# Patient Record
Sex: MALE | Race: WHITE | NOT HISPANIC OR LATINO
[De-identification: names, ages, dates, MRNs, and addresses within clinical notes are randomized per-mention and may not be internally consistent; named-entity substitution may affect disease eponyms.]

---

## 2017-01-30 ENCOUNTER — APPOINTMENT (OUTPATIENT)
Dept: PULMONOLOGY | Facility: CLINIC | Age: 72
End: 2017-01-30

## 2018-06-16 ENCOUNTER — INPATIENT (INPATIENT)
Facility: HOSPITAL | Age: 73
LOS: 2 days | Discharge: HOME | DRG: 314 | End: 2018-06-19
Attending: STUDENT IN AN ORGANIZED HEALTH CARE EDUCATION/TRAINING PROGRAM | Admitting: STUDENT IN AN ORGANIZED HEALTH CARE EDUCATION/TRAINING PROGRAM
Payer: COMMERCIAL

## 2018-06-16 VITALS
SYSTOLIC BLOOD PRESSURE: 105 MMHG | HEART RATE: 124 BPM | OXYGEN SATURATION: 86 % | RESPIRATION RATE: 34 BRPM | DIASTOLIC BLOOD PRESSURE: 59 MMHG

## 2018-06-16 DIAGNOSIS — R09.02 HYPOXEMIA: ICD-10-CM

## 2018-06-16 DIAGNOSIS — I10 ESSENTIAL (PRIMARY) HYPERTENSION: ICD-10-CM

## 2018-06-16 DIAGNOSIS — Z90.89 ACQUIRED ABSENCE OF OTHER ORGANS: Chronic | ICD-10-CM

## 2018-06-16 DIAGNOSIS — Z72.0 TOBACCO USE: ICD-10-CM

## 2018-06-16 DIAGNOSIS — R57.9 SHOCK, UNSPECIFIED: ICD-10-CM

## 2018-06-16 DIAGNOSIS — E78.5 HYPERLIPIDEMIA, UNSPECIFIED: ICD-10-CM

## 2018-06-16 DIAGNOSIS — N39.0 URINARY TRACT INFECTION, SITE NOT SPECIFIED: ICD-10-CM

## 2018-06-16 DIAGNOSIS — N17.9 ACUTE KIDNEY FAILURE, UNSPECIFIED: ICD-10-CM

## 2018-06-16 DIAGNOSIS — I95.9 HYPOTENSION, UNSPECIFIED: ICD-10-CM

## 2018-06-16 DIAGNOSIS — Z87.442 PERSONAL HISTORY OF URINARY CALCULI: Chronic | ICD-10-CM

## 2018-06-16 DIAGNOSIS — I48.91 UNSPECIFIED ATRIAL FIBRILLATION: ICD-10-CM

## 2018-06-16 DIAGNOSIS — I31.3 PERICARDIAL EFFUSION (NONINFLAMMATORY): ICD-10-CM

## 2018-06-16 LAB
ALBUMIN SERPL ELPH-MCNC: 3.1 G/DL — LOW (ref 3.3–5)
ALBUMIN SERPL ELPH-MCNC: 3.4 G/DL — SIGNIFICANT CHANGE UP (ref 3.3–5)
ALP SERPL-CCNC: 95 U/L — SIGNIFICANT CHANGE UP (ref 40–120)
ALP SERPL-CCNC: 95 U/L — SIGNIFICANT CHANGE UP (ref 40–120)
ALT FLD-CCNC: 10 U/L — SIGNIFICANT CHANGE UP (ref 10–45)
ALT FLD-CCNC: 12 U/L — SIGNIFICANT CHANGE UP (ref 10–45)
ANION GAP SERPL CALC-SCNC: 18 MMOL/L — HIGH (ref 5–17)
ANION GAP SERPL CALC-SCNC: 21 MMOL/L — HIGH (ref 5–17)
APPEARANCE UR: ABNORMAL
APTT BLD: 25.6 SEC — LOW (ref 27.5–37.4)
APTT BLD: 27.8 SEC — SIGNIFICANT CHANGE UP (ref 27.5–37.4)
AST SERPL-CCNC: 11 U/L — SIGNIFICANT CHANGE UP (ref 10–40)
AST SERPL-CCNC: 12 U/L — SIGNIFICANT CHANGE UP (ref 10–40)
BACTERIA # UR AUTO: ABNORMAL /HPF
BASE EXCESS BLDV CALC-SCNC: -0.1 MMOL/L — SIGNIFICANT CHANGE UP (ref -2–2)
BASOPHILS # BLD AUTO: 0 K/UL — SIGNIFICANT CHANGE UP (ref 0–0.2)
BASOPHILS # BLD AUTO: 0 K/UL — SIGNIFICANT CHANGE UP (ref 0–0.2)
BASOPHILS NFR BLD AUTO: 0 % — SIGNIFICANT CHANGE UP (ref 0–2)
BASOPHILS NFR BLD AUTO: 0.1 % — SIGNIFICANT CHANGE UP (ref 0–2)
BILIRUB SERPL-MCNC: 0.5 MG/DL — SIGNIFICANT CHANGE UP (ref 0.2–1.2)
BILIRUB SERPL-MCNC: 0.7 MG/DL — SIGNIFICANT CHANGE UP (ref 0.2–1.2)
BILIRUB UR-MCNC: NEGATIVE — SIGNIFICANT CHANGE UP
BLD GP AB SCN SERPL QL: NEGATIVE — SIGNIFICANT CHANGE UP
BUN SERPL-MCNC: 77 MG/DL — HIGH (ref 7–23)
BUN SERPL-MCNC: 86 MG/DL — HIGH (ref 7–23)
CA-I SERPL-SCNC: 1.18 MMOL/L — SIGNIFICANT CHANGE UP (ref 1.12–1.3)
CALCIUM SERPL-MCNC: 8.7 MG/DL — SIGNIFICANT CHANGE UP (ref 8.4–10.5)
CALCIUM SERPL-MCNC: 9.4 MG/DL — SIGNIFICANT CHANGE UP (ref 8.4–10.5)
CHLORIDE BLDV-SCNC: 96 MMOL/L — SIGNIFICANT CHANGE UP (ref 96–108)
CHLORIDE SERPL-SCNC: 91 MMOL/L — LOW (ref 96–108)
CHLORIDE SERPL-SCNC: 96 MMOL/L — SIGNIFICANT CHANGE UP (ref 96–108)
CHLORIDE UR-SCNC: <35 MMOL/L — SIGNIFICANT CHANGE UP
CK MB CFR SERPL CALC: 2.8 NG/ML — SIGNIFICANT CHANGE UP (ref 0–6.7)
CK SERPL-CCNC: 55 U/L — SIGNIFICANT CHANGE UP (ref 30–200)
CO2 BLDV-SCNC: 26 MMOL/L — SIGNIFICANT CHANGE UP (ref 22–30)
CO2 SERPL-SCNC: 19 MMOL/L — LOW (ref 22–31)
CO2 SERPL-SCNC: 21 MMOL/L — LOW (ref 22–31)
COLOR SPEC: YELLOW — SIGNIFICANT CHANGE UP
COMMENT - URINE: SIGNIFICANT CHANGE UP
COMMENT - URINE: SIGNIFICANT CHANGE UP
CREAT ?TM UR-MCNC: 102 MG/DL — SIGNIFICANT CHANGE UP
CREAT SERPL-MCNC: 2.76 MG/DL — HIGH (ref 0.5–1.3)
CREAT SERPL-MCNC: 3.42 MG/DL — HIGH (ref 0.5–1.3)
DIFF PNL FLD: ABNORMAL
EOSINOPHIL # BLD AUTO: 0.1 K/UL — SIGNIFICANT CHANGE UP (ref 0–0.5)
EOSINOPHIL # BLD AUTO: 0.2 K/UL — SIGNIFICANT CHANGE UP (ref 0–0.5)
EOSINOPHIL NFR BLD AUTO: 0.7 % — SIGNIFICANT CHANGE UP (ref 0–6)
EOSINOPHIL NFR BLD AUTO: 0.7 % — SIGNIFICANT CHANGE UP (ref 0–6)
EPI CELLS # UR: SIGNIFICANT CHANGE UP /HPF
GAS PNL BLDV: 133 MMOL/L — LOW (ref 136–145)
GAS PNL BLDV: SIGNIFICANT CHANGE UP
GAS PNL BLDV: SIGNIFICANT CHANGE UP
GLUCOSE BLDV-MCNC: 217 MG/DL — HIGH (ref 70–99)
GLUCOSE SERPL-MCNC: 216 MG/DL — HIGH (ref 70–99)
GLUCOSE SERPL-MCNC: 362 MG/DL — HIGH (ref 70–99)
GLUCOSE UR QL: NEGATIVE — SIGNIFICANT CHANGE UP
HCO3 BLDV-SCNC: 25 MMOL/L — SIGNIFICANT CHANGE UP (ref 21–29)
HCT VFR BLD CALC: 35.6 % — LOW (ref 39–50)
HCT VFR BLD CALC: 39.1 % — SIGNIFICANT CHANGE UP (ref 39–50)
HCT VFR BLDA CALC: 36 % — LOW (ref 39–50)
HGB BLD CALC-MCNC: 11.8 G/DL — LOW (ref 13–17)
HGB BLD-MCNC: 12.3 G/DL — LOW (ref 13–17)
HGB BLD-MCNC: 13.4 G/DL — SIGNIFICANT CHANGE UP (ref 13–17)
HYALINE CASTS # UR AUTO: ABNORMAL
INR BLD: 1.28 RATIO — HIGH (ref 0.88–1.16)
INR BLD: 1.3 RATIO — HIGH (ref 0.88–1.16)
KETONES UR-MCNC: ABNORMAL
LACTATE BLDV-MCNC: 2.2 MMOL/L — HIGH (ref 0.7–2)
LACTATE SERPL-SCNC: 1.3 MMOL/L — SIGNIFICANT CHANGE UP (ref 0.7–2)
LEUKOCYTE ESTERASE UR-ACNC: ABNORMAL
LYMPHOCYTES # BLD AUTO: 0.6 K/UL — LOW (ref 1–3.3)
LYMPHOCYTES # BLD AUTO: 1.4 K/UL — SIGNIFICANT CHANGE UP (ref 1–3.3)
LYMPHOCYTES # BLD AUTO: 3.3 % — LOW (ref 13–44)
LYMPHOCYTES # BLD AUTO: 6.6 % — LOW (ref 13–44)
MAGNESIUM SERPL-MCNC: 2.3 MG/DL — SIGNIFICANT CHANGE UP (ref 1.6–2.6)
MAGNESIUM SERPL-MCNC: 2.4 MG/DL — SIGNIFICANT CHANGE UP (ref 1.6–2.6)
MCHC RBC-ENTMCNC: 30.7 PG — SIGNIFICANT CHANGE UP (ref 27–34)
MCHC RBC-ENTMCNC: 31.2 PG — SIGNIFICANT CHANGE UP (ref 27–34)
MCHC RBC-ENTMCNC: 34.3 GM/DL — SIGNIFICANT CHANGE UP (ref 32–36)
MCHC RBC-ENTMCNC: 34.6 GM/DL — SIGNIFICANT CHANGE UP (ref 32–36)
MCV RBC AUTO: 89.6 FL — SIGNIFICANT CHANGE UP (ref 80–100)
MCV RBC AUTO: 90.1 FL — SIGNIFICANT CHANGE UP (ref 80–100)
MONOCYTES # BLD AUTO: 0.5 K/UL — SIGNIFICANT CHANGE UP (ref 0–0.9)
MONOCYTES # BLD AUTO: 2.5 K/UL — HIGH (ref 0–0.9)
MONOCYTES NFR BLD AUTO: 12.1 % — SIGNIFICANT CHANGE UP (ref 2–14)
MONOCYTES NFR BLD AUTO: 2.4 % — SIGNIFICANT CHANGE UP (ref 2–14)
NEUTROPHILS # BLD AUTO: 16.7 K/UL — HIGH (ref 1.8–7.4)
NEUTROPHILS # BLD AUTO: 17.3 K/UL — HIGH (ref 1.8–7.4)
NEUTROPHILS NFR BLD AUTO: 80.5 % — HIGH (ref 43–77)
NEUTROPHILS NFR BLD AUTO: 93.5 % — HIGH (ref 43–77)
NITRITE UR-MCNC: NEGATIVE — SIGNIFICANT CHANGE UP
NT-PROBNP SERPL-SCNC: 1818 PG/ML — HIGH (ref 0–300)
OTHER CELLS CSF MANUAL: 7 ML/DL — LOW (ref 18–22)
PCO2 BLDV: 42 MMHG — SIGNIFICANT CHANGE UP (ref 35–50)
PH BLDV: 7.38 — SIGNIFICANT CHANGE UP (ref 7.35–7.45)
PH UR: 5.5 — SIGNIFICANT CHANGE UP (ref 5–8)
PHOSPHATE SERPL-MCNC: 3.5 MG/DL — SIGNIFICANT CHANGE UP (ref 2.5–4.5)
PHOSPHATE SERPL-MCNC: 4.4 MG/DL — SIGNIFICANT CHANGE UP (ref 2.5–4.5)
PLAT MORPH BLD: NORMAL — SIGNIFICANT CHANGE UP
PLATELET # BLD AUTO: 382 K/UL — SIGNIFICANT CHANGE UP (ref 150–400)
PLATELET # BLD AUTO: 397 K/UL — SIGNIFICANT CHANGE UP (ref 150–400)
PO2 BLDV: 27 MMHG — SIGNIFICANT CHANGE UP (ref 25–45)
POTASSIUM BLDV-SCNC: 4.2 MMOL/L — SIGNIFICANT CHANGE UP (ref 3.5–5.3)
POTASSIUM SERPL-MCNC: 4.5 MMOL/L — SIGNIFICANT CHANGE UP (ref 3.5–5.3)
POTASSIUM SERPL-MCNC: 4.9 MMOL/L — SIGNIFICANT CHANGE UP (ref 3.5–5.3)
POTASSIUM SERPL-SCNC: 4.5 MMOL/L — SIGNIFICANT CHANGE UP (ref 3.5–5.3)
POTASSIUM SERPL-SCNC: 4.9 MMOL/L — SIGNIFICANT CHANGE UP (ref 3.5–5.3)
POTASSIUM UR-SCNC: 36 MMOL/L — SIGNIFICANT CHANGE UP
PROCALCITONIN SERPL-MCNC: 0.32 NG/ML — HIGH (ref 0.02–0.1)
PROT SERPL-MCNC: 7.3 G/DL — SIGNIFICANT CHANGE UP (ref 6–8.3)
PROT SERPL-MCNC: 7.6 G/DL — SIGNIFICANT CHANGE UP (ref 6–8.3)
PROT UR-MCNC: 30 MG/DL
PROTHROM AB SERPL-ACNC: 14 SEC — HIGH (ref 9.8–12.7)
PROTHROM AB SERPL-ACNC: 14.3 SEC — HIGH (ref 9.8–12.7)
RAPID RVP RESULT: SIGNIFICANT CHANGE UP
RBC # BLD: 3.95 M/UL — LOW (ref 4.2–5.8)
RBC # BLD: 4.36 M/UL — SIGNIFICANT CHANGE UP (ref 4.2–5.8)
RBC # FLD: 12.7 % — SIGNIFICANT CHANGE UP (ref 10.3–14.5)
RBC # FLD: 12.8 % — SIGNIFICANT CHANGE UP (ref 10.3–14.5)
RBC BLD AUTO: SIGNIFICANT CHANGE UP
RBC CASTS # UR COMP ASSIST: ABNORMAL /HPF (ref 0–2)
RH IG SCN BLD-IMP: POSITIVE — SIGNIFICANT CHANGE UP
SAO2 % BLDV: 46 % — LOW (ref 67–88)
SODIUM SERPL-SCNC: 133 MMOL/L — LOW (ref 135–145)
SODIUM SERPL-SCNC: 133 MMOL/L — LOW (ref 135–145)
SODIUM UR-SCNC: 32 MMOL/L — SIGNIFICANT CHANGE UP
SP GR SPEC: 1.01 — SIGNIFICANT CHANGE UP (ref 1.01–1.02)
TROPONIN T, HIGH SENSITIVITY RESULT: 30 NG/L — SIGNIFICANT CHANGE UP (ref 0–51)
TROPONIN T, HIGH SENSITIVITY RESULT: 40 NG/L — SIGNIFICANT CHANGE UP (ref 0–51)
TROPONIN T, HIGH SENSITIVITY RESULT: 49 NG/L — SIGNIFICANT CHANGE UP (ref 0–51)
TSH SERPL-MCNC: 0.01 UIU/ML — LOW (ref 0.27–4.2)
UROBILINOGEN FLD QL: 4 MG/DL
WBC # BLD: 18.5 K/UL — HIGH (ref 3.8–10.5)
WBC # BLD: 20.8 K/UL — HIGH (ref 3.8–10.5)
WBC # FLD AUTO: 18.5 K/UL — HIGH (ref 3.8–10.5)
WBC # FLD AUTO: 20.8 K/UL — HIGH (ref 3.8–10.5)
WBC UR QL: SIGNIFICANT CHANGE UP /HPF (ref 0–5)

## 2018-06-16 PROCEDURE — 93308 TTE F-UP OR LMTD: CPT | Mod: 26,GC

## 2018-06-16 PROCEDURE — 99223 1ST HOSP IP/OBS HIGH 75: CPT | Mod: GC

## 2018-06-16 PROCEDURE — 93010 ELECTROCARDIOGRAM REPORT: CPT

## 2018-06-16 PROCEDURE — 71250 CT THORAX DX C-: CPT | Mod: 26

## 2018-06-16 PROCEDURE — 93308 TTE F-UP OR LMTD: CPT | Mod: 26

## 2018-06-16 PROCEDURE — 99291 CRITICAL CARE FIRST HOUR: CPT

## 2018-06-16 PROCEDURE — 71045 X-RAY EXAM CHEST 1 VIEW: CPT | Mod: 26

## 2018-06-16 PROCEDURE — 93306 TTE W/DOPPLER COMPLETE: CPT | Mod: 26

## 2018-06-16 RX ORDER — LANOLIN ALCOHOL/MO/W.PET/CERES
5 CREAM (GRAM) TOPICAL AT BEDTIME
Qty: 0 | Refills: 0 | Status: DISCONTINUED | OUTPATIENT
Start: 2018-06-16 | End: 2018-06-19

## 2018-06-16 RX ORDER — GLUCAGON INJECTION, SOLUTION 0.5 MG/.1ML
1 INJECTION, SOLUTION SUBCUTANEOUS ONCE
Qty: 0 | Refills: 0 | Status: DISCONTINUED | OUTPATIENT
Start: 2018-06-16 | End: 2018-06-19

## 2018-06-16 RX ORDER — AZITHROMYCIN 500 MG/1
500 TABLET, FILM COATED ORAL ONCE
Qty: 0 | Refills: 0 | Status: COMPLETED | OUTPATIENT
Start: 2018-06-16 | End: 2018-06-16

## 2018-06-16 RX ORDER — IPRATROPIUM/ALBUTEROL SULFATE 18-103MCG
3 AEROSOL WITH ADAPTER (GRAM) INHALATION ONCE
Qty: 0 | Refills: 0 | Status: COMPLETED | OUTPATIENT
Start: 2018-06-16 | End: 2018-06-16

## 2018-06-16 RX ORDER — DEXTROSE 50 % IN WATER 50 %
25 SYRINGE (ML) INTRAVENOUS ONCE
Qty: 0 | Refills: 0 | Status: DISCONTINUED | OUTPATIENT
Start: 2018-06-16 | End: 2018-06-19

## 2018-06-16 RX ORDER — INSULIN LISPRO 100/ML
VIAL (ML) SUBCUTANEOUS AT BEDTIME
Qty: 0 | Refills: 0 | Status: DISCONTINUED | OUTPATIENT
Start: 2018-06-16 | End: 2018-06-19

## 2018-06-16 RX ORDER — AZTREONAM 2 G
2000 VIAL (EA) INJECTION EVERY 6 HOURS
Qty: 0 | Refills: 0 | Status: DISCONTINUED | OUTPATIENT
Start: 2018-06-16 | End: 2018-06-16

## 2018-06-16 RX ORDER — SODIUM CHLORIDE 9 MG/ML
500 INJECTION INTRAMUSCULAR; INTRAVENOUS; SUBCUTANEOUS ONCE
Qty: 0 | Refills: 0 | Status: COMPLETED | OUTPATIENT
Start: 2018-06-16 | End: 2018-06-16

## 2018-06-16 RX ORDER — IPRATROPIUM/ALBUTEROL SULFATE 18-103MCG
3 AEROSOL WITH ADAPTER (GRAM) INHALATION EVERY 6 HOURS
Qty: 0 | Refills: 0 | Status: DISCONTINUED | OUTPATIENT
Start: 2018-06-16 | End: 2018-06-19

## 2018-06-16 RX ORDER — DEXTROSE 50 % IN WATER 50 %
12.5 SYRINGE (ML) INTRAVENOUS ONCE
Qty: 0 | Refills: 0 | Status: DISCONTINUED | OUTPATIENT
Start: 2018-06-16 | End: 2018-06-19

## 2018-06-16 RX ORDER — NOREPINEPHRINE BITARTRATE/D5W 8 MG/250ML
0.05 PLASTIC BAG, INJECTION (ML) INTRAVENOUS
Qty: 8 | Refills: 0 | Status: DISCONTINUED | OUTPATIENT
Start: 2018-06-16 | End: 2018-06-17

## 2018-06-16 RX ORDER — AZTREONAM 2 G
500 VIAL (EA) INJECTION EVERY 8 HOURS
Qty: 0 | Refills: 0 | Status: DISCONTINUED | OUTPATIENT
Start: 2018-06-16 | End: 2018-06-17

## 2018-06-16 RX ORDER — CEFTRIAXONE 500 MG/1
1 INJECTION, POWDER, FOR SOLUTION INTRAMUSCULAR; INTRAVENOUS ONCE
Qty: 0 | Refills: 0 | Status: DISCONTINUED | OUTPATIENT
Start: 2018-06-16 | End: 2018-06-16

## 2018-06-16 RX ORDER — SODIUM CHLORIDE 9 MG/ML
2000 INJECTION INTRAMUSCULAR; INTRAVENOUS; SUBCUTANEOUS ONCE
Qty: 0 | Refills: 0 | Status: COMPLETED | OUTPATIENT
Start: 2018-06-16 | End: 2018-06-16

## 2018-06-16 RX ORDER — SODIUM CHLORIDE 9 MG/ML
1000 INJECTION INTRAMUSCULAR; INTRAVENOUS; SUBCUTANEOUS ONCE
Qty: 0 | Refills: 0 | Status: COMPLETED | OUTPATIENT
Start: 2018-06-16 | End: 2018-06-16

## 2018-06-16 RX ORDER — INSULIN LISPRO 100/ML
VIAL (ML) SUBCUTANEOUS
Qty: 0 | Refills: 0 | Status: DISCONTINUED | OUTPATIENT
Start: 2018-06-16 | End: 2018-06-19

## 2018-06-16 RX ORDER — DEXTROSE 50 % IN WATER 50 %
15 SYRINGE (ML) INTRAVENOUS ONCE
Qty: 0 | Refills: 0 | Status: DISCONTINUED | OUTPATIENT
Start: 2018-06-16 | End: 2018-06-19

## 2018-06-16 RX ORDER — AZTREONAM 2 G
2000 VIAL (EA) INJECTION ONCE
Qty: 0 | Refills: 0 | Status: COMPLETED | OUTPATIENT
Start: 2018-06-16 | End: 2018-06-16

## 2018-06-16 RX ORDER — SODIUM CHLORIDE 9 MG/ML
1000 INJECTION, SOLUTION INTRAVENOUS
Qty: 0 | Refills: 0 | Status: DISCONTINUED | OUTPATIENT
Start: 2018-06-16 | End: 2018-06-19

## 2018-06-16 RX ORDER — NICOTINE POLACRILEX 2 MG
1 GUM BUCCAL DAILY
Qty: 0 | Refills: 0 | Status: DISCONTINUED | OUTPATIENT
Start: 2018-06-16 | End: 2018-06-17

## 2018-06-16 RX ADMIN — Medication 7.5 MICROGRAM(S)/KG/MIN: at 13:31

## 2018-06-16 RX ADMIN — Medication 5 MILLIGRAM(S): at 22:46

## 2018-06-16 RX ADMIN — Medication 100 MILLIGRAM(S): at 14:39

## 2018-06-16 RX ADMIN — Medication 125 MILLIGRAM(S): at 11:45

## 2018-06-16 RX ADMIN — AZITHROMYCIN 250 MILLIGRAM(S): 500 TABLET, FILM COATED ORAL at 12:39

## 2018-06-16 RX ADMIN — Medication 1 PATCH: at 16:54

## 2018-06-16 RX ADMIN — Medication 3 MILLILITER(S): at 11:45

## 2018-06-16 RX ADMIN — Medication 3 MILLILITER(S): at 11:55

## 2018-06-16 RX ADMIN — Medication 7.5 MICROGRAM(S)/KG/MIN: at 20:30

## 2018-06-16 RX ADMIN — SODIUM CHLORIDE 1000 MILLILITER(S): 9 INJECTION INTRAMUSCULAR; INTRAVENOUS; SUBCUTANEOUS at 15:45

## 2018-06-16 RX ADMIN — SODIUM CHLORIDE 2000 MILLILITER(S): 9 INJECTION INTRAMUSCULAR; INTRAVENOUS; SUBCUTANEOUS at 11:50

## 2018-06-16 RX ADMIN — SODIUM CHLORIDE 1000 MILLILITER(S): 9 INJECTION INTRAMUSCULAR; INTRAVENOUS; SUBCUTANEOUS at 13:12

## 2018-06-16 NOTE — H&P ADULT - NEGATIVE GENERAL GENITOURINARY SYMPTOMS
no flank pain R/no urine discoloration/no nocturia/no incontinence/no urinary hesitancy/no hematuria/no flank pain L/no dysuria

## 2018-06-16 NOTE — ED PROVIDER NOTE - PROGRESS NOTE DETAILS
Patient with B-lines on right now, IVC still collapsible, will limit fluids due to concern for overload and start peripheral pressors. Patient seen by MICU, on echo now showing early signs of tamponade, stat cards consult called. Teena REYNA: Patient signed out by Dr. Gonzalez pending cardiology recommendations. Patient accepted to CCU for early tamponade. Patient's BP is stable.

## 2018-06-16 NOTE — H&P ADULT - NEGATIVE ENMT SYMPTOMS
no nasal discharge/no throat pain/no nose bleeds/no dysphagia/no tinnitus/no vertigo/no sinus symptoms/no nasal congestion/no hearing difficulty

## 2018-06-16 NOTE — H&P ADULT - PROBLEM SELECTOR PLAN 6
pt satting 86% on RA on admission w/ audible wheezing - ? undiagnosed COPD given significant smoking history, now satting well on 2L, in no distress, speaking in full sentences, no infiltrate on CT chest, does not appear acutely exacerbated @ this time, will order nichelle PRN  Wells criteria 1.5, low suspicion PE despite recent long car ride given improved sat and HR.  does not appear volume overloaded on exam pt satting 86% on RA on admission w/ audible wheezing - ? undiagnosed COPD given significant smoking history, now satting well on 2L, in no distress, speaking in full sentences, no infiltrate on CT chest, does not appear acutely exacerbated @ this time, will order nichelle PRN  Wells criteria 1.5, low suspicion PE despite recent long car ride given improved sat and HR.  Pt w/ BALTAZAR worsening x 3 mos w/ multiple possible causes including undiagnosed COPD, noted moderate AS on TTE and ? pt going into AF w/ out symptoms. Does not appear volume overloaded on exam

## 2018-06-16 NOTE — ED PROVIDER NOTE - MEDICAL DECISION MAKING DETAILS
72 year old male, past medical history HTN, hyperlipidemia, DM, presents to the ED for shortness of breath. Patient with increasing weakness, fatigue, decreased PO intake. Patient A-fib RVR at PMD's office. On exam hypotensive, tachycardic in 110s but well appearing, mentating well, tachypnea with diffuse rhonchi, few wheezes. POCUS shows pleural effusion without signs of tamponade, collapsible IVC. Could be in hypovolemic shock given decreased PO intake vs septic shock given shortness of breath with pulmonary source. Also with difficulty urinating could have UTI. The shock like state likely placed patient in renal failure given decreased urine output. Will start empiric abx, fluids, pressors if needed. Hold further diltiazem see if fluids helps with the a-fib RVR. Don't suspect calcium channel blocker overdose given not bradycardic. Also, given shortness of breath and smoking history concern for COPD exacerbation given smoking history. Check labwork, ekg, cxr, ua, culture, and reeval.

## 2018-06-16 NOTE — H&P ADULT - ASSESSMENT
71 yo M HTN, HLD, DM2 (does not know a1c, does not check glucose), nephrolithiasis, pulm nodules p/w hypoxia on RA, new A fib RVR ([id number]), pericardial effusion w/ possible  hypotension s/p 3.5 L NS on levophed gtt, PARRISH, UTI

## 2018-06-16 NOTE — CONSULT NOTE ADULT - ASSESSMENT
72M pmh of HTN, hyperlipidemia, DM, COPD, 1 ppd current everyday smoker, presented to the ER, sent from urgent care, where he was found to be in rapid afib with RVR and short of breath.  Work up revealed a large pericardial effusion on echo and mild pericardial effusion without tamponade physiology on CT scan.  Interventional cardiology and IR were consulted regarding urgent drainage and both communicated that the location would not be amenable to drainage.   CTS was consulted regarding surgical intervention for therapeutic/diagnostic pericardiocentesis.   As patient is hemodynamically stable, with /70 on levophed that can be titrated to off, and chest CT without evidence of tamponade physiology, patient to be monitored in CCU and we will continue to follow along with you.      [id number]  CTS 72M pmh of HTN, hyperlipidemia, DM, COPD, 1 ppd current everyday smoker, presented to the ER, sent from urgent care, where he was found to be in rapid afib with RVR and short of breath.  Work up revealed a large pericardial effusion on echo and mild pericardial effusion without tamponade physiology on CT scan.  Interventional cardiology and IR were consulted regarding urgent drainage and both communicated that the location would not be amenable to drainage.   CTS was consulted regarding surgical intervention for therapeutic/diagnostic pericardiocentesis.   As patient is hemodynamically stable, with /70 on levophed that can be titrated to off, and chest CT without evidence of tamponade physiology, urgent surgical intervention is not indicated at this time.  Reconsult as needed.    [id number]  CTS

## 2018-06-16 NOTE — CHART NOTE - NSCHARTNOTEFT_GEN_A_CORE
====================  CCU MIDNIGHT ROUNDS  ====================    MILAGROS VIERA  [id number]  Patient is a 72y old  Male who presents with a chief complaint of AF RVR, hypotension (16 Jun 2018 22:13)      ====================  SUMMARY:  ====================    HPI:  71 yo M HTN, HLD, DM2 (does not know a1c, does not check glucose), nephrolithiasis, pulm nodules p/w 3-4 days of decreased appetite, dec UOP and malaise/fatigue. Pt presented to urgent care this AM and was sent to the ED for hypotension and tachycardia (reported AF RVR), s/p 10 mg IVP cardizem per EMS per ED note. Pt does not know what medications he takes or if any have been changed recently. He denies prior history of A fib. He last saw his PCP ~1 mo ago.   In the ED, pt was wheezing & satting 86% on RA w/ improved O2 sat s/p 3 duonebs and 4L O2 via NC. Pt given 125 mg IV solu medrol. Pt tachycardic 100s-120s w/ SBP as low as 70s systolic. Pt given 3.5L NS and started on levophed for hypotension. Give azithromax for COPD exacerbation and azactam for PNA. UA +. Pt noted to have PARRISH (denies history of CKD). CT chest w/ mild pericardial effusion mild increased ground glass opacity in the RUL suggestive of asymmetric pulmonary edema and POCUS w/ pericardial effusion. Formal TTE showing moderate AS, large (>2 cm) pericardial effusion around LV that appears smaller around the RV (<1cm), no respiratory variation across the MV, envagination of the RA, no RV diastolic collapse is seen, IVC is poorly seen. Pt converted to sinus rhythm and is without complaints @ this time, stating in the ED he ate and urinated for the first time in 4 days.  Pt denies previous cardiac history. Denies previous arrhtyhmia or heart failure. His exercise tolerance has decreased over the past ~3 mos, he now gets dyspneic after walking ~ .5 mile. No CP/palpitations/SOB. Denies orthopnea, PND, LE edema, weight gain or loss. No history of PE/DVT or malignancy. No nausea/vomiting/diarrhea. No fevers/chills. Has chronic smoker's cough occasionally, no sputum production.  No sick contacts. No known tick bites. He has driven ~14 hours recently and was recently in the [facility] and [place] (16 Jun 2018 22:13)      ====================  NEW EVENTS:  ====================        ====================  VITALS (Last 12 hrs):  ====================    T(C): 36.4 (06-16-18 @ 23:00), Max: 37.1 (06-16-18 @ 16:57)  T(F): 97.6 (06-16-18 @ 23:00), Max: 98.8 (06-16-18 @ 16:57)  HR: 102 (06-16-18 @ 23:00) (87 - 124)  BP: 125/68 (06-16-18 @ 23:00) (53/56 - 133/70)  BP(mean): 85 (06-16-18 @ 23:00) (81 - 92)  ABP: --  ABP(mean): --  RR: 22 (06-16-18 @ 23:00) (18 - 34)  SpO2: 93% (06-16-18 @ 23:00) (86% - 100%)  Wt(kg): --  CVP(mm Hg): --  CVP(cm H2O): --  CO: --  CI: --  PA: --  PA(mean): --  PCWP: --  SVR: --  PVR: --    I&O's Summary    16 Jun 2018 07:01  -  16 Jun 2018 23:24  --------------------------------------------------------  IN: 330 mL / OUT: 400 mL / NET: -70 mL            ====================  NEW LABS:  ====================                          12.3   18.5  )-----------( 397      ( 16 Jun 2018 21:17 )             35.6     06-16    133<L>  |  96  |  77<H>  ----------------------------<  362<H>  4.9   |  19<L>  |  2.76<H>    Ca    8.7      16 Jun 2018 21:17  Phos  3.5     06-16  Mg     2.3     06-16    TPro  7.3  /  Alb  3.1<L>  /  TBili  0.5  /  DBili  x   /  AST  11  /  ALT  10  /  AlkPhos  95  06-16    PT/INR - ( 16 Jun 2018 21:17 )   PT: 14.3 sec;   INR: 1.30 ratio         PTT - ( 16 Jun 2018 21:17 )  PTT:25.6 sec  Creatine Kinase, Serum: 55 U/L (06-16-18 @ 21:17)    CKMB Units: 2.8 ng/mL (06-16 @ 21:17)          ====================  PLAN:  ====================  71 yo M HTN, HLD, DM2 (does not know a1c, does not check glucose), nephrolithiasis, pulm nodules p/w hypoxia on RA, new A fib RVR ([id number]), pericardial effusion w/ possible  hypotension s/p 3.5 L NS on levophed gtt, PARRISH, UTI          Problem/Plan - 1:  ·  Problem: Hypotension.  Plan: likely multifactorial s/t hypovolemia s/t poor PO intake and decreased UOP now improved s/p volume resuscitation, ? sepsis component w/ +UA, ? r/t pericardial effusion, ? r/t arrhythmia (currently in sinus)  pressor requirements decreasing, wean levophed as tolerated, appears dry to euvolemic on exam, consider additional fluid given poor PO intake PTA, pericardial effusion and preload dependence and possible sepsis s/t UTI.      Problem/Plan - 2:  ·  Problem: Pericardial effusion.  Plan: unclear etiology, no recent illnesses, no signs/symptoms of pericarditis, w/ hx of pulm nodules (stable per CT chest read), CT chest w/ mult anterior mediastinal lymph nodes and paratracheal lymphadenopathy, thyroid glad extending into upper mediastinum   hemodynamically stable @ this time w/ decreasing pressor requirements, appears dry to euvolemic on exam, consider additional fluids to try to wean pressors, CT surgery consulted if need for emergent drain arises   would need cytology if pericardiocentesis performed.      Problem/Plan - 3:  ·  Problem: Atrial fibrillation.  Plan: now in sinus, no known history of AF, no evidence for ischemia, TSH low, check thyroid panel. Treat UTI. Monitor pericardial effusion.  [id number] - hold AC @ this time as pt w/ pericardial effusion and currently in sinus.      Problem/Plan - 4:  ·  Problem: PARRISH (acute kidney injury).  Plan: pt w/ PARRISH, likely pre-renal in nature given poor PO intake and poor UOP, Cr and UOP improved w/ NS in ED   c/w azactam for + UA, f/u urine culture, f/u urine lytes and renal sono  strict I&Os, daily weights, monitor Cr and lytes.      Problem/Plan - 5:  ·  Problem: UTI (urinary tract infection).  Plan: c/w azactam, f/u urine culture.      Problem/Plan - 6:  Problem: Hypoxia. Plan: pt satting 86% on RA on admission w/ audible wheezing - ? undiagnosed COPD given significant smoking history, now satting well on 2L, in no distress, speaking in full sentences, no infiltrate on CT chest, does not appear acutely exacerbated @ this time, will order duonebs PRN  Wells criteria 1.5, low suspicion PE despite recent long car ride given improved sat and HR.  Pt w/ BALTAZAR worsening x 3 mos w/ multiple possible causes including undiagnosed COPD, noted moderate AS on TTE and ? pt going into AF w/ out symptoms. Does not appear volume overloaded on exam.     Problem/Plan - 7:  ·  Problem: Hypertension.  Plan: currently hypotensive, wean pressors as tolerated  call pharmacy in AM for med list as pt does not know home meds.      Problem/Plan - 8:  ·  Problem: Hyperlipidemia.  Plan: clarify and resume home statin.      Problem/Plan - 9:  ·  Problem: Tobacco user.  Plan: counseled on tobacco cessation, nicotine patch offered and accepted     GoC discussion had w/ patient, patient re-affirmed code status DNR/DNI      Joon Mason MD PGY2   [facility] Pager #: [phone]   Pager #: [phone]

## 2018-06-16 NOTE — H&P ADULT - NEGATIVE GENERAL SYMPTOMS
no weight loss/no polyphagia/no chills/no polydipsia/no sweating/no polyuria/no fever/no weight gain

## 2018-06-16 NOTE — PATIENT PROFILE ADULT. - VISION (WITH CORRECTIVE LENSES IF THE PATIENT USUALLY WEARS THEM):
Partially impaired: cannot see medication labels or newsprint, but can see obstacles in path, and the surrounding layout; can count fingers at arm's length/Distance

## 2018-06-16 NOTE — ED PROCEDURE NOTE - PROCEDURE ADDITIONAL DETAILS
Large pericardial effusion without evidence of tamponade  Right sided B lines.  possible pleural effusion on right

## 2018-06-16 NOTE — ED ADULT NURSE REASSESSMENT NOTE - NS ED NURSE REASSESS COMMENT FT1
Levophed drip started at 0.05mcg/kg/min. BP of 91/57 noted, pt communicating well in full sentences. Pt has no complaints at this time, pt remains on 4L NC with SpO2 of 98%. MD at bedside
MD Granados aware of BP of 128/66, told to titrate down on Levophed drip to keep MAP of 65.
Pharmacy called about Nicotine patch.
Pharmacy called again for medication
Pharmacy called for medication
Pt brought to CT with two RNs present and ED Tech.
Pt tried to go to the bathroom in urinal to obtain urine sample but was unable to go, safety maintained.
Pt's BP remains low at 71/51, Levophed increased to 0.3mcg/kg/min. Third IV line started, NS Bolus running. Pt continues to communicate well in full sentences, states he feels tired. Pt is alert and talking with wife at bedside. SpO2 of 95% on 5L NC noted, safety maintained.
Pt's BP went back down to 80's/60's, Levophed increased to 0.5mcg/kg/min
pt remains a&oX4 resting comfortably in high fowlers position, in no apparent distress. Pt remains on 4L NC. No work of breathing noted. Wife at bedside. Levophed drip going.

## 2018-06-16 NOTE — H&P ADULT - PROBLEM SELECTOR PLAN 1
likely multifactorial s/t hypovolemia s/t poor PO intake and decreased UOP now improved s/p volume resuscitation, ? sepsis component w/ +UA, ? r/t pericardial effusion, ? r/t arrhythmia (currently in sinus)  pressor requirements decreasing, wean levophed as tolerated, appears dry to euvolemic on exam, consider additional fluid given poor PO intake PTA, pericardial effusion and preload dependence and possible sepsis s/t UTI

## 2018-06-16 NOTE — CHART NOTE - NSCHARTNOTEFT_GEN_A_CORE
:  Donaldo Chou  INDICATION:  hypotension  PROCEDURE:  [ x] LIMITED ECHO  [ ] LIMITED CHEST  [ ] LIMITED RETROPERITONEAL  [ ] LIMITED ABDOMINAL  [ ] LIMITED DVT  [ ] NEEDLE GUIDANCE VASCULAR  [ ] NEEDLE GUIDANCE THORACENTESIS  [ ] NEEDLE GUIDANCE PARACENTESIS  [ ] NEEDLE GUIDANCE PERICARDIOCENTESIS  [ ] OTHER    FINDINGS:  Large pericardial effusion with RV collapse in diastole  partial RA collapse in systole. Large IVC    INTERPRETATION:.  Early Tamponade.

## 2018-06-16 NOTE — ED PROVIDER NOTE - OBJECTIVE STATEMENT
72 year old male, past medical history HTN, hyperlipidemia, DM, presents to the ED for shortness of breath. Patient reports that since Wednesday he has been feeling weak, fatigued, with decreased appetite. Today patient went to primary medical doctor Dr. Campbell, and was noted to be short of breath and in new A-fib RVR. Patient reports yesterday his Diltiazem was increased to 180mg. He received 10mg Cardizem in the office. Today patient reports increasing shortness of breath. No wheezing. No chest pain, abdominal pain, nausea, vomiting, diaphoresis. Does reports sitting in car for 6 hours 2-3 days ago.     primary medical doctor: Dr. Campbell

## 2018-06-16 NOTE — H&P ADULT - PROBLEM SELECTOR PLAN 4
pt w/ PARRISH, likely pre-renal in nature given poor PO intake and poor UOP, Cr and UOP improved w/ NS in ED   c/w azactam for + UA, f/u urine culture, f/u urine lytes and renal sono  strict I&Os, daily weights, monitor Cr and lytes

## 2018-06-16 NOTE — CONSULT NOTE ADULT - SUBJECTIVE AND OBJECTIVE BOX
CHIEF COMPLAINT:    HPI:  72M PMHx HTN, HLD, DM2 presented to ED from     PAST MEDICAL & SURGICAL HISTORY:  Pulmonary nodules  COPD (chronic obstructive pulmonary disease)  Diabetes mellitus, type 2  Kidney stones  Hypertension  History of tonsillectomy  History of kidney stones      FAMILY HISTORY:  Family history of early CAD (Father): father  MI age 83      SOCIAL HISTORY:  Smoking: [ ] Never Smoked [ ] Former Smoker (__ packs x ___ years) [ ] Current Smoker  (__ packs x ___ years)  Substance Use: [ ] Never Used [ ] Used ____  EtOH Use:  Marital Status: [ ] Single [ ]  [ ]  [ ]   Sexual History:   Occupation:  Recent Travel:  Country of Birth:  Advance Directives:    Allergies    penicillins (Unknown)    Intolerances        HOME MEDICATIONS:    REVIEW OF SYSTEMS:  Constitutional: [x ] negative [ ] fevers [ ] chills [ ] weight loss [ ] weight gain  HEENT: [ ] negative [ ] dry eyes [ ] eye irritation [ ] postnasal drip [ ] nasal congestion  CV: [x ] negative  [ ] chest pain [ ] orthopnea [ ] palpitations [ ] murmur  Resp: [ ] negative [ x] cough [ ] shortness of breath [ ] dyspnea [ ] wheezing [ ] sputum [ ] hemoptysis  GI: [ x] negative [ ] nausea [ ] vomiting [ ] diarrhea [ ] constipation [ ] abd pain [ ] dysphagia   : [ x] negative [ ] dysuria [ ] nocturia [ ] hematuria [ ] increased urinary frequency  Musculoskeletal: [x ] negative [ ] back pain [ ] myalgias [ ] arthralgias [ ] fracture  Skin: [x ] negative [ ] rash [ ] itch  Neurological: [x ] negative [ ] headache [ ] dizziness [ ] syncope [ ] weakness [ ] numbness  Psychiatric: [x ] negative [ ] anxiety [ ] depression  Endocrine: [x ] negative [ ] diabetes [ ] thyroid problem  Hematologic/Lymphatic: [x ] negative [ ] anemia [ ] bleeding problem  Allergic/Immunologic: [ ] negative [ ] itchy eyes [ ] nasal discharge [ ] hives [ ] angioedema  [ ] All other systems negative  [ ] Unable to assess ROS because ________    OBJECTIVE:  ICU Vital Signs Last 24 Hrs  T(C): 36.6 (16 Jun 2018 12:19), Max: 36.6 (16 Jun 2018 12:19)  T(F): 97.8 (16 Jun 2018 12:19), Max: 97.8 (16 Jun 2018 12:19)  HR: 106 (16 Jun 2018 14:34) (87 - 124)  BP: 128/66 (16 Jun 2018 14:34) (53/56 - 128/66)  BP(mean): --  ABP: --  ABP(mean): --  RR: 18 (16 Jun 2018 14:34) (18 - 34)  SpO2: 95% (16 Jun 2018 14:34) (86% - 100%)        CAPILLARY BLOOD GLUCOSE      POCT Blood Glucose.: 218 mg/dL (16 Jun 2018 11:40)      PHYSICAL EXAM:  General: nad, a+o x3  HEENT:   Lymph Nodes:  Neck: supple  Respiratory: CTAB no w/r/r; cough, no resp distress  Cardiovascular: tachycardic, no m/g/r  Abdomen: obese, soft, NT  Extremities: scant pit edema BL  Skin: no ulcers, rashes  Neurological:  Psychiatry: nl affect    LINES:     HOSPITAL MEDICATIONS:  MEDICATIONS  (STANDING):  nicotine -  14 mG/24Hr(s) Patch 1 patch Transdermal daily  norepinephrine Infusion 0.05 MICROgram(s)/kG/Min (7.5 mL/Hr) IV Continuous <Continuous>  sodium chloride 0.9% Bolus 500 milliLiter(s) IV Bolus once    MEDICATIONS  (PRN):      LABS:                        13.4   20.8  )-----------( 382      ( 16 Jun 2018 11:54 )             39.1     Hgb Trend: 13.4<--  06-16    133<L>  |  91<L>  |  86<H>  ----------------------------<  216<H>  4.5   |  21<L>  |  3.42<H>    Ca    9.4      16 Jun 2018 11:54  Phos  4.4     06-16  Mg     2.4     06-16    TPro  7.6  /  Alb  3.4  /  TBili  0.7  /  DBili  x   /  AST  12  /  ALT  12  /  AlkPhos  95  06-16    Creatinine Trend: 3.42<--  PT/INR - ( 16 Jun 2018 11:54 )   PT: 14.0 sec;   INR: 1.28 ratio         PTT - ( 16 Jun 2018 11:54 )  PTT:27.8 sec      Venous Blood Gas:  06-16 @ 11:54  7.38/42/27/25/46  VBG Lactate: 2.2      MICROBIOLOGY:     RADIOLOGY:  [ ] Reviewed and interpreted by me    EKG: CHIEF COMPLAINT:    HPI:  72 year old male, past medical history HTN, hyperlipidemia, DM, presents to the ED for shortness of breath. Patient reports that since Wednesday he has been feeling weak, fatigued, with decreased appetite. Today patient went to primary medical doctor Dr. Campbell, and was noted to be short of breath and in new A-fib RVR. Patient reports yesterday his Diltiazem was increased to 180mg. He received 10mg Cardizem in the office. Today patient reports increasing shortness of breath. No wheezing. No chest pain, abdominal pain, nausea, vomiting, diaphoresis. Does reports sitting in car for 6 hours 2-3 days ago.    In ED, T: 97.8, HR: 110, BP: 92/60, RR: 23 SpO2: 96%  Labs: WBC elevated to 20.8, PARRISH to 3.42 (last 1.06 in 2016), BNP: 1818  POCUS showing pericardial effusion with early tamponade     PAST MEDICAL & SURGICAL HISTORY:  Pulmonary nodules  COPD (chronic obstructive pulmonary disease)  Diabetes mellitus, type 2  Kidney stones  Hypertension  History of tonsillectomy  History of kidney stones      FAMILY HISTORY:  Family history of early CAD (Father): father  MI age 83      SOCIAL HISTORY:  Smoking: [ ] Never Smoked [ ] Former Smoker (__ packs x ___ years) [ ] Current Smoker  (__ packs x ___ years)  Substance Use: [ ] Never Used [ ] Used ____  EtOH Use:  Marital Status: [ ] Single [ ]  [ ]  [ ]   Sexual History:   Occupation:  Recent Travel:  Country of Birth:  Advance Directives:    Allergies    penicillins (Unknown)    Intolerances        HOME MEDICATIONS:    REVIEW OF SYSTEMS:  Constitutional: [x ] negative [ ] fevers [ ] chills [ ] weight loss [ ] weight gain  HEENT: [ ] negative [ ] dry eyes [ ] eye irritation [ ] postnasal drip [ ] nasal congestion  CV: [x ] negative  [ ] chest pain [ ] orthopnea [ ] palpitations [ ] murmur  Resp: [ ] negative [ x] cough [ ] shortness of breath [ ] dyspnea [ ] wheezing [ ] sputum [ ] hemoptysis  GI: [ x] negative [ ] nausea [ ] vomiting [ ] diarrhea [ ] constipation [ ] abd pain [ ] dysphagia   : [ x] negative [ ] dysuria [ ] nocturia [ ] hematuria [ ] increased urinary frequency  Musculoskeletal: [x ] negative [ ] back pain [ ] myalgias [ ] arthralgias [ ] fracture  Skin: [x ] negative [ ] rash [ ] itch  Neurological: [x ] negative [ ] headache [ ] dizziness [ ] syncope [ ] weakness [ ] numbness  Psychiatric: [x ] negative [ ] anxiety [ ] depression  Endocrine: [x ] negative [ ] diabetes [ ] thyroid problem  Hematologic/Lymphatic: [x ] negative [ ] anemia [ ] bleeding problem  Allergic/Immunologic: [ ] negative [ ] itchy eyes [ ] nasal discharge [ ] hives [ ] angioedema  [ ] All other systems negative  [ ] Unable to assess ROS because ________    OBJECTIVE:  ICU Vital Signs Last 24 Hrs  T(C): 36.6 (16 Jun 2018 12:19), Max: 36.6 (16 Jun 2018 12:19)  T(F): 97.8 (16 Jun 2018 12:19), Max: 97.8 (16 Jun 2018 12:19)  HR: 106 (16 Jun 2018 14:34) (87 - 124)  BP: 128/66 (16 Jun 2018 14:34) (53/56 - 128/66)  BP(mean): --  ABP: --  ABP(mean): --  RR: 18 (16 Jun 2018 14:34) (18 - 34)  SpO2: 95% (16 Jun 2018 14:34) (86% - 100%)        CAPILLARY BLOOD GLUCOSE      POCT Blood Glucose.: 218 mg/dL (16 Jun 2018 11:40)      PHYSICAL EXAM:  General: nad, a+o x3  HEENT:   Lymph Nodes:  Neck: supple  Respiratory: CTAB no w/r/r; cough, no resp distress  Cardiovascular: tachycardic, no m/g/r  Abdomen: obese, soft, NT  Extremities: scant pit edema BL  Skin: no ulcers, rashes  Neurological:  Psychiatry: nl affect    LINES:     HOSPITAL MEDICATIONS:  MEDICATIONS  (STANDING):  nicotine -  14 mG/24Hr(s) Patch 1 patch Transdermal daily  norepinephrine Infusion 0.05 MICROgram(s)/kG/Min (7.5 mL/Hr) IV Continuous <Continuous>  sodium chloride 0.9% Bolus 500 milliLiter(s) IV Bolus once    MEDICATIONS  (PRN):      LABS:                        13.4   20.8  )-----------( 382      ( 16 Jun 2018 11:54 )             39.1     Hgb Trend: 13.4<--  06-16    133<L>  |  91<L>  |  86<H>  ----------------------------<  216<H>  4.5   |  21<L>  |  3.42<H>    Ca    9.4      16 Jun 2018 11:54  Phos  4.4     06-16  Mg     2.4     06-16    TPro  7.6  /  Alb  3.4  /  TBili  0.7  /  DBili  x   /  AST  12  /  ALT  12  /  AlkPhos  95  06-16    Creatinine Trend: 3.42<--  PT/INR - ( 16 Jun 2018 11:54 )   PT: 14.0 sec;   INR: 1.28 ratio         PTT - ( 16 Jun 2018 11:54 )  PTT:27.8 sec      Venous Blood Gas:  06-16 @ 11:54  7.38/42/27/25/46  VBG Lactate: 2.2      MICROBIOLOGY:     RADIOLOGY:  [ ] Reviewed and interpreted by me    EKG: CHIEF COMPLAINT:    HPI:  72M PMHx HTN, HLD, DM2 presenting from his doctor's office for new AF with RVR. He was at usual state of health but began feeling weak, fatigued with decreased po appetite as well as low urine output. Otherwise has not noted fever, chills, SOB, abd pain, diarrhea, dysuria. He has chronic cough, has 55 pack year smoking history, but has not changed from baseline; not productive. He had recent travel to [place] and [place] but no sick contacts. Of note his diltiazem was increased from 120 to 180 yesterday. He saw his primary, Dr. Campbell who noted pt to be short of breath and new afib with RVR. He currently denies CP, SOB, palpitations.  In ED, T: 97.8, HR: 110, BP: 92/60, RR: 23 SpO2: 96%  Labs: WBC elevated to 20.8, PARRISH to 3.42 (last 1.06 in 2016), BNP: 1818  POCUS showing large pericardial effusion with signs concerning for tamponade    PAST MEDICAL & SURGICAL HISTORY:  Pulmonary nodules  COPD (chronic obstructive pulmonary disease)  Diabetes mellitus, type 2  Kidney stones  Hypertension  History of tonsillectomy  History of kidney stones      FAMILY HISTORY:  Family history of early CAD (Father): father  MI age 83      SOCIAL HISTORY:  Smoking: [ ] Never Smoked [ ] Former Smoker (__ packs x ___ years) [ ] Current Smoker  (__ packs x ___ years)  Substance Use: [ ] Never Used [ ] Used ____  EtOH Use:  Marital Status: [ ] Single [ ]  [ ]  [ ]   Sexual History:   Occupation:  Recent Travel:  Country of Birth:  Advance Directives:    Allergies    penicillins (Unknown)    Intolerances        HOME MEDICATIONS:    REVIEW OF SYSTEMS:  Constitutional: [x ] negative [ ] fevers [ ] chills [ ] weight loss [ ] weight gain  HEENT: [ ] negative [ ] dry eyes [ ] eye irritation [ ] postnasal drip [ ] nasal congestion  CV: [x ] negative  [ ] chest pain [ ] orthopnea [ ] palpitations [ ] murmur  Resp: [ ] negative [ x] cough [ ] shortness of breath [ ] dyspnea [ ] wheezing [ ] sputum [ ] hemoptysis  GI: [ x] negative [ ] nausea [ ] vomiting [ ] diarrhea [ ] constipation [ ] abd pain [ ] dysphagia   : [ x] negative [ ] dysuria [ ] nocturia [ ] hematuria [ ] increased urinary frequency  Musculoskeletal: [x ] negative [ ] back pain [ ] myalgias [ ] arthralgias [ ] fracture  Skin: [x ] negative [ ] rash [ ] itch  Neurological: [x ] negative [ ] headache [ ] dizziness [ ] syncope [ ] weakness [ ] numbness  Psychiatric: [x ] negative [ ] anxiety [ ] depression  Endocrine: [x ] negative [ ] diabetes [ ] thyroid problem  Hematologic/Lymphatic: [x ] negative [ ] anemia [ ] bleeding problem  Allergic/Immunologic: [ ] negative [ ] itchy eyes [ ] nasal discharge [ ] hives [ ] angioedema  [ ] All other systems negative  [ ] Unable to assess ROS because ________    OBJECTIVE:  ICU Vital Signs Last 24 Hrs  T(C): 36.6 (16 Jun 2018 12:19), Max: 36.6 (16 Jun 2018 12:19)  T(F): 97.8 (16 Jun 2018 12:19), Max: 97.8 (16 Jun 2018 12:19)  HR: 106 (16 Jun 2018 14:34) (87 - 124)  BP: 128/66 (16 Jun 2018 14:34) (53/56 - 128/66)  BP(mean): --  ABP: --  ABP(mean): --  RR: 18 (16 Jun 2018 14:34) (18 - 34)  SpO2: 95% (16 Jun 2018 14:34) (86% - 100%)        CAPILLARY BLOOD GLUCOSE      POCT Blood Glucose.: 218 mg/dL (16 Jun 2018 11:40)      PHYSICAL EXAM:  General: nad, a+o x3  HEENT:   Lymph Nodes:  Neck: supple  Respiratory: CTAB no w/r/r; cough, no resp distress  Cardiovascular: tachycardic, no m/g/r  Abdomen: obese, soft, NT  Extremities: scant pit edema BL  Skin: no ulcers, rashes  Neurological:  Psychiatry: nl affect    LINES:     HOSPITAL MEDICATIONS:  MEDICATIONS  (STANDING):  nicotine -  14 mG/24Hr(s) Patch 1 patch Transdermal daily  norepinephrine Infusion 0.05 MICROgram(s)/kG/Min (7.5 mL/Hr) IV Continuous <Continuous>  sodium chloride 0.9% Bolus 500 milliLiter(s) IV Bolus once    MEDICATIONS  (PRN):      LABS:                        13.4   20.8  )-----------( 382      ( 16 Jun 2018 11:54 )             39.1     Hgb Trend: 13.4<--  06-16    133<L>  |  91<L>  |  86<H>  ----------------------------<  216<H>  4.5   |  21<L>  |  3.42<H>    Ca    9.4      16 Jun 2018 11:54  Phos  4.4     06-16  Mg     2.4     06-16    TPro  7.6  /  Alb  3.4  /  TBili  0.7  /  DBili  x   /  AST  12  /  ALT  12  /  AlkPhos  95  06-16    Creatinine Trend: 3.42<--  PT/INR - ( 16 Jun 2018 11:54 )   PT: 14.0 sec;   INR: 1.28 ratio         PTT - ( 16 Jun 2018 11:54 )  PTT:27.8 sec      Venous Blood Gas:  06-16 @ 11:54  7.38/42/27/25/46  VBG Lactate: 2.2      MICROBIOLOGY:     RADIOLOGY:  [ ] Reviewed and interpreted by me    EKG: CHIEF COMPLAINT:    HPI:  72M PMHx HTN, HLD, DM2 presenting from his doctor's office for new AF with RVR. He was at usual state of health but began feeling weak, fatigued with decreased po appetite as well as low urine output. Otherwise has not noted fever, chills, SOB, abd pain, diarrhea, dysuria. He has chronic cough, has 55 pack year smoking history, but has not changed from baseline; not productive. He had recent travel to [place] and [place] but no sick contacts. Of note his diltiazem was increased from 120 to 180 yesterday. He saw his primary, Dr. Campbell who noted pt to be short of breath and new afib with RVR. He currently denies CP, SOB, palpitations. Pt confirms he is DNR/ DNI.   In ED, T: 97.8, HR: 110, BP: 92/60, RR: 23 SpO2: 96%  Labs: WBC elevated to 20.8, PARRISH to 3.42 (last 1.06 in 2016), BNP: 1818  POCUS showing large pericardial effusion with signs concerning for tamponade    PAST MEDICAL & SURGICAL HISTORY:  Pulmonary nodules  COPD (chronic obstructive pulmonary disease)  Diabetes mellitus, type 2  Kidney stones  Hypertension  History of tonsillectomy  History of kidney stones      FAMILY HISTORY:  Family history of early CAD (Father): father  MI age 83      SOCIAL HISTORY:  Smoking: [ ] Never Smoked [ ] Former Smoker (__ packs x ___ years) [ ] Current Smoker  (__ packs x ___ years)  Substance Use: [ ] Never Used [ ] Used ____  EtOH Use:  Marital Status: [ ] Single [ ]  [ ]  [ ]   Sexual History:   Occupation:  Recent Travel:  Country of Birth:  Advance Directives:    Allergies    penicillins (Unknown)    Intolerances        HOME MEDICATIONS:    REVIEW OF SYSTEMS:  Constitutional: [x ] negative [ ] fevers [ ] chills [ ] weight loss [ ] weight gain  HEENT: [ ] negative [ ] dry eyes [ ] eye irritation [ ] postnasal drip [ ] nasal congestion  CV: [x ] negative  [ ] chest pain [ ] orthopnea [ ] palpitations [ ] murmur  Resp: [ ] negative [ x] cough [ ] shortness of breath [ ] dyspnea [ ] wheezing [ ] sputum [ ] hemoptysis  GI: [ x] negative [ ] nausea [ ] vomiting [ ] diarrhea [ ] constipation [ ] abd pain [ ] dysphagia   : [ x] negative [ ] dysuria [ ] nocturia [ ] hematuria [ ] increased urinary frequency  Musculoskeletal: [x ] negative [ ] back pain [ ] myalgias [ ] arthralgias [ ] fracture  Skin: [x ] negative [ ] rash [ ] itch  Neurological: [x ] negative [ ] headache [ ] dizziness [ ] syncope [ ] weakness [ ] numbness  Psychiatric: [x ] negative [ ] anxiety [ ] depression  Endocrine: [x ] negative [ ] diabetes [ ] thyroid problem  Hematologic/Lymphatic: [x ] negative [ ] anemia [ ] bleeding problem  Allergic/Immunologic: [ ] negative [ ] itchy eyes [ ] nasal discharge [ ] hives [ ] angioedema  [ ] All other systems negative  [ ] Unable to assess ROS because ________    OBJECTIVE:  ICU Vital Signs Last 24 Hrs  T(C): 36.6 (16 Jun 2018 12:19), Max: 36.6 (16 Jun 2018 12:19)  T(F): 97.8 (16 Jun 2018 12:19), Max: 97.8 (16 Jun 2018 12:19)  HR: 106 (16 Jun 2018 14:34) (87 - 124)  BP: 128/66 (16 Jun 2018 14:34) (53/56 - 128/66)  BP(mean): --  ABP: --  ABP(mean): --  RR: 18 (16 Jun 2018 14:34) (18 - 34)  SpO2: 95% (16 Jun 2018 14:34) (86% - 100%)        CAPILLARY BLOOD GLUCOSE      POCT Blood Glucose.: 218 mg/dL (16 Jun 2018 11:40)      PHYSICAL EXAM:  General: nad, a+o x3  HEENT:   Lymph Nodes:  Neck: supple  Respiratory: CTAB no w/r/r; cough, no resp distress  Cardiovascular: tachycardic, no m/g/r  Abdomen: obese, soft, NT  Extremities: scant pit edema BL  Skin: no ulcers, rashes  Neurological:  Psychiatry: nl affect    LINES:     HOSPITAL MEDICATIONS:  MEDICATIONS  (STANDING):  nicotine -  14 mG/24Hr(s) Patch 1 patch Transdermal daily  norepinephrine Infusion 0.05 MICROgram(s)/kG/Min (7.5 mL/Hr) IV Continuous <Continuous>  sodium chloride 0.9% Bolus 500 milliLiter(s) IV Bolus once    MEDICATIONS  (PRN):      LABS:                        13.4   20.8  )-----------( 382      ( 16 Jun 2018 11:54 )             39.1     Hgb Trend: 13.4<--  06-16    133<L>  |  91<L>  |  86<H>  ----------------------------<  216<H>  4.5   |  21<L>  |  3.42<H>    Ca    9.4      16 Jun 2018 11:54  Phos  4.4     06-16  Mg     2.4     06-16    TPro  7.6  /  Alb  3.4  /  TBili  0.7  /  DBili  x   /  AST  12  /  ALT  12  /  AlkPhos  95  06-16    Creatinine Trend: 3.42<--  PT/INR - ( 16 Jun 2018 11:54 )   PT: 14.0 sec;   INR: 1.28 ratio         PTT - ( 16 Jun 2018 11:54 )  PTT:27.8 sec      Venous Blood Gas:  06-16 @ 11:54  7.38/42/27/25/46  VBG Lactate: 2.2      MICROBIOLOGY:     RADIOLOGY:  [ ] Reviewed and interpreted by me    EKG:

## 2018-06-16 NOTE — H&P ADULT - FAMILY HISTORY
Father  Still living? Unknown  Family history of MI (myocardial infarction), Age at diagnosis: 81-90

## 2018-06-16 NOTE — H&P ADULT - NEGATIVE NEUROLOGICAL SYMPTOMS
no paresthesias/no difficulty walking/no confusion/no transient paralysis/no weakness/no generalized seizures/no vertigo/no syncope/no focal seizures/no loss of consciousness/no headache

## 2018-06-16 NOTE — CONSULT NOTE ADULT - ASSESSMENT
72M PMHx HTN, HLD, DM2 here with shock state in setting of large pericardial effusion with ultrasound findings concerning for cardiac tamponade; pending formal TTE eval. AF with RVR now sinus.    Plan  continue giving aggressive fluids  levophed to MAP 65  cardiology to do formal TTE to further eval tamponade  check TSH  will need ICU level of care, either CCU or MICU pending TTE results

## 2018-06-16 NOTE — ED PROVIDER NOTE - PMH
COPD (chronic obstructive pulmonary disease)    Diabetes mellitus, type 2    Hypertension    Kidney stones    Pulmonary nodules

## 2018-06-16 NOTE — ED PROVIDER NOTE - ATTENDING CONTRIBUTION TO CARE
72M hx HTN, hyperlipidemia, DM, COPD presents from urgent care for increase fatigue, decreased appetite and urinary output x 4d now progressing to dyspnea.  Pt found to be tachycardiac to 120s at urgent care with afib/aflutter.  Given 10mg cardizem by EMS.  Pt has been able to urinate but believes this has been decreased.  Chronic smoker's cough, no known sputum production.  Denies Chest pain, back pain, LE pain/swelling, fever/chills.   Tachycardic with initial hypotension 100s/60s, mild tachypnea  (Attending - Carlos) AAOx3, NCAT, MMM, Trachea midline, No JVD, PERRL, b/l rhonchi worse in bases, tachy, Normal perfusion 2+ radial/DP b/l, soft, NTND, No CVAT b/l, No LE edema, No deformity of extremities, Appropriate, Cooperative, No rashes, CN grossly intact, Normal coordination, No focal motor or sensory deficits. 72M hx HTN, hyperlipidemia, DM, COPD presents from urgent care for increase fatigue, decreased appetite and urinary output x 4d now progressing to dyspnea.  Pt found to be tachycardiac to 120s at urgent care with afib/aflutter.  Given 10mg cardizem by EMS.  Pt has been able to urinate but believes this has been decreased.  Chronic smoker's cough, no known sputum production.  Denies Chest pain, back pain, LE pain/swelling, fever/chills.   Tachycardic with initial hypotension 100s/60s, mild tachypnea  (Attending - Carlos) AAOx3, NCAT, MMM, Trachea midline, No JVD, PERRL, b/l rhonchi worse in bases, tachy, Normal perfusion 2+ radial/DP b/l, soft, NTND, No CVAT b/l, No LE edema, No deformity of extremities, Appropriate, Cooperative, No rashes, CN grossly intact, Normal coordination, No focal motor deficits    EKG with afib/aflutter unclear if new, however, pt one home cardizem, asa 81mg.  POCUS lungs reveals scattered B-likes of R upper lobe, large pericardial effusion but collapsible IVC and not signs of RA/RV collapse.  Undifferentiated hypotension with collapsible IVC less likely to be obstructive however pt with large effusion.  Hypotension maybe secondary to dehydration +/- adverse reaction to cardizem however pt not bradycardic.  Sepsis also in differential given leukocytosis, will treat with IVF bolus, broad spectrum antibiotics, troponin, reassess.  to be admitted

## 2018-06-16 NOTE — H&P ADULT - PROBLEM SELECTOR PLAN 9
counseled on tobacco cessation, nicotine patch offered and accepted     GoC discussion had w/ patient, patient re-affirmed code status DNR/DNI    Joana PARSONS/CCU #[id number]

## 2018-06-16 NOTE — H&P ADULT - PROBLEM SELECTOR PLAN 7
currently hypotensive, wean pressors as tolerated  call pharmacy in AM for med list as pt does not know home meds

## 2018-06-16 NOTE — H&P ADULT - NSHPLABSRESULTS_GEN_ALL_CORE
EKG - sinus rhythm, RBBB (new)     < from: Transthoracic Echocardiogram (06.16.18 @ 15:10) >    1. Peak transaortic valve gradient equals 51 mm Hg, mean  transaortic valve gradient equals 27 mm Hg, aortic valve  velocity time integral equals 57 cm, consistent with  moderate aortic stenosis.  2. Endocardium not well visualized; grossly normal left  ventricular systolic function.  3. Reversal of the E-A waves of the mitral inflow pattern  consistent with reduced compliance of the left ventricle.  4. The right ventricle is not well visualized; grossly  enlarged with normal right ventricular systolic function.  TAPSE 2.1 cm (normal)  5. Large pericardial effusion seen around the left  ventricle (> 2 cm). Effusion appears smaller around the  right ventricle (< 1 cm). No respiratory variation across  the mitral valve seen. There is envagination of the right  atrium. No RV diastolic collapse seen. IVC is poorly seen.  *** No previous Echo exam. Technically difficult study due  to poor imaging windows and rapid heart rate. Echo reviewed  with cardiology fellow on call.    < end of copied text >    < from: CT Chest No Cont (06.16.18 @ 13:32) >    FINDINGS:    CHEST:     LUNGS AND LARGE AIRWAYS: Patent central airways.  Bilateral lower lobe   subsegmental linear atelectasis. Multiple 3 mm right upper lobe nodules,   unchanged from prior study. Previously noted left lower lobe nodule, not   visualized in the current study. Mild groundglass opacity    PLEURA: No pleural effusion.    VESSELS: Calcific atherosclerosis of aorta and coronary arteries.    HEART: Heart size is normal. Mild pericardial effusion with largest   diameter measuring 2.3 cm adjacent to right atrium.    MEDIASTINUM AND JOSSELYN: Redemonstrated multiple anterior mediastinal lymph   nodes and paratracheal lymphadenopathy. Redemonstrated thyroid gland   extending into upper mediastinum.    CHEST WALL AND LOWER NECK: Within normal limits.  In the right upper lobe have the appearance suggesting asymmetric edema   rather than infection.  VISUALIZED UPPER ABDOMEN: Cholelithiasis. A 2.6 cm left interpolar renal   cyst, unchanged.    BONES: Degenerative changes of spine.    IMPRESSION:     Mild pericardial effusion with largest diameter measuring 2.3 cm adjacent   to right atrium. Mild increased groundglass opacity in the right upper   lobe is mostsuggestive of asymmetric pulmonary edema.    < end of copied text >

## 2018-06-16 NOTE — H&P ADULT - PROBLEM SELECTOR PLAN 2
unclear etiology, no recent illnesses, no signs/symptoms of pericarditis, w/ hx of pulm nodules (stable per CT chest read), CT chest w/ mult anterior mediastinal lymph nodes and paratracheal lymphadenopathy, thyroid glad extending into upper mediastinum   hemodynamically stable @ this time w/ decreasing pressor requirements, appears dry to euvolemic on exam, consider additional fluids to try to wean pressors, CT surgery consulted if need for emergent drain arises   would need cytology if pericardiocentesis performed

## 2018-06-16 NOTE — CONSULT NOTE ADULT - PROBLEM SELECTOR RECOMMENDATION 9
No urgent surgical intervention required at this time  Future diagnostic intervention to be determined No urgent surgical intervention required at this time

## 2018-06-16 NOTE — H&P ADULT - NEGATIVE GASTROINTESTINAL SYMPTOMS
no vomiting/no diarrhea/no change in bowel habits/no abdominal pain/no constipation/no hematochezia/no nausea/no melena

## 2018-06-16 NOTE — H&P ADULT - NSHPSOCIALHISTORY_GEN_ALL_CORE
smokes 1 PPD x 55 years   denies ETOH or illicit drug use  retired  lives with wife  IADLs smokes 1 PPD x 55 years   drinks 1-2 beers 4-6 days per week   denies illicit drug use  retired  lives with wife  IADLs

## 2018-06-16 NOTE — ED ADULT NURSE NOTE - OBJECTIVE STATEMENT
72y Male with PMH Afib on baby aspirin and Cardizem, DM, htn and high cholesterol presents to the ED via EMS with 20g to R. hand c/o SOB. 72y Male with PMH Afib on baby aspirin and Cardizem, DM, htn and high cholesterol presents to the ED via EMS with 20g to R. hand c/o SOB. Pt states that since Wednesday he has had SOB, poor appetite, general malaise and has had less frequent urination than normal. Pt states he has been driving a lot in the last few days, a total of 14 hrs. Pt went to urgent care this morning and was sent to the ED for hypotension and tachycardia. EMS gave 10mg Cardizem prior to arrival. Pt presents a&ox4, airway intact, work of breathing noted, wheezing heard to bilateral lung fields 72y Male with PMH Afib on baby aspirin and Cardizem, DM, htn and high cholesterol presents to the ED via EMS with 20g to R. hand c/o SOB. Pt states that since Wednesday he has had SOB, poor appetite, general malaise and has had less frequent urination than normal. Pt states he has been driving a lot in the last few days, a total of 14 hrs. Pt went to urgent care this morning and was sent to the ED for hypotension and tachycardia. EMS gave 10mg Cardizem prior to arrival. Pt presents a&ox4, airway intact, work of breathing noted, wheezing heard to bilateral lung fields, SpO2 of 86% noted on RA, pt placed on 4L NC and given 3 Duoneb treatments, 72y Male with PMH Afib on baby aspirin and Cardizem, DM, htn and high cholesterol presents to the ED via EMS with 20g to R. hand c/o SOB. Pt states that since Wednesday he has had SOB, poor appetite, general malaise and has had less frequent urination than normal. Pt states he has been driving a lot in the last few days, a total of 14 hrs. Pt went to urgent care this morning and was sent to the ED for hypotension and tachycardia. EMS gave 10mg Cardizem prior to arrival. Pt presents a&ox4, airway intact, work of breathing noted, wheezing heard to bilateral lung fields, SpO2 of 86% noted on RA, pt placed on 4L NC and given 3 Duoneb treatments, SpO2 of % noted on O2, skin warm dry and intact, cap refill <2 seconds, peripheral pulses present and equal bilaterally, denies CP, dizziness, ha, n/v, chills/fever, numbness/tingling to extremities, 72y Male with PMH Afib on baby aspirin and Cardizem, DM, htn and high cholesterol presents to the ED via EMS with 20g to R. hand c/o SOB. Pt states that since Wednesday he has had SOB, poor appetite, general malaise and has had less frequent urination than normal. Pt states he has been driving a lot in the last few days, a total of 14 hrs. Pt went to urgent care this morning and was sent to the ED for hypotension and tachycardia. EMS gave 10mg Cardizem prior to arrival. Pt presents a&ox4, airway intact, work of breathing noted, wheezing heard to bilateral lung fields, SpO2 of 86% noted on RA, pt placed on 4L NC and given 3 Duoneb treatments, SpO2 of % noted on O2, skin warm dry and intact, cap refill <2 seconds, peripheral pulses present and equal bilaterally, denies CP, dizziness, ha, n/v, chills/fever, numbness/tingling to extremities. MD at bedside for eval. Safety maintained. 72y Male with PMH Afib on baby aspirin and Cardizem, DM, htn and high cholesterol presents to the ED via EMS with 20g to R. hand c/o SOB. Pt states that since Wednesday he has had SOB, poor appetite, general malaise and has had less frequent urination than normal. Pt states he has been driving a lot in the last few days, a total of 14 hrs. Pt went to urgent care this morning and was sent to the ED for hypotension and tachycardia. EMS gave 10mg Cardizem prior to arrival. Pt states that he normally takes Pt presents a&ox4, airway intact, work of breathing noted, wheezing heard to bilateral lung fields, SpO2 of 86% noted on RA, pt placed on 4L NC and given 3 Duoneb treatments, SpO2 of % noted on O2, skin warm dry and intact, cap refill <2 seconds, peripheral pulses present and equal bilaterally, denies CP, dizziness, ha, n/v, chills/fever, numbness/tingling to extremities. MD at bedside for eval. Safety maintained.

## 2018-06-16 NOTE — H&P ADULT - HISTORY OF PRESENT ILLNESS
71 yo M HTN, HLD, DM2, nephrolithiasis, pulm nodules, 73 yo M HTN, HLD, DM2 (does not know a1c, does not check glucose), nephrolithiasis, pulm nodules p/w 3-4 days of decreased appetite, dec UOP and malaise/fatigue. Pt presented to urgent care this AM and was sent to the ED for hypotension and tachycardia (reported AF RVR), s/p 10 mg IVP cardizem per EMS per ED note. Pt does not know what medications he takes or if any have been changed recently. He denies prior history of A fib. He last saw his PCP ~1 mo ago.   In the ED, pt was wheezing & satting 86% on RA w/ improved O2 sat s/p 3 duonebs and 4L O2 via NC. Pt given 125 mg IV solu medrol. Pt tachycardic 100s-120s w/ SBP as low as 70s systolic. Pt given 3.5L NS and started on levophed for hypotension. Give azithromax for COPD exacerbation and azactam for PNA. UA +. Pt noted to have PARRISH (denies history of CKD). CT chest w/ mild pericardial effusion mild increased ground glass opacity in the RUL suggestive of asymmetric pulmonary edema and POCUS w/ pericardial effusion. Formal TTE showing moderate AS, large (>2 cm) pericardial effusion around LV that appears smaller around the RV (<1cm), no respiratory variation across the MV, envagination of the RA, no RV diastolic collapse is seen, IVC is poorly seen. Pt converted to sinus rhythm and is without complaints @ this time, stating in the ED he ate and urinated for the first time in 4 days.  Pt denies previous cardiac history. Denies previous arrhtyhmia or heart failure. His exercise tolerance has decreased over the past ~3 mos, he now gets dyspneic after walking ~ .5 mile. No CP/palpitations/SOB. Denies orthopnea, PND, LE edema, weight gain or loss. No history of PE/DVT or malignancy. No nausea/vomiting/diarrhea. No fevers/chills. Has chronic smoker's cough occasionally, no sputum production.  No sick contacts. No known tick bites. He has driven ~14 hours recently and was recently in the [facility] and [place]

## 2018-06-16 NOTE — H&P ADULT - PROBLEM SELECTOR PLAN 3
now in sinus, no known history of AF, no evidence for ischemia, TSH low, check thyroid panel. Treat UTI. Monitor pericardial effusion.  [id number] - hold AC @ this time as pt w/ pericardial effusion and currently in sinus

## 2018-06-16 NOTE — CONSULT NOTE ADULT - SUBJECTIVE AND OBJECTIVE BOX
History of Present Illness:  72y Male    Past Medical History  Pulmonary nodules  COPD (chronic obstructive pulmonary disease)  Diabetes mellitus, type 2  Kidney stones  Hypertension      Past Surgical History  History of tonsillectomy  History of kidney stones      MEDICATIONS  (STANDING):  nicotine -  14 mG/24Hr(s) Patch 1 patch Transdermal daily  norepinephrine Infusion 0.05 MICROgram(s)/kG/Min (7.5 mL/Hr) IV Continuous <Continuous>    MEDICATIONS  (PRN):    Antiplatelet therapy:                           Last dose/amt:    Allergies: penicillins (Unknown)      SOCIAL HISTORY:  Smoker: [ ] Yes  [ ] No        PACK YEARS:                         WHEN QUIT?  ETOH use: [ ] Yes  [ ] No              FREQUENCY / QUANTITY:  Ilicit Drug use:  [ ] Yes  [ ] No  Occupation:  Live with:  Assist device use:    Relevant Family History  FAMILY HISTORY:  Family history of early CAD: father  MI age 83      Review of Systems  GENERAL:  Fevers[] chills[] sweats[] fatigue[] weight loss[] weight gain []                                        NEURO:  parathesias[] seizures []  syncope []  confusion []                                                                                  EYES: glasses[]  blurry vision[]  discharge[] pain[] glaucoma []                                                                            ENMT:  difficulty hearing []  vertigo[]  dysphagia[] epistaxis[] recent dental work []                                      CV:  chest pain[] palpitations[] BALTAZAR [] diaphoresis [] edema[]                                                                                             RESPIRATORY:  wheezing[] SOB[] cough [] sputum[] hemoptysis[]                                                                    GI:  nausea[]  vomiting []  diarrhea[] constipation [] melena []                                                                        : hematuria[ ]  dysuria[ ] urgency[] incontinence[]                                                                                              MUSKULOSKELETAL:  arthritis[ ]  joint swelling [ ] muscle weakness [ ]                                                                  SKIN/BREAST:  rash[ ] itching [ ]  hair loss[ ] masses[ ]                                                                                                PSYCH:  dementia [ ] depresion [ ] anxiety[ ]                                                                                                                  HEME/LYMPH:  bruises easily[ ] enlarged lymph nodes[ ] tender lymph nodes[ ]                                                 ENDOCRINE:  cold intolerance[ ] heat intolerance[ ] polydipsia[ ]                                                                              PHYSICAL EXAM  Vital Signs Last 24 Hrs  T(C): 36.5 (2018 20:00), Max: 37.1 (2018 16:57)  T(F): 97.7 (2018 20:00), Max: 98.8 (2018 16:57)  HR: 102 (2018 21:30) (87 - 124)  BP: 133/70 (2018 21:30) (53/56 - 133/70)  BP(mean): 92 (2018 21:30) (81 - 92)  RR: 26 (2018 21:30) (18 - 34)  SpO2: 96% (2018 21:30) (86% - 100%)    General: Well nourished, well developed, no acute distress.                                                         Neuro: Normal exam oriented to person/place & time with no focal motor or sensory  deficits.                    Eyes: Normal exam of conjunctiva & lids, pupils equally reactive.   ENT: Normal exam of nasal/oral mucosa with absence of cyanosis.   Neck: Normal exam of jugular veins, trachea & thyroid.   Chest: Normal lung exam with good air movement absence of wheezes, rales, or rhonchi:                                                                          CV:  Auscultation: normal [ ] S3[ ] S4[ ] Irregular [ ] Rub[ ] Clicks[ ]  Murmurs none:[ ]systolic [ ]  diastolic [ ] holosystolic [ ]  Carotids: No Bruits[ ] Other____________ Abdominal Aorta: normal [ ] nonpalpable[ ]                                                                         GI: Normal exam of abdomen, liver & spleen with no noted masses or tenderness.                                                                                              Extremities: Normal no evidence of cyanosis or deformity Edema: none[ ]trace[ ]1+[ ]2+[ ]3+[ ]4+[ ]  Lower Extremity Pulses: Right[ ] Left[ ]Varicosities[ ]  SKIN : Normal exam to inspection & palation.                                                           LABS:                        12.3   18.5  )-----------( 397      ( 2018 21:17 )             35.6     06-16    133<L>  |  91<L>  |  86<H>  ----------------------------<  216<H>  4.5   |  21<L>  |  3.42<H>    Ca    9.4      2018 11:54  Phos  4.4     06-16  Mg     2.4     06-16    TPro  7.6  /  Alb  3.4  /  TBili  0.7  /  DBili  x   /  AST  12  /  ALT  12  /  AlkPhos  95  06-16    PT/INR - ( 2018 21:17 )   PT: 14.3 sec;   INR: 1.30 ratio         PTT - ( 2018 21:17 )  PTT:25.6 sec  Urinalysis Basic - ( 2018 15:19 )    Color: Yellow / Appearance: SL Turbid / S.015 / pH: x  Gluc: x / Ketone: Trace  / Bili: Negative / Urobili: 4 mg/dL   Blood: x / Protein: 30 mg/dL / Nitrite: Negative   Leuk Esterase: Large / RBC: 10-25 /HPF / WBC 26-50 /HPF   Sq Epi: x / Non Sq Epi: OCC /HPF / Bacteria: Few /HPF              Cardiac Cath:    TTE / MARIA GUADALUPE:    Assessment:  72y Male presents with Shock      Plan: CC: "I was short of breath"  History of Present Illness:  72M, poor historian, with pmh of HTN, hyperlipidemia, DM, COPD, 1 ppd current everyday smoker, presented to the ER, sent from urgent care, where he was found to be in rapid afib with RVR and short of breath.  Endorses that he has been feeling weak, fatigued, and with decreased appetite since last week Wednesday and made an appointment to see his PCP Dr. Campbell. Denies fever, chills, dizziness, syncope, head ache, chest pain, abdominal pain, nausea, vomiting, diaphoresis, nor dysuria.  +extended travel in car for six hours on Wednesday.  Currently patient is located in the ER, Critical area, and found to be sitting out of bed, eating chicken dinner, and demanding to be sent up to a room.  He is on levophed at 0.3 mcg/min(49.5cc) with blood pressure of 148/78,  ST, RR18, SPO2 96% on 4L nc.     Past Medical History  Pulmonary nodules  COPD (chronic obstructive pulmonary disease)  Diabetes mellitus, type 2  Kidney stones  Hypertension      Past Surgical History  History of tonsillectomy  History of kidney stones      MEDICATIONS  (STANDING):  nicotine -  14 mG/24Hr(s) Patch 1 patch Transdermal daily  norepinephrine Infusion 0.05 MICROgram(s)/kG/Min (7.5 mL/Hr) IV Continuous <Continuous>    MEDICATIONS  (PRN):    Antiplatelet therapy:                           Last dose/amt:    Allergies: penicillins (Unknown)      SOCIAL HISTORY:  Smoker: [x ] Yes  [ ] No        PACK YEARS:  1ppd x 55 years  ETOH use: [x ] Yes  [ ] No              FREQUENCY / QUANTITY: 4-5 drinks per week  Ilicit Drug use:  [ ] Yes  [ x] No  Occupation: part-time retired CPA   Live with: spouse  Assist device use: none    Relevant Family History  FAMILY HISTORY:  Family history of early CAD: father  MI age 83      Review of Systems  GENERAL:  Fevers[] chills[] sweats[] fatigue[] weight loss[] weight gain []                                        NEURO:   seizures []  syncope []  confusion []                                                                                  EYES: glasses[]  blurry vision[]  discharge[] pain[] glaucoma []                                                                            ENMT:  difficulty hearing []  vertigo[]  dysphagia[] epistaxis[] recent dental work []                                      CV:  chest pain[] palpitations[] BALTAZAR [] diaphoresis [] edema[]                                                                                             RESPIRATORY:  wheezing[] SOB[x] cough [] sputum[] hemoptysis[]                                                                    GI:  nausea[]  vomiting []  diarrhea[] constipation [] melena []                                                                        : hematuria[ ]  dysuria[ ] urgency[] incontinence[]                                                                                              MUSCULOSKELETAL:  arthritis[ ]  joint swelling [ ] muscle weakness [ ]                                                                  SKIN/BREAST:  rash[ ] itching [ ]  hair loss[ ] masses[ ]                                                                                                PSYCH:  dementia [ ] depression [ ] anxiety[ ]                                                                                                                  HEME/LYMPH:  bruises easily[ ] enlarged lymph nodes[ ] tender lymph nodes[ ]                                                 ENDOCRINE:  cold intolerance[ ] heat intolerance[ ] polydipsia[ ]                                                                              PHYSICAL EXAM  Vital Signs Last 24 Hrs  T(F): 97.7   HR: 102   BP: 133/70   RR: 18  SpO2: 96%     General: Well nourished, well developed, no acute distress.                                                         Neuro: Normal exam oriented to person/place & time with no focal motor or sensory  deficits.                    Eyes: Normal exam of conjunctiva & lids, pupils equally reactive.   ENT: Normal exam of nasal/oral mucosa with absence of cyanosis.   Neck: Normal exam of jugular veins, trachea & thyroid.   Chest: Normal lung exam with good air movement absence of wheezes, rales, or rhonchi:                                                                          CV:  Auscultation: normal [ ] S3[ ] S4[ ] Irregular [ ] Rub[ ] Clicks[ ]  Murmurs none:[ ]systolic [ ]  diastolic [ ] holosystolic [ ]  Carotids: No Bruits[ ] Other____________ Abdominal Aorta: normal [ ] nonpalpable[ ]                                                                         GI: Normal exam of abdomen, liver & spleen with no noted masses or tenderness.                                                                                              Extremities: Normal no evidence of cyanosis or deformity Edema: none[ ]trace[ ]1+[ ]2+[ ]3+[ ]4+[ ]  Lower Extremity Pulses: Right[ ] Left[ ]Varicosities[ ]  SKIN : Normal exam to inspection & palpation.                                                           LABS:                        12.3   18.5  )-----------( 397      ( 2018 21:17 )             35.6     06-16    133<L>  |  91<L>  |  86<H>  ----------------------------<  216<H>  4.5   |  21<L>  |  3.42<H>    Ca    9.4      2018 11:54  Phos  4.4       Mg     2.4     -    TPro  7.6  /  Alb  3.4  /  TBili  0.7  /  DBili  x   /  AST  12  /  ALT  12  /  AlkPhos  95  -  PT/INR - ( 2018 21:17 )   PT: 14.3 sec;   INR: 1.30 ratio    PTT - ( 2018 21:17 )  PTT:25.6 sec  Urinalysis Basic - ( 2018 15:19 )  Color: Yellow / Appearance: SL Turbid / S.015 / pH: x  Gluc: x / Ketone: Trace  / Bili: Negative / Urobili: 4 mg/dL   Blood: x / Protein: 30 mg/dL / Nitrite: Negative   Leuk Esterase: Large / RBC: 10-25 /HPF / WBC 26-50 /HPF   Sq Epi: x / Non Sq Epi: OCC /HPF / Bacteria: Few /HPF      TTE / MARIA GUADALUPE:   Transthoracic Echocardiogram (18 @ 15:10)   Mitral Valve: Mitral valve not well visualized.  Aortic Valve/Aorta: Calcified aortic valve with decreased  opening. Peak transaortic valve gradient equals 51 mm Hg,  mean transaortic valve gradient equals 27 mm Hg, aortic  valve velocity time integral equals 57 cm, consistent with  moderate aortic stenosis. Peak left ventricular outflow  tract gradient equals 14 mm Hg, LVOT velocity time integral  equals 32 cm.  Normal aortic root size. (Ao: 2.9 cm at the sinuses of  Valsalva).  Left Atrium: Leftatrium not well visualized.  Left Ventricle: Endocardium not well visualized; grossly  normal left ventricular systolic function. Concentric left  ventricular hypertrophy. Reversal of the E-A waves of the  mitral inflow pattern consistent with reduced compliance of  the left ventricle.  Right Heart: Right atrium not well visualized. The right  ventricle is not well visualized; grossly enlarged with  normal right ventricular systolic function. TAPSE 2.1 cm  (normal) Tricuspid valve not well visualized. Pulmonic  valve not well visualized.  Pericardium/Pleura: Large pericardial effusion seen around  the left ventricle (> 2 cm). Effusion appears smaller  around the right ventricle (< 1 cm). No respiratory  variation across the mitral valve seen.There is  envagination of the right atrium. No RV diastolic collapse  seen. IVC is poorly seen.  Hemodynamic: Estimated right atrial pressure is 8 mm Hg.  Estimated right ventricular systolic pressure equals 48 mm  Hg, assuming right atrial pressure equals 8 mm Hg,  consistent with mild pulmonary hypertension.    Conclusions  1. Peak transaortic valve gradient equals 51 mm Hg, mean  transaortic valve gradient equals 27 mm Hg, aortic valve  velocity time integral equals 57 cm, consistent with  moderate aortic stenosis.  2. Endocardium not well visualized; grossly normal left  ventricular systolic function.  3. Reversal of the E-A waves of the mitral inflow pattern  consistent with reduced compliance of the left ventricle.  4. The right ventricle is not well visualized; grossly  enlarged with normal right ventricular systolic function.  TAPSE 2.1 cm (normal)  5. Large pericardial effusion seen around the left  ventricle (> 2 cm). Effusion appears smaller around the  right ventricle (< 1 cm). No respiratory variation across  the mitral valve seen. There is envagination of the right  atrium. No RV diastolic collapse seen. IVC is poorly seen.  *** No previous Echo exam. Technically difficult study due  to poor imaging windows and rapid heart rate. Echo reviewed  with cardiology fellow on call. CC: "I was short of breath"  History of Present Illness:  72M, poor historian, with pmh of HTN, hyperlipidemia, DM, COPD, 1 ppd current everyday smoker, presented to the ER, sent from urgent care, where he was found to be in rapid afib with RVR and short of breath.  Endorses that he has been feeling weak, fatigued, and with decreased appetite since last week Wednesday and made an appointment to see his PCP Dr. Campbell. Denies fever, chills, dizziness, syncope, head ache, chest pain, abdominal pain, nausea, vomiting, diaphoresis, nor dysuria.  +extended travel in car for six hours on Wednesday.  Currently patient is located in the ER, Critical area, and found to be sitting out of bed, eating chicken dinner, and demanding to be sent up to a room.  He is on levophed at   0.3 mcg/kg/min(49.5cc) with blood pressure of 148/78,  ST, RR18, SPO2 96% on 4L nc.     Past Medical History  Pulmonary nodules  COPD (chronic obstructive pulmonary disease)  Diabetes mellitus, type 2  Kidney stones  Hypertension      Past Surgical History  History of tonsillectomy  History of kidney stones      MEDICATIONS  (STANDING):  nicotine -  14 mG/24Hr(s) Patch 1 patch Transdermal daily  norepinephrine Infusion 0.05 MICROgram(s)/kG/Min (7.5 mL/Hr) IV Continuous <Continuous>    MEDICATIONS  (PRN):    Antiplatelet therapy:                           Last dose/amt:    Allergies: penicillins (Unknown)      SOCIAL HISTORY:  Smoker: [x ] Yes  [ ] No        PACK YEARS:  1ppd x 55 years  ETOH use: [x ] Yes  [ ] No              FREQUENCY / QUANTITY: 4-5 drinks per week  Ilicit Drug use:  [ ] Yes  [ x] No  Occupation: part-time retired CPA   Live with: spouse  Assist device use: none    Relevant Family History  FAMILY HISTORY:  Family history of early CAD: father  MI age 83      Review of Systems  GENERAL:  Fevers[] chills[] sweats[] fatigue[x] weight loss[] weight gain [] poor appetite [x]                                      NEURO:   seizures []  syncope []  confusion []                                                                                  EYES: glasses[]  blurry vision[]  discharge[] pain[] glaucoma []                                                                            ENMT:  difficulty hearing []  vertigo[]  dysphagia[] epistaxis[] recent dental work []                                      CV:  chest pain[] palpitations[] BALTAZAR [] diaphoresis [] edema[]                                                                                             RESPIRATORY:  wheezing[] SOB[x] cough [] sputum[] hemoptysis[]                                                                    GI:  nausea[]  vomiting []  diarrhea[] constipation [] melena []                                                                        : hematuria[ ]  dysuria[ ] urgency[] incontinence[]                                                                                              MUSCULOSKELETAL:  arthritis[ ]  joint swelling [ ] muscle weakness [ ]                                                                  SKIN/BREAST:  rash[ ] itching [ ]  hair loss[ ] masses[ ]                                                                                                PSYCH:  dementia [ ] depression [ ] anxiety[ ]                                                                                                                  HEME/LYMPH:  bruises easily[ ] enlarged lymph nodes[ ] tender lymph nodes[ ]                                                 ENDOCRINE:  cold intolerance[ ] heat intolerance[ ] polydipsia[ ]                                                                              PHYSICAL EXAM  Vital Signs Last 24 Hrs  T(F): 97.7   HR: 102   BP: 133/70   RR: 18  SpO2: 96%     General: Well nourished, no acute distress    Psych: Appropriate, conversive, doesn't remember medications                                       Neuro: Non-focal                 Eyes: PERRLA EOMI   ENT: not examined  Neck: trachea midline  Chest: clear lungs                                                                       CV:  Auscultation: normal [x ] tachy S3[ ] S4[ ] Irregular [ ] Rub[ ] Clicks[ ]  Murmurs none:[ ]systolic [ ]  diastolic [ ] holosystolic [ ]                                                               GI: Obese abdomen, bowel tones active x4                                                                                            Extremities: Normal no evidence of cyanosis or deformity Edema: none[ ]trace[x ]1+[ ]2+[ ]3+[ ]4+[ ]  Lower Extremity Pulses: Right[2/4 ] Left[2/4 ]  SKIN : Normal exam to inspection & palpation.                                                           LABS:                        12.3   18.5  )-----------( 397      ( 2018 21:17 )             35.6     06-16    133<L>  |  91<L>  |  86<H>  ----------------------------<  216<H>  4.5   |  21<L>  |  3.42<H>    Ca    9.4      2018 11:54  Phos  4.4     06-16  Mg     2.4     -16    TPro  7.6  /  Alb  3.4  /  TBili  0.7  /  DBili  x   /  AST  12  /  ALT  12  /  AlkPhos  95  -16  PT/INR - ( 2018 21:17 )   PT: 14.3 sec;   INR: 1.30 ratio    PTT - ( 2018 21:17 )  PTT:25.6 sec  Urinalysis Basic - ( 2018 15:19 )  Color: Yellow / Appearance: SL Turbid / S.015 / pH: x  Gluc: x / Ketone: Trace  / Bili: Negative / Urobili: 4 mg/dL   Blood: x / Protein: 30 mg/dL / Nitrite: Negative   Leuk Esterase: Large / RBC: 10-25 /HPF / WBC 26-50 /HPF   Sq Epi: x / Non Sq Epi: OCC /HPF / Bacteria: Few /HPF      TTE / MARIA GUADALUPE:   Transthoracic Echocardiogram (18 @ 15:10)   Mitral Valve: Mitral valve not well visualized.  Aortic Valve/Aorta: Calcified aortic valve with decreased  opening. Peak transaortic valve gradient equals 51 mm Hg,  mean transaortic valve gradient equals 27 mm Hg, aortic  valve velocity time integral equals 57 cm, consistent with  moderate aortic stenosis. Peak left ventricular outflow  tract gradient equals 14 mm Hg, LVOT velocity time integral  equals 32 cm.  Normal aortic root size. (Ao: 2.9 cm at the sinuses of  Valsalva).  Left Atrium: Leftatrium not well visualized.  Left Ventricle: Endocardium not well visualized; grossly  normal left ventricular systolic function. Concentric left  ventricular hypertrophy. Reversal of the E-A waves of the  mitral inflow pattern consistent with reduced compliance of  the left ventricle.  Right Heart: Right atrium not well visualized. The right  ventricle is not well visualized; grossly enlarged with  normal right ventricular systolic function. TAPSE 2.1 cm  (normal) Tricuspid valve not well visualized. Pulmonic  valve not well visualized.  Pericardium/Pleura: Large pericardial effusion seen around  the left ventricle (> 2 cm). Effusion appears smaller  around the right ventricle (< 1 cm). No respiratory  variation across the mitral valve seen.There is  envagination of the right atrium. No RV diastolic collapse  seen. IVC is poorly seen.  Hemodynamic: Estimated right atrial pressure is 8 mm Hg.  Estimated right ventricular systolic pressure equals 48 mm  Hg, assuming right atrial pressure equals 8 mm Hg,  consistent with mild pulmonary hypertension.    Conclusions  1. Peak transaortic valve gradient equals 51 mm Hg, mean  transaortic valve gradient equals 27 mm Hg, aortic valve  velocity time integral equals 57 cm, consistent with  moderate aortic stenosis.  2. Endocardium not well visualized; grossly normal left  ventricular systolic function.  3. Reversal of the E-A waves of the mitral inflow pattern  consistent with reduced compliance of the left ventricle.  4. The right ventricle is not well visualized; grossly  enlarged with normal right ventricular systolic function.  TAPSE 2.1 cm (normal)  5. Large pericardial effusion seen around the left  ventricle (> 2 cm). Effusion appears smaller around the  right ventricle (< 1 cm). No respiratory variation across  the mitral valve seen. There is envagination of the right  atrium. No RV diastolic collapse seen. IVC is poorly seen.  *** No previous Echo exam. Technically difficult study due  to poor imaging windows and rapid heart rate. Echo reviewed  with cardiology fellow on call.      CT Chest No Cont (18 @ 13:32)   LUNGS AND LARGE AIRWAYS: Patent central airways.  Bilateral lower lobe   subsegmental linear atelectasis. Multiple 3 mm right upper lobe nodules,   unchanged from prior study. Previously noted left lower lobe nodule, not   visualized in the current study. Mild ground glass opacity    PLEURA: No pleural effusion.    VESSELS: Calcific atherosclerosis of aorta and coronary arteries.    HEART: Heart size is normal. Mild pericardial effusion with largest   diameter measuring 2.3 cm adjacent to right atrium.    MEDIASTINUM AND JOSSELYN: Re-demonstrated multiple anterior mediastinal lymph   nodes and paratracheal lymphadenopathy. Re-demonstrated thyroid gland   extending into upper mediastinum.    CHEST WALL AND LOWER NECK: Within normal limits.  In the right upper lobe have the appearance suggesting asymmetric edema   rather than infection.  VISUALIZED UPPER ABDOMEN: Cholelithiasis. A 2.6 cm left interpolar renal   cyst, unchanged.    BONES: Degenerative changes of spine.    IMPRESSION:     Mild pericardial effusion with largest diameter measuring 2.3 cm adjacent   to right atrium. Mild increased groundglass opacity in the right upper   lobe is most suggestive of asymmetric pulmonary edema.

## 2018-06-17 DIAGNOSIS — I48.91 UNSPECIFIED ATRIAL FIBRILLATION: ICD-10-CM

## 2018-06-17 LAB
ALBUMIN SERPL ELPH-MCNC: 3.2 G/DL — LOW (ref 3.3–5)
ALP SERPL-CCNC: 88 U/L — SIGNIFICANT CHANGE UP (ref 40–120)
ALT FLD-CCNC: 9 U/L — LOW (ref 10–45)
ANION GAP SERPL CALC-SCNC: 13 MMOL/L — SIGNIFICANT CHANGE UP (ref 5–17)
APPEARANCE UR: CLEAR — SIGNIFICANT CHANGE UP
AST SERPL-CCNC: 9 U/L — LOW (ref 10–40)
B BURGDOR C6 AB SER-ACNC: NEGATIVE — SIGNIFICANT CHANGE UP
B BURGDOR IGG+IGM SER-ACNC: 0.15 INDEX — SIGNIFICANT CHANGE UP (ref 0.01–0.89)
BILIRUB SERPL-MCNC: 0.3 MG/DL — SIGNIFICANT CHANGE UP (ref 0.2–1.2)
BILIRUB UR-MCNC: NEGATIVE — SIGNIFICANT CHANGE UP
BUN SERPL-MCNC: 68 MG/DL — HIGH (ref 7–23)
CALCIUM SERPL-MCNC: 9 MG/DL — SIGNIFICANT CHANGE UP (ref 8.4–10.5)
CHLORIDE SERPL-SCNC: 99 MMOL/L — SIGNIFICANT CHANGE UP (ref 96–108)
CHOLEST SERPL-MCNC: 96 MG/DL — SIGNIFICANT CHANGE UP (ref 10–199)
CO2 SERPL-SCNC: 24 MMOL/L — SIGNIFICANT CHANGE UP (ref 22–31)
COLOR SPEC: YELLOW — SIGNIFICANT CHANGE UP
CREAT SERPL-MCNC: 2.09 MG/DL — HIGH (ref 0.5–1.3)
DIFF PNL FLD: NEGATIVE — SIGNIFICANT CHANGE UP
EPI CELLS # UR: SIGNIFICANT CHANGE UP /HPF
GLUCOSE BLDC GLUCOMTR-MCNC: 156 MG/DL — HIGH (ref 70–99)
GLUCOSE BLDC GLUCOMTR-MCNC: 165 MG/DL — HIGH (ref 70–99)
GLUCOSE BLDC GLUCOMTR-MCNC: 189 MG/DL — HIGH (ref 70–99)
GLUCOSE BLDC GLUCOMTR-MCNC: 227 MG/DL — HIGH (ref 70–99)
GLUCOSE BLDC GLUCOMTR-MCNC: 232 MG/DL — HIGH (ref 70–99)
GLUCOSE SERPL-MCNC: 226 MG/DL — HIGH (ref 70–99)
GLUCOSE UR QL: NEGATIVE — SIGNIFICANT CHANGE UP
HBA1C BLD-MCNC: 7.5 % — HIGH (ref 4–5.6)
HCT VFR BLD CALC: 35.2 % — LOW (ref 39–50)
HDLC SERPL-MCNC: 30 MG/DL — LOW (ref 40–125)
HGB BLD-MCNC: 11.9 G/DL — LOW (ref 13–17)
KETONES UR-MCNC: NEGATIVE — SIGNIFICANT CHANGE UP
LEUKOCYTE ESTERASE UR-ACNC: ABNORMAL
LIPID PNL WITH DIRECT LDL SERPL: 51 MG/DL — SIGNIFICANT CHANGE UP
MAGNESIUM SERPL-MCNC: 2.4 MG/DL — SIGNIFICANT CHANGE UP (ref 1.6–2.6)
MCHC RBC-ENTMCNC: 30.1 PG — SIGNIFICANT CHANGE UP (ref 27–34)
MCHC RBC-ENTMCNC: 33.7 GM/DL — SIGNIFICANT CHANGE UP (ref 32–36)
MCV RBC AUTO: 89.5 FL — SIGNIFICANT CHANGE UP (ref 80–100)
NITRITE UR-MCNC: NEGATIVE — SIGNIFICANT CHANGE UP
PH UR: 5.5 — SIGNIFICANT CHANGE UP (ref 5–8)
PHOSPHATE SERPL-MCNC: 3.7 MG/DL — SIGNIFICANT CHANGE UP (ref 2.5–4.5)
PLATELET # BLD AUTO: 315 K/UL — SIGNIFICANT CHANGE UP (ref 150–400)
POTASSIUM SERPL-MCNC: 4.6 MMOL/L — SIGNIFICANT CHANGE UP (ref 3.5–5.3)
POTASSIUM SERPL-SCNC: 4.6 MMOL/L — SIGNIFICANT CHANGE UP (ref 3.5–5.3)
PROT SERPL-MCNC: 7 G/DL — SIGNIFICANT CHANGE UP (ref 6–8.3)
PROT UR-MCNC: SIGNIFICANT CHANGE UP
RBC # BLD: 3.94 M/UL — LOW (ref 4.2–5.8)
RBC # FLD: 12.7 % — SIGNIFICANT CHANGE UP (ref 10.3–14.5)
RBC CASTS # UR COMP ASSIST: SIGNIFICANT CHANGE UP /HPF (ref 0–2)
SODIUM SERPL-SCNC: 136 MMOL/L — SIGNIFICANT CHANGE UP (ref 135–145)
SP GR SPEC: 1.01 — SIGNIFICANT CHANGE UP (ref 1.01–1.02)
T3 SERPL-MCNC: 162 NG/DL — SIGNIFICANT CHANGE UP (ref 80–200)
T4 AB SER-ACNC: 11.1 UG/DL — SIGNIFICANT CHANGE UP (ref 4.6–12)
TOTAL CHOLESTEROL/HDL RATIO MEASUREMENT: 3.2 RATIO — LOW (ref 3.4–9.6)
TRIGL SERPL-MCNC: 77 MG/DL — SIGNIFICANT CHANGE UP (ref 10–149)
TSH SERPL-MCNC: <0.01 UIU/ML — LOW (ref 0.27–4.2)
UROBILINOGEN FLD QL: NEGATIVE — SIGNIFICANT CHANGE UP
UUN UR-MCNC: 853 MG/DL — SIGNIFICANT CHANGE UP
WBC # BLD: 15.5 K/UL — HIGH (ref 3.8–10.5)
WBC # FLD AUTO: 15.5 K/UL — HIGH (ref 3.8–10.5)
WBC UR QL: SIGNIFICANT CHANGE UP /HPF (ref 0–5)

## 2018-06-17 PROCEDURE — 93306 TTE W/DOPPLER COMPLETE: CPT | Mod: 26

## 2018-06-17 PROCEDURE — 93010 ELECTROCARDIOGRAM REPORT: CPT

## 2018-06-17 PROCEDURE — 93308 TTE F-UP OR LMTD: CPT | Mod: 26

## 2018-06-17 PROCEDURE — 99222 1ST HOSP IP/OBS MODERATE 55: CPT

## 2018-06-17 PROCEDURE — 99233 SBSQ HOSP IP/OBS HIGH 50: CPT | Mod: GC

## 2018-06-17 RX ORDER — ACETAMINOPHEN 500 MG
650 TABLET ORAL ONCE
Qty: 0 | Refills: 0 | Status: COMPLETED | OUTPATIENT
Start: 2018-06-17 | End: 2018-06-17

## 2018-06-17 RX ORDER — IPRATROPIUM/ALBUTEROL SULFATE 18-103MCG
3 AEROSOL WITH ADAPTER (GRAM) INHALATION EVERY 6 HOURS
Qty: 0 | Refills: 0 | Status: DISCONTINUED | OUTPATIENT
Start: 2018-06-17 | End: 2018-06-19

## 2018-06-17 RX ORDER — METOPROLOL TARTRATE 50 MG
12.5 TABLET ORAL
Qty: 0 | Refills: 0 | Status: DISCONTINUED | OUTPATIENT
Start: 2018-06-17 | End: 2018-06-17

## 2018-06-17 RX ORDER — NICOTINE POLACRILEX 2 MG
2 GUM BUCCAL
Qty: 0 | Refills: 0 | Status: DISCONTINUED | OUTPATIENT
Start: 2018-06-17 | End: 2018-06-19

## 2018-06-17 RX ORDER — NICOTINE POLACRILEX 2 MG
1 GUM BUCCAL DAILY
Qty: 0 | Refills: 0 | Status: DISCONTINUED | OUTPATIENT
Start: 2018-06-17 | End: 2018-06-19

## 2018-06-17 RX ADMIN — Medication 2 MILLIGRAM(S): at 21:25

## 2018-06-17 RX ADMIN — Medication 50 MILLIGRAM(S): at 06:25

## 2018-06-17 RX ADMIN — Medication 2 MILLIGRAM(S): at 18:24

## 2018-06-17 RX ADMIN — Medication 650 MILLIGRAM(S): at 06:21

## 2018-06-17 RX ADMIN — Medication 5 MILLIGRAM(S): at 22:36

## 2018-06-17 RX ADMIN — Medication 3 MILLILITER(S): at 22:36

## 2018-06-17 RX ADMIN — Medication 1 PATCH: at 12:43

## 2018-06-17 RX ADMIN — Medication 650 MILLIGRAM(S): at 06:41

## 2018-06-17 RX ADMIN — Medication 2: at 09:00

## 2018-06-17 RX ADMIN — Medication 1: at 12:43

## 2018-06-17 RX ADMIN — Medication 650 MILLIGRAM(S): at 23:16

## 2018-06-17 RX ADMIN — Medication 3 MILLILITER(S): at 17:37

## 2018-06-17 RX ADMIN — Medication 1: at 17:34

## 2018-06-17 NOTE — PROGRESS NOTE ADULT - ATTENDING COMMENTS
Patient is seen and examined with fellow, NP and the CCU house-staff. I agree with the history, physical and the assessment and plan.  f/u with TTE  f/u with urina and blood cx  ?h/o afib and need for AC - will d/w Adrian (out-pt cards)

## 2018-06-17 NOTE — PROGRESS NOTE ADULT - ASSESSMENT
71 yo M HTN, HLD, DM2 (does not know a1c, does not check glucose), nephrolithiasis, pulm nodules p/w hypoxia on RA, new A fib RVR ([id number]), pericardial effusion w/ possible  hypotension s/p 3.5 L NS on levophed gtt, PARRISH, UTI

## 2018-06-17 NOTE — CHART NOTE - NSCHARTNOTEFT_GEN_A_CORE
====================  CCU MIDNIGHT ROUNDS  ====================    MILAGROS VIERA  [id number]    ====================  SUMMARY:  ====================    73 yo M HTN, HLD, DM2, nephrolithiasis, pulm nodules, p/w hypoxia on RA, new AF RVR ([id number] 3), pericardial effusion w/ possible early tamponade physiology on TTE, hypotension s/p 3.5 L NS and levo gtt (now off) and PARRISH     ====================  NEW EVENTS:  ====================    BP stable off pressors. HR stable - sinus tach 100-110s. Saturating well on 2L. No complaints/new events     ====================  VITALS (Last 12 hrs):  ====================    T(C): 36.6 (06-17-18 @ 18:00), Max: 36.6 (06-17-18 @ 18:00)  HR: 108 (06-17-18 @ 20:00) (102 - 110)  BP: 116/72 (06-17-18 @ 20:00) (109/66 - 137/69)  BP(mean): 85 (06-17-18 @ 20:00) (77 - 98)  RR: 25 (06-17-18 @ 20:00) (14 - 29)  SpO2: 96% (06-17-18 @ 20:00) (93% - 97%)    TELEMETRY: sinus tach, PACs    I&O's Summary    16 Jun 2018 07:01  -  17 Jun 2018 07:00  --------------------------------------------------------  IN: 515 mL / OUT: 1275 mL / NET: -760 mL    17 Jun 2018 07:01  -  17 Jun 2018 20:56  --------------------------------------------------------  IN: 480 mL / OUT: 900 mL / NET: -420 mL    ====================  PLAN:  ====================  - pericardial effusion - limited TTE in AM, no clinical s/s tamponade, hold cardizem given risk for decompensation, w/ hx pulm nodules and lymphadenopathy on CT chest, f/u for ? drainage in AM  - AF - now in sinus, [id number], f/u with PCP w/ regards to history, hold AC s/t pericardial effusion of unclear etiology  - HTN - currently normotensive, hold cardizem d/t yakov  - PARRISH - repeat UA negative, ABX DCed, no s/s infection, Cr downtrending, making good urine, FeUrea/FeNa consistent w/ pre-renal - likely s/t to poor PO intake [facility]/CCU  #[id number]

## 2018-06-17 NOTE — PROGRESS NOTE ADULT - SUBJECTIVE AND OBJECTIVE BOX
Patient is a 72y old  Male who presents with a chief complaint of AF RVR, hypotension (16 Jun 2018 22:13)      Overnight Event:    REVIEW OF SYSTEMS:  	    MEDICATIONS  (STANDING):  aztreonam  IVPB 500 milliGRAM(s) IV Intermittent every 8 hours  dextrose 5%. 1000 milliLiter(s) (50 mL/Hr) IV Continuous <Continuous>  dextrose 50% Injectable 12.5 Gram(s) IV Push once  dextrose 50% Injectable 25 Gram(s) IV Push once  dextrose 50% Injectable 25 Gram(s) IV Push once  insulin lispro (HumaLOG) corrective regimen sliding scale   SubCutaneous three times a day before meals  insulin lispro (HumaLOG) corrective regimen sliding scale   SubCutaneous at bedtime  melatonin 5 milliGRAM(s) Oral at bedtime  nicotine -  14 mG/24Hr(s) Patch 1 patch Transdermal daily    MEDICATIONS  (PRN):  ALBUTerol/ipratropium for Nebulization 3 milliLiter(s) Nebulizer every 6 hours PRN Shortness of Breath and/or Wheezing  dextrose 40% Gel 15 Gram(s) Oral once PRN Blood Glucose LESS THAN 70 milliGRAM(s)/deciliter  glucagon  Injectable 1 milliGRAM(s) IntraMuscular once PRN Glucose LESS THAN 70 milligrams/deciliter        PHYSICAL EXAM:  Vital Signs Last 24 Hrs  T(C): 36.4 (17 Jun 2018 06:00), Max: 37.1 (16 Jun 2018 16:57)  T(F): 97.6 (17 Jun 2018 06:00), Max: 98.8 (16 Jun 2018 16:57)  HR: 102 (17 Jun 2018 06:00) (87 - 124)  BP: 128/72 (17 Jun 2018 06:00) (53/56 - 135/70)  BP(mean): 95 (17 Jun 2018 06:00) (77 - 95)  RR: 20 (17 Jun 2018 06:00) (17 - 34)  SpO2: 92% (17 Jun 2018 06:00) (86% - 100%)  I&O's Summary    16 Jun 2018 07:01  -  17 Jun 2018 07:00  --------------------------------------------------------  IN: 515 mL / OUT: 1275 mL / NET: -760 mL        Appearance: Normal	  HEENT:   Normal oral mucosa, PERRL, EOMI	  Lymphatic: No lymphadenopathy  Cardiovascular: Normal S1 S2, No JVD, No murmurs, No edema  Respiratory: Lungs clear to auscultation	  Psychiatry: A & O x 3, Mood & affect appropriate  Gastrointestinal:  Soft, Non-tender, + BS	  Skin: No rashes, No ecchymoses, No cyanosis	  Neurologic: Non-focal  Extremities: Normal range of motion, No clubbing, cyanosis or edema  Vascular: Peripheral pulses palpable 2+ bilaterally    LABS:	 	                        12.3   18.5  )-----------( 397      ( 16 Jun 2018 21:17 )             35.6     Auto Eosinophil # 0.1   / Auto Eosinophil % 0.7   / Auto Neutrophil # 17.3  / Auto Neutrophil % 93.5  / BANDS % x                            13.4   20.8  )-----------( 382      ( 16 Jun 2018 11:54 )             39.1     Auto Eosinophil # 0.2   / Auto Eosinophil % 0.7   / Auto Neutrophil # 16.7  / Auto Neutrophil % 80.5  / BANDS % x        INR: 1.30 ratio (06-16 @ 21:17)  INR: 1.28 ratio (06-16 @ 11:54)    06-16    133<L>  |  96  |  77<H>  ----------------------------<  362<H>  4.9   |  19<L>  |  2.76<H>  06-16    133<L>  |  91<L>  |  86<H>  ----------------------------<  216<H>  4.5   |  21<L>  |  3.42<H>    Ca    8.7      16 Jun 2018 21:17  Mg     2.3     06-16  Phos  3.5     06-16  TPro  7.3  /  Alb  3.1<L>  /  TBili  0.5  /  DBili  x   /  AST  11  /  ALT  10  /  AlkPhos  95  06-16  TPro  7.6  /  Alb  3.4  /  TBili  0.7  /  DBili  x   /  AST  12  /  ALT  12  /  AlkPhos  95  06-16    Lactate, Blood: 1.3 mmol/L (06-16 @ 21:17)      proBNP: Serum Pro-Brain Natriuretic Peptide: 1818 pg/mL (06-16 @ 11:54)    Lipid Profile: 06-16 Chol 96 LDL 51 HDL 30<L> Trig 77  HgA1c: 7.5 % (06-16 @ 23:44)    TSH: Thyroid Stimulating Hormone, Serum: <0.01 uIU/mL (06-16 @ 23:44)      CARDIAC MARKERS:   16 Jun 2018 21:17 Troponin x     / Creatine Kinase 55 U/L /  CKMB 2.8 ng/mL / CPK Mass Assay % x            TELEMETRY: 	    ECG:  	  RADIOLOGY:  OTHER: 	    PREVIOUS DIAGNOSTIC TESTING:    [ ] Echocardiogram:  [ ]  Catheterization:  [ ] Stress Test:  	  	  MELINDA Paulino  Contact # Patient is a 72y old  Male who presents with a chief complaint of AF RVR, hypotension (16 Jun 2018 22:13)    HPI:  73 yo M HTN, HLD, DM2 (does not know a1c, does not check glucose), nephrolithiasis, pulm nodules p/w 3-4 days of decreased appetite, dec UOP and malaise/fatigue. Pt presented to urgent care this AM and was sent to the ED for hypotension and tachycardia (reported AF RVR), s/p 10 mg IVP cardizem per EMS per ED note. Pt does not know what medications he takes or if any have been changed recently. He denies prior history of A fib. He last saw his PCP ~1 mo ago.   In the ED, pt was wheezing & satting 86% on RA w/ improved O2 sat s/p 3 duonebs and 4L O2 via NC. Pt given 125 mg IV solu medrol. Pt tachycardic 100s-120s w/ SBP as low as 70s systolic. Pt given 3.5L NS and started on levophed for hypotension. Give azithromax for COPD exacerbation and azactam for PNA. UA +. Pt noted to have PARRISH (denies history of CKD). CT chest w/ mild pericardial effusion mild increased ground glass opacity in the RUL suggestive of asymmetric pulmonary edema and POCUS w/ pericardial effusion. Formal TTE showing moderate AS, large (>2 cm) pericardial effusion around LV that appears smaller around the RV (<1cm), no respiratory variation across the MV, envagination of the RA, no RV diastolic collapse is seen, IVC is poorly seen. Pt converted to sinus rhythm and is without complaints @ this time, stating in the ED he ate and urinated for the first time in 4 days.  Pt denies previous cardiac history. Denies previous arrhtyhmia or heart failure. His exercise tolerance has decreased over the past ~3 mos, he now gets dyspneic after walking ~ .5 mile. No CP/palpitations/SOB. Denies orthopnea, PND, LE edema, weight gain or loss. No history of PE/DVT or malignancy. No nausea/vomiting/diarrhea. No fevers/chills. Has chronic smoker's cough occasionally, no sputum production.  No sick contacts. No known tick bites. He has driven ~14 hours recently and was recently in the [facility] and [place] (16 Jun 2018 22:13)    Overnight Event: Weaned off levophed.  IR evaluated CT to drain pericardial effusion.    REVIEW OF SYSTEMS:  	    MEDICATIONS  (STANDING):  aztreonam  IVPB 500 milliGRAM(s) IV Intermittent every 8 hours  dextrose 5%. 1000 milliLiter(s) (50 mL/Hr) IV Continuous <Continuous>  dextrose 50% Injectable 12.5 Gram(s) IV Push once  dextrose 50% Injectable 25 Gram(s) IV Push once  dextrose 50% Injectable 25 Gram(s) IV Push once  insulin lispro (HumaLOG) corrective regimen sliding scale   SubCutaneous three times a day before meals  insulin lispro (HumaLOG) corrective regimen sliding scale   SubCutaneous at bedtime  melatonin 5 milliGRAM(s) Oral at bedtime  nicotine -  14 mG/24Hr(s) Patch 1 patch Transdermal daily    MEDICATIONS  (PRN):  ALBUTerol/ipratropium for Nebulization 3 milliLiter(s) Nebulizer every 6 hours PRN Shortness of Breath and/or Wheezing  dextrose 40% Gel 15 Gram(s) Oral once PRN Blood Glucose LESS THAN 70 milliGRAM(s)/deciliter  glucagon  Injectable 1 milliGRAM(s) IntraMuscular once PRN Glucose LESS THAN 70 milligrams/deciliter        PHYSICAL EXAM:  Vital Signs Last 24 Hrs  T(C): 36.4 (17 Jun 2018 06:00), Max: 37.1 (16 Jun 2018 16:57)  T(F): 97.6 (17 Jun 2018 06:00), Max: 98.8 (16 Jun 2018 16:57)  HR: 102 (17 Jun 2018 06:00) (87 - 124)  BP: 128/72 (17 Jun 2018 06:00) (53/56 - 135/70)  BP(mean): 95 (17 Jun 2018 06:00) (77 - 95)  RR: 20 (17 Jun 2018 06:00) (17 - 34)  SpO2: 92% (17 Jun 2018 06:00) (86% - 100%)  I&O's Summary    16 Jun 2018 07:01  -  17 Jun 2018 07:00  --------------------------------------------------------  IN: 515 mL / OUT: 1275 mL / NET: -760 mL        Appearance: Normal	  HEENT:   Normal oral mucosa, PERRL, EOMI	  Lymphatic: No lymphadenopathy  Cardiovascular: Normal S1 S2, No JVD, No murmurs, No edema  Respiratory: Lungs clear to auscultation	  Psychiatry: A & O x 3, Mood & affect appropriate  Gastrointestinal:  Soft, Non-tender, + BS	  Skin: No rashes, No ecchymoses, No cyanosis	  Neurologic: Non-focal  Extremities: Normal range of motion, No clubbing, cyanosis or edema  Vascular: Peripheral pulses palpable 2+ bilaterally    LABS:	 	                        12.3   18.5  )-----------( 397      ( 16 Jun 2018 21:17 )             35.6     Auto Eosinophil # 0.1   / Auto Eosinophil % 0.7   / Auto Neutrophil # 17.3  / Auto Neutrophil % 93.5  / BANDS % x                            13.4   20.8  )-----------( 382      ( 16 Jun 2018 11:54 )             39.1     Auto Eosinophil # 0.2   / Auto Eosinophil % 0.7   / Auto Neutrophil # 16.7  / Auto Neutrophil % 80.5  / BANDS % x        INR: 1.30 ratio (06-16 @ 21:17)  INR: 1.28 ratio (06-16 @ 11:54)    06-16    133<L>  |  96  |  77<H>  ----------------------------<  362<H>  4.9   |  19<L>  |  2.76<H>  06-16    133<L>  |  91<L>  |  86<H>  ----------------------------<  216<H>  4.5   |  21<L>  |  3.42<H>    Ca    8.7      16 Jun 2018 21:17  Mg     2.3     06-16  Phos  3.5     06-16  TPro  7.3  /  Alb  3.1<L>  /  TBili  0.5  /  DBili  x   /  AST  11  /  ALT  10  /  AlkPhos  95  06-16  TPro  7.6  /  Alb  3.4  /  TBili  0.7  /  DBili  x   /  AST  12  /  ALT  12  /  AlkPhos  95  06-16    Lactate, Blood: 1.3 mmol/L (06-16 @ 21:17)      proBNP: Serum Pro-Brain Natriuretic Peptide: 1818 pg/mL (06-16 @ 11:54)    Lipid Profile: 06-16 Chol 96 LDL 51 HDL 30<L> Trig 77  HgA1c: 7.5 % (06-16 @ 23:44)    TSH: Thyroid Stimulating Hormone, Serum: <0.01 uIU/mL (06-16 @ 23:44)      CARDIAC MARKERS:   16 Jun 2018 21:17 Troponin x     / Creatine Kinase 55 U/L /  CKMB 2.8 ng/mL / CPK Mass Assay % x            TELEMETRY: 	    ECG:  	  RADIOLOGY:  OTHER: 	    PREVIOUS DIAGNOSTIC TESTING:    [ ] Echocardiogram:  [ ]  Catheterization:  [ ] Stress Test:  	  	  MELINDA Paulino  Contact #

## 2018-06-18 ENCOUNTER — TRANSCRIPTION ENCOUNTER (OUTPATIENT)
Age: 73
End: 2018-06-18

## 2018-06-18 DIAGNOSIS — Z29.9 ENCOUNTER FOR PROPHYLACTIC MEASURES, UNSPECIFIED: ICD-10-CM

## 2018-06-18 DIAGNOSIS — R09.82 POSTNASAL DRIP: ICD-10-CM

## 2018-06-18 DIAGNOSIS — Z51.5 ENCOUNTER FOR PALLIATIVE CARE: ICD-10-CM

## 2018-06-18 DIAGNOSIS — Z71.89 OTHER SPECIFIED COUNSELING: ICD-10-CM

## 2018-06-18 DIAGNOSIS — R57.9 SHOCK, UNSPECIFIED: ICD-10-CM

## 2018-06-18 DIAGNOSIS — R91.8 OTHER NONSPECIFIC ABNORMAL FINDING OF LUNG FIELD: ICD-10-CM

## 2018-06-18 DIAGNOSIS — J44.9 CHRONIC OBSTRUCTIVE PULMONARY DISEASE, UNSPECIFIED: ICD-10-CM

## 2018-06-18 LAB
ALBUMIN SERPL ELPH-MCNC: 3 G/DL — LOW (ref 3.3–5)
ALP SERPL-CCNC: 87 U/L — SIGNIFICANT CHANGE UP (ref 40–120)
ALT FLD-CCNC: 10 U/L — SIGNIFICANT CHANGE UP (ref 10–45)
ANION GAP SERPL CALC-SCNC: 13 MMOL/L — SIGNIFICANT CHANGE UP (ref 5–17)
AST SERPL-CCNC: 13 U/L — SIGNIFICANT CHANGE UP (ref 10–40)
BASOPHILS # BLD AUTO: 0 K/UL — SIGNIFICANT CHANGE UP (ref 0–0.2)
BASOPHILS NFR BLD AUTO: 0.2 % — SIGNIFICANT CHANGE UP (ref 0–2)
BILIRUB SERPL-MCNC: 0.2 MG/DL — SIGNIFICANT CHANGE UP (ref 0.2–1.2)
BUN SERPL-MCNC: 49 MG/DL — HIGH (ref 7–23)
CALCIUM SERPL-MCNC: 9.4 MG/DL — SIGNIFICANT CHANGE UP (ref 8.4–10.5)
CHLORIDE SERPL-SCNC: 101 MMOL/L — SIGNIFICANT CHANGE UP (ref 96–108)
CO2 SERPL-SCNC: 26 MMOL/L — SIGNIFICANT CHANGE UP (ref 22–31)
CREAT SERPL-MCNC: 1.26 MG/DL — SIGNIFICANT CHANGE UP (ref 0.5–1.3)
CULTURE RESULTS: NO GROWTH — SIGNIFICANT CHANGE UP
EOSINOPHIL # BLD AUTO: 0.1 K/UL — SIGNIFICANT CHANGE UP (ref 0–0.5)
EOSINOPHIL NFR BLD AUTO: 0.3 % — SIGNIFICANT CHANGE UP (ref 0–6)
GLUCOSE BLDC GLUCOMTR-MCNC: 134 MG/DL — HIGH (ref 70–99)
GLUCOSE BLDC GLUCOMTR-MCNC: 139 MG/DL — HIGH (ref 70–99)
GLUCOSE BLDC GLUCOMTR-MCNC: 150 MG/DL — HIGH (ref 70–99)
GLUCOSE BLDC GLUCOMTR-MCNC: 168 MG/DL — HIGH (ref 70–99)
GLUCOSE SERPL-MCNC: 153 MG/DL — HIGH (ref 70–99)
HCT VFR BLD CALC: 37.9 % — LOW (ref 39–50)
HGB BLD-MCNC: 12.5 G/DL — LOW (ref 13–17)
LYMPHOCYTES # BLD AUTO: 1.6 K/UL — SIGNIFICANT CHANGE UP (ref 1–3.3)
LYMPHOCYTES # BLD AUTO: 8.4 % — LOW (ref 13–44)
MAGNESIUM SERPL-MCNC: 2 MG/DL — SIGNIFICANT CHANGE UP (ref 1.6–2.6)
MCHC RBC-ENTMCNC: 29.7 PG — SIGNIFICANT CHANGE UP (ref 27–34)
MCHC RBC-ENTMCNC: 33 GM/DL — SIGNIFICANT CHANGE UP (ref 32–36)
MCV RBC AUTO: 89.8 FL — SIGNIFICANT CHANGE UP (ref 80–100)
MONOCYTES # BLD AUTO: 1.8 K/UL — HIGH (ref 0–0.9)
MONOCYTES NFR BLD AUTO: 9.7 % — SIGNIFICANT CHANGE UP (ref 2–14)
NEUTROPHILS # BLD AUTO: 15.5 K/UL — HIGH (ref 1.8–7.4)
NEUTROPHILS NFR BLD AUTO: 81.5 % — HIGH (ref 43–77)
PHOSPHATE SERPL-MCNC: 2.9 MG/DL — SIGNIFICANT CHANGE UP (ref 2.5–4.5)
PLATELET # BLD AUTO: 349 K/UL — SIGNIFICANT CHANGE UP (ref 150–400)
POTASSIUM SERPL-MCNC: 4.4 MMOL/L — SIGNIFICANT CHANGE UP (ref 3.5–5.3)
POTASSIUM SERPL-SCNC: 4.4 MMOL/L — SIGNIFICANT CHANGE UP (ref 3.5–5.3)
PROT SERPL-MCNC: 7 G/DL — SIGNIFICANT CHANGE UP (ref 6–8.3)
RBC # BLD: 4.22 M/UL — SIGNIFICANT CHANGE UP (ref 4.2–5.8)
RBC # FLD: 12.9 % — SIGNIFICANT CHANGE UP (ref 10.3–14.5)
SODIUM SERPL-SCNC: 140 MMOL/L — SIGNIFICANT CHANGE UP (ref 135–145)
SPECIMEN SOURCE: SIGNIFICANT CHANGE UP
WBC # BLD: 19 K/UL — HIGH (ref 3.8–10.5)
WBC # FLD AUTO: 19 K/UL — HIGH (ref 3.8–10.5)

## 2018-06-18 PROCEDURE — 99497 ADVNCD CARE PLAN 30 MIN: CPT | Mod: 25

## 2018-06-18 PROCEDURE — 99233 SBSQ HOSP IP/OBS HIGH 50: CPT

## 2018-06-18 PROCEDURE — 99223 1ST HOSP IP/OBS HIGH 75: CPT | Mod: GC

## 2018-06-18 PROCEDURE — 93321 DOPPLER ECHO F-UP/LMTD STD: CPT | Mod: 26

## 2018-06-18 PROCEDURE — 99255 IP/OBS CONSLTJ NEW/EST HI 80: CPT | Mod: GC

## 2018-06-18 PROCEDURE — 93308 TTE F-UP OR LMTD: CPT | Mod: 26

## 2018-06-18 RX ORDER — ACETAMINOPHEN 500 MG
650 TABLET ORAL ONCE
Qty: 0 | Refills: 0 | Status: COMPLETED | OUTPATIENT
Start: 2018-06-18 | End: 2018-06-18

## 2018-06-18 RX ORDER — FLUTICASONE PROPIONATE 50 MCG
1 SPRAY, SUSPENSION NASAL
Qty: 0 | Refills: 0 | Status: DISCONTINUED | OUTPATIENT
Start: 2018-06-18 | End: 2018-06-19

## 2018-06-18 RX ADMIN — Medication 3 MILLILITER(S): at 18:30

## 2018-06-18 RX ADMIN — Medication 650 MILLIGRAM(S): at 22:15

## 2018-06-18 RX ADMIN — Medication 650 MILLIGRAM(S): at 21:48

## 2018-06-18 RX ADMIN — Medication 3 MILLILITER(S): at 12:22

## 2018-06-18 RX ADMIN — Medication 3 MILLILITER(S): at 06:46

## 2018-06-18 RX ADMIN — Medication 5 MILLIGRAM(S): at 21:48

## 2018-06-18 RX ADMIN — Medication 1 PATCH: at 13:27

## 2018-06-18 RX ADMIN — Medication 1 PATCH: at 12:00

## 2018-06-18 NOTE — PROGRESS NOTE ADULT - ATTENDING COMMENTS
Patient with new AF and perciardial effusion of unclear etiology. He is tachycardic, but there is no evidence of tamponade.  After extensive discussion with patient, his wife, and one of his sons at the bedside, patient is agreeable to staying for diagnostic pericardial drainage.  Case reviewed with Young Grover MD from Interventional Radiology who believes the effusion is reachable by IR.  Case discussed with Dr. Campbell (outpatient cardiologist) and Pulmonary consult. Patient with new AF and pericardial effusion of unclear etiology. He is tachycardic, but there is no evidence of tamponade.  After extensive discussion with patient, his wife, and one of his sons at the bedside, patient is agreeable to staying for diagnostic pericardial drainage.  Case reviewed with Young Grover MD from Interventional Radiology who believes the effusion is reachable by IR.  Case discussed with Dr. Campbell (outpatient cardiologist) and Pulmonary consult.

## 2018-06-18 NOTE — CONSULT NOTE ADULT - ASSESSMENT
73 yo M HTN, HLD, DM2, nephrolithiasis, pulm nodules p/w hypoxia on RA, new A fib RVR ([id number] 3), and new pericardial effusion. 71 yo M HTN, HLD, DM2, nephrolithiasis, pulm nodules p/w hypoxia on RA, new A fib RVR ([id number]), and new pericardial effusion.

## 2018-06-18 NOTE — DIETITIAN INITIAL EVALUATION ADULT. - PROBLEM SELECTOR PLAN 9
counseled on tobacco cessation, nicotine patch offered and accepted     GoC discussion had w/ patient, patient re-affirmed code status DNR/DNI    Joana PARSONS/CCU #[id number]

## 2018-06-18 NOTE — DISCHARGE NOTE ADULT - VISION (WITH CORRECTIVE LENSES IF THE PATIENT USUALLY WEARS THEM):
Distance/Partially impaired: cannot see medication labels or newsprint, but can see obstacles in path, and the surrounding layout; can count fingers at arm's length

## 2018-06-18 NOTE — DISCHARGE NOTE ADULT - PATIENT PORTAL LINK FT
You can access the [facility] Patient Portal, offered by [facility], by registering with the following website: [email or web address]

## 2018-06-18 NOTE — PROGRESS NOTE ADULT - ASSESSMENT
72 year old male well known to me admitted with severe weakness and findings of PARRISH and periardial effusion with low BP and tachycardia He does NOT have history of PAF. Has mild AS and chronic diastolic dysfunction. On cardizem due to hyperthyroidism for which he has refused to see endocrine int he past. Last TTE 5/2018 no effusion. Renal function to baseline after IVF. To review follow up TTE but may need diagnostic and therapeutic pericardiocentesis Will ask pulmonary (Dr. Brand ) to see patient who he last saw several years ago.     1. Treatment per CCU  2. Pulmonary evaluation  3. CTS follow up regarding pericardial effusion and possible drainage for diagnostic and therapeutic.    Marco Campbell MD

## 2018-06-18 NOTE — CONSULT NOTE ADULT - PROBLEM SELECTOR RECOMMENDATION 2
He is currently DNR/DNI. Primary team would like us to complete a MOLST form. AF with RVR during this admission, care per CCU team.

## 2018-06-18 NOTE — CONSULT NOTE ADULT - SUBJECTIVE AND OBJECTIVE BOX
HPI:  71 yo M HTN, HLD, DM2 (does not know a1c, does not check glucose), nephrolithiasis, pulm nodules p/w 3-4 days of decreased appetite, dec UOP and malaise/fatigue. Pt presented to urgent care this AM and was sent to the ED for hypotension and tachycardia (reported AF RVR), s/p 10 mg IVP cardizem per EMS per ED note. Pt does not know what medications he takes or if any have been changed recently. He denies prior history of A fib. He last saw his PCP ~1 mo ago.   In the ED, pt was wheezing & satting 86% on RA w/ improved O2 sat s/p 3 duonebs and 4L O2 via NC. Pt given 125 mg IV solu medrol. Pt tachycardic 100s-120s w/ SBP as low as 70s systolic. Pt given 3.5L NS and started on levophed for hypotension. Give azithromax for COPD exacerbation and azactam for PNA. UA +. Pt noted to have PARRISH (denies history of CKD). CT chest w/ mild pericardial effusion mild increased ground glass opacity in the RUL suggestive of asymmetric pulmonary edema and POCUS w/ pericardial effusion. Formal TTE showing moderate AS, large (>2 cm) pericardial effusion around LV that appears smaller around the RV (<1cm), no respiratory variation across the MV, envagination of the RA, no RV diastolic collapse is seen, IVC is poorly seen. Pt converted to sinus rhythm and is without complaints @ this time, stating in the ED he ate and urinated for the first time in 4 days.  Pt denies previous cardiac history. Denies previous arrhtyhmia or heart failure. His exercise tolerance has decreased over the past ~3 mos, he now gets dyspneic after walking ~ .5 mile. No CP/palpitations/SOB. Denies orthopnea, PND, LE edema, weight gain or loss. No history of PE/DVT or malignancy. No nausea/vomiting/diarrhea. No fevers/chills. Has chronic smoker's cough occasionally, no sputum production.  No sick contacts. No known tick bites. He has driven ~14 hours recently and was recently in the [facility] and [place] (2018 22:13)      PERTINENT PMH REVIEWED:  [ x] YES [ ] NO           SOCIAL HISTORY:   Significant other/partner:  [x ] YES  [ ] NO               Children:  [ ] YES  [ ] NO                   Sikhism/Spirituality: Mu-ism  Substance hx:  [ ] YES   [x ] NO                   Tobacco hx:  [x ] YES  [ ] NO                       Alcohol hx: [x ] YES  [ ] NO         Home Opioid hx:  [ ] YES  [ x] NO   Living Situation: [x ] Home  [ ] Long term care  [ ] Rehab [ ] Other    FAMILY HISTORY:  FAMILY HISTORY:  Family history of MI (myocardial infarction) (Father)    [x ] Family history non-contributory     BASELINE (I)ADLs (prior to admission):  [place]: [x ] total  [ ] moderate [ ] dependent    ADVANCE DIRECTIVES:    DNR [ x] YES [ ] NO                            [ ] Completed  MOLST  [ ] YES [x ] NO                      [ ] Completed  Health Care Proxy [x ] YES  [ ] NO   [x ] Completed  Living Will  [ ] YES [ ] NO             [ ] Surrogate  [x ] HCP  [ ] Guardian:                             Wife                                    Phone#:    ALLERGIES:   Allergies Penicillin    penicillins (Unknown)    Intolerances        MEDICATIONS:   MEDICATIONS  (STANDING):  ALBUTerol/ipratropium for Nebulization 3 milliLiter(s) Nebulizer every 6 hours  dextrose 5%. 1000 milliLiter(s) (50 mL/Hr) IV Continuous <Continuous>  dextrose 50% Injectable 12.5 Gram(s) IV Push once  dextrose 50% Injectable 25 Gram(s) IV Push once  dextrose 50% Injectable 25 Gram(s) IV Push once  diltiazem    Tablet 30 milliGRAM(s) Oral every 6 hours  fluticasone propionate 50 MICROgram(s)/spray Nasal Spray 1 Spray(s) Both Nostrils two times a day  insulin lispro (HumaLOG) corrective regimen sliding scale   SubCutaneous three times a day before meals  insulin lispro (HumaLOG) corrective regimen sliding scale   SubCutaneous at bedtime  melatonin 5 milliGRAM(s) Oral at bedtime  nicotine - 21 mG/24Hr(s) Patch 1 patch Transdermal daily    MEDICATIONS  (PRN):  ALBUTerol/ipratropium for Nebulization 3 milliLiter(s) Nebulizer every 6 hours PRN Shortness of Breath and/or Wheezing  dextrose 40% Gel 15 Gram(s) Oral once PRN Blood Glucose LESS THAN 70 milliGRAM(s)/deciliter  glucagon  Injectable 1 milliGRAM(s) IntraMuscular once PRN Glucose LESS THAN 70 milligrams/deciliter  nicotine  Polacrilex Gum 2 milliGRAM(s) Oral five times a day PRN craving      PRESENT SYMPTOMS:  Source: [ x] Patient   [ ] Family   [ ] Team     Pain:                        [x ] No [ ] Yes             [ ] Mild [ ] Moderate [ ] Severe    Onset -  Location -  Duration -  Character -  Alleviating/Aggravating -  Radiation -  Timing -      Dyspnea:                [ ] No [x ] Yes             [ x] Mild [ ] Moderate [ ] Severe    Anxiety:                  [x ] No [ ] Yes             [ ] Mild [ ] Moderate [ ] Severe    Fatigue:                  [ ] No [x ] Yes             [x ] Mild [ ] Moderate [ ] Severe    Nausea:                  [x ] No [ ] Yes             [ ] Mild [ ] Moderate [ ] Severe    Loss of appetite:   [ x] No [ ] Yes             [ ] Mild [ ] Moderate [ ] Severe    Constipation:        [x ] No [ ] Yes             [ ] Mild [ ] Moderate [ ] Severe    Other Symptoms:  [x ] All other review of systems negative   [ ] Unable to obtain due to poor mentation     Karnofsky Performance Score/Palliative Performance Status Version 2:         %    PHYSICAL EXAM:  Vital Signs Last 24 Hrs  T(C): 36.9 (2018 08:00), Max: 37.2 (2018 06:00)  T(F): 98.5 (2018 08:00), Max: 98.9 (2018 06:00)  HR: 114 (2018 13:00) (96 - 118)  BP: 121/88 (2018 13:00) (91/63 - 144/89)  BP(mean): 98 (2018 13:00) (70 - 108)  RR: 16 (2018 13:00) (15 - 29)  SpO2: 95% (2018 13:00) (87% - 99%) I&O's Summary    2018 07:  -  2018 07:00  --------------------------------------------------------  IN: 480 mL / OUT: 1675 mL / NET: -1195 mL    2018 07:01  -  2018 14:00  --------------------------------------------------------  IN: 480 mL / OUT: 325 mL / NET: 155 mL        General:  [ x] Alert  [ x] Oriented x3      [ ] Lethargic  [ ] Agitated   [ ] Cachexia   [ ] Unarousable  [x ] Verbal  [ ] Non-Verbal    HEENT:  [ x] Normal   [ ] Dry mouth   [ ] ET Tube    [ ] Trach  [ ] Oral lesions    Lungs:   [x ] Clear [ ] Tachypnea  [ ] Audible excessive secretions   [ ] Rhonchi        [ ] Right [ ] Left [ ] Bilateral  [ ] Crackles        [ ] Right [ ] Left [ ] Bilateral  [ ] Wheezing     [ ] Right [ ] Left [ ] Bilateral    Cardiovascular:  [x ] Regular [ ] Irregular [ ] Tachycardia   [ ] Bradycardia  [ ] Murmur [ ] Other    Abdomen: [x ] Soft  [ ] Distended   [ x] +BS  [x ] Non tender [ ] Tender  [ ]PEG   [ ]OGT/ NGT   Last BM:       Genitourinary: [ x] Normal [ ] Incontinent   [ ] Oliguria/Anuria   [ ] Flores    Musculoskeletal:  [x ] Normal   [ ] Weakness  [ ] Bedbound/Wheelchair bound [ ] Edema    Neurological: [ x] No focal deficits  [ ] Cognitive impairment  [ ] Dysphagia [ ] Dysarthria [ ] Paresis [ ] Other     Skin: [x ] Normal   [ ] Pressure ulcer(s)                  [ ] Rash    LABS:  [ ] Critical Care time for unstable patient with organ failure.  Total Time:                 minutes  18    140  |  101  |  49<H>  ----------------------------<  153<H>  4.4   |  26  |  1.26    Ca    9.4      2018 05:46  Phos  2.9     06-18  Mg     2.0     06-18    TPro  7.0  /  Alb  3.0<L>  /  TBili  0.2  /  DBili  x   /  AST  13  /  ALT  10  /  AlkPhos  87  06-18    PT/INR - ( 2018 21:17 )   PT: 14.3 sec;   INR: 1.30 ratio         PTT - ( 2018 21:17 )  PTT:25.6 sec  Urinalysis Basic - ( 2018 10:40 )    Color: Yellow / Appearance: Clear / S.014 / pH: x  Gluc: x / Ketone: Negative  / Bili: Negative / Urobili: Negative   Blood: x / Protein: Trace / Nitrite: Negative   Leuk Esterase: Trace / RBC: 0-2 /HPF / WBC 0-2 /HPF   Sq Epi: x / Non Sq Epi: OCC /HPF / Bacteria: x        Shock: [ ] Septic [ ] Cardiogenic [ ] Neurologic [ ] Hypovolemic  Vasopressors x   Inotrophs x     Protein Calorie Malnutrition: [x ] Mild [ ] Moderate [ ] Severe  Oral Intake: [ ] Unable/mouth care only [ ] Minimal [x ] Moderate [ ] Full Capability  Diet: [ ] NPO [ ] Tube feeds [ ] TPN [ ] Other     RADIOLOGY & ADDITIONAL STUDIES:    REFERRALS:   [ ] Chaplaincy  [ ] Hospice  [ ] Child Life  [ ] Social Work  [ ] Case management [ ] Holistic Therapy HPI: 72M with PMH of HTN, HLD, DM2, nephrolithiasis, pulmonary nodules p/w hypoxia on RA, new AF with RVR ([id number]), and a new pericardial effusion of unclear etiology. No evidence of tamponade, and after extensive family discussion, patient agreed to IR-guided diagnostic pericardiocentesis, with patient hopeful for discharge shortly thereafter. Palliative called to help clarify and document goals of care.    PERTINENT PMH REVIEWED:  [ x] YES [ ] NO           SOCIAL HISTORY:   Significant other/partner:  [x ] YES  [ ] NO               Children:  [x ] YES  [ ] NO                   Taoist/Spirituality: Tenriism  Substance hx:  [ ] YES   [x ] NO                   Tobacco hx:  [x ] YES  [ ] NO                       Alcohol hx: [x ] YES  [ ] NO         Home Opioid hx:  [ ] YES  [ x] NO   Living Situation: [x ] Home  [ ] Long term care  [ ] Rehab [ ] Other    FAMILY HISTORY:  FAMILY HISTORY:  Family history of MI (myocardial infarction) (Father)    [x ] Family history non-contributory     BASELINE (I)ADLs (prior to admission):  [place]: [x ] total  [ ] moderate [ ] dependent    ADVANCE DIRECTIVES:    DNR [ x] YES [ ] NO                            [ ] Completed  MOLST  [ ] YES [x ] NO                      [ ] Completed  Health Care Proxy [x ] YES  [ ] NO   [x ] Completed  Living Will  [ ] YES [ ] NO             [x ] Surrogate  [ ] HCP  [ ] Guardian:                             Laura (wife) Phone#: [phone]    ALLERGIES:   Allergies Penicillin    penicillins (Unknown)    Intolerances        MEDICATIONS:   MEDICATIONS  (STANDING):  ALBUTerol/ipratropium for Nebulization 3 milliLiter(s) Nebulizer every 6 hours  dextrose 5%. 1000 milliLiter(s) (50 mL/Hr) IV Continuous <Continuous>  dextrose 50% Injectable 12.5 Gram(s) IV Push once  dextrose 50% Injectable 25 Gram(s) IV Push once  dextrose 50% Injectable 25 Gram(s) IV Push once  diltiazem    Tablet 30 milliGRAM(s) Oral every 6 hours  fluticasone propionate 50 MICROgram(s)/spray Nasal Spray 1 Spray(s) Both Nostrils two times a day  insulin lispro (HumaLOG) corrective regimen sliding scale   SubCutaneous three times a day before meals  insulin lispro (HumaLOG) corrective regimen sliding scale   SubCutaneous at bedtime  melatonin 5 milliGRAM(s) Oral at bedtime  nicotine - 21 mG/24Hr(s) Patch 1 patch Transdermal daily    MEDICATIONS  (PRN):  ALBUTerol/ipratropium for Nebulization 3 milliLiter(s) Nebulizer every 6 hours PRN Shortness of Breath and/or Wheezing  dextrose 40% Gel 15 Gram(s) Oral once PRN Blood Glucose LESS THAN 70 milliGRAM(s)/deciliter  glucagon  Injectable 1 milliGRAM(s) IntraMuscular once PRN Glucose LESS THAN 70 milligrams/deciliter  nicotine  Polacrilex Gum 2 milliGRAM(s) Oral five times a day PRN craving      PRESENT SYMPTOMS:  Source: [ x] Patient   [ ] Family   [ ] Team     Pain:                        [x ] No [ ] Yes             [ ] Mild [ ] Moderate [ ] Severe    Onset -  Location -  Duration -  Character -  Alleviating/Aggravating -  Radiation -  Timing -      Dyspnea:                [ ] No [x ] Yes             [ x] Mild [ ] Moderate [ ] Severe    Anxiety:                  [x ] No [ ] Yes             [ ] Mild [ ] Moderate [ ] Severe    Fatigue:                  [ ] No [x ] Yes             [x ] Mild [ ] Moderate [ ] Severe    Nausea:                  [x ] No [ ] Yes             [ ] Mild [ ] Moderate [ ] Severe    Loss of appetite:   [ x] No [ ] Yes             [ ] Mild [ ] Moderate [ ] Severe    Constipation:        [x ] No [ ] Yes             [ ] Mild [ ] Moderate [ ] Severe    Other Symptoms:  [x ] All other review of systems negative   [ ] Unable to obtain due to poor mentation     Karnofsky Performance Score/Palliative Performance Status Version 2:        70-80 %    PHYSICAL EXAM:  Vital Signs Last 24 Hrs  T(C): 36.9 (2018 08:00), Max: 37.2 (2018 06:00)  T(F): 98.5 (2018 08:00), Max: 98.9 (2018 06:00)  HR: 114 (2018 13:00) (96 - 118)  BP: 121/88 (2018 13:00) (91/63 - 144/89)  BP(mean): 98 (2018 13:00) (70 - 108)  RR: 16 (2018 13:00) (15 - 29)  SpO2: 95% (2018 13:00) (87% - 99%) I&O's Summary    2018 07:  -  2018 07:00  --------------------------------------------------------  IN: 480 mL / OUT: 1675 mL / NET: -1195 mL    2018 07:  -  2018 14:00  --------------------------------------------------------  IN: 480 mL / OUT: 325 mL / NET: 155 mL        General:  [ x] Alert  [ x] Oriented x3      [ ] Lethargic  [ ] Agitated   [ ] Cachexia   [ ] Unarousable  [x ] Verbal  [ ] Non-Verbal    HEENT:  [ x] Normal   [ ] Dry mouth   [ ] ET Tube    [ ] Trach  [ ] Oral lesions    Lungs:   [x ] Clear [ ] Tachypnea  [ ] Audible excessive secretions   [ ] Rhonchi        [ ] Right [ ] Left [ ] Bilateral  [ ] Crackles        [ ] Right [ ] Left [ ] Bilateral  [ ] Wheezing     [ ] Right [ ] Left [ ] Bilateral    Cardiovascular:  [x ] Regular [ ] Irregular [ ] Tachycardia   [ ] Bradycardia  [ ] Murmur [ ] Other    Abdomen: [x ] Soft  [ ] Distended   [ x] +BS  [x ] Non tender [ ] Tender  [ ]PEG   [ ]OGT/ NGT   Last BM:   2018    Genitourinary: [ x] Normal [ ] Incontinent   [ ] Oliguria/Anuria   [ ] Flores    Musculoskeletal:  [x ] Normal   [ ] Weakness  [ ] Bedbound/Wheelchair bound [ ] Edema    Neurological: [ x] No focal deficits  [ ] Cognitive impairment  [ ] Dysphagia [ ] Dysarthria [ ] Paresis [ ] Other     Skin: [x ] Normal   [ ] Pressure ulcer(s)                  [ ] Rash    LABS: reviewed        140  |  101  |  49<H>  ----------------------------<  153<H>  4.4   |  26  |  1.26    Ca    9.4      2018 05:46  Phos  2.9     06-18  Mg     2.0     06-18    TPro  7.0  /  Alb  3.0<L>  /  TBili  0.2  /  DBili  x   /  AST  13  /  ALT  10  /  AlkPhos  87  06-18    PT/INR - ( 2018 21:17 )   PT: 14.3 sec;   INR: 1.30 ratio         PTT - ( 2018 21:17 )  PTT:25.6 sec  Urinalysis Basic - ( 2018 10:40 )    Color: Yellow / Appearance: Clear / S.014 / pH: x  Gluc: x / Ketone: Negative  / Bili: Negative / Urobili: Negative   Blood: x / Protein: Trace / Nitrite: Negative   Leuk Esterase: Trace / RBC: 0-2 /HPF / WBC 0-2 /HPF   Sq Epi: x / Non Sq Epi: OCC /HPF / Bacteria: x    Shock: [ ] Septic [ ] Cardiogenic [ ] Neurologic [ ] Hypovolemic  Vasopressors x 0  Inotrophs x 0    Protein Calorie Malnutrition: [x ] Mild [ ] Moderate [ ] Severe  Oral Intake: [ ] Unable/mouth care only [ ] Minimal [x ] Moderate [ ] Full Capability  Diet: [ ] NPO [ ] Tube feeds [ ] TPN [ ] Other     RADIOLOGY & ADDITIONAL STUDIES:   CT chest :   Mild pericardial effusion with largest diameter measuring 2.3 cm adjacent   to right atrium. Mild increased groundglass opacity in the right upper   lobe is most suggestive of asymmetric pulmonary edema.    REFERRALS:   [ ] Chaplaincy  [ ] Hospice  [ ] Child Life  [ ] Social Work  [ ] Case management [ ] Holistic Therapy

## 2018-06-18 NOTE — PROGRESS NOTE ADULT - SUBJECTIVE AND OBJECTIVE BOX
Patient is a 72y old  Male who presents with a chief complaint of AF RVR, hypotension (16 Jun 2018 22:13)    HPI:  73 yo M HTN, HLD, DM2 (does not know a1c, does not check glucose), nephrolithiasis, pulm nodules p/w 3-4 days of decreased appetite, dec UOP and malaise/fatigue. Pt presented to urgent care this AM and was sent to the ED for hypotension and tachycardia (reported AF RVR), s/p 10 mg IVP cardizem per EMS per ED note. Pt does not know what medications he takes or if any have been changed recently. He denies prior history of A fib. He last saw his PCP ~1 mo ago.   In the ED, pt was wheezing & satting 86% on RA w/ improved O2 sat s/p 3 duonebs and 4L O2 via NC. Pt given 125 mg IV solu medrol. Pt tachycardic 100s-120s w/ SBP as low as 70s systolic. Pt given 3.5L NS and started on levophed for hypotension. Give azithromax for COPD exacerbation and azactam for PNA. UA +. Pt noted to have PARRISH (denies history of CKD). CT chest w/ mild pericardial effusion mild increased ground glass opacity in the RUL suggestive of asymmetric pulmonary edema and POCUS w/ pericardial effusion. Formal TTE showing moderate AS, large (>2 cm) pericardial effusion around LV that appears smaller around the RV (<1cm), no respiratory variation across the MV, envagination of the RA, no RV diastolic collapse is seen, IVC is poorly seen. Pt converted to sinus rhythm and is without complaints @ this time, stating in the ED he ate and urinated for the first time in 4 days.  Pt denies previous cardiac history. Denies previous arrhtyhmia or heart failure. His exercise tolerance has decreased over the past ~3 mos, he now gets dyspneic after walking ~ .5 mile. No CP/palpitations/SOB. Denies orthopnea, PND, LE edema, weight gain or loss. No history of PE/DVT or malignancy. No nausea/vomiting/diarrhea. No fevers/chills. Has chronic smoker's cough occasionally, no sputum production.  No sick contacts. No known tick bites. He has driven ~14 hours recently and was recently in the [facility] and [place] (16 Jun 2018 22:13)    Overnight Event: No issues overnight    REVIEW OF SYSTEMS:  	    MEDICATIONS  (STANDING):  ALBUTerol/ipratropium for Nebulization 3 milliLiter(s) Nebulizer every 6 hours  dextrose 5%. 1000 milliLiter(s) (50 mL/Hr) IV Continuous <Continuous>  dextrose 50% Injectable 12.5 Gram(s) IV Push once  dextrose 50% Injectable 25 Gram(s) IV Push once  dextrose 50% Injectable 25 Gram(s) IV Push once  insulin lispro (HumaLOG) corrective regimen sliding scale   SubCutaneous three times a day before meals  insulin lispro (HumaLOG) corrective regimen sliding scale   SubCutaneous at bedtime  melatonin 5 milliGRAM(s) Oral at bedtime  nicotine - 21 mG/24Hr(s) Patch 1 patch Transdermal daily    MEDICATIONS  (PRN):  ALBUTerol/ipratropium for Nebulization 3 milliLiter(s) Nebulizer every 6 hours PRN Shortness of Breath and/or Wheezing  dextrose 40% Gel 15 Gram(s) Oral once PRN Blood Glucose LESS THAN 70 milliGRAM(s)/deciliter  glucagon  Injectable 1 milliGRAM(s) IntraMuscular once PRN Glucose LESS THAN 70 milligrams/deciliter  nicotine  Polacrilex Gum 2 milliGRAM(s) Oral five times a day PRN craving        PHYSICAL EXAM:  Vital Signs Last 24 Hrs  T(C): 37.2 (18 Jun 2018 06:00), Max: 37.2 (18 Jun 2018 06:00)  T(F): 98.9 (18 Jun 2018 06:00), Max: 98.9 (18 Jun 2018 06:00)  HR: 112 (18 Jun 2018 07:00) (96 - 112)  BP: 133/84 (18 Jun 2018 07:00) (91/63 - 144/89)  BP(mean): 96 (18 Jun 2018 07:00) (70 - 108)  RR: 32 (18 Jun 2018 07:00) (14 - 32)  SpO2: 90% (18 Jun 2018 07:00) (87% - 97%)  I&O's Summary    17 Jun 2018 07:01  -  18 Jun 2018 07:00  --------------------------------------------------------  IN: 480 mL / OUT: 1675 mL / NET: -1195 mL        Appearance: Normal	  HEENT:   Normal oral mucosa, PERRL, EOMI	  Lymphatic: No lymphadenopathy  Cardiovascular: Normal S1 S2, No JVD, No murmurs, No edema  Respiratory: Lungs clear to auscultation	  Psychiatry: A & O x 3, Mood & affect appropriate  Gastrointestinal:  Soft, Non-tender, + BS	  Skin: No rashes, No ecchymoses, No cyanosis	  Neurologic: Non-focal  Extremities: Normal range of motion, No clubbing, cyanosis or edema  Vascular: Peripheral pulses palpable 2+ bilaterally    LABS:	 	                        12.5   19.0  )-----------( 349      ( 18 Jun 2018 05:46 )             37.9     Auto Eosinophil # x     / Auto Eosinophil % x     / Auto Neutrophil # x     / Auto Neutrophil % x     / BANDS % x                            11.9   15.5  )-----------( 315      ( 17 Jun 2018 06:54 )             35.2     Auto Eosinophil # x     / Auto Eosinophil % x     / Auto Neutrophil # x     / Auto Neutrophil % x     / BANDS % x                            12.3   18.5  )-----------( 397      ( 16 Jun 2018 21:17 )             35.6     Auto Eosinophil # 0.1   / Auto Eosinophil % 0.7   / Auto Neutrophil # 17.3  / Auto Neutrophil % 93.5  / BANDS % x        INR: 1.30 ratio (06-16 @ 21:17)  INR: 1.28 ratio (06-16 @ 11:54)    06-18    140  |  101  |  49<H>  ----------------------------<  153<H>  4.4   |  26  |  1.26  06-17    136  |  99  |  68<H>  ----------------------------<  226<H>  4.6   |  24  |  2.09<H>  06-16    133<L>  |  96  |  77<H>  ----------------------------<  362<H>  4.9   |  19<L>  |  2.76<H>    Ca    9.4      18 Jun 2018 05:46  Mg     2.0     06-18  Phos  2.9     06-18  TPro  7.0  /  Alb  3.0<L>  /  TBili  0.2  /  DBili  x   /  AST  13  /  ALT  10  /  AlkPhos  87  06-18  TPro  7.0  /  Alb  3.2<L>  /  TBili  0.3  /  DBili  x   /  AST  9<L>  /  ALT  9<L>  /  AlkPhos  88  06-17  TPro  7.3  /  Alb  3.1<L>  /  TBili  0.5  /  DBili  x   /  AST  11  /  ALT  10  /  AlkPhos  95  06-16    Lactate, Blood: 1.3 mmol/L (06-16 @ 21:17)      proBNP: Serum Pro-Brain Natriuretic Peptide: 1818 pg/mL (06-16 @ 11:54)    Lipid Profile: 06-16 Chol 96 LDL 51 HDL 30<L> Trig 77  HgA1c: 7.5 % (06-16 @ 23:44)    TSH: Thyroid Stimulating Hormone, Serum: <0.01 uIU/mL (06-16 @ 23:44)      CARDIAC MARKERS:   16 Jun 2018 21:17 Troponin x     / Creatine Kinase 55 U/L /  CKMB 2.8 ng/mL / CPK Mass Assay % x            TELEMETRY: 	    ECG:  	  RADIOLOGY:  OTHER: 	    PREVIOUS DIAGNOSTIC TESTING:    [ ] Echocardiogram: < from: Transthoracic Echocardiogram (06.17.18 @ 10:41) >  Observations:  Aortic Valve/Aorta:  Left Ventricle: Normal left ventricular systolic function.  Right Heart: The right ventricle is not well visualized;  grossly normal right ventricular systolic function.  Pericardium/Pleura: Large pericardial effusion posterior  and lateral to the left ventricle (> 2 cm). effusion  appears smaller around the right ventricle (<1 cm). There  is invagination of the right atrium. No RV diastolic  collapse seen. The IVC is dilated but contracts with sniff.  Borderline respiratory variation across the mitral valve.  Hemodynamic: Estimated right ventricular systolic pressure  equals 40 mm Hg, assuming right atrial pressure equals 15  mm Hg, consistent with mild pulmonary hypertension.  ------------------------------------------------------------------------  Conclusions:  1. Large pericardial effusion posterior and lateral to the  left ventricle (> 2 cm). effusion appears smaller around  the right ventricle (<1 cm). There is invagination of the  right atrium. No RV diastolic collapse seen. The IVC is  dilated but contracts with sniff. Borderline respiratory  variation across the mitral valve.  *** Compared with echocardiogram of 6/16/2018, degree of  respiratory variation has increased, otherwise  the  effusion appears grossly similar. /75 at time of  exam.    < end of copied text >      [facility]  Contact # [phone] Patient is a 72y old  Male who presents with a chief complaint of AF RVR, hypotension (16 Jun 2018 22:13)    HPI:  73 yo M HTN, HLD, DM2 (does not know a1c, does not check glucose), nephrolithiasis, pulm nodules p/w 3-4 days of decreased appetite, dec UOP and malaise/fatigue. Pt presented to urgent care this AM and was sent to the ED for hypotension and tachycardia (reported AF RVR), s/p 10 mg IVP cardizem per EMS per ED note. Pt does not know what medications he takes or if any have been changed recently. He denies prior history of A fib. He last saw his PCP ~1 mo ago.   In the ED, pt was wheezing & satting 86% on RA w/ improved O2 sat s/p 3 duonebs and 4L O2 via NC. Pt given 125 mg IV solu medrol. Pt tachycardic 100s-120s w/ SBP as low as 70s systolic. Pt given 3.5L NS and started on levophed for hypotension. Give azithromax for COPD exacerbation and azactam for PNA. UA +. Pt noted to have PARRISH (denies history of CKD). CT chest w/ mild pericardial effusion mild increased ground glass opacity in the RUL suggestive of asymmetric pulmonary edema and POCUS w/ pericardial effusion. Formal TTE showing moderate AS, large (>2 cm) pericardial effusion around LV that appears smaller around the RV (<1cm), no respiratory variation across the MV, envagination of the RA, no RV diastolic collapse is seen, IVC is poorly seen. Pt converted to sinus rhythm and is without complaints @ this time, stating in the ED he ate and urinated for the first time in 4 days.  Pt denies previous cardiac history. Denies previous arrhtyhmia or heart failure. His exercise tolerance has decreased over the past ~3 mos, he now gets dyspneic after walking ~ .5 mile. No CP/palpitations/SOB. Denies orthopnea, PND, LE edema, weight gain or loss. No history of PE/DVT or malignancy. No nausea/vomiting/diarrhea. No fevers/chills. Has chronic smoker's cough occasionally, no sputum production.  No sick contacts. No known tick bites. He has driven ~14 hours recently and was recently in the [facility] (16 Jun 2018 22:13)    Overnight Event: No issues overnight    REVIEW OF SYSTEMS: No SOB or chest pain, feels better overall, wants to go home.  Patient states he does not want any intervention despite knowing this is possibly malignant effusion.  	    MEDICATIONS  (STANDING):  ALBUTerol/ipratropium for Nebulization 3 milliLiter(s) Nebulizer every 6 hours  dextrose 5%. 1000 milliLiter(s) (50 mL/Hr) IV Continuous <Continuous>  dextrose 50% Injectable 12.5 Gram(s) IV Push once  dextrose 50% Injectable 25 Gram(s) IV Push once  dextrose 50% Injectable 25 Gram(s) IV Push once  insulin lispro (HumaLOG) corrective regimen sliding scale   SubCutaneous three times a day before meals  insulin lispro (HumaLOG) corrective regimen sliding scale   SubCutaneous at bedtime  melatonin 5 milliGRAM(s) Oral at bedtime  nicotine - 21 mG/24Hr(s) Patch 1 patch Transdermal daily    MEDICATIONS  (PRN):  ALBUTerol/ipratropium for Nebulization 3 milliLiter(s) Nebulizer every 6 hours PRN Shortness of Breath and/or Wheezing  dextrose 40% Gel 15 Gram(s) Oral once PRN Blood Glucose LESS THAN 70 milliGRAM(s)/deciliter  glucagon  Injectable 1 milliGRAM(s) IntraMuscular once PRN Glucose LESS THAN 70 milligrams/deciliter  nicotine  Polacrilex Gum 2 milliGRAM(s) Oral five times a day PRN craving        PHYSICAL EXAM:  Vital Signs Last 24 Hrs  T(C): 37.2 (18 Jun 2018 06:00), Max: 37.2 (18 Jun 2018 06:00)  T(F): 98.9 (18 Jun 2018 06:00), Max: 98.9 (18 Jun 2018 06:00)  HR: 112 (18 Jun 2018 07:00) (96 - 112)  BP: 133/84 (18 Jun 2018 07:00) (91/63 - 144/89)  BP(mean): 96 (18 Jun 2018 07:00) (70 - 108)  RR: 32 (18 Jun 2018 07:00) (14 - 32)  SpO2: 90% (18 Jun 2018 07:00) (87% - 97%)  I&O's Summary    17 Jun 2018 07:01  -  18 Jun 2018 07:00  --------------------------------------------------------  IN: 480 mL / OUT: 1675 mL / NET: -1195 mL        Appearance: Normal	  HEENT:   Normal oral mucosa, PERRL, EOMI	  Cardiovascular: Normal S1 S2, No JVD, No murmurs, +1 pedal edema  Respiratory: expiratory wheezes	  Psychiatry: A & O x 3, Mood & affect appropriate  Gastrointestinal:  Soft, Non-tender, + BS	  Skin: No rashes, No ecchymoses, No cyanosis	  Neurologic: Non-focal  Extremities: Normal range of motion, No clubbing, cyanosis or edema  Vascular: Peripheral pulses palpable 2+ bilaterally    LABS:	 	                        12.5   19.0  )-----------( 349      ( 18 Jun 2018 05:46 )             37.9     Auto Eosinophil # x     / Auto Eosinophil % x     / Auto Neutrophil # x     / Auto Neutrophil % x     / BANDS % x                            11.9   15.5  )-----------( 315      ( 17 Jun 2018 06:54 )             35.2     Auto Eosinophil # x     / Auto Eosinophil % x     / Auto Neutrophil # x     / Auto Neutrophil % x     / BANDS % x                            12.3   18.5  )-----------( 397      ( 16 Jun 2018 21:17 )             35.6     Auto Eosinophil # 0.1   / Auto Eosinophil % 0.7   / Auto Neutrophil # 17.3  / Auto Neutrophil % 93.5  / BANDS % x        INR: 1.30 ratio (06-16 @ 21:17)  INR: 1.28 ratio (06-16 @ 11:54)    06-18    140  |  101  |  49<H>  ----------------------------<  153<H>  4.4   |  26  |  1.26  06-17    136  |  99  |  68<H>  ----------------------------<  226<H>  4.6   |  24  |  2.09<H>  06-16    133<L>  |  96  |  77<H>  ----------------------------<  362<H>  4.9   |  19<L>  |  2.76<H>    Ca    9.4      18 Jun 2018 05:46  Mg     2.0     06-18  Phos  2.9     06-18  TPro  7.0  /  Alb  3.0<L>  /  TBili  0.2  /  DBili  x   /  AST  13  /  ALT  10  /  AlkPhos  87  06-18  TPro  7.0  /  Alb  3.2<L>  /  TBili  0.3  /  DBili  x   /  AST  9<L>  /  ALT  9<L>  /  AlkPhos  88  06-17  TPro  7.3  /  Alb  3.1<L>  /  TBili  0.5  /  DBili  x   /  AST  11  /  ALT  10  /  AlkPhos  95  06-16    Lactate, Blood: 1.3 mmol/L (06-16 @ 21:17)      proBNP: Serum Pro-Brain Natriuretic Peptide: 1818 pg/mL (06-16 @ 11:54)    Lipid Profile: 06-16 Chol 96 LDL 51 HDL 30<L> Trig 77  HgA1c: 7.5 % (06-16 @ 23:44)    TSH: Thyroid Stimulating Hormone, Serum: <0.01 uIU/mL (06-16 @ 23:44)      CARDIAC MARKERS:   16 Jun 2018 21:17 Troponin x     / Creatine Kinase 55 U/L /  CKMB 2.8 ng/mL / CPK Mass Assay % x            TELEMETRY: 	    ECG:  	  RADIOLOGY:  OTHER: 	    PREVIOUS DIAGNOSTIC TESTING:    [ ] Echocardiogram: < from: Transthoracic Echocardiogram (06.17.18 @ 10:41) >  Observations:  Aortic Valve/Aorta:  Left Ventricle: Normal left ventricular systolic function.  Right Heart: The right ventricle is not well visualized;  grossly normal right ventricular systolic function.  Pericardium/Pleura: Large pericardial effusion posterior  and lateral to the left ventricle (> 2 cm). effusion  appears smaller around the right ventricle (<1 cm). There  is invagination of the right atrium. No RV diastolic  collapse seen. The IVC is dilated but contracts with sniff.  Borderline respiratory variation across the mitral valve.  Hemodynamic: Estimated right ventricular systolic pressure  equals 40 mm Hg, assuming right atrial pressure equals 15  mm Hg, consistent with mild pulmonary hypertension.  ------------------------------------------------------------------------  Conclusions:  1. Large pericardial effusion posterior and lateral to the  left ventricle (> 2 cm). effusion appears smaller around  the right ventricle (<1 cm). There is invagination of the  right atrium. No RV diastolic collapse seen. The IVC is  dilated but contracts with sniff. Borderline respiratory  variation across the mitral valve.  *** Compared with echocardiogram of 6/16/2018, degree of  respiratory variation has increased, otherwise  the  effusion appears grossly similar. /75 at time of  exam.    < end of copied text >      CHARLES PaulinoR[facility]  Contact # [phone]

## 2018-06-18 NOTE — CONSULT NOTE ADULT - SUBJECTIVE AND OBJECTIVE BOX
[facility] DIVISION OF PULMONARY, CRITICAL CARE AND SLEEP MEDICINE  PULMONARY CONSULTATION NOTE  PHONE NUMBER [phone] MONDAY-FRIDAY 8A-5P or [phone] on NIGHTS/WEEKENDS/HOLIDAYS    NAME: MILAGROS VIERA  MEDICAL RECORD NUMBER: [id number]    CHIEF COMPLAINT: Patient is a 72y old  Male who presents with a chief complaint of AF RVR, hypotension (16 Jun 2018 22:13)      HISTORY OF PRESENT ILLNESS: 72M extensive medical history including COPD, active smoker, obesity, postnasal drip and pulmonary nodules presenting from urgent care center with tachycardia and hypotension in the setting of rapid atrial fibrillation and a moderate-large pericardial effusion.  He had noted a few days of fatigue/malaise, decreased appetite and decreased urination. In the ED he received Cardizem.    He was also noted to be wheezing and have an O2 saturation of 86% on RA which improved  with supplemental oxygen. He we was found to have a large pericardial effusion on imaging without invagination of the RA and no diastolic collapse He converted to sinus rhythm in the ED.    He denies any history of arrythmia He has chronic dyspnea and gets SOB with a little exertion. He denies any preceding chest pain, shortness of breath, palpitations, nausea, vomiting, or abdominal pain. He is a chronic smoker and has a chronic dry cough. He denies sputum production, sick contacts, or hemoptysis. He was recently in the [facility]    He has been seen by CTS and the CCU team on this stay and is presently refusing any interventions on his pericardial effusion. He is presently off of vasopressors with SBP 140s. His  and appears irregular on monitor at present. He is breathing comfortably. He is DNR. He is hoping to go home.      ====================BACKGROUND INFORMATION============================    FAMILY HISTORY: FAMILY HISTORY:  Family history of MI (myocardial infarction) (Father)      PAST MEDICAL AND SURGICAL HISTORY: PAST MEDICAL & SURGICAL HISTORY:  Pulmonary nodules  COPD (chronic obstructive pulmonary disease)  Diabetes mellitus, type 2  Kidney stones  Hypertension  History of tonsillectomy  History of kidney stones      ALLERGIES:Allergies:  penicillins (Unknown)      HOME MEDICATIONS: reviewed     OUTPATIENT PULMONARY DOCTOR: Frederic Brand MD    SOCIAL HISTORY  TOBACCO USE: active smoker  ALCOHOL: social  DRUGS: denies  MARITAL STATUS:    EMPLOYMENT: semi-retired CPA (works 2 days/week)  EXPOSURES: denied   RECENT TRAVELS: car travel - [place]  CODE STATUS: DNR     ====================REVIEW OF SYSTEMS=====================================  CONSTITUTIONAL: Feels ok  CARDIOVASCULAR: Denies chest pain, palpitations  PULMONARY: Chronic dry cough, No hemoptysis, No sputum production  GASTROINTESTINAL: Denies nausea, vomiting, abdominal pain  [x] ALL OTHER REVIEW OF SYSTEMS ARE NEGATIVE   [] UNABLE TO OBTAIN REVIEW OF SYSTEMS DUE TO ______________      ====================PHYSICAL EXAM=========================================    VITALS: ICU Vital Signs Last 24 Hrs  T(C): 36.9 (18 Jun 2018 08:00), Max: 37.2 (18 Jun 2018 06:00)  T(F): 98.5 (18 Jun 2018 08:00), Max: 98.9 (18 Jun 2018 06:00)  HR: 116 (18 Jun 2018 10:00) (96 - 118)  BP: 135/82 (18 Jun 2018 10:00) (91/63 - 144/89)  BP(mean): 101 (18 Jun 2018 10:00) (70 - 108)  RR: 22 (18 Jun 2018 10:00) (14 - 29)  SpO2: 92% (18 Jun 2018 10:00) (87% - 97%)      INTAKE and OUTPUT: I&O's Summary    17 Jun 2018 07:01  -  18 Jun 2018 07:00  --------------------------------------------------------  IN: 480 mL / OUT: 1675 mL / NET: -1195 mL    18 Jun 2018 07:01  -  18 Jun 2018 12:05  --------------------------------------------------------  IN: 120 mL / OUT: 200 mL / NET: -80 mL        WEIGHT: Daily     Daily     GLUCOSE: CAPILLARY BLOOD GLUCOSE      POCT Blood Glucose.: 139 mg/dL (18 Jun 2018 11:50)      VENTILATOR SETTINGS: N/A    GENERAL: AAOx3, NAD, sitting comfortably in chair  HEENT: NCAT, EOMI, anicteric, nares clear, MMM, trachea midline  NECK: supple   LYMPH NODES: no palpable supraclavicular LAD  CARDIOVASCULAR: RRR, S1S2, systolic murmur  PULMONARY: no accessory muscle use, end expiratory wheeze, no crackles, no rhonchi  ABDOMEN: soft, NT, ND, +BS  SKIN: warm and dry  EXTREMITIES: no clubbing or cyanosis  NEUROLOGIC: nonfocal exam, moves all extremities  PSYCHIATRIC: calm and in good spirits - wants to go home    ====================MEDICATIONS===========================================  MEDICATIONS  (STANDING):  ALBUTerol/ipratropium for Nebulization 3 milliLiter(s) Nebulizer every 6 hours  dextrose 5%. 1000 milliLiter(s) (50 mL/Hr) IV Continuous <Continuous>  dextrose 50% Injectable 12.5 Gram(s) IV Push once  dextrose 50% Injectable 25 Gram(s) IV Push once  dextrose 50% Injectable 25 Gram(s) IV Push once  diltiazem    Tablet 30 milliGRAM(s) Oral every 6 hours  insulin lispro (HumaLOG) corrective regimen sliding scale   SubCutaneous three times a day before meals  insulin lispro (HumaLOG) corrective regimen sliding scale   SubCutaneous at bedtime  melatonin 5 milliGRAM(s) Oral at bedtime  nicotine - 21 mG/24Hr(s) Patch 1 patch Transdermal daily      MEDICATIONS  (PRN):  ALBUTerol/ipratropium for Nebulization 3 milliLiter(s) Nebulizer every 6 hours PRN Shortness of Breath and/or Wheezing  dextrose 40% Gel 15 Gram(s) Oral once PRN Blood Glucose LESS THAN 70 milliGRAM(s)/deciliter  glucagon  Injectable 1 milliGRAM(s) IntraMuscular once PRN Glucose LESS THAN 70 milligrams/deciliter  nicotine  Polacrilex Gum 2 milliGRAM(s) Oral five times a day PRN craving      ====================LABORATORY RESULTS====================================  CBC Full  -  ( 18 Jun 2018 05:46 )  WBC Count : 19.0 K/uL  Hemoglobin : 12.5 g/dL  Hematocrit : 37.9 %  Platelet Count - Automated : 349 K/uL  Mean Cell Volume : 89.8 fl  Mean Cell Hemoglobin : 29.7 pg  Mean Cell Hemoglobin Concentration : 33.0 gm/dL  Auto Neutrophil # : 15.5 K/uL  Auto Lymphocyte # : 1.6 K/uL  Auto Monocyte # : 1.8 K/uL  Auto Eosinophil # : 0.1 K/uL  Auto Basophil # : 0.0 K/uL  Auto Neutrophil % : 81.5 %  Auto Lymphocyte % : 8.4 %  Auto Monocyte % : 9.7 %  Auto Eosinophil % : 0.3 %  Auto Basophil % : 0.2 %                                    06-18    140  |  101  |  49<H>  ----------------------------<  153<H>  4.4   |  26  |  1.26    Ca    9.4      18 Jun 2018 05:46  Phos  2.9     06-18  Mg     2.0     06-18    TPro  7.0  /  Alb  3.0<L>  /  TBili  0.2  /  DBili  x   /  AST  13  /  ALT  10  /  AlkPhos  87  06-18        PT/INR - ( 16 Jun 2018 21:17 )   PT: 14.3 sec;   INR: 1.30 ratio         PTT - ( 16 Jun 2018 21:17 )  PTT:25.6 sec    Creatinine Trend: 1.26<--, 2.09<--, 2.76<--, 3.42<--      ====================RADIOLOGY and ECHOCARDIOGRAPHY=======================    CXR:  < from: Xray Chest 1 View- PORTABLE-Urgent (06.16.18 @ 12:23) >  IMPRESSION: No focal consolidation.  < end of copied text >      CT CHEST: < from: CT Chest No Cont (06.16.18 @ 13:32) >  CHEST:     LUNGS AND LARGE AIRWAYS: Patent central airways.  Bilateral lower lobe subsegmental linear atelectasis. Multiple 3 mm right upper lobe nodules,   unchanged from prior study. Previously noted left lower lobe nodule, not visualized in the current study. Mild groundglass opacity    PLEURA: No pleural effusion.    VESSELS: Calcific atherosclerosis of aorta and coronary arteries.    HEART: Heart size is normal. Mild pericardial effusion with largest diameter measuring 2.3 cm adjacent to right atrium.    MEDIASTINUM AND JOSSELYN: Redemonstrated multiple anterior mediastinal lymph nodes and paratracheal lymphadenopathy. Redemonstrated thyroid gland   extending into upper mediastinum.    CHEST WALL AND LOWER NECK: Within normal limits. In the right upper lobe have the appearance suggesting asymmetric edema   rather than infection.  VISUALIZED UPPER ABDOMEN: Cholelithiasis. A 2.6 cm left interpolar renal cyst, unchanged.    BONES: Degenerative changes of spine.    IMPRESSION:     Mild pericardial effusion with largest diameter measuring 2.3 cm adjacent   to right atrium. Mild increased groundglass opacity in the right upper   lobe is mostsuggestive of asymmetric pulmonary edema.    < end of copied text >      ECHOCARDIOGRAPHY: < from: Transthoracic Echocardiogram (06.17.18 @ 10:41) >  1. Large pericardial effusion posterior and lateral to the left ventricle (> 2 cm). effusion appears smaller around  the right ventricle (<1 cm). There is invagination of the right atrium. No RV diastolic collapse seen. The IVC is  dilated but contracts with sniff. Borderline respiratory variation across the mitral valve.  *** Compared with echocardiogram of 6/16/2018, degree of respiratory variation has increased, otherwise  the  effusion appears grossly similar. /75 at time of exam.    < end of copied text >

## 2018-06-18 NOTE — DIETITIAN INITIAL EVALUATION ADULT. - PERTINENT LABORATORY DATA
Na 140 [135 - 145], K+ 4.4 [3.5 - 5.3], BUN 49 [7 - 23], Cr 1.26 [0.50 - 1.30],  [70 - 99], Phos 2.9 [2.5 - 4.5], Alk Phos 87 [40 - 120], AST 13 [10 - 40], ALT 10 [10 - 45], Mg 2.0 [1.6 - 2.6], Ca 9.4 [8.4 - 10.5], HbA1c 7.5%; Fingersticks (6/16-18) [id number]

## 2018-06-18 NOTE — DIETITIAN INITIAL EVALUATION ADULT. - NS AS NUTRI INTERV ED CONTENT
Pt states wife has been taking care of pt's modified diet, declines diet education at this time. Encouraged pt to limit usage of butter for cardiac health and discussed alternative options for butter. RD availability made known to pt.

## 2018-06-18 NOTE — PROGRESS NOTE ADULT - SUBJECTIVE AND OBJECTIVE BOX
Patient is a 72y old  Male who presents with a chief complaint of AF RVR, hypotension (2018 22:13)    He feels much improved. He denies c/o chest pain, increasing SOB or palpitations.    Allergies    penicillins (Unknown)    Intolerances      MEDICATIONS  (STANDING):  ALBUTerol/ipratropium for Nebulization 3 milliLiter(s) Nebulizer every 6 hours  dextrose 5%. 1000 milliLiter(s) (50 mL/Hr) IV Continuous <Continuous>  dextrose 50% Injectable 12.5 Gram(s) IV Push once  dextrose 50% Injectable 25 Gram(s) IV Push once  dextrose 50% Injectable 25 Gram(s) IV Push once  insulin lispro (HumaLOG) corrective regimen sliding scale   SubCutaneous three times a day before meals  insulin lispro (HumaLOG) corrective regimen sliding scale   SubCutaneous at bedtime  melatonin 5 milliGRAM(s) Oral at bedtime  nicotine - 21 mG/24Hr(s) Patch 1 patch Transdermal daily    MEDICATIONS  (PRN):  ALBUTerol/ipratropium for Nebulization 3 milliLiter(s) Nebulizer every 6 hours PRN Shortness of Breath and/or Wheezing  dextrose 40% Gel 15 Gram(s) Oral once PRN Blood Glucose LESS THAN 70 milliGRAM(s)/deciliter  glucagon  Injectable 1 milliGRAM(s) IntraMuscular once PRN Glucose LESS THAN 70 milligrams/deciliter  nicotine  Polacrilex Gum 2 milliGRAM(s) Oral five times a day PRN craving      PHYSICAL EXAM:  Vital Signs Last 24 Hrs  T(C): 37.2 (2018 06:00), Max: 37.2 (2018 06:00)  T(F): 98.9 (2018 06:00), Max: 98.9 (2018 06:00)  HR: 104 (2018 06:00) (96 - 110)  BP: 144/89 (2018 06:00) (91/63 - 144/89)  BP(mean): 108 (2018 06:00) (70 - 108)  RR: 16 (2018 06:00) (14 - 29)  SpO2: 92% (2018 06:00) (87% - 97%)  Daily     Daily   I&O's Summary    2018 07:01  -  2018 07:00  --------------------------------------------------------  IN: 480 mL / OUT: 1675 mL / NET: -1195 mL        General Appearance: 	 Alert, cooperative, no distress  HEENT: normocephalic, atraumatic   Neck: 3cm JVD,  carotid 2+  bilaterally without bruits  Lungs:  increased expiratory phase with coarse BS bilaterally  Cor:  pmi 5th ICS MCL, regular rate and rhythm, S1 normal intensity, S2 normal intensity, no gallops, murmurs or rubs  Abdomen: soft, non-tender; bowel sounds normal; no masses,  no organomegaly  Extremities: without cyanosis, clubbing or edema  Vasc: 1-+ PT and DP pulses; LE varicosities    Telemetry: NSR /min  Labs:  CBC Full  -  ( 2018 05:46 )  WBC Count : 19.0 K/uL  Hemoglobin : 12.5 g/dL  Hematocrit : 37.9 %  Platelet Count - Automated : 349 K/uL  Mean Cell Volume : 89.8 fl  Mean Cell Hemoglobin : 29.7 pg  Mean Cell Hemoglobin Concentration : 33.0 gm/dL  Auto Neutrophil # : x  Auto Lymphocyte # : x  Auto Monocyte # : x  Auto Eosinophil # : x  Auto Basophil # : x  Auto Neutrophil % : x  Auto Lymphocyte % : x  Auto Monocyte % : x  Auto Eosinophil % : x  Auto Basophil % : x    06-18    140  |  101  |  49<H>  ----------------------------<  153<H>  4.4   |  26  |  1.26    Ca    9.4      2018 05:46  Phos  2.9     06-18  Mg     2.0     06-18    TPro  7.0  /  Alb  3.0<L>  /  TBili  0.2  /  DBili  x   /  AST  13  /  ALT  10  /  AlkPhos  87  06-18    CARDIAC MARKERS ( 2018 21:17 )  x     / x     / 55 U/L / x     / 2.8 ng/mL      PT/INR - ( 2018 21:17 )   PT: 14.3 sec;   INR: 1.30 ratio         PTT - ( 2018 21:17 )  PTT:25.6 sec  Urinalysis Basic - ( 2018 10:40 )    Color: Yellow / Appearance: Clear / S.014 / pH: x  Gluc: x / Ketone: Negative  / Bili: Negative / Urobili: Negative   Blood: x / Protein: Trace / Nitrite: Negative   Leuk Esterase: Trace / RBC: 0-2 /HPF / WBC 0-2 /HPF   Sq Epi: x / Non Sq Epi: OCC /HPF / Bacteria: x        Radiology/Imaging:  < from: US TTE 2D F/U, Limited w/o Contrast (ED) (18 @ 19:44) >  Procedure was performed in the Emergency Department by a credentialed   Emergency Medicine Attending Physician    EXAM:  ED TTE LIMITED [id number]      ORDER COMMENTS:      PROCEDURE DATE:  2018    FOCUSED ED ULTRASOUND REPORT          INTERPRETATION:  A focused transthoracic cardiac ultrasound examination   was  performed.  A large pericardial effusion was visualized measuring up to 2.31 cm  posteriorly during diastole.  No global wall motion abnormality was identified.  There was no diastolic collapse of the right ventricle.  The right atrium had some questionable collapse during systole but this   could be related to atrial fibrillation.  The right ventricle was not dilated.  The IVC was still collapsable.    IMPRESSION:  Large pericardial effusion without acute evidence of tamponade, the   effusion did appear to measure larger than the prior study performed this   day.    < end of copied text >    < from: CT Chest No Cont (18 @ 13:32) >        FINDINGS:    CHEST:     LUNGS AND LARGE AIRWAYS: Patent central airways.  Bilateral lower lobe   subsegmental linear atelectasis. Multiple 3 mm right upper lobe nodules,   unchanged from prior study. Previously noted left lower lobe nodule, not   visualized in the current study. Mild groundglass opacity    PLEURA: No pleural effusion.    VESSELS: Calcific atherosclerosis of aorta and coronary arteries.    HEART: Heart size is normal. Mild pericardial effusion with largest   diameter measuring 2.3 cm adjacent to right atrium.    MEDIASTINUM AND JOSSELYN: Redemonstrated multiple anterior mediastinal lymph   nodes and paratracheal lymphadenopathy. Redemonstrated thyroid gland   extending into upper mediastinum.    CHEST WALL AND LOWER NECK: Within normal limits.  In the right upper lobe have the appearance suggesting asymmetric edema   rather than infection.  VISUALIZED UPPER ABDOMEN: Cholelithiasis. A 2.6 cm left interpolar renal   cyst, unchanged.    BONES: Degenerative changes of spine.    IMPRESSION:     Mild pericardial effusion with largest diameter measuring 2.3 cm adjacent   to right atrium. Mild increased groundglass opacity in the right upper   lobe is mostsuggestive of asymmetric pulmonary edema.        < end of copied text >                Marco Campbell MD [facility]  [phone]

## 2018-06-18 NOTE — DISCHARGE NOTE ADULT - HOSPITAL COURSE
72 year old male w/ pmhx of HTN, HLD, T2DM, nephrolithiasis, Pulm nodules presented with SOB, hypoxia, found to have new onset afib w/ RVR ,pericardial effusion w/ concerns of tamponade and hypotension s/p CT of chest showed stable pulm nodules, pericardial effusion, RUL asymmetric pulm edema, mul ant mediastinal lymph nodes, paratracheal lymphadenopathy, thyroid extens into upper mediastinum. S/P TTE (6/17) showed large  pericardial effusion without acute evidence of tamponade, the  effusion did appear to measure larger than the prior study performed this day. Patient's HR is controlled with Cardizem 60mg po every 8 hrs and pt remains hemodynamically stable. s/p Pericardial diagnostic drainage by IR (6/19) w/ 130 cc fluid removal. Pt refuses further work up and hospital stay. Repeat limited echo s/p drainage showed 72 year old male w/ pmhx of HTN, HLD, T2DM, nephrolithiasis, Pulm nodules presented with SOB, hypoxia, found to have new onset afib w/ RVR ,pericardial effusion w/ concerns of tamponade and hypotension s/p CT of chest showed stable pulm nodules, pericardial effusion, RUL asymmetric pulm edema, mul ant mediastinal lymph nodes, paratracheal lymphadenopathy, thyroid extens into upper mediastinum. S/P TTE (6/17) showed large  pericardial effusion without acute evidence of tamponade, the  effusion did appear to measure larger than the prior study performed this day. Patient's HR is controlled with Cardizem 60mg po every 8 hrs and pt remains hemodynamically stable. s/p Pericardial diagnostic drainage by IR (6/19) w/ 130 cc fluid removal. Pt remains hemodynamically stable. Pt refuses further work up and hospital stay. Repeat limited echo s/p drainage showed 1. Large circumferential pericardial effusion. The largest area of the effusion is around the left ventricle (> 2 cm). There is increased respiratory variation across the mitral valve. No evidence of right atrial or right ventricular collapse. The IVC is small and contracts normally with sniff. *** Compared with echocardiogram of 6/18/2018, findings are  grossly similar. Pt will be discharged with home medications and pt will follow up w/ his cardiologist in 1 week

## 2018-06-18 NOTE — DISCHARGE NOTE ADULT - THE PATIENT HAS
Chief Complaint   Patient presents with    Atrial Fibrillation    Coronary Artery Disease    Hypertension    Hyperlipidemia    6 Month Follow-Up       Patient presents for initial medical evaluation. Patient is followed on a regular basis by Dr. Anand Gallardo MD. Patient with hx of Afibb since 90's, on chronic coumadin therapy. Had cardiac cath in 90's, has not had any recent stress test or echo. Patient with COPD as well, follows Dr. Sobia Dobson. Pt denies chest pain, fatigue, nausea, vomiting, diarrhea, constipation, motor weakness, insomnia, weight loss, syncope, dizziness, lightheadedness, palpitations, PND, orthopnea, or claudication. No bleeding issus with coumadin. Recently had a mechanical fall. Had Echo in 5/2012 which showed normal LVF, EF 50%. 9-25-12: as above, s/p negative nuclear stress test with normal LVF, there was suggestion of PHTN due to increased RV uptake. Pt denies chest pain, dyspnea, dyspnea on exertion, change in exercise capacity, fatigue, nausea, vomiting, diarrhea, constipation, motor weakness, insomnia, weight loss, syncope, dizziness, lightheadedness, palpitations, PND, orthopnea, or claudication. Patient has chronic back issues with will see Neurosurgeon/PMR. 3-18-13: as above, doing ok overall, s/p back procedure with Dr. Opal Simon and now feeling much better. No bleeding issues, no recent hospitalizations. Pt denies chest pain, dyspnea, dyspnea on exertion, change in exercise capacity, fatigue, nausea, vomiting, diarrhea, constipation, motor weakness, insomnia, weight loss, syncope, dizziness, lightheadedness, palpitations, PND, orthopnea, or claudication. Does snore a bit a night. No hx of DUARTE. 5-2-13; as above, s/p abnormal sleep study showing moderate to severe DUARTE.     11-21-13: As above, doing well overall. No recent hospitalizations, SL nitro use, or bleeding issues. No change in meds. No recent hospitlizations.  Pt denies chest pain, dyspnea, dyspnea on exertion, change in exercise capacity, fatigue,  nausea, vomiting, diarrhea, constipation, motor weakness, insomnia, weight loss, syncope, dizziness, lightheadedness, palpitations, PND, orthopnea, or claudication. No bleeding issues on coumadin. 7-31-14: as above, having back issues still. Pt denies chest pain, dyspnea, dyspnea on exertion, change in exercise capacity, fatigue,  nausea, vomiting, diarrhea, constipation, motor weakness, insomnia, weight loss, syncope, dizziness, lightheadedness, palpitations, PND, orthopnea, or claudication. No hosp. No bleeding issues. Compliant with meds. Does not have CPAP, was told he did not need CPAP by Dr. Shana Castillo.    1-29-15: as above, still having back pain issues. Was told he doesn't need CPAP machine by DR. Torres. Pt denies chest pain, dyspnea, dyspnea on exertion, change in exercise capacity, fatigue,  nausea, vomiting, diarrhea, constipation, motor weakness, insomnia, weight loss, syncope, dizziness, lightheadedness, palpitations, PND, orthopnea, or claudication.' no recent hospitalizations. No bleeding issues. S/p back surgery with DR. Dipti Dickinson    10-13-15: as above, doing well ovearall. No change in meds. No hospitalizations. Needs to follow up with Dr. Shana Castillo for CPAP/DUARTE. On coumadin, no bleeding issues. Pt denies chest pain, dyspnea, dyspnea on exertion, change in exercise capacity, fatigue,  nausea, vomiting, diarrhea, constipation, motor weakness, insomnia, weight loss, syncope, dizziness, lightheadedness, palpitations, PND, orthopnea, or claudication. Having a hard time losing weight. 11-3-15: as above, s/p ECho with EF of 50%, mild LVH, moderate biatrial enlargement, normal RVSP. Compliant with med. No recent hosptializations. BP is under control. 6-16-16: as above, doing welll overall. Did have some URI type symptoms. No hospitalizations. Remains on coumadin and no bleeding issues. Has not been able to obtain CPAP yet from Dr. Shana Castillo. Will need to follow up.  Diet and exercise Hypertension    Atrial fibrillation (HCC)    Hyperlipidemia    COPD (chronic obstructive pulmonary disease) (HCC)    Benign prostatic hyperplasia    CAD (coronary artery disease)    Insomnia    DUARTE (obstructive sleep apnea)    Morbid obesity with BMI of 40.0-44.9, adult ([facility])    Skin ulcer of left lower leg, limited to breakdown of skin ([address]    Type 2 diabetes mellitus without complication, without long-term current use of insulin (HCC)    Venous insufficiency       Current Outpatient Prescriptions   Medication Sig Dispense Refill    simvastatin (ZOCOR) 20 MG tablet Take 1 tablet by mouth nightly 90 tablet 3    terazosin (HYTRIN) 10 MG capsule Take 1 capsule by mouth nightly 90 capsule 3    silver sulfADIAZINE (SILVADENE) 1 % cream Apply topically daily. 50 g 1    furosemide (LASIX) 40 MG tablet TAKE ONE TABLET BY MOUTH TWICE DAILY 180 tablet 3    lisinopril (PRINIVIL;ZESTRIL) 20 MG tablet TAKE ONE TABLET BY MOUTH ONCE DAILY 90 tablet 3    finasteride (PROSCAR) 5 MG tablet TAKE ONE TABLET BY MOUTH ONCE DAILY 90 tablet 3    KLOR-CON M10 10 MEQ extended release tablet TAKE ONE TABLET BY MOUTH ONCE DAILY 90 tablet 3    CPAP Machine MISC by Does not apply route      Fluticasone Furoate-Vilanterol 200-25 MCG/INH AEPB Inhale 1 puff into the lungs daily 90 each 3    Umeclidinium Bromide 62.5 MCG/INH AEPB Inhale 1 puff into the lungs daily 3 month supply 90 each 3    brimonidine (ALPHAGAN P) 0.15 % ophthalmic solution       latanoprost (XALATAN) 0.005 % ophthalmic solution       warfarin (COUMADIN) 5 MG tablet TAKE ONE AND ONE-HALF TABLETS DAILY, ADJUSTING AS NEEDED 135 tablet 3    ALPHAGAN P 0.1 % SOLN       levothyroxine (LEVOTHROID) 50 MCG tablet Take 1 tablet by mouth daily. 30 tablet 3    Vitamin D (CHOLECALCIFEROL) 1000 UNITS CAPS capsule Take 1,000 Units by mouth. Every friday       Brimonidine Tartrate (ALPHAGAN P OP) Apply 3 micro curie to eye 2 times daily.         DORZOLAMIDE HCL OP Apply 10 micro curie to eye 2 times daily.  LATANOPROST OP Apply 2.5 micro curie to eye nightly. No current facility-administered medications for this visit. ALLERGIES: Review of patient's allergies indicates no known allergies. Review of Systems:  General ROS: negative  Psychological ROS: negative  Hematological and Lymphatic ROS: No history of blood clots or bleeding disorder. Respiratory ROS: no cough, shortness of breath, or wheezing  Cardiovascular ROS: no chest pain or dyspnea on exertion  Gastrointestinal ROS: no abdominal pain, change in bowel habits, or black or bloody stools  Genito-Urinary ROS: no dysuria, trouble voiding, or hematuria  Musculoskeletal ROS: negative  Neurological ROS: no TIA or stroke symptoms  Dermatological ROS: negative    VITALS:  Blood pressure 130/80, pulse 66, height 5' 9\" (1.753 m), weight (!) 318 lb (144.2 kg). Body mass index is 46.96 kg/m². Physical Examination:  General appearance - alert, well appearing, and in no distress  Mental status - alert, oriented to person, place, and time  Neck - Neck is supple, no JVD or carotid bruits. No thyromegaly or adenopathy.    Chest - clear to auscultation, no wheezes, rales or rhonchi, symmetric air entry  Heart - normal rate, regular rhythm, normal S1, S2, no murmurs, rubs, clicks or gallops  Abdomen - soft, nontender, nondistended, no masses or organomegaly  Neurological - alert, oriented, normal speech, no focal findings or movement disorder noted  Extremities - peripheral pulses normal, no pedal edema, no clubbing or cyanosis  Skin - normal coloration and turgor, no rashes, no suspicious skin lesions noted    LABS:    Chemistry        Component Value Date/Time     11/14/2017 0859    K 4.2 11/14/2017 0859     11/14/2017 0859    CO2 23 11/14/2017 0859    BUN 22 11/14/2017 0859    CREATININE 1.09 11/14/2017 0859        Component Value Date/Time    CALCIUM 9.2 11/14/2017 0859    ALKPHOS 77 current doses. Check EKG    Diet and exrecise. Cont with [address]. Echo prior to next office visit. Patient was advised and encouraged to check blood pressure at home or at a pharmacy, maintain a logbook, and also call us back if blood pressure are above the target ranges or if it is low. Patient clearly understands and agrees to the instructions. We will need to continue to monitor muscle and liver enzymes, BUN, CR, and electrolytes. Details of medical condition explained and patient was warned about adverse consequences of uncontrolled medical conditions and possible side effects of prescribed medications. Patient was advised to go to the ER if he starts experiencing adverse effects of the medications. patient was instructed to call us back or go to nearby emergency room immediately if symptoms get worse or do not improve. Thank you for allowing me to participate in the care of your patient, please don't hesitate to contact me if you have any further questions. no difficulties

## 2018-06-18 NOTE — DIETITIAN INITIAL EVALUATION ADULT. - ADHERENCE
fair/Pt states wife is in charge of pt's diet modification, avoids concentrated sweets and salt, but states his weakness is butter

## 2018-06-18 NOTE — CONSULT NOTE ADULT - PROBLEM SELECTOR RECOMMENDATION 5
-rate control   -management as per cardiology
> 16 minutes (3:05pm to 3:26pm) spend on advanced care planning conversation with patient (in person) and wife (via phone) leading to completion of MOLST.

## 2018-06-18 NOTE — CONSULT NOTE ADULT - PROBLEM SELECTOR RECOMMENDATION 8
-DVT proph  -GI proph  -Maintain O2 sat > 90%  -Palliative care evaluation - DNR  -Aspiration precautions  -PT/OOB

## 2018-06-18 NOTE — CONSULT NOTE ADULT - ASSESSMENT
72M extensive medical history including COPD, active smoker, obesity, postnasal drip and pulmonary nodules presenting from urgent care center with tachycardia and hypotension in the setting of rapid atrial fibrillation and a large pericardial effusion

## 2018-06-18 NOTE — DIETITIAN INITIAL EVALUATION ADULT. - ORAL INTAKE PTA
Pt reports poor appetite and not eating much 3-4 days PTA 2/2 not feeling well. Pt reports appetite and po intakes have always been good before that, pt's wife prepares well balanced diet with plenty of meats/vegetables and starch/poor

## 2018-06-18 NOTE — DIETITIAN INITIAL EVALUATION ADULT. - OTHER INFO
Pt seen for LOS in CCU, reports  pounds, denies recent wt fluctuations. Pt's current wt is 196 pounds. Pt reports appetite and po intakes have improved back up to baseline, reports 100% po intakes, noted 50% po intake per flowsheet. No GI distress, +BM today. Pt wears full upper and lower dentures with good fit, denies chewing/swallowing difficulties. NKFA. PTA denies taking supplements. Pt with T2DM on Janumet at home, reports he does not check fingersticks at home, but gets regular check ups from MD for his blood sugar.

## 2018-06-18 NOTE — DIETITIAN INITIAL EVALUATION ADULT. - ENERGY NEEDS
Height: 63 inches (stated), Weight: 196 pounds  BMI: 34.7 kg/m2 IBW: 124 pounds (+/-10%), %IBW: 158%  Pertinent Info: Per chart, 71 y/o male with PMH of HTN, HLD, T2DM, COPD and pulmonary nodules admitted with new Afib with RVR, and pericardial effusion, palliative following. +1 bilateral ankles edema, no pressure ulcers noted at this time.

## 2018-06-18 NOTE — DISCHARGE NOTE ADULT - MEDICATION SUMMARY - MEDICATIONS TO TAKE
I will START or STAY ON the medications listed below when I get home from the hospital:    aspirin 81 mg oral tablet  -- 1 tab(s) by mouth once a day  -- Indication: For Preventive measure    dilTIAZem 180 mg/24 hours oral capsule, extended release  -- 1 cap(s) by mouth once a day  -- Indication: For Atrial fibrillation    Janumet 50 mg-500 mg oral tablet  -- 1 tab(s) by mouth once a day  -- Indication: For Type 2 diabetes Mellitus    Pravachol 20 mg oral tablet  -- 1 tab(s) by mouth once a day (at bedtime)  -- Indication: For Hyperlipidemia    Diovan  mg-25 mg oral tablet  -- 1 tab(s) by mouth once a day  -- Indication: For Hypertension    Flonase 50 mcg/inh nasal spray  -- 1 spray(s) into nose once a day  -- Indication: For nasal spray    nicotine 21 mg/24 hr transdermal film, extended release  -- 1 patch by transdermal patch once a day  -- Indication: For Smoking cessation

## 2018-06-18 NOTE — DISCHARGE NOTE ADULT - CARE PROVIDERS DIRECT ADDRESSES
[email or web address]

## 2018-06-18 NOTE — DISCHARGE NOTE ADULT - PLAN OF CARE
HOME CARE INSTRUCTIONS  Take any medicines exactly as prescribed.  Follow up with your caregiver as directed.  Monitor your exercise capacity (the a Follow up with your doctor. Follow up with your cardiologist in 1 week Atrial fibrillation is the most common heart rhythm problem & has the risk of stroke & heart attack  It helps if you control your blood pressure, not drink more than 1-2 alcohol drinks per day, cut down on caffeine, getting treatment for over active thyroid gland, & getting exercise  Call your doctor if you feel your heart racing or beating unusually, chest tightness or pain, lightheaded, faint, shortness of breath especially with exercise  It is important to take your heart medication as prescribed Continue your hyperlipidemia medication as prescribed Low salt diet  Activity as tolerated.  Take all medication as prescribed.  Follow up with your medical doctor for routine blood pressure monitoring at your next visit.  Notify your doctor if you have any of the following symptoms:   Dizziness, Lightheadedness, Blurry vision, Headache, Chest pain, Shortness of breath Follow up with your pulmonary  Surveillance CT of chest q 3-6months as per Pulm

## 2018-06-18 NOTE — DIETITIAN INITIAL EVALUATION ADULT. - PROBLEM SELECTOR PLAN 3
now in sinus, no known history of AF, no evidence for ischemia, TSH low, check thyroid panel. Treat UTI. Monitor pericardial effusion.  [id number] - hold AC @ this time as pt w/ pericardial effusion and currently in sinus

## 2018-06-18 NOTE — GOALS OF CARE CONVERSATION - PERSONAL ADVANCE DIRECTIVE - CONVERSATION DETAILS
Discussed advanced care planning, and the role of MOLST form in protecting Mr. Murry's wishes. Mr. Murry was clear on wanting to be DNR, DNI, no trial of BiPAP, no temporary or permanent feeding tube; he opted for a trial of IV fluids and abx as indicated. Wife is also in agreement. Explained importance of keeping the original MOLST copy in an easily identifiable location in the home. MOLST completed and original given to patient; copy left in paper medical record in CCU.

## 2018-06-18 NOTE — PROGRESS NOTE ADULT - PROBLEM SELECTOR PLAN 2
will discuss with CT sx regarding potential window will discuss with IR regarding pericardial drain/culture and cytology

## 2018-06-18 NOTE — CONSULT NOTE ADULT - PROBLEM SELECTOR RECOMMENDATION 3
-mild obstructive ventilatory defect on PFT  -was supposed to be on Stiolto but has not been using it  -maintain O2 sat > 90%  -Incentive spirometer and Acapella  -outpatient followup with Dr. Brand

## 2018-06-18 NOTE — PROGRESS NOTE ADULT - ASSESSMENT
71 yo M HTN, HLD, DM2, nephrolithiasis, pulm nodules p/w hypoxia on RA, new A fib RVR ([id number]), and new pericardial effusion.

## 2018-06-18 NOTE — CONSULT NOTE ADULT - PROBLEM SELECTOR RECOMMENDATION 9
-He is refusing any aggressive intervention at this time, because he does not want to receive anesthesia. He believes he would have a difficult recovery after anesthesia.  -Con't care per primary team After discussion with CCU team, is open to IR-guided drainage. Unclear etiology of effusion, hoping for more diagnostic information after procedure.

## 2018-06-18 NOTE — CONSULT NOTE ADULT - PROBLEM SELECTOR RECOMMENDATION 9
-management as per CCU and CTS  -followup CTS and/or IR regarding possible pericardiocentesis vs pericardial window to evaluate for causative etiology  -patient currently hemodynamically stable and undecided if he would want any intervention. He states he wants to go home

## 2018-06-18 NOTE — CONSULT NOTE ADULT - ATTENDING COMMENTS
71 y/o male smoker c/o fatigue and increase SOB for 2-3 days. Also decrease appetite. No fever no chills. Traveled to [facility] recently but no h/o tick bites. IN ED noticed to have large pericardial effusion not in tamponade.  On our  exam pt has Large pericardial effusion with RV compression in diastole and  partial RA collapse in systole. Pt with large IVC. Consistent with pre tamponade.  Pt is already on pressors.  Pt with wbc of 20k. CT chest without source of infection or masses.  DIff dx of tamponade includes malignant vs, infection. Less likely hypothyroidism. Send RVP and Lyme titters.    Pt will need diagnostic and therapeutic pericardiocentesis.    Will have cardiolgy evaluate. If need be will take patient to MICU.    CC time 35 min
Pt seen and examined.  Mod/large pericardial effusion.   No tamponade.  Possible drainage and biopsy vs medical management.  Will d/w team
Dr. Brand to follow.

## 2018-06-18 NOTE — CHART NOTE - NSCHARTNOTEFT_GEN_A_CORE
====================  CCU MIDNIGHT ROUNDS  ====================    MILAGROS VIERA  [id number]    ====================  SUMMARY:  ====================    73 yo M HTN, HLD, DM2, nephrolithiasis, pulm nodules, p/w hypoxia on RA, new AF RVR ([id number]), pericardial effusion w/ possible early tamponade physiology on TTE, hypotension s/p 3.5 L NS and levo gtt (now off) and PARRISH (now resolved)     ====================  NEW EVENTS:  ====================    briefly went into rapid AF this PM, asymptomatic, now back in sinus. No new complaints, anxious for sampling of pericardial fluid in AM and DC home     ====================  VITALS (Last 12 hrs):  ====================    T(C): 36.6 (06-18-18 @ 19:00), Max: 36.7 (06-18-18 @ 15:00)  HR: 98 (06-18-18 @ 23:00) (98 - 118)  BP: 99/65 (06-18-18 @ 23:00) (95/62 - 142/85)  BP(mean): 75 (06-18-18 @ 23:00) (73 - 102)  RR: 18 (06-18-18 @ 23:00) (16 - 29)  SpO2: 90% (06-18-18 @ 23:00) (90% - 99%)    TELEMETRY: sinus 90s-100s w/ PACs     I&O's Summary    17 Jun 2018 07:01  -  18 Jun 2018 07:00  --------------------------------------------------------  IN: 480 mL / OUT: 1675 mL / NET: -1195 mL    18 Jun 2018 07:01  -  18 Jun 2018 23:04  --------------------------------------------------------  IN: 620 mL / OUT: 875 mL / NET: -255 mL    ====================  PLAN:  ====================  - pericardial effusion - no s/s clinical tamponade, NPO after MN for   - AF - briefly in flutter this PM, now in sinus, [id number], c/w cardizem - holding AC s/t   - HTN - currently normotensive, c/w cardizem   - PARRISH - resolved - likely prerenal s/t poor PO intake PTA    Joana Pope [facility]/CCU  #[id number]

## 2018-06-18 NOTE — DIETITIAN INITIAL EVALUATION ADULT. - PROBLEM SELECTOR PLAN 6
pt satting 86% on RA on admission w/ audible wheezing - ? undiagnosed COPD given significant smoking history, now satting well on 2L, in no distress, speaking in full sentences, no infiltrate on CT chest, does not appear acutely exacerbated @ this time, will order nichelle PRN  Wells criteria 1.5, low suspicion PE despite recent long car ride given improved sat and HR.  Pt w/ BALTAZAR worsening x 3 mos w/ multiple possible causes including undiagnosed COPD, noted moderate AS on TTE and ? pt going into AF w/ out symptoms. Does not appear volume overloaded on exam

## 2018-06-18 NOTE — DISCHARGE NOTE ADULT - CARE PLAN
Goal:	HOME CARE INSTRUCTIONS  Take any medicines exactly as prescribed.  Follow up with your caregiver as directed.  Monitor your exercise capacity (the a  Assessment and plan of treatment:	Follow up with your doctor. Principal Discharge DX:	Pericardial effusion  Goal:	HOME CARE INSTRUCTIONS  Take any medicines exactly as prescribed.  Follow up with your caregiver as directed.  Monitor your exercise capacity (the a  Assessment and plan of treatment:	Follow up with your cardiologist in 1 week  Secondary Diagnosis:	Atrial fibrillation  Assessment and plan of treatment:	Atrial fibrillation is the most common heart rhythm problem & has the risk of stroke & heart attack  It helps if you control your blood pressure, not drink more than 1-2 alcohol drinks per day, cut down on caffeine, getting treatment for over active thyroid gland, & getting exercise  Call your doctor if you feel your heart racing or beating unusually, chest tightness or pain, lightheaded, faint, shortness of breath especially with exercise  It is important to take your heart medication as prescribed  Secondary Diagnosis:	Hyperlipidemia  Assessment and plan of treatment:	Continue your hyperlipidemia medication as prescribed  Secondary Diagnosis:	Hypertension  Assessment and plan of treatment:	Low salt diet  Activity as tolerated.  Take all medication as prescribed.  Follow up with your medical doctor for routine blood pressure monitoring at your next visit.  Notify your doctor if you have any of the following symptoms:   Dizziness, Lightheadedness, Blurry vision, Headache, Chest pain, Shortness of breath  Secondary Diagnosis:	Pulmonary nodules  Assessment and plan of treatment:	Follow up with your pulmonary  Surveillance CT of chest q 3-6months as per Pulm

## 2018-06-18 NOTE — DISCHARGE NOTE ADULT - CARE PROVIDER_API CALL
Roberto Kamara), Cardiology; Internal Medicine  [address] [address]  Phone: [phone]  Fax: [phone] Roberto Kamara), Cardiology; Internal Medicine  [address] [facility] [address]  [place] [address]  Phone: [phone]  Fax: [phone]    Marco Campbell), Cardiovascular Disease; Internal Medicine; Nuclear Cardiology  [address] [facility] [address]  [place], [place] [id number]  Phone: [phone]  Fax: [phone]

## 2018-06-19 ENCOUNTER — RESULT REVIEW (OUTPATIENT)
Age: 73
End: 2018-06-19

## 2018-06-19 VITALS
HEART RATE: 108 BPM | SYSTOLIC BLOOD PRESSURE: 123 MMHG | OXYGEN SATURATION: 92 % | DIASTOLIC BLOOD PRESSURE: 80 MMHG | TEMPERATURE: 98 F | RESPIRATION RATE: 18 BRPM

## 2018-06-19 DIAGNOSIS — G47.33 OBSTRUCTIVE SLEEP APNEA (ADULT) (PEDIATRIC): ICD-10-CM

## 2018-06-19 LAB
ALBUMIN FLD-MCNC: 2.9 G/DL — SIGNIFICANT CHANGE UP
ALBUMIN SERPL ELPH-MCNC: 3.1 G/DL — LOW (ref 3.3–5)
ALP SERPL-CCNC: 86 U/L — SIGNIFICANT CHANGE UP (ref 40–120)
ALT FLD-CCNC: 13 U/L — SIGNIFICANT CHANGE UP (ref 10–45)
ANION GAP SERPL CALC-SCNC: 13 MMOL/L — SIGNIFICANT CHANGE UP (ref 5–17)
APTT BLD: 26.5 SEC — LOW (ref 27.5–37.4)
AST SERPL-CCNC: 14 U/L — SIGNIFICANT CHANGE UP (ref 10–40)
B PERT IGG+IGM PNL SER: ABNORMAL
BASOPHILS # BLD AUTO: 0 K/UL — SIGNIFICANT CHANGE UP (ref 0–0.2)
BASOPHILS NFR BLD AUTO: 0.1 % — SIGNIFICANT CHANGE UP (ref 0–2)
BILIRUB SERPL-MCNC: 0.2 MG/DL — SIGNIFICANT CHANGE UP (ref 0.2–1.2)
BLD GP AB SCN SERPL QL: NEGATIVE — SIGNIFICANT CHANGE UP
BUN SERPL-MCNC: 37 MG/DL — HIGH (ref 7–23)
CALCIUM SERPL-MCNC: 9.4 MG/DL — SIGNIFICANT CHANGE UP (ref 8.4–10.5)
CHLORIDE SERPL-SCNC: 99 MMOL/L — SIGNIFICANT CHANGE UP (ref 96–108)
CO2 SERPL-SCNC: 27 MMOL/L — SIGNIFICANT CHANGE UP (ref 22–31)
COLOR FLD: SIGNIFICANT CHANGE UP
CREAT SERPL-MCNC: 1.2 MG/DL — SIGNIFICANT CHANGE UP (ref 0.5–1.3)
EOSINOPHIL # BLD AUTO: 0.1 K/UL — SIGNIFICANT CHANGE UP (ref 0–0.5)
EOSINOPHIL NFR BLD AUTO: 0.6 % — SIGNIFICANT CHANGE UP (ref 0–6)
FLUID INTAKE SUBSTANCE CLASS: SIGNIFICANT CHANGE UP
FLUID SEGMENTED GRANULOCYTES: 37 % — SIGNIFICANT CHANGE UP
GLUCOSE BLDC GLUCOMTR-MCNC: 138 MG/DL — HIGH (ref 70–99)
GLUCOSE FLD-MCNC: 126 MG/DL — SIGNIFICANT CHANGE UP
GLUCOSE SERPL-MCNC: 146 MG/DL — HIGH (ref 70–99)
GRAM STN FLD: SIGNIFICANT CHANGE UP
HCT VFR BLD CALC: 36.5 % — LOW (ref 39–50)
HGB BLD-MCNC: 12.3 G/DL — LOW (ref 13–17)
INR BLD: 1.21 RATIO — HIGH (ref 0.88–1.16)
LDH SERPL L TO P-CCNC: >2250 U/L — SIGNIFICANT CHANGE UP
LYMPHOCYTES # BLD AUTO: 1.9 K/UL — SIGNIFICANT CHANGE UP (ref 1–3.3)
LYMPHOCYTES # BLD AUTO: 12.4 % — LOW (ref 13–44)
LYMPHOCYTES # FLD: 34 % — SIGNIFICANT CHANGE UP
MAGNESIUM SERPL-MCNC: 1.7 MG/DL — SIGNIFICANT CHANGE UP (ref 1.6–2.6)
MCHC RBC-ENTMCNC: 30.4 PG — SIGNIFICANT CHANGE UP (ref 27–34)
MCHC RBC-ENTMCNC: 33.6 GM/DL — SIGNIFICANT CHANGE UP (ref 32–36)
MCV RBC AUTO: 90.4 FL — SIGNIFICANT CHANGE UP (ref 80–100)
MONOCYTES # BLD AUTO: 1.9 K/UL — HIGH (ref 0–0.9)
MONOCYTES NFR BLD AUTO: 12.1 % — SIGNIFICANT CHANGE UP (ref 2–14)
MONOS+MACROS # FLD: 29 % — SIGNIFICANT CHANGE UP
NEUTROPHILS # BLD AUTO: 11.8 K/UL — HIGH (ref 1.8–7.4)
NEUTROPHILS NFR BLD AUTO: 74.8 % — SIGNIFICANT CHANGE UP (ref 43–77)
PH FLD: 7.45 — SIGNIFICANT CHANGE UP
PHOSPHATE SERPL-MCNC: 3.4 MG/DL — SIGNIFICANT CHANGE UP (ref 2.5–4.5)
PLATELET # BLD AUTO: 377 K/UL — SIGNIFICANT CHANGE UP (ref 150–400)
POTASSIUM SERPL-MCNC: 4.3 MMOL/L — SIGNIFICANT CHANGE UP (ref 3.5–5.3)
POTASSIUM SERPL-SCNC: 4.3 MMOL/L — SIGNIFICANT CHANGE UP (ref 3.5–5.3)
PROT FLD-MCNC: 5 G/DL — SIGNIFICANT CHANGE UP
PROT SERPL-MCNC: 6.9 G/DL — SIGNIFICANT CHANGE UP (ref 6–8.3)
PROTHROM AB SERPL-ACNC: 13.2 SEC — HIGH (ref 9.8–12.7)
RBC # BLD: 4.03 M/UL — LOW (ref 4.2–5.8)
RBC # FLD: 12.8 % — SIGNIFICANT CHANGE UP (ref 10.3–14.5)
RCV VOL RI: HIGH /UL (ref 0–5)
RH IG SCN BLD-IMP: POSITIVE — SIGNIFICANT CHANGE UP
SODIUM SERPL-SCNC: 139 MMOL/L — SIGNIFICANT CHANGE UP (ref 135–145)
SPECIMEN SOURCE: SIGNIFICANT CHANGE UP
TOTAL NUCLEATED CELL COUNT, BODY FLUID: 3150 /UL — HIGH (ref 0–5)
TUBE TYPE: SIGNIFICANT CHANGE UP
WBC # BLD: 15.7 K/UL — HIGH (ref 3.8–10.5)
WBC # FLD AUTO: 15.7 K/UL — HIGH (ref 3.8–10.5)

## 2018-06-19 PROCEDURE — C1894: CPT

## 2018-06-19 PROCEDURE — 93306 TTE W/DOPPLER COMPLETE: CPT

## 2018-06-19 PROCEDURE — 93321 DOPPLER ECHO F-UP/LMTD STD: CPT

## 2018-06-19 PROCEDURE — 84480 ASSAY TRIIODOTHYRONINE (T3): CPT

## 2018-06-19 PROCEDURE — 82436 ASSAY OF URINE CHLORIDE: CPT

## 2018-06-19 PROCEDURE — 83605 ASSAY OF LACTIC ACID: CPT

## 2018-06-19 PROCEDURE — 84436 ASSAY OF TOTAL THYROXINE: CPT

## 2018-06-19 PROCEDURE — 84295 ASSAY OF SERUM SODIUM: CPT

## 2018-06-19 PROCEDURE — 83986 ASSAY PH BODY FLUID NOS: CPT

## 2018-06-19 PROCEDURE — 81001 URINALYSIS AUTO W/SCOPE: CPT

## 2018-06-19 PROCEDURE — 80053 COMPREHEN METABOLIC PANEL: CPT

## 2018-06-19 PROCEDURE — 33010: CPT

## 2018-06-19 PROCEDURE — 71250 CT THORAX DX C-: CPT

## 2018-06-19 PROCEDURE — 85730 THROMBOPLASTIN TIME PARTIAL: CPT

## 2018-06-19 PROCEDURE — 93321 DOPPLER ECHO F-UP/LMTD STD: CPT | Mod: 26

## 2018-06-19 PROCEDURE — 99233 SBSQ HOSP IP/OBS HIGH 50: CPT

## 2018-06-19 PROCEDURE — 87486 CHLMYD PNEUM DNA AMP PROBE: CPT

## 2018-06-19 PROCEDURE — 83615 LACTATE (LD) (LDH) ENZYME: CPT

## 2018-06-19 PROCEDURE — 86901 BLOOD TYPING SEROLOGIC RH(D): CPT

## 2018-06-19 PROCEDURE — 84132 ASSAY OF SERUM POTASSIUM: CPT

## 2018-06-19 PROCEDURE — 99291 CRITICAL CARE FIRST HOUR: CPT | Mod: 25

## 2018-06-19 PROCEDURE — 84300 ASSAY OF URINE SODIUM: CPT

## 2018-06-19 PROCEDURE — 85014 HEMATOCRIT: CPT

## 2018-06-19 PROCEDURE — 99232 SBSQ HOSP IP/OBS MODERATE 35: CPT

## 2018-06-19 PROCEDURE — 85610 PROTHROMBIN TIME: CPT

## 2018-06-19 PROCEDURE — 87633 RESP VIRUS 12-25 TARGETS: CPT

## 2018-06-19 PROCEDURE — 84443 ASSAY THYROID STIM HORMONE: CPT

## 2018-06-19 PROCEDURE — 89051 BODY FLUID CELL COUNT: CPT

## 2018-06-19 PROCEDURE — 93308 TTE F-UP OR LMTD: CPT | Mod: 26

## 2018-06-19 PROCEDURE — 87086 URINE CULTURE/COLONY COUNT: CPT

## 2018-06-19 PROCEDURE — 96375 TX/PRO/DX INJ NEW DRUG ADDON: CPT

## 2018-06-19 PROCEDURE — 87205 SMEAR GRAM STAIN: CPT

## 2018-06-19 PROCEDURE — 85027 COMPLETE CBC AUTOMATED: CPT

## 2018-06-19 PROCEDURE — 96374 THER/PROPH/DIAG INJ IV PUSH: CPT

## 2018-06-19 PROCEDURE — 93308 TTE F-UP OR LMTD: CPT

## 2018-06-19 PROCEDURE — 87798 DETECT AGENT NOS DNA AMP: CPT

## 2018-06-19 PROCEDURE — 88305 TISSUE EXAM BY PATHOLOGIST: CPT | Mod: 26

## 2018-06-19 PROCEDURE — 86618 LYME DISEASE ANTIBODY: CPT

## 2018-06-19 PROCEDURE — 82962 GLUCOSE BLOOD TEST: CPT

## 2018-06-19 PROCEDURE — 87040 BLOOD CULTURE FOR BACTERIA: CPT

## 2018-06-19 PROCEDURE — 84484 ASSAY OF TROPONIN QUANT: CPT

## 2018-06-19 PROCEDURE — 87206 SMEAR FLUORESCENT/ACID STAI: CPT

## 2018-06-19 PROCEDURE — 82330 ASSAY OF CALCIUM: CPT

## 2018-06-19 PROCEDURE — 82435 ASSAY OF BLOOD CHLORIDE: CPT

## 2018-06-19 PROCEDURE — 82570 ASSAY OF URINE CREATININE: CPT

## 2018-06-19 PROCEDURE — 86850 RBC ANTIBODY SCREEN: CPT

## 2018-06-19 PROCEDURE — 88112 CYTOPATH CELL ENHANCE TECH: CPT

## 2018-06-19 PROCEDURE — 84145 PROCALCITONIN (PCT): CPT

## 2018-06-19 PROCEDURE — 77012 CT SCAN FOR NEEDLE BIOPSY: CPT | Mod: 26

## 2018-06-19 PROCEDURE — 83880 ASSAY OF NATRIURETIC PEPTIDE: CPT

## 2018-06-19 PROCEDURE — 93010 ELECTROCARDIOGRAM REPORT: CPT

## 2018-06-19 PROCEDURE — 77012 CT SCAN FOR NEEDLE BIOPSY: CPT

## 2018-06-19 PROCEDURE — 87102 FUNGUS ISOLATION CULTURE: CPT

## 2018-06-19 PROCEDURE — 88305 TISSUE EXAM BY PATHOLOGIST: CPT

## 2018-06-19 PROCEDURE — 84540 ASSAY OF URINE/UREA-N: CPT

## 2018-06-19 PROCEDURE — 87070 CULTURE OTHR SPECIMN AEROBIC: CPT

## 2018-06-19 PROCEDURE — 88112 CYTOPATH CELL ENHANCE TECH: CPT | Mod: 26

## 2018-06-19 PROCEDURE — 84100 ASSAY OF PHOSPHORUS: CPT

## 2018-06-19 PROCEDURE — 82042 OTHER SOURCE ALBUMIN QUAN EA: CPT

## 2018-06-19 PROCEDURE — 82553 CREATINE MB FRACTION: CPT

## 2018-06-19 PROCEDURE — 87581 M.PNEUMON DNA AMP PROBE: CPT

## 2018-06-19 PROCEDURE — 93005 ELECTROCARDIOGRAM TRACING: CPT

## 2018-06-19 PROCEDURE — 82945 GLUCOSE OTHER FLUID: CPT

## 2018-06-19 PROCEDURE — 87015 SPECIMEN INFECT AGNT CONCNTJ: CPT

## 2018-06-19 PROCEDURE — 86900 BLOOD TYPING SEROLOGIC ABO: CPT

## 2018-06-19 PROCEDURE — 82550 ASSAY OF CK (CPK): CPT

## 2018-06-19 PROCEDURE — 84133 ASSAY OF URINE POTASSIUM: CPT

## 2018-06-19 PROCEDURE — 94640 AIRWAY INHALATION TREATMENT: CPT

## 2018-06-19 PROCEDURE — 82947 ASSAY GLUCOSE BLOOD QUANT: CPT

## 2018-06-19 PROCEDURE — 83735 ASSAY OF MAGNESIUM: CPT

## 2018-06-19 PROCEDURE — 84157 ASSAY OF PROTEIN OTHER: CPT

## 2018-06-19 PROCEDURE — 82803 BLOOD GASES ANY COMBINATION: CPT

## 2018-06-19 PROCEDURE — 87075 CULTR BACTERIA EXCEPT BLOOD: CPT

## 2018-06-19 PROCEDURE — 87116 MYCOBACTERIA CULTURE: CPT

## 2018-06-19 PROCEDURE — 83036 HEMOGLOBIN GLYCOSYLATED A1C: CPT

## 2018-06-19 PROCEDURE — 71045 X-RAY EXAM CHEST 1 VIEW: CPT

## 2018-06-19 PROCEDURE — 80061 LIPID PANEL: CPT

## 2018-06-19 RX ORDER — ACETAMINOPHEN 500 MG
650 TABLET ORAL ONCE
Qty: 0 | Refills: 0 | Status: COMPLETED | OUTPATIENT
Start: 2018-06-19 | End: 2018-06-19

## 2018-06-19 RX ORDER — COLCHICINE 0.6 MG
0.6 TABLET ORAL
Qty: 0 | Refills: 0 | Status: DISCONTINUED | OUTPATIENT
Start: 2018-06-19 | End: 2018-06-19

## 2018-06-19 RX ORDER — NICOTINE POLACRILEX 2 MG
1 GUM BUCCAL
Qty: 0 | Refills: 0 | COMMUNITY
Start: 2018-06-19

## 2018-06-19 RX ORDER — MAGNESIUM SULFATE 500 MG/ML
2 VIAL (ML) INJECTION ONCE
Qty: 0 | Refills: 0 | Status: COMPLETED | OUTPATIENT
Start: 2018-06-19 | End: 2018-06-19

## 2018-06-19 RX ADMIN — Medication 650 MILLIGRAM(S): at 11:30

## 2018-06-19 RX ADMIN — Medication 650 MILLIGRAM(S): at 13:24

## 2018-06-19 RX ADMIN — Medication 3 MILLILITER(S): at 00:35

## 2018-06-19 RX ADMIN — Medication 1 PATCH: at 12:11

## 2018-06-19 RX ADMIN — Medication 650 MILLIGRAM(S): at 06:05

## 2018-06-19 RX ADMIN — Medication 650 MILLIGRAM(S): at 06:30

## 2018-06-19 RX ADMIN — Medication 3 MILLILITER(S): at 06:46

## 2018-06-19 NOTE — PROGRESS NOTE ADULT - SUBJECTIVE AND OBJECTIVE BOX
Interventional Radiology  Pre-Procedure Note    This is a 72y  Male      HPI:  71 yo M HTN, HLD, DM2, nephrolithiasis, and pulm nodules transferred from urgent care to [facility] for hypotension.  Pt found to be hypoxic and in Afib with RVR.  Further workup revealed large pericardial effusion, with no evidence of tamponade Pt presents today to IR for pericardial drainage.  Currently denies shortness of breath or chest pain.      PAST MEDICAL    Pulmonary nodules  COPD (chronic obstructive pulmonary disease)  Diabetes mellitus, type 2  Kidney stones  Hypertension  HLD    SURGICAL HISTORY:  History of tonsillectomy  History of kidney stones    Social History:     FAMILY HISTORY:  Family history of MI (myocardial infarction) (Father)    Allergies: penicillins (Unknown)    Current Medications: ALBUTerol/ipratropium for Nebulization 3 milliLiter(s) Nebulizer every 6 hours  ALBUTerol/ipratropium for Nebulization 3 milliLiter(s) Nebulizer every 6 hours PRN  colchicine 0.6 milliGRAM(s) Oral two times a day  dextrose 40% Gel 15 Gram(s) Oral once PRN  dextrose 5%. 1000 milliLiter(s) IV Continuous <Continuous>  dextrose 50% Injectable 12.5 Gram(s) IV Push once  dextrose 50% Injectable 25 Gram(s) IV Push once  dextrose 50% Injectable 25 Gram(s) IV Push once  diltiazem    Tablet 60 milliGRAM(s) Oral every 8 hours  fluticasone propionate 50 MICROgram(s)/spray Nasal Spray 1 Spray(s) Both Nostrils two times a day  glucagon  Injectable 1 milliGRAM(s) IntraMuscular once PRN  insulin lispro (HumaLOG) corrective regimen sliding scale   SubCutaneous three times a day before meals  insulin lispro (HumaLOG) corrective regimen sliding scale   SubCutaneous at bedtime  melatonin 5 milliGRAM(s) Oral at bedtime  nicotine  Polacrilex Gum 2 milliGRAM(s) Oral five times a day PRN  nicotine - 21 mG/24Hr(s) Patch 1 patch Transdermal daily      Labs:                         12.3   15.7  )-----------( 377      ( 19 Jun 2018 05:21 )             36.5       06-19    139  |  99  |  37<H>  ----------------------------<  146<H>  4.3   |  27  |  1.20    Ca    9.4      19 Jun 2018 05:21  Phos  3.4     06-19  Mg     1.7     06-19    TPro  6.9  /  Alb  3.1<L>  /  TBili  0.2  /  DBili  x   /  AST  14  /  ALT  13  /  AlkPhos  86  06-19 6/18/18 TTE  Blood Pressure: 144/89 mmHg  Height: 170 cm  Weight: 89 kg  BSA: 2 m2  Heart Rate: 109 mmHg  ------------------------------------------------------------------------  PROCEDURE: Limited transthoracic echocardiogram with 2-D.  M-Mode and spectral and color flow Doppler.  INDICATION: Pericardial effusion (noninflammatory) (I31.3)  ------------------------------------------------------------------------  Observations:  Aortic Valve/Aorta: Normal trileaflet aortic valve.  Normal aortic root, aortic arch and descending thoracic  aorta.  Left Ventricle: Normal left ventricular systolic function.  No segmental wall motion abnormalities. Normal left  ventricular internal dimensions and wall thicknesses.  Right Heart: Normal right atrium. Right ventricular  enlargement with normal right ventricular systolic  function. Normal tricuspid valve. Normal pulmonic valve.  Pericardium/Pleura: Large circumfrential pericardial  effusion. The largest pocket is 2.3 cm adjacent to the left  ventricle.  There is a pocket measuring 1.1 cm anteiror to  the right ventricle.  No evidence of right atrial or right  ventricular collapse.  Hemodynamic: Estimated right atrial pressure is 8 mm Hg.  ------------------------------------------------------------------------  Conclusions:  1. Large circumfrential pericardial effusion. The largest  pocket is 2.3 cm adjacent to the left ventricle.  There is  a pocket measuring 1.1 cm anteiror to the right ventricle.  No evidence of right ventricular collapse. There is slight  invagination of the right atrium.  *** Compared with echocardiogram of 6/16/2018, no  significant changes noted.  ------------------------------------------------------------------------        Blood Bank: 06-19-18  O  --  Positive    Blood Available Until:    Assessment/Plan:   This is a 72y Male with above pmhx now with pericardial effusion presents today to IR for pericardial drainage.  Procedure/ risks/ benefits/ goals/ alternatives were explained.  Pt is currently DNR.  DNR status discussed with patient and wife, requesting limited resuscitation, does not wish to be intubated.  All questions answered. Informed content obtained from patient. Consent placed in chart. Interventional Radiology  Pre-Procedure Note    HPI:  71 yo M HTN, HLD, DM2, nephrolithiasis, and pulm nodules transferred from urgent care to [facility] for hypotension.  Pt found to be hypoxic and in Afib with RVR.  Further workup revealed large pericardial effusion, with no evidence of tamponade Pt presents today to IR for pericardial drainage.  Currently denies shortness of breath or chest pain.      PAST MEDICAL    Pulmonary nodules  COPD (chronic obstructive pulmonary disease)  Diabetes mellitus, type 2  cataracts  Kidney stones '64, '86  Hypertension  HLD    SURGICAL HISTORY:  History of tonsillectomy  surgical removal of kidney stones    Social History:   1 PPD x 55 years  drinks ETOH, 1 shot of liquor 4 days a week  Denies illicit drug use    FAMILY HISTORY:  Family history of MI (myocardial infarction) (Father)    Allergies: penicillins (Unknown)    Current Medications: ALBUTerol/ipratropium for Nebulization 3 milliLiter(s) Nebulizer every 6 hours  ALBUTerol/ipratropium for Nebulization 3 milliLiter(s) Nebulizer every 6 hours PRN  colchicine 0.6 milliGRAM(s) Oral two times a day  dextrose 40% Gel 15 Gram(s) Oral once PRN  dextrose 5%. 1000 milliLiter(s) IV Continuous <Continuous>  dextrose 50% Injectable 12.5 Gram(s) IV Push once  dextrose 50% Injectable 25 Gram(s) IV Push once  dextrose 50% Injectable 25 Gram(s) IV Push once  diltiazem    Tablet 60 milliGRAM(s) Oral every 8 hours  fluticasone propionate 50 MICROgram(s)/spray Nasal Spray 1 Spray(s) Both Nostrils two times a day  glucagon  Injectable 1 milliGRAM(s) IntraMuscular once PRN  insulin lispro (HumaLOG) corrective regimen sliding scale   SubCutaneous three times a day before meals  insulin lispro (HumaLOG) corrective regimen sliding scale   SubCutaneous at bedtime  melatonin 5 milliGRAM(s) Oral at bedtime  nicotine  Polacrilex Gum 2 milliGRAM(s) Oral five times a day PRN  nicotine - 21 mG/24Hr(s) Patch 1 patch Transdermal daily      Labs:                         12.3   15.7  )-----------( 377      ( 19 Jun 2018 05:21 )             36.5       06-19    139  |  99  |  37<H>  ----------------------------<  146<H>  4.3   |  27  |  1.20    Ca    9.4      19 Jun 2018 05:21  Phos  3.4     06-19  Mg     1.7     06-19    TPro  6.9  /  Alb  3.1<L>  /  TBili  0.2  /  DBili  x   /  AST  14  /  ALT  13  /  AlkPhos  86  06-19 6/18/18 TTE  Blood Pressure: 144/89 mmHg  Height: 170 cm  Weight: 89 kg  BSA: 2 m2  Heart Rate: 109 mmHg  ------------------------------------------------------------------------  PROCEDURE: Limited transthoracic echocardiogram with 2-D.  M-Mode and spectral and color flow Doppler.  INDICATION: Pericardial effusion (noninflammatory) (I31.3)  ------------------------------------------------------------------------  Observations:  Aortic Valve/Aorta: Normal trileaflet aortic valve.  Normal aortic root, aortic arch and descending thoracic  aorta.  Left Ventricle: Normal left ventricular systolic function.  No segmental wall motion abnormalities. Normal left  ventricular internal dimensions and wall thicknesses.  Right Heart: Normal right atrium. Right ventricular  enlargement with normal right ventricular systolic  function. Normal tricuspid valve. Normal pulmonic valve.  Pericardium/Pleura: Large circumfrential pericardial  effusion. The largest pocket is 2.3 cm adjacent to the left  ventricle.  There is a pocket measuring 1.1 cm anteiror to  the right ventricle.  No evidence of right atrial or right  ventricular collapse.  Hemodynamic: Estimated right atrial pressure is 8 mm Hg.  ------------------------------------------------------------------------  Conclusions:  1. Large circumfrential pericardial effusion. The largest  pocket is 2.3 cm adjacent to the left ventricle.  There is  a pocket measuring 1.1 cm anteiror to the right ventricle.  No evidence of right ventricular collapse. There is slight  invagination of the right atrium.  *** Compared with echocardiogram of 6/16/2018, no  significant changes noted.  ------------------------------------------------------------------------        Blood Bank: 06-19-18  O  --  Positive    Blood Available Until:    Assessment/Plan:   This is a 72y Male with above pmhx now with pericardial effusion presents today to IR for pericardial drainage.  Procedure/ risks/ benefits/ goals/ alternatives were explained.  Pt is currently DNR.  DNR status discussed with patient and wife, requesting limited resuscitation, does not wish to be intubated.  All questions answered. Informed content obtained from patient. Consent placed in chart. Interventional Radiology  Pre-Procedure Note    HPI:  73 yo M HTN, HLD, DM2, nephrolithiasis, and pulm nodules transferred from urgent care to [facility] for hypotension.  Pt found to be hypoxic and in Afib with RVR.  Further workup revealed large pericardial effusion, with no evidence of tamponade Pt presents today to IR for pericardial drainage.  Currently denies shortness of breath or chest pain.  NPO since MN.  Reports hx of delayed awakening during kidney stone surgery.    PAST MEDICAL    Pulmonary nodules  COPD (chronic obstructive pulmonary disease)  Diabetes mellitus, type 2  cataracts  Kidney stones '64, '86  Hypertension  HLD    SURGICAL HISTORY:  History of tonsillectomy  surgical removal of kidney stones    Social History:   1 PPD x 55 years  drinks ETOH, 1 shot of liquor 4 days a week  Denies illicit drug use    FAMILY HISTORY:  Family history of MI (myocardial infarction) (Father)    Allergies: penicillins (Unknown)    Current Medications: ALBUTerol/ipratropium for Nebulization 3 milliLiter(s) Nebulizer every 6 hours  ALBUTerol/ipratropium for Nebulization 3 milliLiter(s) Nebulizer every 6 hours PRN  colchicine 0.6 milliGRAM(s) Oral two times a day  dextrose 40% Gel 15 Gram(s) Oral once PRN  dextrose 5%. 1000 milliLiter(s) IV Continuous <Continuous>  dextrose 50% Injectable 12.5 Gram(s) IV Push once  dextrose 50% Injectable 25 Gram(s) IV Push once  dextrose 50% Injectable 25 Gram(s) IV Push once  diltiazem    Tablet 60 milliGRAM(s) Oral every 8 hours  fluticasone propionate 50 MICROgram(s)/spray Nasal Spray 1 Spray(s) Both Nostrils two times a day  glucagon  Injectable 1 milliGRAM(s) IntraMuscular once PRN  insulin lispro (HumaLOG) corrective regimen sliding scale   SubCutaneous three times a day before meals  insulin lispro (HumaLOG) corrective regimen sliding scale   SubCutaneous at bedtime  melatonin 5 milliGRAM(s) Oral at bedtime  nicotine  Polacrilex Gum 2 milliGRAM(s) Oral five times a day PRN  nicotine - 21 mG/24Hr(s) Patch 1 patch Transdermal daily      Labs:                         12.3   15.7  )-----------( 377      ( 19 Jun 2018 05:21 )             36.5       06-19    139  |  99  |  37<H>  ----------------------------<  146<H>  4.3   |  27  |  1.20    Ca    9.4      19 Jun 2018 05:21  Phos  3.4     06-19  Mg     1.7     06-19    TPro  6.9  /  Alb  3.1<L>  /  TBili  0.2  /  DBili  x   /  AST  14  /  ALT  13  /  AlkPhos  86  06-19 6/18/18 TTE  Blood Pressure: 144/89 mmHg  Height: 170 cm  Weight: 89 kg  BSA: 2 m2  Heart Rate: 109 mmHg  ------------------------------------------------------------------------  PROCEDURE: Limited transthoracic echocardiogram with 2-D.  M-Mode and spectral and color flow Doppler.  INDICATION: Pericardial effusion (noninflammatory) (I31.3)  ------------------------------------------------------------------------  Observations:  Aortic Valve/Aorta: Normal trileaflet aortic valve.  Normal aortic root, aortic arch and descending thoracic  aorta.  Left Ventricle: Normal left ventricular systolic function.  No segmental wall motion abnormalities. Normal left  ventricular internal dimensions and wall thicknesses.  Right Heart: Normal right atrium. Right ventricular  enlargement with normal right ventricular systolic  function. Normal tricuspid valve. Normal pulmonic valve.  Pericardium/Pleura: Large circumfrential pericardial  effusion. The largest pocket is 2.3 cm adjacent to the left  ventricle.  There is a pocket measuring 1.1 cm anteiror to  the right ventricle.  No evidence of right atrial or right  ventricular collapse.  Hemodynamic: Estimated right atrial pressure is 8 mm Hg.  ------------------------------------------------------------------------  Conclusions:  1. Large circumfrential pericardial effusion. The largest  pocket is 2.3 cm adjacent to the left ventricle.  There is  a pocket measuring 1.1 cm anteiror to the right ventricle.  No evidence of right ventricular collapse. There is slight  invagination of the right atrium.  *** Compared with echocardiogram of 6/16/2018, no  significant changes noted.  ------------------------------------------------------------------------        Blood Bank: 06-19-18  O  --  Positive    Blood Available Until:    Assessment/Plan:   This is a 72y Male with above pmhx now with pericardial effusion presents today to IR for pericardial drainage.  Procedure/ risks/ benefits/ goals/ alternatives were explained.  Pt is currently DNR.  DNR status discussed with patient and wife, requesting limited resuscitation, does not wish to be intubated.  All questions answered. Informed content obtained from patient. Consent placed in chart.

## 2018-06-19 NOTE — PROGRESS NOTE ADULT - SUBJECTIVE AND OBJECTIVE BOX
CHIEF COMPLAINT:    Interval Events:    REVIEW OF SYSTEMS:  Constitutional: No fevers or chills. No weight loss. No fatigue or generalized malaise.  Eyes: No itching or discharge from the eyes  ENT: No ear pain. No ear discharge. No nasal congestion. No post nasal drip. No epistaxis. No throat pain. No sore throat. No difficulty swallowing.   CV: No chest pain. No palpitations. No lightheadedness or dizziness.   Resp: No dyspnea at rest. No dyspnea on exertion. No orthopnea. No wheezing. No cough. No stridor. No sputum production. No chest pain with respiration.  GI: No nausea. No vomiting. No diarrhea.  MSK: No joint pain or pain in any extremities  Integumentary: No skin lesions. No pedal edema.  Neurological: No gross motor weakness. No sensory changes.  [ ] All other systems negative  [ ] Unable to assess ROS because ________    OBJECTIVE:  ICU Vital Signs Last 24 Hrs  T(C): 36.4 (2018 23:00), Max: 37.2 (2018 06:00)  T(F): 97.6 (2018 23:00), Max: 98.9 (2018 06:00)  HR: 100 (2018 05:00) (94 - 118)  BP: 116/78 (2018 05:00) (93/57 - 144/89)  BP(mean): 90 (2018 05:00) (67 - 108)  ABP: --  ABP(mean): --  RR: 18 (2018 05:00) (16 - 29)  SpO2: 89% (2018 05:00) (89% - 111%)        17 @ 07:  -  18 @ 07:00  --------------------------------------------------------  IN: 480 mL / OUT: 1675 mL / NET: -1195 mL     @ 07:01  -  19 @ 05:33  --------------------------------------------------------  IN: 620 mL / OUT: 1225 mL / NET: -605 mL      CAPILLARY BLOOD GLUCOSE      POCT Blood Glucose.: 168 mg/dL (2018 21:22)      PHYSICAL EXAM:  General: Awake, alert, oriented X 3.   HEENT: Atraumatic, normocephalic.                 Mallampatti Grade                 No nasal congestion.                No tonsillar or pharyngeal exudates.  Lymph Nodes: No palpable lymphadenopathy  Neck: No JVD. No carotid bruit.   Respiratory: Normal chest expansion                         Normal percussion                         Normal and equal air entry                         No wheeze, rhonchi or rales.  Cardiovascular: S1 S2 normal. No murmurs, rubs or gallops.   Abdomen: Soft, non-tender, non-distended. No organomegaly.  Extremities: Warm to touch. Peripheral pulse palpable. No pedal edema.   Skin: No rashes or skin lesions  Neurological: Motor and sensory examination equal and normal in all four extremities.  Psychiatry: Appropriate mood and affect.    HOSPITAL MEDICATIONS:  MEDICATIONS  (STANDING):  ALBUTerol/ipratropium for Nebulization 3 milliLiter(s) Nebulizer every 6 hours  dextrose 5%. 1000 milliLiter(s) (50 mL/Hr) IV Continuous <Continuous>  dextrose 50% Injectable 12.5 Gram(s) IV Push once  dextrose 50% Injectable 25 Gram(s) IV Push once  dextrose 50% Injectable 25 Gram(s) IV Push once  diltiazem    Tablet 60 milliGRAM(s) Oral every 8 hours  fluticasone propionate 50 MICROgram(s)/spray Nasal Spray 1 Spray(s) Both Nostrils two times a day  insulin lispro (HumaLOG) corrective regimen sliding scale   SubCutaneous three times a day before meals  insulin lispro (HumaLOG) corrective regimen sliding scale   SubCutaneous at bedtime  melatonin 5 milliGRAM(s) Oral at bedtime  nicotine - 21 mG/24Hr(s) Patch 1 patch Transdermal daily    MEDICATIONS  (PRN):  ALBUTerol/ipratropium for Nebulization 3 milliLiter(s) Nebulizer every 6 hours PRN Shortness of Breath and/or Wheezing  dextrose 40% Gel 15 Gram(s) Oral once PRN Blood Glucose LESS THAN 70 milliGRAM(s)/deciliter  glucagon  Injectable 1 milliGRAM(s) IntraMuscular once PRN Glucose LESS THAN 70 milligrams/deciliter  nicotine  Polacrilex Gum 2 milliGRAM(s) Oral five times a day PRN craving      LABS:                        12.5   19.0  )-----------( 349      ( 2018 05:46 )             37.9     06-18    140  |  101  |  49<H>  ----------------------------<  153<H>  4.4   |  26  |  1.26    Ca    9.4      2018 05:46  Phos  2.9       Mg     2.0     18    TPro  7.0  /  Alb  3.0<L>  /  TBili  0.2  /  DBili  x   /  AST  13  /  ALT  10  /  AlkPhos  87  -18      Urinalysis Basic - ( 2018 10:40 )    Color: Yellow / Appearance: Clear / S.014 / pH: x  Gluc: x / Ketone: Negative  / Bili: Negative / Urobili: Negative   Blood: x / Protein: Trace / Nitrite: Negative   Leuk Esterase: Trace / RBC: 0-2 /HPF / WBC 0-2 /HPF   Sq Epi: x / Non Sq Epi: OCC /HPF / Bacteria: x            MICROBIOLOGY:     RADIOLOGY:  [ ] Reviewed and interpreted by me    Point of Care Ultrasound Findings:    PFT:    EKG: CHIEF COMPLAINT: f/up SOB, COPD, abnormal CT chest, OSAS--pretty good at present--mild cough    Interval Events: CCU care--for pericardiocentesis    REVIEW OF SYSTEMS:  Constitutional: No fevers or chills. No weight loss. + fatigue or generalized malaise.  Eyes: No itching or discharge from the eyes  ENT: No ear pain. No ear discharge. No nasal congestion. No post nasal drip. No epistaxis. No throat pain. No sore throat. No difficulty swallowing.   CV: No chest pain. No palpitations. No lightheadedness or dizziness.   Resp: No dyspnea at rest. + dyspnea on exertion. No orthopnea. No wheezing. + cough. No stridor. No sputum production. No chest pain with respiration.  GI: No nausea. No vomiting. No diarrhea.  MSK: No joint pain or pain in any extremities  Integumentary: No skin lesions. No pedal edema.  Neurological: No gross motor weakness. No sensory changes.  [+ ] All other systems negative  [ ] Unable to assess ROS because ________    OBJECTIVE:  ICU Vital Signs Last 24 Hrs  T(C): 36.4 (2018 23:00), Max: 37.2 (2018 06:00)  T(F): 97.6 (2018 23:00), Max: 98.9 (2018 06:00)  HR: 100 (2018 05:00) (94 - 118)  BP: 116/78 (2018 05:00) (93/57 - 144/89)  BP(mean): 90 (2018 05:00) (67 - 108)  ABP: --  ABP(mean): --  RR: 18 (2018 05:00) (16 - 29)  SpO2: 89% (2018 05:00) (89% - 111%)        17 @ 07:01  -  -18 @ 07:00  --------------------------------------------------------  IN: 480 mL / OUT: 1675 mL / NET: -1195 mL     @ 07:01  -  - @ 05:33  --------------------------------------------------------  IN: 620 mL / OUT: 1225 mL / NET: -605 mL      CAPILLARY BLOOD GLUCOSE      POCT Blood Glucose.: 168 mg/dL (2018 21:22)      PHYSICAL EXAM: NAD in bed  General: Awake, alert, oriented X 3.   HEENT: Atraumatic, normocephalic.                 Mallampatti Grade 3                No nasal congestion.                No tonsillar or pharyngeal exudates.  Lymph Nodes: No palpable lymphadenopathy  Neck: No JVD. No carotid bruit.   Respiratory: Normal chest expansion                         Normal percussion                         Normal and equal air entry                         exp wheeze,no rhonchi or rales.  Cardiovascular: S1 S2 normal. No murmurs, rubs or gallops.   Abdomen: Soft, non-tender, non-distended. No organomegaly.  Extremities: Warm to touch. Peripheral pulse palpable. No pedal edema.   Skin: No rashes or skin lesions  Neurological: Motor and sensory examination equal and normal in all four extremities.  Psychiatry: Appropriate mood and affect.    HOSPITAL MEDICATIONS:  MEDICATIONS  (STANDING):  ALBUTerol/ipratropium for Nebulization 3 milliLiter(s) Nebulizer every 6 hours  dextrose 5%. 1000 milliLiter(s) (50 mL/Hr) IV Continuous <Continuous>  dextrose 50% Injectable 12.5 Gram(s) IV Push once  dextrose 50% Injectable 25 Gram(s) IV Push once  dextrose 50% Injectable 25 Gram(s) IV Push once  diltiazem    Tablet 60 milliGRAM(s) Oral every 8 hours  fluticasone propionate 50 MICROgram(s)/spray Nasal Spray 1 Spray(s) Both Nostrils two times a day  insulin lispro (HumaLOG) corrective regimen sliding scale   SubCutaneous three times a day before meals  insulin lispro (HumaLOG) corrective regimen sliding scale   SubCutaneous at bedtime  melatonin 5 milliGRAM(s) Oral at bedtime  nicotine - 21 mG/24Hr(s) Patch 1 patch Transdermal daily    MEDICATIONS  (PRN):  ALBUTerol/ipratropium for Nebulization 3 milliLiter(s) Nebulizer every 6 hours PRN Shortness of Breath and/or Wheezing  dextrose 40% Gel 15 Gram(s) Oral once PRN Blood Glucose LESS THAN 70 milliGRAM(s)/deciliter  glucagon  Injectable 1 milliGRAM(s) IntraMuscular once PRN Glucose LESS THAN 70 milligrams/deciliter  nicotine  Polacrilex Gum 2 milliGRAM(s) Oral five times a day PRN craving      LABS:                        12.5   19.0  )-----------( 349      ( 2018 05:46 )             37.9     06-18    140  |  101  |  49<H>  ----------------------------<  153<H>  4.4   |  26  |  1.26    Ca    9.4      2018 05:46  Phos  2.9       Mg     2.0         TPro  7.0  /  Alb  3.0<L>  /  TBili  0.2  /  DBili  x   /  AST  13  /  ALT  10  /  AlkPhos  87  18      Urinalysis Basic - ( 2018 10:40 )    Color: Yellow / Appearance: Clear / S.014 / pH: x  Gluc: x / Ketone: Negative  / Bili: Negative / Urobili: Negative   Blood: x / Protein: Trace / Nitrite: Negative   Leuk Esterase: Trace / RBC: 0-2 /HPF / WBC 0-2 /HPF   Sq Epi: x / Non Sq Epi: OCC /HPF / Bacteria: x            MICROBIOLOGY:     RADIOLOGY:  [ ] Reviewed and interpreted by me    Point of Care Ultrasound Findings:    PFT:    EKG:

## 2018-06-19 NOTE — PROGRESS NOTE ADULT - PROBLEM SELECTOR PLAN 4
Palliative Care consult for MOLST form
abnormal CT--at risk for lung CA--will need follow up-next 3-6 months

## 2018-06-19 NOTE — PROGRESS NOTE ADULT - PROBLEM SELECTOR PLAN 1
[id number]  intermittent afib/aflutter  Cont. Cardizem 60 PO Q8HRS  Hold AC for now due to INR drainage

## 2018-06-19 NOTE — PROGRESS NOTE ADULT - SUBJECTIVE AND OBJECTIVE BOX
Patient is a 72y old  Male who presents with a chief complaint of AF RVR, hypotension (2018 15:41)    He feels improved. He denies c/o chest pain, increasing SOB or palpitations.    Allergies    penicillins (Unknown)    Intolerances      MEDICATIONS  (STANDING):  ALBUTerol/ipratropium for Nebulization 3 milliLiter(s) Nebulizer every 6 hours  dextrose 5%. 1000 milliLiter(s) (50 mL/Hr) IV Continuous <Continuous>  dextrose 50% Injectable 12.5 Gram(s) IV Push once  dextrose 50% Injectable 25 Gram(s) IV Push once  dextrose 50% Injectable 25 Gram(s) IV Push once  diltiazem    Tablet 60 milliGRAM(s) Oral every 8 hours  fluticasone propionate 50 MICROgram(s)/spray Nasal Spray 1 Spray(s) Both Nostrils two times a day  insulin lispro (HumaLOG) corrective regimen sliding scale   SubCutaneous three times a day before meals  insulin lispro (HumaLOG) corrective regimen sliding scale   SubCutaneous at bedtime  melatonin 5 milliGRAM(s) Oral at bedtime  nicotine - 21 mG/24Hr(s) Patch 1 patch Transdermal daily    MEDICATIONS  (PRN):  ALBUTerol/ipratropium for Nebulization 3 milliLiter(s) Nebulizer every 6 hours PRN Shortness of Breath and/or Wheezing  dextrose 40% Gel 15 Gram(s) Oral once PRN Blood Glucose LESS THAN 70 milliGRAM(s)/deciliter  glucagon  Injectable 1 milliGRAM(s) IntraMuscular once PRN Glucose LESS THAN 70 milligrams/deciliter  nicotine  Polacrilex Gum 2 milliGRAM(s) Oral five times a day PRN craving      PHYSICAL EXAM:  Vital Signs Last 24 Hrs  T(C): 36.4 (2018 23:00), Max: 36.9 (2018 08:00)  T(F): 97.6 (2018 23:00), Max: 98.5 (2018 08:00)  HR: 114 (2018 06:00) (94 - 118)  BP: 101/71 (2018 06:00) (93/57 - 142/85)  BP(mean): 81 (2018 06:00) (67 - 102)  RR: 30 (2018 06:00) (16 - 30)  SpO2: 91% (2018 06:00) (89% - 111%)  Daily     Daily Weight in k.1 (2018 15:35)  I&O's Summary    2018 07:01  -  2018 07:00  --------------------------------------------------------  IN: 480 mL / OUT: 1675 mL / NET: -1195 mL    2018 07:01  -  2018 06:39  --------------------------------------------------------  IN: 810 mL / OUT: 1375 mL / NET: -565 mL    General Appearance: 	 Alert, cooperative, no distress  HEENT: normocephalic, atraumatic   Neck: 3cm JVD,  carotid 2+  bilaterally without bruits  Lungs:  increased expiratory phase with coarse BS bilaterally  Cor:  pmi 5th ICS MCL, regular rate and rhythm, S1 normal intensity, S2 normal intensity, no gallops, murmurs or rubs  Abdomen: soft, non-tender; bowel sounds normal; no masses,  no organomegaly  Extremities: without cyanosis, clubbing or edema  Vasc: 1-+ PT and DP pulses; LE varicosities    Telemetry: NSR    Labs:  CBC Full  -  ( 2018 05:21 )  WBC Count : 15.7 K/uL  Hemoglobin : 12.3 g/dL  Hematocrit : 36.5 %  Platelet Count - Automated : 377 K/uL  Mean Cell Volume : 90.4 fl  Mean Cell Hemoglobin : 30.4 pg  Mean Cell Hemoglobin Concentration : 33.6 gm/dL  Auto Neutrophil # : x  Auto Lymphocyte # : x  Auto Monocyte # : x  Auto Eosinophil # : x  Auto Basophil # : x  Auto Neutrophil % : x  Auto Lymphocyte % : x  Auto Monocyte % : x  Auto Eosinophil % : x  Auto Basophil % : x        139  |  99  |  37<H>  ----------------------------<  146<H>  4.3   |  27  |  1.20    Ca    9.4      2018 05:21  Phos  3.4     -  Mg     1.7     -    TPro  6.9  /  Alb  3.1<L>  /  TBili  0.2  /  DBili  x   /  AST  14  /  ALT  13  /  AlkPhos  86  06-19        PT/INR - ( 2018 05:21 )   PT: 13.2 sec;   INR: 1.21 ratio         PTT - ( 2018 05:21 )  PTT:26.5 sec  Urinalysis Basic - ( 2018 10:40 )    Color: Yellow / Appearance: Clear / S.014 / pH: x  Gluc: x / Ketone: Negative  / Bili: Negative / Urobili: Negative   Blood: x / Protein: Trace / Nitrite: Negative   Leuk Esterase: Trace / RBC: 0-2 /HPF / WBC 0-2 /HPF   Sq Epi: x / Non Sq Epi: OCC /HPF / Bacteria: x                        Marco Campbell MD [facility]  [phone]

## 2018-06-19 NOTE — PROGRESS NOTE ADULT - ASSESSMENT
72M extensive medical history including COPD, active smoker, obesity, postnasal drip and pulmonary nodules presenting from urgent care center with tachycardia and hypotension in the setting of rapid atrial fibrillation and a large pericardial effusion 72M extensive medical history including COPD, active smoker, obesity, postnasal drip and pulmonary nodules presenting from urgent care center with tachycardia and hypotension in the setting of rapid atrial fibrillation and a large pericardial effusion    For pericardial evaluation today

## 2018-06-19 NOTE — PROGRESS NOTE ADULT - ATTENDING COMMENTS
Dr. Brand to follow. as above--  Pulmonary stable-copd, AF, pericardial effusion-awaiting evaluation (today)  No absolute pulm. contras to procedure  symbicort/spiriva to continue   CT follow up out pt in 3-6 mnths  nicotine addiction to be addressed  out pt [facility] as well  Frederic Brand MD-Pulmonary   [phone]

## 2018-06-19 NOTE — PROGRESS NOTE ADULT - ASSESSMENT
72 year old male well known to me admitted with severe weakness and findings of PARRISH and pericardial effusion with low BP and tachycardia He does not have history of prior PAF. Has mild AS and chronic diastolic dysfunction. On cardizem due to hyperthyroidism for which he has refused to see endocrine in the past. Last TTE 5/2018 no effusion. Renal function to baseline after IVF. For diagnostic and therapeutic pericardiocentesis per IR today. Would start colchicine 0.6mg bid pending results.     1. Treatment per CCU  2. IR drainage of effusion  3. Colchicine 0.6mg bid pending results  4. D/C planning after drainage per CCU team.

## 2018-06-19 NOTE — PROGRESS NOTE ADULT - SUBJECTIVE AND OBJECTIVE BOX
CCU NOTE    Admission date: 6/16/18  Chief complaint/ Diagnosis: New onset afib and pericardial effusion  HPI: 71 yo M HTN, HLD, DM2 (does not know a1c, does not check glucose), nephrolithiasis, pulm nodules p/w 3-4 days of decreased appetite, dec UOP and malaise/fatigue. Found to have afib w/RVR  and pericardial effusion w/ possible tamponade    Interval history: Uneventful overnight  Intermittent afib/aflutter     REVIEW OF SYSTEMS  Denies CP, Palpitation, SOB, Dyspnea [ x ] All other systems negative    MEDICATIONS  (STANDING):  ALBUTerol/ipratropium for Nebulization 3 milliLiter(s) Nebulizer every 6 hours  colchicine 0.6 milliGRAM(s) Oral two times a day  diltiazem    Tablet 60 milliGRAM(s) Oral every 8 hours  fluticasone propionate 50 MICROgram(s)/spray Nasal Spray 1 Spray(s) Both Nostrils two times a day  insulin lispro (HumaLOG) corrective regimen sliding scale   SubCutaneous three times a day before meals  insulin lispro (HumaLOG) corrective regimen sliding scale   SubCutaneous at bedtime  melatonin 5 milliGRAM(s) Oral at bedtime  nicotine - 21 mG/24Hr(s) Patch 1 patch Transdermal daily    MEDICATIONS  (PRN):  ALBUTerol/ipratropium for Nebulization 3 milliLiter(s) Nebulizer every 6 hours PRN Shortness of Breath and/or Wheezing  dextrose 40% Gel 15 Gram(s) Oral once PRN Blood Glucose LESS THAN 70 milliGRAM(s)/deciliter  glucagon  Injectable 1 milliGRAM(s) IntraMuscular once PRN Glucose LESS THAN 70 milligrams/deciliter  nicotine  Polacrilex Gum 2 milliGRAM(s) Oral five times a day PRN craving    Allergy: penicillins (Unknown)    ICU Vital Signs Last 24 Hrs  T(C): 36.4 (Max: 36.9 )  HR: 102  (94 - 118)  BP: 101/71 (93/57 - 142/85)  RR: 30 (16 - 30)  SpO2: 94%  (89% - 111%) in room air    I&O's Summary  IN: 785 mL / OUT: 1375 mL / NET: -590 mL    PHYSICAL EXAM  Appearance: Normal, NAD  HEAD:  Atraumatic, Normocephalic  NECK: Supple, No JVD  CHEST/LUNG: Clear to auscultation bilaterally; No wheezing  HEART: Normal S1, S2. No murmurs, rubs, or gallops  ABDOMEN: + Bowel sounds, Soft, NT, ND   EXTREMITIES:  2+ Peripheral Pulses, No clubbing, cyanosis, or edema  NEUROLOGY: non-focal, aaox3  SKIN: No rashes or lesions    Interpretation of Telemetry: NSR w/ intermittent afib/aflutter                 12.3   15.7  )-----------( 377                   36.5     139  |  99  |  37<H>  ----------------------------<  146<H>  4.3   |  27  |  1.20<1.26<2.06    Ca    9.4     Phos  3.4     Mg     1.7 (repleted)  TPro  6.9  /  Alb  3.1<L>  /  TBili  0.2  /  DBili  x   /  AST  14  /  ALT  13  /  AlkPhos  86  06-19                CAPILLARY BLOOD GLUCOSE      POCT Blood Glucose.: 168 mg/dL (18 Jun 2018 21:22)  POCT Blood Glucose.: 150 mg/dL (18 Jun 2018 17:12)  POCT Blood Glucose.: 139 mg/dL (18 Jun 2018 11:50)  POCT Blood Glucose.: 134 mg/dL (18 Jun 2018 07:54)

## 2018-06-19 NOTE — PROGRESS NOTE ADULT - ASSESSMENT
73 yo M HTN, HLD, DM2 (does not know a1c, does not check glucose), nephrolithiasis, pulm nodules p/w 3-4 days of decreased appetite, dec UOP and malaise/fatigue. Found to have afib w/RVR  and pericardial effusion w/ possible tamponade

## 2018-06-19 NOTE — PROGRESS NOTE ADULT - ATTENDING COMMENTS
Patient status post diagnostic pericardiocentesis with removal of 170 cc of bloody effusion. High suspicion for malignancy. This was discussed at length with the patient and his wife. They are not interested in staying for further work-up.    Post drain echo shows ongoing large pericardial effusion.  He ambulated post procedure without orthostatic symptoms.  PAF noted, but given bloody pericardial effusion, he is not a candidate for anticoagulation.    Patient will follow-up as outpatient with Debbie Campbell and Sunday. They will follow-up cytology of fluid.

## 2018-06-20 LAB
NIGHT BLUE STAIN TISS: SIGNIFICANT CHANGE UP
SPECIMEN SOURCE: SIGNIFICANT CHANGE UP

## 2018-06-22 LAB
CULTURE RESULTS: SIGNIFICANT CHANGE UP
CULTURE RESULTS: SIGNIFICANT CHANGE UP
SPECIMEN SOURCE: SIGNIFICANT CHANGE UP
SPECIMEN SOURCE: SIGNIFICANT CHANGE UP

## 2018-06-22 NOTE — CHART NOTE - NSCHARTNOTEFT_GEN_A_CORE
:  Donaldo Chou  INDICATION:  hypotension  PROCEDURE:  [ x] LIMITED ECHO  [ ] LIMITED CHEST  [ ] LIMITED RETROPERITONEAL  [ ] LIMITED ABDOMINAL  [ ] LIMITED DVT  [ ] NEEDLE GUIDANCE VASCULAR  [ ] NEEDLE GUIDANCE THORACENTESIS  [ ] NEEDLE GUIDANCE PARACENTESIS  [ ] NEEDLE GUIDANCE PERICARDIOCENTESIS  [ ] OTHER    FINDINGS:  There is evidence of right atrial collapse during systole and RV collapse during diastole signifying early cardiac tamponade.     INTERPRETATION: early cardiac tamponade    date of exam 6/16/2018. date of note 6/22/2018 :  Donaldo Chou  INDICATION:  hypotension  PROCEDURE:  [ x] LIMITED ECHO  [ ] LIMITED CHEST  [ ] LIMITED RETROPERITONEAL  [ ] LIMITED ABDOMINAL  [ ] LIMITED DVT  [ ] NEEDLE GUIDANCE VASCULAR  [ ] NEEDLE GUIDANCE THORACENTESIS  [ ] NEEDLE GUIDANCE PARACENTESIS  [ ] NEEDLE GUIDANCE PERICARDIOCENTESIS  [ ] OTHER    FINDINGS:  There is  a moderate pericardial effusion evidence of right atrial collapse during systole and RV collapse during diastole signifying early cardiac tamponade.     INTERPRETATION: early cardiac tamponade    date of exam 6/16/2018. date of note 6/22/2018

## 2018-06-23 LAB — NON-GYNECOLOGICAL CYTOLOGY STUDY: SIGNIFICANT CHANGE UP

## 2018-06-24 LAB
CULTURE RESULTS: SIGNIFICANT CHANGE UP
SPECIMEN SOURCE: SIGNIFICANT CHANGE UP

## 2018-06-27 ENCOUNTER — APPOINTMENT (OUTPATIENT)
Dept: PULMONOLOGY | Facility: CLINIC | Age: 73
End: 2018-06-27
Payer: COMMERCIAL

## 2018-06-27 VITALS
DIASTOLIC BLOOD PRESSURE: 60 MMHG | HEIGHT: 63 IN | WEIGHT: 190 LBS | BODY MASS INDEX: 33.66 KG/M2 | HEART RATE: 119 BPM | SYSTOLIC BLOOD PRESSURE: 120 MMHG | OXYGEN SATURATION: 90 %

## 2018-06-27 DIAGNOSIS — I31.3 PERICARDIAL EFFUSION (NONINFLAMMATORY): ICD-10-CM

## 2018-06-27 PROCEDURE — 94618 PULMONARY STRESS TESTING: CPT

## 2018-06-27 PROCEDURE — 94727 GAS DIL/WSHOT DETER LNG VOL: CPT

## 2018-06-27 PROCEDURE — ZZZZZ: CPT

## 2018-06-27 PROCEDURE — 99214 OFFICE O/P EST MOD 30 MIN: CPT | Mod: 25

## 2018-06-27 PROCEDURE — 71046 X-RAY EXAM CHEST 2 VIEWS: CPT

## 2018-06-27 PROCEDURE — 99406 BEHAV CHNG SMOKING 3-10 MIN: CPT

## 2018-06-27 PROCEDURE — 94729 DIFFUSING CAPACITY: CPT

## 2018-06-27 PROCEDURE — 94010 BREATHING CAPACITY TEST: CPT | Mod: 59

## 2018-06-27 RX ORDER — ALBUTEROL SULFATE 90 UG/1
108 (90 BASE) AEROSOL, METERED RESPIRATORY (INHALATION) EVERY 6 HOURS
Qty: 3 | Refills: 1 | Status: ACTIVE | COMMUNITY
Start: 2018-06-27 | End: 1900-01-01

## 2018-07-18 LAB
CULTURE RESULTS: SIGNIFICANT CHANGE UP
SPECIMEN SOURCE: SIGNIFICANT CHANGE UP

## 2018-08-08 LAB
CULTURE RESULTS: SIGNIFICANT CHANGE UP
SPECIMEN SOURCE: SIGNIFICANT CHANGE UP

## 2018-12-11 ENCOUNTER — NON-APPOINTMENT (OUTPATIENT)
Age: 73
End: 2018-12-11

## 2018-12-11 ENCOUNTER — APPOINTMENT (OUTPATIENT)
Dept: PULMONOLOGY | Facility: CLINIC | Age: 73
End: 2018-12-11
Payer: COMMERCIAL

## 2018-12-11 VITALS
DIASTOLIC BLOOD PRESSURE: 72 MMHG | WEIGHT: 192 LBS | SYSTOLIC BLOOD PRESSURE: 124 MMHG | BODY MASS INDEX: 34.02 KG/M2 | OXYGEN SATURATION: 90 % | RESPIRATION RATE: 18 BRPM | HEART RATE: 104 BPM | HEIGHT: 63 IN

## 2018-12-11 PROCEDURE — 94010 BREATHING CAPACITY TEST: CPT | Mod: 59

## 2018-12-11 PROCEDURE — 94618 PULMONARY STRESS TESTING: CPT

## 2018-12-11 PROCEDURE — 99214 OFFICE O/P EST MOD 30 MIN: CPT | Mod: 25

## 2018-12-11 PROCEDURE — 99406 BEHAV CHNG SMOKING 3-10 MIN: CPT

## 2018-12-11 NOTE — PHYSICAL EXAM
[General Appearance - Well Developed] : well developed [Normal Appearance] : normal appearance [Well Groomed] : well groomed [General Appearance - Well Nourished] : well nourished [No Deformities] : no deformities [General Appearance - In No Acute Distress] : no acute distress [Normal Conjunctiva] : the conjunctiva exhibited no abnormalities [Eyelids - No Xanthelasma] : the eyelids demonstrated no xanthelasmas [Normal Oropharynx] : normal oropharynx [Neck Appearance] : the appearance of the neck was normal [Neck Cervical Mass (___cm)] : no neck mass was observed [Jugular Venous Distention Increased] : there was no jugular-venous distention [Thyroid Diffuse Enlargement] : the thyroid was not enlarged [Thyroid Nodule] : there were no palpable thyroid nodules [Heart Rate And Rhythm] : heart rate and rhythm were normal [Heart Sounds] : normal S1 and S2 [Murmurs] : no murmurs present [Respiration, Rhythm And Depth] : normal respiratory rhythm and effort [Exaggerated Use Of Accessory Muscles For Inspiration] : no accessory muscle use [Auscultation Breath Sounds / Voice Sounds] : lungs were clear to auscultation bilaterally [Abdomen Soft] : soft [Abdomen Tenderness] : non-tender [Abdomen Mass (___ Cm)] : no abdominal mass palpated [Abnormal Walk] : normal gait [Gait - Sufficient For Exercise Testing] : the gait was sufficient for exercise testing [Nail Clubbing] : no clubbing of the fingernails [Cyanosis, Localized] : no localized cyanosis [Petechial Hemorrhages (___cm)] : no petechial hemorrhages [Skin Color & Pigmentation] : normal skin color and pigmentation [] : no rash [No Venous Stasis] : no venous stasis [Skin Lesions] : no skin lesions [No Skin Ulcers] : no skin ulcer [No Xanthoma] : no  xanthoma was observed [Deep Tendon Reflexes (DTR)] : deep tendon reflexes were 2+ and symmetric [Sensation] : the sensory exam was normal to light touch and pinprick [No Focal Deficits] : no focal deficits [Oriented To Time, Place, And Person] : oriented to person, place, and time [Impaired Insight] : insight and judgment were intact [Affect] : the affect was normal [III] : III [FreeTextEntry1] : I:E ratio 1:3; mild rhonchi at bases [FreeTextEntry2] : 1+ LE edema Alert and oriented, no focal deficits, no motor or sensory deficits.

## 2018-12-11 NOTE — HISTORY OF PRESENT ILLNESS
[FreeTextEntry1] : Mr. Murry is a 73 year old male with a history of abnormal CT, allergies, COPD, HTN, low Vit D, nicotine addiction, obesity, LIMA, SOB, pericardial effusion, who presents to the office for a follow up visit. His chief complaint is health maintenance.\par -he states that he has been generally well except for compromised mobility \par -he mentions that he has fluid in his legs and is on diuretics\par -he reports his bowels are regular. He has occasional soft stools\par -he states he has a sour taste in his mouth infrequently\par -he reports his weight is stable\par -he states he smokes 1 pack a day\par -he notes he has some leg swelling which has improved significantly\par -he mentions he has been compliant with his medications\par -he states that he sleeps well. He is up once a night to urinate and does not believe he snores\par -he notes he walks for exercise\par -he states he has not been using his nasal spray\par -he denies any headaches, nausea, vomiting, fever, chills, sweats, chest pain, chest pressure, diarrhea, constipation, dysphagia, dizziness, leg swelling, leg pain, itchy eyes, itchy ears, heartburn, reflux.

## 2018-12-11 NOTE — REASON FOR VISIT
[Follow-Up] : a follow-up visit [FreeTextEntry1] : abnormal CT, allergies, COPD, HTN, low Vit D, nicotine addiction, obesity, LIMA, SOB, pericardial effusion

## 2018-12-11 NOTE — ADDENDUM
[FreeTextEntry1] : All medical record entries made by alfonzo Tripp were at Dr. Frederic Brand's, direction and personally dictated by me on (12/11/2018). I have reviewed the chart and agree that the record accurately reflects my personal performance of the history, physical exam, assessment and plan. I have also personally directed, reviewed, and agree with the discharge instructions.

## 2018-12-11 NOTE — ASSESSMENT
[FreeTextEntry1] : Mr. Murry has a history of CAD, COPD, OSAS, obesity, active snoring and is s/p pericardiocentesis and is still short of breath - improved.\par \par His shortness of breath is multifactorial is due to: \par -COPD\par -asthma\par -poor breathing mechanics\par -overweight \par -CAD\par \par problem 1: asthma/COPD\par -continue Trelegy, 1 puff QD\par -continue Ventolin, 2 inhalations QD \par -Inhaler technique reviewed as well as oral hygiene techniques reviewed with patient. Avoidance of cold air, extremes of temperature, rescue inhaler should be used before exercise. Order of medication reviewed with patient. Recommended use of a cool mist humidifier in the bedroom. \par \par  \par Asthma is believed to be caused by inherited (genetic) and environmental factor, but its exact cause is unknown. Asthma may be triggered by allergens, lung infections, or irritants in the air. Asthma triggers are different for each person \par \par  \par problem 2: allergies and sinuses \par -continue Flonase 1 sniff each nostril BID \par -continue to use Astelin nasal spray 1 sniff each nostril BID (0.15)\par \par problem 3: overweight\par -recommended to visit the [facility] \par \par Weight loss, exercise, and diet control were discussed and are highly encouraged. Treatment options were given such as, aqua therapy, and contacting a nutritionist. Recommended to use the elliptical, stationary bike, less use of treadmill. Mindful eating was explained to the patient Obesity is associated with worsening asthma, shortness of breath, and potential for cardiac disease, diabetes, and other underlying medical conditions. \par \par  \par \par problem 4: current smoking\par -he is being prescribed NicoDerm \par -Discussed the risks/associations with continued smoking including COPD, emphysema, shortness of breath, renal cancer, bladder cancer, stroke risk, cardiac disease, etc. Smoking cessation was discussed at length and highly encouraged. Various options to aid cessation was discussed including use of Chantix, Nicotrol, nicotine products, laser therapy, hypnosis, Wellbutrin, etc. \par \par problem 5: abnormal chest CT/ lung cancer screening\par -follow up chest CT June 2019\par CAT scans are the only radiological modality to identify abnormalities w/in the lungs with regards to nodules/masses/lymph nodes. Risks, benefits were reviewed in detail. The guidelines for abnormalities include follow up CT scans at various intervals which could range from 6 weeks to 1 year intervals. If there is a change for the worse then consideration for a biopsy will be considered if you are a candidate. Second opinion evaluation with a thoracic surgeon or an interventional radiologist could be offered. \par \par problem 6: questionable sleep apnea (returned)\par -not interested in any sleep study\par -recommended to use Oxy-Aid by [employer] \par Sleep apnea is associated with adverse clinical consequences which an affect most organ systems. Cardiovascular disease risk includes arrhythmias, atrial fibrillation, hypertension, coronary artery disease, and stroke. Metabolic disorders include diabetes type 2, non-alcoholic fatty liver disease. Mood disorder especially depression; and cognitive decline especially in the elderly. Associations with chronic reflux/Parsons’s esophagus some but not all inclusive. \par -Reasons include arousal consistent with hypopnea; respiratory events most prominent in REM sleep or supine position; therefore sleep staging and body position are important for accurate diagnosis and estimation of AHI. \par \par \par  Problem 7: health maintenance\par -s/p influenza vaccine - 2018\par -recommended strep pneumonia vaccines: Prevnar-13 vaccine, followed by Pneumo vaccine 23 one year following\par -recommended early intervention for URIs\par -recommended regular osteoporosis evaluations\par -recommended early dermatological evaluations\par -recommended after the age of 50 to the age of 70, colonoscopy every 5 years \par \par \par  \par \par F/U in 3-4 months\par He is encouraged to call with any changes, concerns, or questions

## 2018-12-11 NOTE — PROCEDURE
[FreeTextEntry1] : PFT - spi reveals moderate restrictive dysfunction; FEV1 is 1.20 which is 53% of predicted, normal flow volume loop \par \par 6 minute walk test reveals a low saturation of 90% with  evidence of moderate-severe dyspnea and fatigue; walked 428 meters. Patient stopped due to severe SOB at minute 3.

## 2019-02-13 NOTE — DISCHARGE NOTE ADULT - NURSING SECTION COMPLETE
Pt given provider recommendation to have xray done to rule out fx.  Number provided to pt and he was appreciative of call.   Patient/Caregiver provided printed discharge information.

## 2019-04-04 ENCOUNTER — APPOINTMENT (OUTPATIENT)
Dept: PULMONOLOGY | Facility: CLINIC | Age: 74
End: 2019-04-04
Payer: COMMERCIAL

## 2019-04-04 VITALS
DIASTOLIC BLOOD PRESSURE: 65 MMHG | HEIGHT: 60 IN | HEART RATE: 87 BPM | SYSTOLIC BLOOD PRESSURE: 120 MMHG | BODY MASS INDEX: 38.87 KG/M2 | WEIGHT: 198 LBS | RESPIRATION RATE: 18 BRPM | OXYGEN SATURATION: 93 %

## 2019-04-04 PROCEDURE — 99214 OFFICE O/P EST MOD 30 MIN: CPT | Mod: 25

## 2019-04-04 PROCEDURE — 99406 BEHAV CHNG SMOKING 3-10 MIN: CPT

## 2019-04-04 PROCEDURE — 71046 X-RAY EXAM CHEST 2 VIEWS: CPT

## 2019-04-04 PROCEDURE — 94640 AIRWAY INHALATION TREATMENT: CPT

## 2019-04-04 RX ORDER — LEVALBUTEROL HYDROCHLORIDE 0.63 MG/3ML
0.63 SOLUTION RESPIRATORY (INHALATION)
Qty: 120 | Refills: 3 | Status: ACTIVE | COMMUNITY
Start: 2019-04-04 | End: 1900-01-01

## 2019-04-04 NOTE — REASON FOR VISIT
[Follow-Up] : a follow-up visit [Spouse] : spouse [FreeTextEntry1] : abnormal CT, allergies, COPD, HTN, low Vit D, nicotine addiction, obesity, LIMA, SOB, pericardial effusion

## 2019-04-04 NOTE — PHYSICAL EXAM

## 2019-04-04 NOTE — PROCEDURE
[FreeTextEntry1] : CXR reveals moderate cardiomegaly small right pleural effusion, mild atelectasis at right base but no evidence of infiltrate

## 2019-04-04 NOTE — ADDENDUM
[FreeTextEntry1] : Documented by Jossue Shane acting as a scribe for Dr. Frederic Brand on 04/04/2019 \par \par All medical record entries made by the Scribe were at my, Dr. Frederic Brand's, direction and personally dictated by me on 04/04/2019  . I have reviewed the chart and agree that the record accurately reflects my personal performance of the history, physical exam, procedure, assessment and plan. I have also personally directed, reviewed, and agree with the discharge instructions. \par \par

## 2019-04-04 NOTE — ASSESSMENT
[FreeTextEntry1] : Mr. Murry has a history of Afib, CAD, COPD, OSAS, obesity, active snoring and is s/p pericardiocentesis and is still short of breath. He is non-compliant with his medications and smoking cessation. He is awaiting EPS intervention (cardioversion) \par \par His shortness of breath is multifactorial is due to: \par -COPD\par -asthma\par -poor breathing mechanics\par -overweight \par -CAD\par -?tracheomalacia\par \par problem 1: asthma/COPD (flair) - noncompliant\par -add Prednisone taper, 30mg for 5 days, 20mg for 5 days, 10 mg for 5 days \par Information sheet given to the patient to be reviewed, this medication is never to be used without consulting the prescribing physician. Proper dietary restraint is necessary specifically salt containing foods, if any reaction may occur should be reported. \par \par -continue Trelegy, 1 puff QD\par -continue Ventolin, 2 inhalations QD \par -add Xopenex .63 nebulizer Q6H (gargle and spit after use) - treatment given today\par \par -Inhaler technique reviewed as well as oral hygiene techniques reviewed with patient. Avoidance of cold air, extremes of temperature, rescue inhaler should be used before exercise. Order of medication reviewed with patient. Recommended use of a cool mist humidifier in the bedroom. \par Asthma is believed to be caused by inherited (genetic) and environmental factor, but its exact cause is unknown. Asthma may be triggered by allergens, lung infections, or irritants in the air. Asthma triggers are different for each person \par \par problem 2: r/o TBM\par -complete dynamic chest CT to r/o TBM\par  \par problem 3: allergies and sinuses \par -continue Flonase 1 sniff each nostril BID \par -continue to use Astelin nasal spray 1 sniff each nostril BID (0.15)\par \par problem 4: current smoking (no interest in cessation)\par -re-stressed importance of smoking cessation with patient\par -he is being prescribed NicoDerm \par -Discussed the risks/associations with continued smoking including COPD, emphysema, shortness of breath, renal cancer, bladder cancer, stroke risk, cardiac disease, etc. Smoking cessation was discussed at length and highly encouraged. Various options to aid cessation was discussed including use of Chantix, Nicotrol, nicotine products, laser therapy, hypnosis, Wellbutrin, etc. \par \par problem 5: abnormal chest CT/ lung cancer screening\par -follow up chest CT June 2019\par CAT scans are the only radiological modality to identify abnormalities w/in the lungs with regards to nodules/masses/lymph nodes. Risks, benefits were reviewed in detail. The guidelines for abnormalities include follow up CT scans at various intervals which could range from 6 weeks to 1 year intervals. If there is a change for the worse then consideration for a biopsy will be considered if you are a candidate. Second opinion evaluation with a thoracic surgeon or an interventional radiologist could be offered. \par \par problem 6: questionable sleep apnea (returned)\par -not interested in any sleep study\par -recommended to use Oxy-Aid by [employer] \par Sleep apnea is associated with adverse clinical consequences which an affect most organ systems. Cardiovascular disease risk includes arrhythmias, atrial fibrillation, hypertension, coronary artery disease, and stroke. Metabolic disorders include diabetes type 2, non-alcoholic fatty liver disease. Mood disorder especially depression; and cognitive decline especially in the elderly. Associations with chronic reflux/Parsons’s esophagus some but not all inclusive. \par -Reasons include arousal consistent with hypopnea; respiratory events most prominent in REM sleep or supine position; therefore sleep staging and body position are important for accurate diagnosis and estimation of AHI. \par \par problem 7: overweight\par -recommended to visit the [facility] \par \par Weight loss, exercise, and diet control were discussed and are highly encouraged. Treatment options were given such as, aqua therapy, and contacting a nutritionist. Recommended to use the elliptical, stationary bike, less use of treadmill. Mindful eating was explained to the patient Obesity is associated with worsening asthma, shortness of breath, and potential for cardiac disease, diabetes, and other underlying medical conditions. \par \par problem 8: cardiac\par -recommended to continua following with Dr. Dickson.\par -awaiting EPS/cardioversion\par \par problem  9: Pre-Op clearance for EPS/cardioversion\par -at this point in time there are no absolute pulmonary contraindications to go forward with the planned procedure \par -at the time of surgery s/he should have optimal pain control, incentive spirometry, early ambulation, DVT and GI prophylaxis.\par \par  Problem 10: health maintenance\par -s/p influenza vaccine - 2018\par -recommended strep pneumonia vaccines: Prevnar-13 vaccine, followed by Pneumo vaccine 23 one year following\par -recommended early intervention for URIs\par -recommended regular osteoporosis evaluations\par -recommended early dermatological evaluations\par -recommended after the age of 50 to the age of 70, colonoscopy every 5 years \par \par F/U in 3-4 months\par He is encouraged to call with any changes, concerns, or questions

## 2019-04-04 NOTE — REVIEW OF SYSTEMS
[Negative] : Sleep Disorder [As Noted in HPI] : as noted in HPI [Cough] : cough [Dyspnea] : dyspnea [FreeTextEntry8] : BALTAZAR

## 2019-04-04 NOTE — HISTORY OF PRESENT ILLNESS
[FreeTextEntry1] : Mr. Murry is a 73 year old male with a history of abnormal CT, allergies, COPD, HTN, low Vit D, nicotine addiction, obesity, LIMA, SOB, pericardial effusion, who presents to the office for a follow up visit. His chief complaint is \par -he states he is still smoking about a pack a day\par -he notes he was awaiting an ablation but the anesthesiologist did not deem him able to complete surgery\par -he reports he has leg pain as well as weakness\par -he states he becomes SOB when he walks\par -he notes he has currently has diarrhea\par -he reports his sinuses are clear\par -he states he is sleeping well but will occasionally cough in the middle of night\par -he notes he has not been taking his inhalers  \par -he reports his quality of life is poor\par -he notes he has been coughing as well\par -he denies any chest pain, chest pressure, constipation dysphagia, dizziness, sour taste in the mouth, heartburn, reflux, leg swelling, leg pain, itchy eyes, itchy ears, myalgias or arthralgias\par  \par

## 2019-04-08 ENCOUNTER — INPATIENT (INPATIENT)
Facility: HOSPITAL | Age: 74
LOS: 5 days | Discharge: LEFT BEFORE DISCHARGE | DRG: 274 | End: 2019-04-14
Attending: INTERNAL MEDICINE | Admitting: INTERNAL MEDICINE
Payer: COMMERCIAL

## 2019-04-08 VITALS
HEART RATE: 87 BPM | RESPIRATION RATE: 20 BRPM | DIASTOLIC BLOOD PRESSURE: 86 MMHG | HEIGHT: 63 IN | SYSTOLIC BLOOD PRESSURE: 131 MMHG | OXYGEN SATURATION: 92 % | WEIGHT: 199.96 LBS | TEMPERATURE: 98 F

## 2019-04-08 DIAGNOSIS — Z90.89 ACQUIRED ABSENCE OF OTHER ORGANS: Chronic | ICD-10-CM

## 2019-04-08 DIAGNOSIS — Z87.442 PERSONAL HISTORY OF URINARY CALCULI: Chronic | ICD-10-CM

## 2019-04-08 DIAGNOSIS — E05.20 THYROTOXICOSIS WITH TOXIC MULTINODULAR GOITER WITHOUT THYROTOXIC CRISIS OR STORM: ICD-10-CM

## 2019-04-08 DIAGNOSIS — I50.9 HEART FAILURE, UNSPECIFIED: ICD-10-CM

## 2019-04-08 DIAGNOSIS — E11.65 TYPE 2 DIABETES MELLITUS WITH HYPERGLYCEMIA: ICD-10-CM

## 2019-04-08 LAB
ALBUMIN SERPL ELPH-MCNC: 3.5 G/DL — SIGNIFICANT CHANGE UP (ref 3.3–5)
ALP SERPL-CCNC: 142 U/L — HIGH (ref 40–120)
ALT FLD-CCNC: 56 U/L — HIGH (ref 10–45)
ANION GAP SERPL CALC-SCNC: 13 MMOL/L — SIGNIFICANT CHANGE UP (ref 5–17)
APTT BLD: 27.7 SEC — SIGNIFICANT CHANGE UP (ref 27.5–36.3)
AST SERPL-CCNC: 50 U/L — HIGH (ref 10–40)
BASOPHILS # BLD AUTO: 0 K/UL — SIGNIFICANT CHANGE UP (ref 0–0.2)
BASOPHILS NFR BLD AUTO: 0 % — SIGNIFICANT CHANGE UP (ref 0–2)
BILIRUB SERPL-MCNC: 0.5 MG/DL — SIGNIFICANT CHANGE UP (ref 0.2–1.2)
BUN SERPL-MCNC: 33 MG/DL — HIGH (ref 7–23)
CALCIUM SERPL-MCNC: 9.3 MG/DL — SIGNIFICANT CHANGE UP (ref 8.4–10.5)
CHLORIDE SERPL-SCNC: 97 MMOL/L — SIGNIFICANT CHANGE UP (ref 96–108)
CO2 SERPL-SCNC: 26 MMOL/L — SIGNIFICANT CHANGE UP (ref 22–31)
CREAT SERPL-MCNC: 1.32 MG/DL — HIGH (ref 0.5–1.3)
EOSINOPHIL # BLD AUTO: 0.1 K/UL — SIGNIFICANT CHANGE UP (ref 0–0.5)
EOSINOPHIL NFR BLD AUTO: 1 % — SIGNIFICANT CHANGE UP (ref 0–6)
GAS PNL BLDV: SIGNIFICANT CHANGE UP
GLUCOSE BLDC GLUCOMTR-MCNC: 138 MG/DL — HIGH (ref 70–99)
GLUCOSE BLDC GLUCOMTR-MCNC: 268 MG/DL — HIGH (ref 70–99)
GLUCOSE SERPL-MCNC: 177 MG/DL — HIGH (ref 70–99)
HCT VFR BLD CALC: 46.3 % — SIGNIFICANT CHANGE UP (ref 39–50)
HGB BLD-MCNC: 14.8 G/DL — SIGNIFICANT CHANGE UP (ref 13–17)
INR BLD: 1.69 RATIO — HIGH (ref 0.88–1.16)
LYMPHOCYTES # BLD AUTO: 0.7 K/UL — LOW (ref 1–3.3)
LYMPHOCYTES # BLD AUTO: 4 % — LOW (ref 13–44)
MCHC RBC-ENTMCNC: 28.9 PG — SIGNIFICANT CHANGE UP (ref 27–34)
MCHC RBC-ENTMCNC: 31.9 GM/DL — LOW (ref 32–36)
MCV RBC AUTO: 90.6 FL — SIGNIFICANT CHANGE UP (ref 80–100)
MONOCYTES # BLD AUTO: 1.1 K/UL — HIGH (ref 0–0.9)
MONOCYTES NFR BLD AUTO: 6 % — SIGNIFICANT CHANGE UP (ref 2–14)
NEUTROPHILS # BLD AUTO: 19.1 K/UL — HIGH (ref 1.8–7.4)
NEUTROPHILS NFR BLD AUTO: 85 % — HIGH (ref 43–77)
NT-PROBNP SERPL-SCNC: 3348 PG/ML — HIGH (ref 0–300)
PLATELET # BLD AUTO: 279 K/UL — SIGNIFICANT CHANGE UP (ref 150–400)
POTASSIUM SERPL-MCNC: 5.4 MMOL/L — HIGH (ref 3.5–5.3)
POTASSIUM SERPL-SCNC: 5.4 MMOL/L — HIGH (ref 3.5–5.3)
PROT SERPL-MCNC: 6.1 G/DL — SIGNIFICANT CHANGE UP (ref 6–8.3)
PROTHROM AB SERPL-ACNC: 19.7 SEC — HIGH (ref 10–12.9)
RBC # BLD: 5.11 M/UL — SIGNIFICANT CHANGE UP (ref 4.2–5.8)
RBC # FLD: 15.1 % — HIGH (ref 10.3–14.5)
SODIUM SERPL-SCNC: 136 MMOL/L — SIGNIFICANT CHANGE UP (ref 135–145)
TROPONIN T, HIGH SENSITIVITY RESULT: 45 NG/L — SIGNIFICANT CHANGE UP (ref 0–51)
TROPONIN T, HIGH SENSITIVITY RESULT: 45 NG/L — SIGNIFICANT CHANGE UP (ref 0–51)
WBC # BLD: 20.9 K/UL — HIGH (ref 3.8–10.5)
WBC # FLD AUTO: 20.9 K/UL — HIGH (ref 3.8–10.5)

## 2019-04-08 PROCEDURE — 99285 EMERGENCY DEPT VISIT HI MDM: CPT | Mod: GC,25

## 2019-04-08 PROCEDURE — 99406 BEHAV CHNG SMOKING 3-10 MIN: CPT

## 2019-04-08 PROCEDURE — 99223 1ST HOSP IP/OBS HIGH 75: CPT | Mod: AI

## 2019-04-08 PROCEDURE — 99223 1ST HOSP IP/OBS HIGH 75: CPT | Mod: 25

## 2019-04-08 PROCEDURE — 71250 CT THORAX DX C-: CPT | Mod: 26

## 2019-04-08 PROCEDURE — 71046 X-RAY EXAM CHEST 2 VIEWS: CPT | Mod: 26

## 2019-04-08 PROCEDURE — 93010 ELECTROCARDIOGRAM REPORT: CPT | Mod: NC,GC

## 2019-04-08 RX ORDER — COLCHICINE 0.6 MG
0.6 TABLET ORAL
Qty: 0 | Refills: 0 | Status: DISCONTINUED | OUTPATIENT
Start: 2019-04-08 | End: 2019-04-14

## 2019-04-08 RX ORDER — IPRATROPIUM/ALBUTEROL SULFATE 18-103MCG
3 AEROSOL WITH ADAPTER (GRAM) INHALATION ONCE
Qty: 0 | Refills: 0 | Status: COMPLETED | OUTPATIENT
Start: 2019-04-08 | End: 2019-04-08

## 2019-04-08 RX ORDER — DILTIAZEM HCL 120 MG
120 CAPSULE, EXT RELEASE 24 HR ORAL DAILY
Qty: 0 | Refills: 0 | Status: DISCONTINUED | OUTPATIENT
Start: 2019-04-08 | End: 2019-04-08

## 2019-04-08 RX ORDER — DEXTROSE 50 % IN WATER 50 %
25 SYRINGE (ML) INTRAVENOUS ONCE
Qty: 0 | Refills: 0 | Status: DISCONTINUED | OUTPATIENT
Start: 2019-04-08 | End: 2019-04-14

## 2019-04-08 RX ORDER — SODIUM CHLORIDE 9 MG/ML
1000 INJECTION, SOLUTION INTRAVENOUS
Qty: 0 | Refills: 0 | Status: DISCONTINUED | OUTPATIENT
Start: 2019-04-08 | End: 2019-04-14

## 2019-04-08 RX ORDER — DEXTROSE 50 % IN WATER 50 %
12.5 SYRINGE (ML) INTRAVENOUS ONCE
Qty: 0 | Refills: 0 | Status: DISCONTINUED | OUTPATIENT
Start: 2019-04-08 | End: 2019-04-14

## 2019-04-08 RX ORDER — FUROSEMIDE 40 MG
10 TABLET ORAL
Qty: 500 | Refills: 0 | Status: DISCONTINUED | OUTPATIENT
Start: 2019-04-08 | End: 2019-04-14

## 2019-04-08 RX ORDER — DEXTROSE 50 % IN WATER 50 %
15 SYRINGE (ML) INTRAVENOUS ONCE
Qty: 0 | Refills: 0 | Status: DISCONTINUED | OUTPATIENT
Start: 2019-04-08 | End: 2019-04-14

## 2019-04-08 RX ORDER — INSULIN LISPRO 100/ML
VIAL (ML) SUBCUTANEOUS AT BEDTIME
Qty: 0 | Refills: 0 | Status: DISCONTINUED | OUTPATIENT
Start: 2019-04-08 | End: 2019-04-14

## 2019-04-08 RX ORDER — DILTIAZEM HCL 120 MG
60 CAPSULE, EXT RELEASE 24 HR ORAL THREE TIMES A DAY
Qty: 0 | Refills: 0 | Status: DISCONTINUED | OUTPATIENT
Start: 2019-04-08 | End: 2019-04-14

## 2019-04-08 RX ORDER — APIXABAN 2.5 MG/1
5 TABLET, FILM COATED ORAL EVERY 12 HOURS
Qty: 0 | Refills: 0 | Status: DISCONTINUED | OUTPATIENT
Start: 2019-04-08 | End: 2019-04-14

## 2019-04-08 RX ORDER — ASPIRIN/CALCIUM CARB/MAGNESIUM 324 MG
81 TABLET ORAL DAILY
Qty: 0 | Refills: 0 | Status: DISCONTINUED | OUTPATIENT
Start: 2019-04-08 | End: 2019-04-14

## 2019-04-08 RX ORDER — FLUTICASONE PROPIONATE 50 MCG
1 SPRAY, SUSPENSION NASAL
Qty: 0 | Refills: 0 | Status: DISCONTINUED | OUTPATIENT
Start: 2019-04-08 | End: 2019-04-14

## 2019-04-08 RX ORDER — NICOTINE POLACRILEX 2 MG
1 GUM BUCCAL DAILY
Qty: 0 | Refills: 0 | Status: DISCONTINUED | OUTPATIENT
Start: 2019-04-08 | End: 2019-04-14

## 2019-04-08 RX ORDER — PANTOPRAZOLE SODIUM 20 MG/1
40 TABLET, DELAYED RELEASE ORAL
Qty: 0 | Refills: 0 | Status: DISCONTINUED | OUTPATIENT
Start: 2019-04-08 | End: 2019-04-14

## 2019-04-08 RX ORDER — DILTIAZEM HCL 120 MG
1 CAPSULE, EXT RELEASE 24 HR ORAL
Qty: 0 | Refills: 0 | COMMUNITY

## 2019-04-08 RX ORDER — BUDESONIDE AND FORMOTEROL FUMARATE DIHYDRATE 160; 4.5 UG/1; UG/1
2 AEROSOL RESPIRATORY (INHALATION)
Qty: 0 | Refills: 0 | Status: DISCONTINUED | OUTPATIENT
Start: 2019-04-08 | End: 2019-04-14

## 2019-04-08 RX ORDER — ATORVASTATIN CALCIUM 80 MG/1
20 TABLET, FILM COATED ORAL AT BEDTIME
Qty: 0 | Refills: 0 | Status: DISCONTINUED | OUTPATIENT
Start: 2019-04-08 | End: 2019-04-14

## 2019-04-08 RX ORDER — SPIRONOLACTONE 25 MG/1
25 TABLET, FILM COATED ORAL DAILY
Qty: 0 | Refills: 0 | Status: DISCONTINUED | OUTPATIENT
Start: 2019-04-08 | End: 2019-04-14

## 2019-04-08 RX ORDER — INSULIN LISPRO 100/ML
VIAL (ML) SUBCUTANEOUS
Qty: 0 | Refills: 0 | Status: DISCONTINUED | OUTPATIENT
Start: 2019-04-08 | End: 2019-04-14

## 2019-04-08 RX ORDER — GLUCAGON INJECTION, SOLUTION 0.5 MG/.1ML
1 INJECTION, SOLUTION SUBCUTANEOUS ONCE
Qty: 0 | Refills: 0 | Status: DISCONTINUED | OUTPATIENT
Start: 2019-04-08 | End: 2019-04-14

## 2019-04-08 RX ORDER — FUROSEMIDE 40 MG
60 TABLET ORAL ONCE
Qty: 0 | Refills: 0 | Status: COMPLETED | OUTPATIENT
Start: 2019-04-08 | End: 2019-04-08

## 2019-04-08 RX ORDER — FUROSEMIDE 40 MG
20 TABLET ORAL ONCE
Qty: 0 | Refills: 0 | Status: DISCONTINUED | OUTPATIENT
Start: 2019-04-08 | End: 2019-04-08

## 2019-04-08 RX ORDER — INSULIN LISPRO 100/ML
VIAL (ML) SUBCUTANEOUS
Qty: 0 | Refills: 0 | Status: DISCONTINUED | OUTPATIENT
Start: 2019-04-08 | End: 2019-04-08

## 2019-04-08 RX ADMIN — Medication 1 PATCH: at 22:57

## 2019-04-08 RX ADMIN — PANTOPRAZOLE SODIUM 40 MILLIGRAM(S): 20 TABLET, DELAYED RELEASE ORAL at 19:02

## 2019-04-08 RX ADMIN — Medication 0.6 MILLIGRAM(S): at 19:02

## 2019-04-08 RX ADMIN — Medication 3 MILLILITER(S): at 10:14

## 2019-04-08 RX ADMIN — Medication 3 MILLILITER(S): at 11:13

## 2019-04-08 RX ADMIN — Medication 60 MILLIGRAM(S): at 22:56

## 2019-04-08 RX ADMIN — BUDESONIDE AND FORMOTEROL FUMARATE DIHYDRATE 2 PUFF(S): 160; 4.5 AEROSOL RESPIRATORY (INHALATION) at 19:02

## 2019-04-08 RX ADMIN — Medication 5 MG/HR: at 18:10

## 2019-04-08 RX ADMIN — Medication 3 MILLILITER(S): at 10:13

## 2019-04-08 RX ADMIN — APIXABAN 5 MILLIGRAM(S): 2.5 TABLET, FILM COATED ORAL at 19:02

## 2019-04-08 RX ADMIN — ATORVASTATIN CALCIUM 20 MILLIGRAM(S): 80 TABLET, FILM COATED ORAL at 22:56

## 2019-04-08 RX ADMIN — Medication 60 MILLIGRAM(S): at 10:20

## 2019-04-08 NOTE — H&P ADULT - NSHPPHYSICALEXAM_GEN_ALL_CORE
wd obese male, appears dyspneic and cyanotic wd obese male, appears dyspneic and cyanotic  T(F): 98.8 (04-08-19 @ 09:53)  HR: 79 (04-08-19 @ 10:49)   BP: 116/81 (04-08-19 @ 10:49)   RR: 20 (04-08-19 @ 10:49)   SpO2: 97% (04-08-19 @ 10:49)  HEENT- NC/AT, PERRL, EOMI, CP/SA  NECK- SUPPLE  LUNGS- CTA  CARD- RRR, S1S2  ABD- DISTENDED, NT, POS BS  EXT- 3+ PITTING EDEMA TO THE GROIN B/L  NEURO- NONFOCAL

## 2019-04-08 NOTE — CONSULT NOTE ADULT - SUBJECTIVE AND OBJECTIVE BOX
HPI:  72 yo male presents with progressive worsening of dyspnea, orthopnea, BALTAZAR, sleeps in chair, increased LE edema w pain on ambulation and increased abdominal girth.   No cough, no fevers, no N/V, no palpitations Ptn was seen by his pulmonologist Dr. Brand on 4/4 who started him on 40 mg Prednisone, ptn states he has no relief, and saw his planned Follow up w his cardiologist Dr. Campbell today, who sent him to the hospital for treatment.  PMH:  hyperthyrodism, untreated since ptn refused Endo F/U in the past, obesity, HTN, HLD, chronic heavy smoker, 50 pack years, COPD, LIMA, refuses CPAP, pulmonary nodules, dCHF, CAD, Afib on Eliquis, Pericardial effusion w drainage in 6/18, at which time was placed on Colchicine, refused to stay for work up after drainage,  DM2, Nephrolithiasis (08 Apr 2019 11:43)      Admit Diagnosis  Heart failure      ENDOCRINE HPI: 72 y/o Type 2 DM, Hyperthyroidism, HTN, HLD, COPD CHF, Admitted with SOB and anasarca.    72 y/o morbid obesity, heavy smoker P/H Type 2 DM treated with Janumet 50/1,000 daily. Patient not fully compliant with diet, not following FS. HbA1c last admission 7.5%. Patient started on treatment with prednisone 40-->30 mg daily for COPD exacerbation one week ago. Noted polyuria. FS on admission 177 mg/dL.    Hyperthyroidism- patient has history of hyperthyroidism for the past 2-3 years. He has no family history of thyroid disease. No eye complaints. Chest CT noted with thyroid nodularity. Weight increasing, no palpitations, no tremor. TFT's 6/18: TSH <0.01, T4- 11.1, T3-162. He never had treatment for thyroid disease.    He did not get iodine contrast dye recently or during tis admission.    PAST MEDICAL & SURGICAL HISTORY:  Pulmonary nodules  COPD (chronic obstructive pulmonary disease)  Diabetes mellitus, type 2  Kidney stones  Hypertension  History of tonsillectomy  History of kidney stones      FAMILY HISTORY:  Family history of MI (myocardial infarction)      Social History:    Outpatient Medications:    MEDICATIONS  (STANDING):  apixaban 5 milliGRAM(s) Oral every 12 hours  aspirin enteric coated 81 milliGRAM(s) Oral daily  atorvastatin 20 milliGRAM(s) Oral at bedtime  buDESOnide 160 MICROgram(s)/formoterol 4.5 MICROgram(s) Inhaler 2 Puff(s) Inhalation two times a day  colchicine 0.6 milliGRAM(s) Oral two times a day  dextrose 5%. 1000 milliLiter(s) (50 mL/Hr) IV Continuous <Continuous>  dextrose 50% Injectable 12.5 Gram(s) IV Push once  dextrose 50% Injectable 25 Gram(s) IV Push once  dextrose 50% Injectable 25 Gram(s) IV Push once  diltiazem    Tablet 60 milliGRAM(s) Oral three times a day  fluticasone propionate 50 MICROgram(s)/spray Nasal Spray 1 Spray(s) Both Nostrils two times a day  furosemide Infusion 10 mG/Hr (5 mL/Hr) IV Continuous <Continuous>  insulin lispro (HumaLOG) corrective regimen sliding scale   SubCutaneous three times a day before meals  insulin lispro (HumaLOG) corrective regimen sliding scale   SubCutaneous at bedtime  nicotine - 21 mG/24Hr(s) Patch 1 patch Transdermal daily  pantoprazole    Tablet 40 milliGRAM(s) Oral before breakfast  predniSONE   Tablet 30 milliGRAM(s) Oral daily  spironolactone 25 milliGRAM(s) Oral daily    MEDICATIONS  (PRN):  dextrose 40% Gel 15 Gram(s) Oral once PRN Blood Glucose LESS THAN 70 milliGRAM(s)/deciliter  glucagon  Injectable 1 milliGRAM(s) IntraMuscular once PRN Glucose LESS THAN 70 milligrams/deciliter      Allergies    penicillins (Unknown)    Intolerances        Review of Systems:  Constitutional: No fever, no chills.   Eyes: blurry vision  Neuro: No tremors  HEENT: No pain  Cardiovascular: No chest pain, palpitations  Respiratory: SOB, non productive cough  GI: No nausea, vomiting, increased abdominal girth.  : No dysuria  Skin: no rash, bilateral edema  Psych: no depression  Endocrine:  polyuria, polydipsia  Hem/lymph: no swelling  Osteoporosis: no fractures    ALL OTHER SYSTEMS REVIEWED AND NEGATIVE    PHYSICAL EXAM:  VITALS: T(C): 36.3 (04-08-19 @ 18:37)  T(F): 97.4 (04-08-19 @ 18:37), Max: 98.8 (04-08-19 @ 13:53)  HR: 81 (04-08-19 @ 18:37) (79 - 87)  BP: 113/76 (04-08-19 @ 18:37) (111/74 - 132/87)  RR:  (18 - 22)  SpO2:  (92% - 98%)  GENERAL: obesity SOB, anasarca  EYES: No proptosis, no lid lag, anicteric  HEENT:  Atraumatic, Normocephalic, moist mucous membranes  THYROID: Normal size, no palpable nodules, no cervical LN.  RESPIRATORY: Clear to auscultation bilaterally; decreased breath sounds both bases. Mild wheezing  CARDIOVASCULAR: Regular rate and rhythm; No murmurs; +2 peripheral edema  GI: Soft, nontender, distended, normal bowel sounds  SKIN: Dry, intact, No rashes or lesions  MUSCULOSKELETAL: Full range of motion, decreased strength  NEURO: no focal deficits.  PSYCH: Alert and oriented x 3, normal affect, normal mood    POCT Blood Glucose.: 268 mg/dL (04-08-19 @ 18:35)                            14.8   20.9  )-----------( 279      ( 08 Apr 2019 10:08 )             46.3       04-08    136  |  97  |  33<H>  ----------------------------<  177<H>  5.4<H>   |  26  |  1.32<H>    Ca    9.3      08 Apr 2019 10:08    TPro  6.1  /  Alb  3.5  /  TBili  0.5  /  DBili  x   /  AST  50<H>  /  ALT  56<H>  /  AlkPhos  142<H>  04-08      Thyroid Function Tests:      Thyroid Stimulating Hormone, Serum: <0.01: Test Repeated uIU/mL (06.16.18 @ 23:44)  T4, Serum: 11.1 ug/dL (06.16.18 @ 23:44)  Triiodothyronine, Total (T3 Total): 162 ng/dL (06.16.18 @ 23:44)    Hemoglobin A1C, Whole Blood: 7.5: Method: Immunoassay       Reference Range                4.0-5.6%       High risk (prediabetic)        5.7-6.4%       Diabetic, diagnostic             >=6.5%       ADA diabetic treatment goal       <7.0%  The Hemoglobin A1c testing is NGSP-certified.Reference ranges are based  upon the 2010 recommendations of  the American Diabetes Association.  Interpretation may vary for children  and adolescents. % (06.16.18 @ 23:44)        Radiology:     < from: CT Chest No Cont (04.08.19 @ 12:18) >  EXAM:  CT CHEST                            PROCEDURE DATE:  04/08/2019            INTERPRETATION:  Clinical information: Chest pain and shortness of   breath. Exam is compared to previous study of 6/16/2018.    CT scan of the chest was obtained without administration of intravenous   contrast.    Small low-attenuation lesions are noted within the thyroid gland. They   are unchanged when compared to previous exam.    < end of copied text >

## 2019-04-08 NOTE — CONSULT NOTE ADULT - SUBJECTIVE AND OBJECTIVE BOX
Patient seen and evaluated @   Reason for consult:     HPI: 73 year old M pt with PMH of a-fib on eliquis, HTN, HLD, DM2, and COPD p/w worsening SOB x 1 month. Pt recently saw his pulmonologist (Dr. Brand) on 4/4/2019 and was medically treated for asthma exacerbation with other co-morbidities causing SOB. This morning, pt noted he was not able to walk even few steps due to worsening SOB. Pt saw his cardiologist (Dr. Campbell) today, who then sent the pt to ED for further evaluation prior to pending MARIA GUADALUPE/DCCV. When seen in the ED, pt was sitting in a chair with mild dyspnea on NC. Pt was able to speak in full sentences but noted that he feels heavy with persistent SOB. ROS was otherwise unremarkable including no chest pain, palpitations, dizziness, lightheadedness, cough, and wheezing.    PMH:   Pulmonary nodules  COPD (chronic obstructive pulmonary disease)  Diabetes mellitus, type 2  Kidney stones  Hypertension    PSH:   History of tonsillectomy  History of kidney stones    Medications:     Allergies:  penicillins (Unknown)    FAMILY HISTORY:  Family history of MI (myocardial infarction)    Social History:  Smoking:  Alcohol:  Drugs:    Review of Systems:  Constitutional: [ ] Fever [ ] Chills [x] Fatigue [ ] Weight change   HEENT: [ ] Blurred vision [ ] Eye Pain [ ] Headache [ ] Runny nose [ ] Sore Throat   Respiratory: [ ] Cough [ ] Wheezing [x] Shortness of breath  Cardiovascular: [ ] Chest Pain [ ] Palpitations [ ] BALTAZAR [ ] PND [ ] Orthopnea  Gastrointestinal: [ ] Abdominal Pain [ ] Diarrhea [ ] Constipation [ ] Hemorrhoids [ ] Nausea [ ] Vomiting  Genitourinary: [ ] Nocturia [ ] Dysuria [ ] Incontinence  Extremities: [ ] Swelling [ ] Joint Pain  Neurologic: [ ] Focal deficit [ ] Paresthesias [ ] Syncope  Lymphatic: [ ] Swelling [ ] Lymphadenopathy   Skin: [ ] Rash [ ] Ecchymoses [ ] Wounds [ ] Lesions  Psychiatry: [ ] Depression [ ] Suicidal/Homicidal Ideation [ ] Anxiety [ ] Sleep Disturbances  [ ] 10 point review of systems is otherwise negative except as mentioned above            [ ]Unable to obtain    Physical Exam:  T(C): 37.1 (04-08-19 @ 13:53), Max: 37.1 (04-08-19 @ 13:53)  HR: 80 (04-08-19 @ 13:53) (80 - 87)  BP: 111/74 (04-08-19 @ 13:53) (111/74 - 132/87)  RR: 20 (04-08-19 @ 13:53) (18 - 22)  SpO2: 96% (04-08-19 @ 13:53) (92% - 98%)  Wt(kg): --    Daily Height in cm: 160.02 (08 Apr 2019 09:18)    Daily     Appearance: Mild acute distress with respiratory distress  Eyes: PERRL, EOMI  HENT: Normal oral mucosa, NC/AT  Cardiovascular: normal S1 and S2, RRR, no m/r/g, 1+ pitting edema in bilateral LE, thick neck girth difficult to assess for JVP  Procedural Access Site:  No hematoma, non-tender to palpation, 2+ pulses distally, no bruit, no ecchymosis  Respiratory: diminished breath sound in RLL, no wheezing noted  Gastrointestinal: Soft, non-tender, non-distended, BS+  Musculoskeletal: No clubbing, no joint deformity   Neurologic: Non-focal  Lymphatic: No lymphadenopathy  Psychiatry: AAOx3, mood & affect appropriate  Skin: No rashes, no ecchymoses, no cyanosis    Cardiovascular Diagnostic Testing:  ECG: Bifascicular block    Echo:    Stress Testing:    Cath:    Interpretation of Telemetry:    Imaging:    Labs:                        14.8   20.9  )-----------( 279      ( 08 Apr 2019 10:08 )             46.3     04-08    136  |  97  |  33<H>  ----------------------------<  177<H>  5.4<H>   |  26  |  1.32<H>    Ca    9.3      08 Apr 2019 10:08    TPro  6.1  /  Alb  3.5  /  TBili  0.5  /  DBili  x   /  AST  50<H>  /  ALT  56<H>  /  AlkPhos  142<H>  04-08    PT/INR - ( 08 Apr 2019 11:21 )   PT: 19.7 sec;   INR: 1.69 ratio         PTT - ( 08 Apr 2019 11:21 )  PTT:27.7 sec      Serum Pro-Brain Natriuretic Peptide: 3348 pg/mL (04-08 @ 10:08)

## 2019-04-08 NOTE — ED ADULT NURSE NOTE - OBJECTIVE STATEMENT
73YOM pmh HTN, DM2, CHF, COPD, afib on eliquis presents to ED from doctor's office c/o SOB. Pt states he has always had SOB but getting worse in last 3 weeks. Pt states his swelling in the legs has gotten worse. 3+ pitting edema noted to bilateral extremities noted. Denies CP, fever, chills, HA, dizziness, back pain, abd pain, burning upon urination.  Pt placed on potable cardiac monitor. Will continue to monitor. Wife at bedside. 73YOM pmh HTN, DM2, CHF, COPD, afib on eliquis presents to ED from doctor's office c/o SOB. Pt states he has always had SOB but getting worse in last 3 weeks. Pt states his swelling in the legs has gotten worse. 3+ pitting edema noted to bilateral extremities noted. Abd distended. Denies CP, fever, chills, HA, dizziness, back pain, abd pain, burning upon urination.  Pt placed on potable cardiac monitor. Will continue to monitor. Wife at bedside.

## 2019-04-08 NOTE — PATIENT PROFILE ADULT - NSPROPOAPRESSUREINJURY_GEN_A_NUR
Mother  Still living? Yes, Estimated age: Age Unknown  Family history of borderline diabetes mellitus, Age at diagnosis: 41-50 no

## 2019-04-08 NOTE — CONSULT NOTE ADULT - ASSESSMENT
A/P: 73 year old M pt with PMH of a-fib on eliquis, HTN, HLD, DM2, and COPD p/w worsening SOB x 1 month.    - SOB 2/2 pulmonary etiology vs. CM from a-fib vs. ischemic burden (% on LHC from 9/2016)  - check TTE echocardiogram to assess for LVEF, wall motion, and valvulopathy  - hold off CCB (home med diltiazem 180 daily) considering SOB may be 2/2 ADHF  - hold off ARB (home med valsartan 160 daily) considering hyperkalemia and PARRISH  - start iv lasix 80mg BID, monitor I/O's  - monitor volume status prior to pending MARIA GUADALUPE/DCCV, not on the schedule yet  - appreciate further recs from pulmonary standpoint with extensive respiratory hx    Roberto Conley, #[id number]  EP Fellow

## 2019-04-08 NOTE — ED ADULT NURSE REASSESSMENT NOTE - NS ED NURSE REASSESS COMMENT FT1
Pt states breathing is better after duoneb treatments. Pt in no acute distress. Breathing unlabored.

## 2019-04-08 NOTE — H&P ADULT - HISTORY OF PRESENT ILLNESS
74 yo male presents with progressive worsening of dyspnea, orthopnea, BALTAZAR, sleeps in chair, increased LE edema w pain on ambulation and increased abdominal girth.   No cough, no fevers, no N/V, no palpitations Ptn was seen by his pulmonologist Dr. Brand on 4/4 who started him on 40 mg Prednisone, ptn states he has no relief, and saw his planned Follow up w his cardiologist Dr. Campbell today, who sent him to the hospital for treatment.  PMH:  hyperthyrodism, untreated since ptn refused Endo F/U in the past, obesity, HTN, HLD, chronic heavy smoker, 50 pack years, COPD, LIMA, refuses CPAP, pulmonary nodules, dCHF, CAD, Afib on Eliquis, Pericardial effusion w drainage in 6/18, at which time was placed on Colchicine, refused to stay for work up after drainage,  DM2, Nephrolithiasis

## 2019-04-08 NOTE — ED PROVIDER NOTE - ATTENDING CONTRIBUTION TO CARE
72 y/o male with the above documented history and HPI who on exam appears chronically unwell and a bit dyspneic. VSs noted, neck supple, lungs c/ diminished BSs at the bases and scattered crackles, cardiac sounds s/ audible m/r/g, abdomen soft, NT, protuberant, extremities s/ asymmetry, calf ttp or palpable cord, skin c/ 2+ B/L LE pitting edema and neurologically intact. Appropriate screening studies obtained. O2, nebulizer treatment and diuretic (feels urge to urinate now) provided. Hypoxic on RA. Will require admission. Will follow his remaining studies, reassess and treat/dispo accordingly.

## 2019-04-08 NOTE — CONSULT NOTE ADULT - ASSESSMENT
72 y/o Type 2 DM, Hyperthyroidism, HTN, HLD, COPD CHF, Admitted with SOB and anasarca.    72 y/o morbid obesity, heavy smoker P/H Type 2 DM treated with Janumet 50/1,000 daily. Patient not fully compliant with diet, not following FS. HbA1c last admission 7.5%. Patient started on treatment with prednisone 40-->30 mg daily for COPD exacerbation one week ago. Noted polyuria. FS on admission 177 mg/dL.    Hyperthyroidism- patient has history of hyperthyroidism for the past 2-3 years. He has no family history of thyroid disease. No eye complaints. Chest CT noted with thyroid nodularity. Weight increasing, no palpitations, no tremor. TFT's 6/18: TSH <0.01, T4- 11.1, T3-162. He never had treatment for thyroid disease.    He did not get iodine contrast dye recently or during tis admission.    DM- control? check HbA1c.  Expect hyperglycemia on steroid treatment.  FS on admission "only" 177 despite 1 week of prednisone 40 mg daily.  Mid scale humalog coverage for now.    Hyperthyroidism-  Clinical story, stable hyperthyroidism for 2 years and CT noted thyroid nodularity are consistent with MNG with borderline hyperthyroidism.  Avoid iodine contrast dye and amiodarone.  Will repeat TFT's. If T3 still high would start low dose tapazol.  Thyroid- most probably

## 2019-04-08 NOTE — CONSULT NOTE ADULT - SUBJECTIVE AND OBJECTIVE BOX
CHIEF COMPLAINT:    HPI:    PAST MEDICAL & SURGICAL HISTORY:  Pulmonary nodules  COPD (chronic obstructive pulmonary disease)  Diabetes mellitus, type 2  Kidney stones  Hypertension  History of tonsillectomy  History of kidney stones      FAMILY HISTORY:  Family history of MI (myocardial infarction)      SOCIAL HISTORY:  Smoking: [ ] Never Smoked [ ] Former Smoker (__ packs x ___ years) [ ] Current Smoker  (__ packs x ___ years)  Substance Use: [ ] Never Used [ ] Used ____  EtOH Use:  Marital Status: [ ] Single [ ]  [ ]  [ ]   Sexual History:   Occupation:  Recent Travel:  Country of Birth:  Advance Directives:    Allergies    penicillins (Unknown)    Intolerances        HOME MEDICATIONS:    REVIEW OF SYSTEMS:  Constitutional: [ ] negative [ ] fevers [ ] chills [ ] weight loss [ ] weight gain  HEENT: [ ] negative [ ] dry eyes [ ] eye irritation [ ] postnasal drip [ ] nasal congestion  CV: [ ] negative  [ ] chest pain [ ] orthopnea [ ] palpitations [ ] murmur  Resp: [ ] negative [ ] cough [ ] shortness of breath [ ] dyspnea [ ] wheezing [ ] sputum [ ] hemoptysis  GI: [ ] negative [ ] nausea [ ] vomiting [ ] diarrhea [ ] constipation [ ] abd pain [ ] dysphagia   : [ ] negative [ ] dysuria [ ] nocturia [ ] hematuria [ ] increased urinary frequency  Musculoskeletal: [ ] negative [ ] back pain [ ] myalgias [ ] arthralgias [ ] fracture  Skin: [ ] negative [ ] rash [ ] itch  Neurological: [ ] negative [ ] headache [ ] dizziness [ ] syncope [ ] weakness [ ] numbness  Psychiatric: [ ] negative [ ] anxiety [ ] depression  Endocrine: [ ] negative [ ] diabetes [ ] thyroid problem  Hematologic/Lymphatic: [ ] negative [ ] anemia [ ] bleeding problem  Allergic/Immunologic: [ ] negative [ ] itchy eyes [ ] nasal discharge [ ] hives [ ] angioedema  [ ] All other systems negative  [ ] Unable to assess ROS because ________    OBJECTIVE:  ICU Vital Signs Last 24 Hrs  T(C): 37.1 (08 Apr 2019 13:53), Max: 37.1 (08 Apr 2019 13:53)  T(F): 98.8 (08 Apr 2019 13:53), Max: 98.8 (08 Apr 2019 13:53)  HR: 80 (08 Apr 2019 13:53) (80 - 87)  BP: 111/74 (08 Apr 2019 13:53) (111/74 - 132/87)  BP(mean): --  ABP: --  ABP(mean): --  RR: 20 (08 Apr 2019 13:53) (18 - 22)  SpO2: 96% (08 Apr 2019 13:53) (92% - 98%)        CAPILLARY BLOOD GLUCOSE          PHYSICAL EXAM:  General:   HEENT:   Lymph Nodes:  Neck:   Respiratory:   Cardiovascular:   Abdomen:   Extremities:   Skin:   Neurological:  Psychiatry:    HOSPITAL MEDICATIONS:                              LABS:                        14.8   20.9  )-----------( 279      ( 08 Apr 2019 10:08 )             46.3     Hgb Trend: 14.8<--  04-08    136  |  97  |  33<H>  ----------------------------<  177<H>  5.4<H>   |  26  |  1.32<H>    Ca    9.3      08 Apr 2019 10:08    TPro  6.1  /  Alb  3.5  /  TBili  0.5  /  DBili  x   /  AST  50<H>  /  ALT  56<H>  /  AlkPhos  142<H>  04-08    Creatinine Trend: 1.32<--  PT/INR - ( 08 Apr 2019 11:21 )   PT: 19.7 sec;   INR: 1.69 ratio         PTT - ( 08 Apr 2019 11:21 )  PTT:27.7 sec      Venous Blood Gas:  04-08 @ 10:38  7.39/51/45/31/78  VBG Lactate: 3.0      MICROBIOLOGY:     RADIOLOGY:  [ ] Reviewed and interpreted by me    PULMONARY FUNCTION TESTS:    EKG: CHIEF COMPLAINT: SOB/ LE edema     HPI: 73 M current smoker with a PMH of obesity, HTN, HLD, CAD, diastolic HF,  A fib on Eliquis, type 2 diabetes, COPD ( not on home O2), LIMA now presenting with SOB, b/l LE edema and abdominal distension. Patient reports that he has been experiencing progressively worsening SOB on exertion for 3-4 weeks. This has been accompanied by orthopnea and for the past 2-3 weeks he has been sleeping sitting up in a chair. He also endorses increasing abdominal girth, weight gain and worsening lower extremity edema which has been limiting his ability to get around. He reports a chronic cough with intermittent clear mucus production but denies any worsening of this cough. Denies CP, fever, chills, nausea, vomiting, diarrhea, dysuria. He was supposed to get a MARIA GUADALUPE/ cardioversion but this was delayed due to worsening resp status. He was seen by Dr. Brand on 4/4 and placed on a Prednisone taper. However, he continued to experience worsening symptoms and was referred to the Ed by Dr. Campbell. In the Ed he received nebs and Lasix 60 mg IV x1. a Ct chest was performed and did not show any infiltrates.     PAST MEDICAL & SURGICAL HISTORY:  Pulmonary nodules  COPD (chronic obstructive pulmonary disease)  Diabetes mellitus, type 2  Kidney stones  Hypertension  History of tonsillectomy  History of kidney stones      FAMILY HISTORY:  Family history of MI (myocardial infarction)      SOCIAL HISTORY:  Smoking: [ ] Never Smoked [ ] Former Smoker (__ packs x ___ years) [ x] Current Smoker  (_1_ packs x ___ years)  Substance Use: [x ] Never Used [ ] Used ____  EtOH Use: Social   Marital Status: [ ] Single [ x]  [ ]  [ ]   Sexual History: NC  Occupation: CPA  Recent Travel: None  Advance Directives:    Allergies    penicillins (Unknown)    Intolerances        HOME MEDICATIONS:    REVIEW OF SYSTEMS:  Constitutional: [ ] negative [ ] fevers [ ] chills [ ] weight loss [ ] weight gain  HEENT: [ ] negative [ ] dry eyes [ ] eye irritation [ ] postnasal drip [ ] nasal congestion  CV: [ ] negative  [ ] chest pain [ ] orthopnea [ ] palpitations [ ] murmur  Resp: [ ] negative [ ] cough [ ] shortness of breath [ ] dyspnea [ ] wheezing [ ] sputum [ ] hemoptysis  GI: [ ] negative [ ] nausea [ ] vomiting [ ] diarrhea [ ] constipation [ ] abd pain [ ] dysphagia   : [ ] negative [ ] dysuria [ ] nocturia [ ] hematuria [ ] increased urinary frequency  Musculoskeletal: [ ] negative [ ] back pain [ ] myalgias [ ] arthralgias [ ] fracture  Skin: [ ] negative [ ] rash [ ] itch  Neurological: [ ] negative [ ] headache [ ] dizziness [ ] syncope [ ] weakness [ ] numbness  Psychiatric: [ ] negative [ ] anxiety [ ] depression  Endocrine: [ ] negative [ ] diabetes [ ] thyroid problem  Hematologic/Lymphatic: [ ] negative [ ] anemia [ ] bleeding problem  Allergic/Immunologic: [ ] negative [ ] itchy eyes [ ] nasal discharge [ ] hives [ ] angioedema  [ ] All other systems negative  [ ] Unable to assess ROS because ________    OBJECTIVE:  ICU Vital Signs Last 24 Hrs  T(C): 37.1 (08 Apr 2019 13:53), Max: 37.1 (08 Apr 2019 13:53)  T(F): 98.8 (08 Apr 2019 13:53), Max: 98.8 (08 Apr 2019 13:53)  HR: 80 (08 Apr 2019 13:53) (80 - 87)  BP: 111/74 (08 Apr 2019 13:53) (111/74 - 132/87)  BP(mean): --  ABP: --  ABP(mean): --  RR: 20 (08 Apr 2019 13:53) (18 - 22)  SpO2: 96% (08 Apr 2019 13:53) (92% - 98%)        CAPILLARY BLOOD GLUCOSE          PHYSICAL EXAM:  General:   HEENT:   Lymph Nodes:  Neck:   Respiratory:   Cardiovascular:   Abdomen:   Extremities:   Skin:   Neurological:  Psychiatry:    HOSPITAL MEDICATIONS:                              LABS:                        14.8   20.9  )-----------( 279      ( 08 Apr 2019 10:08 )             46.3     Hgb Trend: 14.8<--  04-08    136  |  97  |  33<H>  ----------------------------<  177<H>  5.4<H>   |  26  |  1.32<H>    Ca    9.3      08 Apr 2019 10:08    TPro  6.1  /  Alb  3.5  /  TBili  0.5  /  DBili  x   /  AST  50<H>  /  ALT  56<H>  /  AlkPhos  142<H>  04-08    Creatinine Trend: 1.32<--  PT/INR - ( 08 Apr 2019 11:21 )   PT: 19.7 sec;   INR: 1.69 ratio         PTT - ( 08 Apr 2019 11:21 )  PTT:27.7 sec      Venous Blood Gas:  04-08 @ 10:38  7.39/51/45/31/78  VBG Lactate: 3.0      MICROBIOLOGY:     RADIOLOGY:  [ ] Reviewed and interpreted by me    PULMONARY FUNCTION TESTS:    EKG: CHIEF COMPLAINT: SOB/ LE edema     HPI: 73 M current smoker with a PMH of obesity, HTN, HLD, CAD, diastolic HF,  A fib on Eliquis, type 2 diabetes, COPD ( not on home O2), LIMA now presenting with SOB, b/l LE edema and abdominal distension. Patient reports that he has been experiencing progressively worsening SOB on exertion for 3-4 weeks. This has been accompanied by orthopnea and for the past 2-3 weeks he has been sleeping sitting up in a chair. He also endorses increasing abdominal girth, weight gain and worsening lower extremity edema which has been limiting his ability to get around. He reports a chronic cough with intermittent clear mucus production but denies any worsening of this cough. Denies CP, fever, chills, nausea, vomiting, diarrhea, dysuria. He was supposed to get a MARIA GUADALUPE/ cardioversion but this was delayed due to worsening resp status. He was seen by Dr. Brand on 4/4 and placed on a Prednisone taper. However, he continued to experience worsening symptoms and was referred to the Ed by Dr. Campbell. In the Ed he received nebs and Lasix 60 mg IV x1. a Ct chest was performed and did not show any infiltrates.     PAST MEDICAL & SURGICAL HISTORY:  Pulmonary nodules  COPD (chronic obstructive pulmonary disease)  Diabetes mellitus, type 2  Kidney stones  Hypertension  History of tonsillectomy  History of kidney stones      FAMILY HISTORY:  Family history of MI (myocardial infarction)      SOCIAL HISTORY:  Smoking: [ ] Never Smoked [ ] Former Smoker (__ packs x ___ years) [ x] Current Smoker  (_1_ packs x ___ years)  Substance Use: [x ] Never Used [ ] Used ____  EtOH Use: Social   Marital Status: [ ] Single [ x]  [ ]  [ ]   Sexual History: NC  Occupation: CPA  Recent Travel: None  Advance Directives: Full code     Allergies    penicillins (Unknown)    Intolerances        HOME MEDICATIONS:  Reviewed     REVIEW OF SYSTEMS:  Constitutional: [ ] negative [- ] fevers [- ] chills [ ] weight loss [+ ] weight gain  HEENT: [ ] negative [- ] dry eyes [ -] eye irritation [+ ] postnasal drip [ -] nasal congestion  CV: [ ] negative  [- ] chest pain [+ ] orthopnea [- ] palpitations [ ] murmur  Resp: [ ] negative [+ ] cough [+ ] shortness of breath [+ ] dyspnea [- ] wheezing [- ] sputum [- ] hemoptysis  GI: [ ] negative [ -] nausea [- ] vomiting [- ] diarrhea [- ] constipation [- ] abd pain [- ] dysphagia   : [ ] negative [ -] dysuria [- ] nocturia [ -] hematuria [- ] increased urinary frequency  Musculoskeletal: [ -] negative [ -] back pain [- ] myalgias [ -] arthralgias [ ] fracture  Skin: [ ] negative [- ] rash [ ] itch  Neurological: [ ] negative [- ] headache [- ] dizziness [ ] syncope [ ] weakness [ ] numbness  Psychiatric: [ ] negative [- ] anxiety [ ] depression  Endocrine: [ ] negative [+ ] diabetes [- ] thyroid problem  Hematologic/Lymphatic: [ ] negative [ ] anemia [ -] bleeding problem  Allergic/Immunologic: [ ] negative [ ] itchy eyes [- ] nasal discharge [ ] hives [ ] angioedema  [ x] All other systems negative  [ ] Unable to assess ROS because ________    OBJECTIVE:  ICU Vital Signs Last 24 Hrs  T(C): 37.1 (08 Apr 2019 13:53), Max: 37.1 (08 Apr 2019 13:53)  T(F): 98.8 (08 Apr 2019 13:53), Max: 98.8 (08 Apr 2019 13:53)  HR: 80 (08 Apr 2019 13:53) (80 - 87)  BP: 111/74 (08 Apr 2019 13:53) (111/74 - 132/87)  BP(mean): --  ABP: --  ABP(mean): --  RR: 20 (08 Apr 2019 13:53) (18 - 22)  SpO2: 96% (08 Apr 2019 13:53) (92% - 98%)        CAPILLARY BLOOD GLUCOSE          PHYSICAL EXAM:  General: NAD  HEENT: PERRLA  Lymph Nodes: No palpable cervical LNs  Neck: Thick neck, + JVD  Respiratory: Decreased Bs b/l bases, no wheezing or crackles   Cardiovascular: Irregularly, irregular, S1+ S2, + 2/6 murmur   Abdomen: Distended/ firm with pitting edema, NT, BS+  Extremities: B/L LE pitting edema up to thighs, pulses + b/l LEs  Skin: Chronic skin changes   Neurological: AAOx3, grossly non focal   Psychiatry: Normal mood     HOSPITAL MEDICATIONS:    Duonebs x3  Lasix 60 mg IV    LABS:                        14.8   20.9  )-----------( 279      ( 08 Apr 2019 10:08 )             46.3     Hgb Trend: 14.8<--  04-08    136  |  97  |  33<H>  ----------------------------<  177<H>  5.4<H>   |  26  |  1.32<H>    Ca    9.3      08 Apr 2019 10:08    TPro  6.1  /  Alb  3.5  /  TBili  0.5  /  DBili  x   /  AST  50<H>  /  ALT  56<H>  /  AlkPhos  142<H>  04-08    Creatinine Trend: 1.32<--  PT/INR - ( 08 Apr 2019 11:21 )   PT: 19.7 sec;   INR: 1.69 ratio         PTT - ( 08 Apr 2019 11:21 )  PTT:27.7 sec      Venous Blood Gas:  04-08 @ 10:38  7.39/51/45/31/78  VBG Lactate: 3.0      MICROBIOLOGY:     RADIOLOGY:  < from: Xray Chest 2 Views PA/Lat (04.08.19 @ 10:07) >  The heart is slightly enlarged. Bilateral small pleural effusion. Mild   pulmonary vascular congestion. Calcified aortic knob.    < from: CT Chest No Cont (04.08.19 @ 12:18) >  CT scan of the chest was obtained without administration of intravenous   contrast.    Small low-attenuation lesions are noted within the thyroid gland. They   are unchanged when compared to previous exam.    Few lymph nodes are present in the pretracheal space, AP window and the   anterior mediastinum. They are unchanged when compared to previous exam.     Heart is enlarged in size. Calcification of the aortic valve and the   coronary arteries is noted. No pericardial effusion is noted.     No endobronchial lesions are noted. Centrilobular emphysema is noted   bilaterally. Few less than 0.4 cm solid nodules are noted in the right   upper lobe. They're unchanged when compared to previous exam. Compressive   atelectasis is noted involving portion of the right lower lobe. This is   secondary to small right pleural effusion.    Below the diaphragm, visualized portions of the abdomen demonstrate   ascites and cholelithiasis. Small calcification is noted in the right   kidney. Low-attenuation lesion in the left kidney is incompletely imaged   on this exam.     Degenerative changes of the spine are noted.    Impression: Stable few very small solid nodules in the right upper lobe   when compared to previous exam.    Small right pleural effusion.      [ ] Reviewed and interpreted by me    PULMONARY FUNCTION TESTS:    EKG: CHIEF COMPLAINT: SOB/ LE edema     HPI: 73 M current smoker with a PMH of obesity, HTN, HLD, CAD, diastolic HF,  A fib on Eliquis, type 2 diabetes, COPD ( not on home O2), LIMA now presenting with SOB, b/l LE edema and abdominal distension. Patient reports that he has been experiencing progressively worsening SOB on exertion for 3-4 weeks. This has been accompanied by orthopnea and for the past 2-3 weeks he has been sleeping sitting up in a chair. He also endorses increasing abdominal girth, weight gain and worsening lower extremity edema which has been limiting his ability to get around. He reports a chronic cough with intermittent clear mucus production but denies any worsening of this cough. Denies CP, fever, chills, nausea, vomiting, diarrhea, dysuria. He was supposed to get a MARIA GUADALUPE/ cardioversion but this was delayed due to worsening resp status. He was seen by Dr. Brand on 4/4 and placed on a Prednisone taper. However, he continued to experience worsening symptoms and was referred to the Ed by Dr. Campbell. In the Ed he received nebs and Lasix 60 mg IV x1. a Ct chest was performed and did not show any infiltrates.     PAST MEDICAL & SURGICAL HISTORY:  Pulmonary nodules  COPD (chronic obstructive pulmonary disease)  Diabetes mellitus, type 2  Kidney stones  Hypertension  History of tonsillectomy  History of kidney stones      FAMILY HISTORY:  Family history of MI (myocardial infarction)      SOCIAL HISTORY:  Smoking: [ ] Never Smoked [ ] Former Smoker (__ packs x ___ years) [ x] Current Smoker  (_1_ packs x __55_ years)  Substance Use: [x ] Never Used [ ] Used ____  EtOH Use: Social   Marital Status: [ ] Single [ x]  [ ]  [ ]   Sexual History: NC  Occupation: CPA  Recent Travel: None  Advance Directives: Full code     Allergies    penicillins (Unknown)    Intolerances        HOME MEDICATIONS:  Reviewed     REVIEW OF SYSTEMS:  Constitutional: [ ] negative [- ] fevers [- ] chills [ ] weight loss [+ ] weight gain  HEENT: [ ] negative [- ] dry eyes [ -] eye irritation [+ ] postnasal drip [ -] nasal congestion  CV: [ ] negative  [- ] chest pain [+ ] orthopnea [- ] palpitations [ ] murmur  Resp: [ ] negative [+ ] cough [+ ] shortness of breath [+ ] dyspnea [- ] wheezing [- ] sputum [- ] hemoptysis  GI: [ ] negative [ -] nausea [- ] vomiting [- ] diarrhea [- ] constipation [- ] abd pain [- ] dysphagia   : [ ] negative [ -] dysuria [- ] nocturia [ -] hematuria [- ] increased urinary frequency  Musculoskeletal: [ -] negative [ -] back pain [- ] myalgias [ -] arthralgias [ ] fracture  Skin: [ ] negative [- ] rash [ ] itch  Neurological: [ ] negative [- ] headache [- ] dizziness [ ] syncope [ ] weakness [ ] numbness  Psychiatric: [ ] negative [- ] anxiety [ ] depression  Endocrine: [ ] negative [+ ] diabetes [- ] thyroid problem  Hematologic/Lymphatic: [ ] negative [ ] anemia [ -] bleeding problem  Allergic/Immunologic: [ ] negative [ ] itchy eyes [- ] nasal discharge [ ] hives [ ] angioedema  [ x] All other systems negative  [ ] Unable to assess ROS because ________    OBJECTIVE:  ICU Vital Signs Last 24 Hrs  T(C): 37.1 (08 Apr 2019 13:53), Max: 37.1 (08 Apr 2019 13:53)  T(F): 98.8 (08 Apr 2019 13:53), Max: 98.8 (08 Apr 2019 13:53)  HR: 80 (08 Apr 2019 13:53) (80 - 87)  BP: 111/74 (08 Apr 2019 13:53) (111/74 - 132/87)  BP(mean): --  ABP: --  ABP(mean): --  RR: 20 (08 Apr 2019 13:53) (18 - 22)  SpO2: 96% (08 Apr 2019 13:53) (92% - 98%)        CAPILLARY BLOOD GLUCOSE          PHYSICAL EXAM:  General: NAD  HEENT: PERRLA  Lymph Nodes: No palpable cervical LNs  Neck: Thick neck, + JVD  Respiratory: Decreased Bs b/l bases, no wheezing or crackles   Cardiovascular: Irregularly, irregular, S1+ S2, + 2/6 murmur   Abdomen: Distended/ firm with pitting edema, NT, BS+  Extremities: B/L LE pitting edema up to thighs, pulses + b/l LEs  Skin: Chronic skin changes   Neurological: AAOx3, grossly non focal   Psychiatry: Normal mood     HOSPITAL MEDICATIONS:    Duonebs x3  Lasix 60 mg IV    LABS:                        14.8   20.9  )-----------( 279      ( 08 Apr 2019 10:08 )             46.3     Hgb Trend: 14.8<--  04-08    136  |  97  |  33<H>  ----------------------------<  177<H>  5.4<H>   |  26  |  1.32<H>    Ca    9.3      08 Apr 2019 10:08    TPro  6.1  /  Alb  3.5  /  TBili  0.5  /  DBili  x   /  AST  50<H>  /  ALT  56<H>  /  AlkPhos  142<H>  04-08    Creatinine Trend: 1.32<--  PT/INR - ( 08 Apr 2019 11:21 )   PT: 19.7 sec;   INR: 1.69 ratio         PTT - ( 08 Apr 2019 11:21 )  PTT:27.7 sec      Venous Blood Gas:  04-08 @ 10:38  7.39/51/45/31/78  VBG Lactate: 3.0      MICROBIOLOGY:     RADIOLOGY:  < from: Xray Chest 2 Views PA/Lat (04.08.19 @ 10:07) >  The heart is slightly enlarged. Bilateral small pleural effusion. Mild   pulmonary vascular congestion. Calcified aortic knob.    < from: CT Chest No Cont (04.08.19 @ 12:18) >  CT scan of the chest was obtained without administration of intravenous   contrast.    Small low-attenuation lesions are noted within the thyroid gland. They   are unchanged when compared to previous exam.    Few lymph nodes are present in the pretracheal space, AP window and the   anterior mediastinum. They are unchanged when compared to previous exam.     Heart is enlarged in size. Calcification of the aortic valve and the   coronary arteries is noted. No pericardial effusion is noted.     No endobronchial lesions are noted. Centrilobular emphysema is noted   bilaterally. Few less than 0.4 cm solid nodules are noted in the right   upper lobe. They're unchanged when compared to previous exam. Compressive   atelectasis is noted involving portion of the right lower lobe. This is   secondary to small right pleural effusion.    Below the diaphragm, visualized portions of the abdomen demonstrate   ascites and cholelithiasis. Small calcification is noted in the right   kidney. Low-attenuation lesion in the left kidney is incompletely imaged   on this exam.     Degenerative changes of the spine are noted.    Impression: Stable few very small solid nodules in the right upper lobe   when compared to previous exam.    Small right pleural effusion.      [ ] Reviewed and interpreted by me    PULMONARY FUNCTION TESTS:    EKG:

## 2019-04-08 NOTE — CONSULT NOTE ADULT - ATTENDING COMMENTS
seen and examined with fellow. I agree with H & P, A & P.  ECG c/w typical flutter.  Symptoms persist despite rate control. D/w patient option for CV vs Flutter ablation. I counseled him of the high likelihood of recurrence with CV.  We will proceed with ablation.

## 2019-04-08 NOTE — ED PROVIDER NOTE - NS ED ROS FT
CONSTITUTIONAL: No fevers, no chills, lightheadedness, dizziness  Eyes: no visual changes  Ears: no ear drainage, no ear pain  Nose: no nasal congestion  Mouth/Throat: no sore throat  CV: No chest pain, no palpitations  PULM: see hpi  GI: No n/v/d, no abd pain  : no dysuria, no hematuria  SKIN: no rashes.  NEURO: no headache  PSYCHIATRIC: no known mental health issues.

## 2019-04-08 NOTE — ED ADULT NURSE NOTE - NSIMPLEMENTINTERV_GEN_ALL_ED
Implemented All Universal Safety Interventions:  [place] to call system. Call bell, personal items and telephone within reach. Instruct patient to call for assistance. Room bathroom lighting operational. Non-slip footwear when patient is off stretcher. Physically safe environment: no spills, clutter or unnecessary equipment. Stretcher in lowest position, wheels locked, appropriate side rails in place.

## 2019-04-08 NOTE — H&P ADULT - NSICDXPASTMEDICALHX_GEN_ALL_CORE_FT
PAST MEDICAL HISTORY:  COPD (chronic obstructive pulmonary disease)     Diabetes mellitus, type 2     Hypertension     Kidney stones     Pulmonary nodules

## 2019-04-08 NOTE — H&P ADULT - ASSESSMENT
73 M current smoker with a PMH of obesity, hyperthyrodism, HTN, HLD, CAD, diastolic HF,  A fib on Eliquis,DM2, COPD, LIMA not on CPAP, now presenting with SOB, b/l LE edema and abdominal distension, likely 2/2 acute decompensated diastolic heart failure     1. Acute decompensated diastolic HF/ A fib  - Start on lasix drip w strict Is and Os and daily weights  - Check electrolytes daily. Keep  K+>4, Mag > 2  - Telemetry monitoring. Follow cardiac enzymes  - Check TTE, has h/o CAD, % on LHC from 9/2016. cont statins, ASA. Not on BB prob 2/2 COPD. will address w cardiology  - BPs are soft, will cont Diltiazem but in short acting dosage. will place on 60 mg q8H  - Cont to optimize rate control for A fib. Remains on AC with Eliquis  - EPS consult for possible MARIA GUADALUPE/DCCV once ptn is euvolemic  - Dr. Campbell, Ptn's cardiologist to follow    2. COPD  - Will cont po Prednisone at 30 mg daily, Pulm input appreciated  - Duonebs prn, no sign of an acute infection  - Incentive spirometry  - Supplemental O2 to keep sats > 90 %    3. Lung nodules:  - Few unchanged 0.4 cm RUL nodules  - Requires outpt follow up with repeat imaging in 6 months     4. LIMA  - refuses CPAP    5. Nicotine addiction/ Smoking cessation:  - Will place on Nicotine patch while inptn   - Smoking cessation counseling provided , ptn is aware he should not be smoking and will try harder  6. Endo  - check HA1C, start of FS before meals w Insulin on a sliding scale, check TFTs, get ENDO consult  7. Preventive  - no need for DVT ppx, ptn is on Eliquis  -GI ppx w Protonix

## 2019-04-08 NOTE — ED PROVIDER NOTE - OBJECTIVE STATEMENT
72yo M hx dm2, htn, CHF, afib (new within last year) on new eliquis, COPD not on home o2, here with worsening sob, b/l leg swelling, and abd distention x 1 month. Was seen outpt by Dr. morgan who sent pt for MARIA GUADALUPE prior to cardioversion but was not done because of underlying pulmonary issues and active sob.

## 2019-04-08 NOTE — CONSULT NOTE ADULT - ASSESSMENT
73 M current smoker with a PMH of obesity, HTN, HLD, CAD, diastolic HF,  A fib on Eliquis, type 2 diabetes, COPD ( not on home O2), LIMA now presenting with SOB, b/l LE edema and abdominal distension, likely 2/2 acute decompensated diastolic heart failure     1. Acute decompensated diastolic HF/ A fib  - Agree with IV diuresis with Lasix   - Requires strict I/Os/ daily weights. Goal to keep at least 1-2 negative daily   - Suggest following electrolytes BID while getting IV diuresis. Keep  K+>4, Mag > 2  - Telemetry monitoring. Follow cardiac enzymes  - Check TTE  - Cont to optimize rate control for A fib. Remains on AC with eliquis  - Cardiology follow up    2. COPD  - Would suggest continuing the Prednisone taper he was prescribed on 4/4 by Dr. Brand. Can keep him on Prednisone 30 mg daily and then taper over 1 week   - suggest Xopenex and Atrovent Q6H  - Leukocytosis likely 2/2 steroids. No clinical radiological evidence of infection. Hold off on antibiotics for now.   - Please check RVP  - Cont Flonase BID for post nasal drip   - Chest PT/ acapella use to assist with mobilization of secretions   - Incentive spirometry  - Supplemental O2 to keep sats > 90 %    3. Lung nodules:  - Few unchanged 0.4 cm RUL nodules  - Requires outpt follow up with repeat imaging in 6 months     4. LIMA  - Unwilling to use CPAP    5. Nicotine addiction/ Smoking cessation:  - Requires a nicotine patch   - Smoking cessation counseling provided and different outpt options discussed at length. Time spent: 10 mins 73 M current smoker with a PMH of obesity, HTN, HLD, CAD, diastolic HF,  A fib on Eliquis, type 2 diabetes, COPD ( not on home O2), LIMA now presenting with SOB, b/l LE edema and abdominal distension, likely 2/2 acute decompensated diastolic heart failure     1. Acute decompensated diastolic HF/ A fib  - Agree with IV diuresis with Lasix   - Requires strict I/Os/ daily weights. Goal to keep at least 1-2 negative daily   - Suggest following electrolytes BID while getting IV diuresis. Keep  K+>4, Mag > 2  - Telemetry monitoring. Follow cardiac enzymes  - Check TTE  - Cont to optimize rate control for A fib. Remains on AC with eliquis  - Cardiology follow up    2. COPD  - Would suggest continuing the Prednisone taper he was prescribed on 4/4 by Dr. Brand. Can keep him on Prednisone 30 mg daily and then taper over 1 week   - suggest Xopenex and Atrovent Q6H  - Leukocytosis likely 2/2 steroids. No clinical radiological evidence of infection. Hold off on antibiotics for now.   - Please check RVP  - Cont Flonase BID for post nasal drip   - Chest PT/ acapella use to assist with mobilization of secretions   - Incentive spirometry  - Supplemental O2 to keep sats > 90 %    3. Lung nodules:  - Few unchanged 0.4 cm RUL nodules  - Requires outpt follow up with repeat imaging in 6 months     4. LIMA  - Unwilling to use CPAP    5. Nicotine addiction/ Smoking cessation:  - Requires a nicotine patch   - Smoking cessation counseling provided and different outpt options discussed at length. Time spent: 10 mins     6. Gen:  - DVT Px: On Eliquis  - Dr. Brand will follow him during hospitalization

## 2019-04-09 DIAGNOSIS — Z29.9 ENCOUNTER FOR PROPHYLACTIC MEASURES, UNSPECIFIED: ICD-10-CM

## 2019-04-09 DIAGNOSIS — E11.9 TYPE 2 DIABETES MELLITUS WITHOUT COMPLICATIONS: ICD-10-CM

## 2019-04-09 DIAGNOSIS — J44.1 CHRONIC OBSTRUCTIVE PULMONARY DISEASE WITH (ACUTE) EXACERBATION: ICD-10-CM

## 2019-04-09 DIAGNOSIS — G47.33 OBSTRUCTIVE SLEEP APNEA (ADULT) (PEDIATRIC): ICD-10-CM

## 2019-04-09 DIAGNOSIS — F17.200 NICOTINE DEPENDENCE, UNSPECIFIED, UNCOMPLICATED: ICD-10-CM

## 2019-04-09 DIAGNOSIS — R06.02 SHORTNESS OF BREATH: ICD-10-CM

## 2019-04-09 DIAGNOSIS — R91.8 OTHER NONSPECIFIC ABNORMAL FINDING OF LUNG FIELD: ICD-10-CM

## 2019-04-09 DIAGNOSIS — I50.9 HEART FAILURE, UNSPECIFIED: ICD-10-CM

## 2019-04-09 DIAGNOSIS — Z01.811 ENCOUNTER FOR PREPROCEDURAL RESPIRATORY EXAMINATION: ICD-10-CM

## 2019-04-09 LAB
24R-OH-CALCIDIOL SERPL-MCNC: 13 NG/ML — LOW (ref 30–80)
ANION GAP SERPL CALC-SCNC: 13 MMOL/L — SIGNIFICANT CHANGE UP (ref 5–17)
APPEARANCE UR: CLEAR — SIGNIFICANT CHANGE UP
BACTERIA # UR AUTO: ABNORMAL
BILIRUB UR-MCNC: NEGATIVE — SIGNIFICANT CHANGE UP
BLD GP AB SCN SERPL QL: NEGATIVE — SIGNIFICANT CHANGE UP
BUN SERPL-MCNC: 36 MG/DL — HIGH (ref 7–23)
CALCIUM SERPL-MCNC: 9.3 MG/DL — SIGNIFICANT CHANGE UP (ref 8.4–10.5)
CHLORIDE SERPL-SCNC: 91 MMOL/L — LOW (ref 96–108)
CHOLEST SERPL-MCNC: 125 MG/DL — SIGNIFICANT CHANGE UP (ref 10–199)
CO2 SERPL-SCNC: 33 MMOL/L — HIGH (ref 22–31)
COLOR SPEC: YELLOW — SIGNIFICANT CHANGE UP
CREAT SERPL-MCNC: 1.48 MG/DL — HIGH (ref 0.5–1.3)
CULTURE RESULTS: NO GROWTH — SIGNIFICANT CHANGE UP
DIFF PNL FLD: SIGNIFICANT CHANGE UP
EPI CELLS # UR: 0 /HPF — SIGNIFICANT CHANGE UP (ref 0–5)
GAS PNL BLDA: SIGNIFICANT CHANGE UP
GLUCOSE BLDC GLUCOMTR-MCNC: 152 MG/DL — HIGH (ref 70–99)
GLUCOSE BLDC GLUCOMTR-MCNC: 157 MG/DL — HIGH (ref 70–99)
GLUCOSE BLDC GLUCOMTR-MCNC: 181 MG/DL — HIGH (ref 70–99)
GLUCOSE SERPL-MCNC: 164 MG/DL — HIGH (ref 70–99)
GLUCOSE UR QL: NEGATIVE — SIGNIFICANT CHANGE UP
HBA1C BLD-MCNC: 8.2 % — HIGH (ref 4–5.6)
HCT VFR BLD CALC: 47.5 % — SIGNIFICANT CHANGE UP (ref 39–50)
HDLC SERPL-MCNC: 51 MG/DL — SIGNIFICANT CHANGE UP
HGB BLD-MCNC: 14.7 G/DL — SIGNIFICANT CHANGE UP (ref 13–17)
HYALINE CASTS # UR AUTO: 0 /LPF — SIGNIFICANT CHANGE UP (ref 0–7)
KETONES UR-MCNC: NEGATIVE — SIGNIFICANT CHANGE UP
LEUKOCYTE ESTERASE UR-ACNC: ABNORMAL
LIPID PNL WITH DIRECT LDL SERPL: 54 MG/DL — SIGNIFICANT CHANGE UP
MCHC RBC-ENTMCNC: 27.9 PG — SIGNIFICANT CHANGE UP (ref 27–34)
MCHC RBC-ENTMCNC: 30.9 GM/DL — LOW (ref 32–36)
MCV RBC AUTO: 90.3 FL — SIGNIFICANT CHANGE UP (ref 80–100)
NITRITE UR-MCNC: NEGATIVE — SIGNIFICANT CHANGE UP
PH UR: 6 — SIGNIFICANT CHANGE UP (ref 5–8)
PLATELET # BLD AUTO: 280 K/UL — SIGNIFICANT CHANGE UP (ref 150–400)
POTASSIUM SERPL-MCNC: 4.4 MMOL/L — SIGNIFICANT CHANGE UP (ref 3.5–5.3)
POTASSIUM SERPL-SCNC: 4.4 MMOL/L — SIGNIFICANT CHANGE UP (ref 3.5–5.3)
PROT UR-MCNC: SIGNIFICANT CHANGE UP
RBC # BLD: 5.26 M/UL — SIGNIFICANT CHANGE UP (ref 4.2–5.8)
RBC # FLD: 14.8 % — HIGH (ref 10.3–14.5)
RBC CASTS # UR COMP ASSIST: 1 /HPF — SIGNIFICANT CHANGE UP (ref 0–4)
RH IG SCN BLD-IMP: POSITIVE — SIGNIFICANT CHANGE UP
SODIUM SERPL-SCNC: 137 MMOL/L — SIGNIFICANT CHANGE UP (ref 135–145)
SP GR SPEC: 1.02 — SIGNIFICANT CHANGE UP (ref 1.01–1.02)
SPECIMEN SOURCE: SIGNIFICANT CHANGE UP
T3 SERPL-MCNC: 97 NG/DL — SIGNIFICANT CHANGE UP (ref 80–200)
T4 AB SER-ACNC: 7.1 UG/DL — SIGNIFICANT CHANGE UP (ref 4.6–12)
T4 FREE SERPL-MCNC: 1.7 NG/DL — SIGNIFICANT CHANGE UP (ref 0.9–1.8)
TOTAL CHOLESTEROL/HDL RATIO MEASUREMENT: 2.5 RATIO — LOW (ref 3.4–9.6)
TRIGL SERPL-MCNC: 101 MG/DL — SIGNIFICANT CHANGE UP (ref 10–149)
TSH SERPL-MCNC: 0.03 UIU/ML — LOW (ref 0.27–4.2)
TSH SERPL-MCNC: 0.03 UIU/ML — LOW (ref 0.27–4.2)
UROBILINOGEN FLD QL: SIGNIFICANT CHANGE UP
WBC # BLD: 16.4 K/UL — HIGH (ref 3.8–10.5)
WBC # FLD AUTO: 16.4 K/UL — HIGH (ref 3.8–10.5)
WBC UR QL: 0 /HPF — SIGNIFICANT CHANGE UP (ref 0–5)

## 2019-04-09 PROCEDURE — 93325 DOPPLER ECHO COLOR FLOW MAPG: CPT | Mod: 26

## 2019-04-09 PROCEDURE — 76705 ECHO EXAM OF ABDOMEN: CPT | Mod: 26

## 2019-04-09 PROCEDURE — 93923 UPR/LXTR ART STDY 3+ LVLS: CPT | Mod: 26

## 2019-04-09 PROCEDURE — 71045 X-RAY EXAM CHEST 1 VIEW: CPT | Mod: 26

## 2019-04-09 PROCEDURE — 93609 INTRA-VNTR MAPG TCHYCAR SITE: CPT

## 2019-04-09 PROCEDURE — 93010 ELECTROCARDIOGRAM REPORT: CPT

## 2019-04-09 PROCEDURE — 93312 ECHO TRANSESOPHAGEAL: CPT | Mod: 26

## 2019-04-09 PROCEDURE — 93623 PRGRMD STIMJ&PACG IV RX NFS: CPT | Mod: 26

## 2019-04-09 PROCEDURE — 93621 COMP EP EVL L PAC&REC C SINS: CPT | Mod: 26

## 2019-04-09 PROCEDURE — 99233 SBSQ HOSP IP/OBS HIGH 50: CPT | Mod: GC

## 2019-04-09 PROCEDURE — 99232 SBSQ HOSP IP/OBS MODERATE 35: CPT

## 2019-04-09 PROCEDURE — 93320 DOPPLER ECHO COMPLETE: CPT | Mod: 26

## 2019-04-09 PROCEDURE — 93653 COMPRE EP EVAL TX SVT: CPT

## 2019-04-09 RX ORDER — CHLORHEXIDINE GLUCONATE 213 G/1000ML
1 SOLUTION TOPICAL
Qty: 0 | Refills: 0 | Status: DISCONTINUED | OUTPATIENT
Start: 2019-04-09 | End: 2019-04-14

## 2019-04-09 RX ORDER — IPRATROPIUM/ALBUTEROL SULFATE 18-103MCG
3 AEROSOL WITH ADAPTER (GRAM) INHALATION EVERY 6 HOURS
Qty: 0 | Refills: 0 | Status: DISCONTINUED | OUTPATIENT
Start: 2019-04-09 | End: 2019-04-14

## 2019-04-09 RX ORDER — ACETAMINOPHEN 500 MG
1000 TABLET ORAL ONCE
Qty: 0 | Refills: 0 | Status: DISCONTINUED | OUTPATIENT
Start: 2019-04-09 | End: 2019-04-14

## 2019-04-09 RX ORDER — ONDANSETRON 8 MG/1
4 TABLET, FILM COATED ORAL ONCE
Qty: 0 | Refills: 0 | Status: DISCONTINUED | OUTPATIENT
Start: 2019-04-09 | End: 2019-04-09

## 2019-04-09 RX ADMIN — Medication 60 MILLIGRAM(S): at 05:48

## 2019-04-09 RX ADMIN — ATORVASTATIN CALCIUM 20 MILLIGRAM(S): 80 TABLET, FILM COATED ORAL at 21:39

## 2019-04-09 RX ADMIN — Medication 1 PATCH: at 22:20

## 2019-04-09 RX ADMIN — BUDESONIDE AND FORMOTEROL FUMARATE DIHYDRATE 2 PUFF(S): 160; 4.5 AEROSOL RESPIRATORY (INHALATION) at 05:48

## 2019-04-09 RX ADMIN — PANTOPRAZOLE SODIUM 40 MILLIGRAM(S): 20 TABLET, DELAYED RELEASE ORAL at 09:43

## 2019-04-09 RX ADMIN — SPIRONOLACTONE 25 MILLIGRAM(S): 25 TABLET, FILM COATED ORAL at 05:48

## 2019-04-09 RX ADMIN — Medication 0.6 MILLIGRAM(S): at 05:47

## 2019-04-09 RX ADMIN — Medication 60 MILLIGRAM(S): at 21:39

## 2019-04-09 RX ADMIN — Medication 1 SPRAY(S): at 18:38

## 2019-04-09 RX ADMIN — Medication 1 PATCH: at 22:00

## 2019-04-09 RX ADMIN — Medication 30 MILLIGRAM(S): at 05:48

## 2019-04-09 RX ADMIN — Medication 0.6 MILLIGRAM(S): at 19:18

## 2019-04-09 RX ADMIN — APIXABAN 5 MILLIGRAM(S): 2.5 TABLET, FILM COATED ORAL at 19:18

## 2019-04-09 RX ADMIN — APIXABAN 5 MILLIGRAM(S): 2.5 TABLET, FILM COATED ORAL at 05:50

## 2019-04-09 RX ADMIN — Medication 2: at 17:38

## 2019-04-09 RX ADMIN — Medication 2: at 09:43

## 2019-04-09 RX ADMIN — BUDESONIDE AND FORMOTEROL FUMARATE DIHYDRATE 2 PUFF(S): 160; 4.5 AEROSOL RESPIRATORY (INHALATION) at 18:33

## 2019-04-09 RX ADMIN — Medication 1 SPRAY(S): at 05:47

## 2019-04-09 NOTE — PROGRESS NOTE ADULT - ASSESSMENT
73 M current smoker with a PMH of obesity, hyperthyrodism, HTN, HLD, CAD, diastolic HF,  A fib on Eliquis,DM2, COPD, LIMA not on CPAP, now presenting with SOB, b/l LE edema and abdominal distension, likely 2/2 acute decompensated diastolic heart failure     1. Acute decompensated diastolic HF/ A fib  - on lasix drip w good urine output, nearly 3 liters in neg balance. cont w strict Is and Os and daily weights  - Check electrolytes daily. Keep  K+>4, Mag > 2  - Telemetry monitoring. Follow cardiac enzymes. s/p cardioversion  - MARIA GUADALUPE c/w RV sysloic dysfunction, severe MR, severe AS, nl LV systolic function.  has h/o CAD, % on LHC from 9/2016. cont statins, ASA. Not on BB 2/2 COPD.   - BPs improved,  cont Diltiazem 60 mg q8H  - Cont AC with Eliquis,    2. COPD  - cont po Prednisone at 30 mg daily, Pulm input appreciated  - Duonebs prn, no sign of an acute infection. ID input appreciated  - Incentive spirometry  - Supplemental O2 to keep sats > 90 %  - presently on BIPAP, ABG is improving , ptn is a chronic CO2 retainer    3. Lung nodules:  - Few unchanged 0.4 cm RUL nodules  - Requires outpt follow up with repeat imaging in 6 months     4. LIMA  - refuses CPAP    5. Nicotine addiction/ Smoking cessation:  - Will place on Nicotine patch while inptn   - Smoking cessation counseling provided , ptn is aware he should not be smoking and will try harder  6. Endo  - HA1C is 8.2%, on FS before meals w Insulin on a sliding scale, Tsh is low c/w Hyperthyrodism, will start tapazole. Amiodarone and Iodine contrast to be avoided. ENDO consult appreciated  7. Preventive  - no need for DVT ppx, ptn is on Eliquis  -GI ppx w Protonix

## 2019-04-09 NOTE — PROGRESS NOTE ADULT - SUBJECTIVE AND OBJECTIVE BOX
Patient is a 73y old  Male who presents with a chief complaint of worsening dyspnea, LE edema, increase in abd girth over a month (2019 10:02)      SUBJECTIVE / OVERNIGHT EVENTS: s/p MARIA GUADALUPE/DCCV, on lasix drip, in 2800 cc neg balance, on BIPAP in PACU, -109,     MEDICATIONS  (STANDING):  acetaminophen  IVPB .. 1000 milliGRAM(s) IV Intermittent once  apixaban 5 milliGRAM(s) Oral every 12 hours  aspirin enteric coated 81 milliGRAM(s) Oral daily  atorvastatin 20 milliGRAM(s) Oral at bedtime  buDESOnide 160 MICROgram(s)/formoterol 4.5 MICROgram(s) Inhaler 2 Puff(s) Inhalation two times a day  colchicine 0.6 milliGRAM(s) Oral two times a day  dextrose 5%. 1000 milliLiter(s) (50 mL/Hr) IV Continuous <Continuous>  dextrose 50% Injectable 12.5 Gram(s) IV Push once  dextrose 50% Injectable 25 Gram(s) IV Push once  dextrose 50% Injectable 25 Gram(s) IV Push once  diltiazem    Tablet 60 milliGRAM(s) Oral three times a day  fluticasone propionate 50 MICROgram(s)/spray Nasal Spray 1 Spray(s) Both Nostrils two times a day  furosemide Infusion 10 mG/Hr (5 mL/Hr) IV Continuous <Continuous>  insulin lispro (HumaLOG) corrective regimen sliding scale   SubCutaneous three times a day before meals  insulin lispro (HumaLOG) corrective regimen sliding scale   SubCutaneous at bedtime  nicotine - 21 mG/24Hr(s) Patch 1 patch Transdermal daily  pantoprazole    Tablet 40 milliGRAM(s) Oral before breakfast  predniSONE   Tablet 30 milliGRAM(s) Oral daily  spironolactone 25 milliGRAM(s) Oral daily    MEDICATIONS  (PRN):  dextrose 40% Gel 15 Gram(s) Oral once PRN Blood Glucose LESS THAN 70 milliGRAM(s)/deciliter  glucagon  Injectable 1 milliGRAM(s) IntraMuscular once PRN Glucose LESS THAN 70 milligrams/deciliter  ondansetron Injectable 4 milliGRAM(s) IV Push once PRN Nausea and/or Vomiting      Vital Signs Last 24 Hrs  T(F): 97.2 (19 @ 19:00), Max: 97.9 (19 @ 16:50)  HR: 104 (19 @ 19:30) (65 - 109)  BP: 123/64 (19 @ 19:30) (109/69 - 167/98)  RR: 16 (19 @ 19:30) (16 - 24)  SpO2: 95% (19 @ 19:30) (93% - 100%)  Telemetry:   CAPILLARY BLOOD GLUCOSE      POCT Blood Glucose.: 181 mg/dL (2019 17:32)  POCT Blood Glucose.: 157 mg/dL (2019 08:54)  POCT Blood Glucose.: 138 mg/dL (2019 23:09)    I&O's Summary    2019 07:  -  2019 07:00  --------------------------------------------------------  IN: 120 mL / OUT: 2350 mL / NET: -2230 mL    2019 07:  -  2019 19:34  --------------------------------------------------------  IN: 10 mL / OUT: 700 mL / NET: -690 mL        PHYSICAL EXAM:  GENERAL: NAD, well-developed  HEAD:  Atraumatic, Normocephalic  EYES: EOMI, PERRLA, conjunctiva and sclera clear  NECK: Supple, No JVD  CHEST/LUNG: Clear to auscultation bilaterally; No wheeze  HEART: Regular rate and rhythm; No murmurs, rubs, or gallops  ABDOMEN: Soft, Nontender, Nondistended; Bowel sounds present  EXTREMITIES:  2+ Peripheral Pulses, No clubbing, cyanosis, or edema  PSYCH: AAOx3  NEUROLOGY: non-focal  SKIN: No rashes or lesions    LABS:                        14.7   16.4  )-----------( 280      ( 2019 06:35 )             47.5     -    137  |  91<L>  |  36<H>  ----------------------------<  164<H>  4.4   |  33<H>  |  1.48<H>    Ca    9.3      2019 06:34    TPro  6.1  /  Alb  3.5  /  TBili  0.5  /  DBili  x   /  AST  50<H>  /  ALT  56<H>  /  AlkPhos  142<H>  04-08    PT/INR - ( 2019 11:21 )   PT: 19.7 sec;   INR: 1.69 ratio         PTT - ( 2019 11:21 )  PTT:27.7 sec      Urinalysis Basic - ( 2019 22:59 )    Color: Yellow / Appearance: Clear / S.025 / pH: x  Gluc: x / Ketone: Negative  / Bili: Negative / Urobili: <2 mg/dL   Blood: x / Protein: Trace / Nitrite: Negative   Leuk Esterase: Moderate / RBC: 1 /HPF / WBC 0 /HPF   Sq Epi: x / Non Sq Epi: 0 /HPF / Bacteria: Few        RADIOLOGY & ADDITIONAL TESTS:    Imaging Personally Reviewed:    Consultant(s) Notes Reviewed:      Care Discussed with Consultants/Other Providers:

## 2019-04-09 NOTE — PROGRESS NOTE ADULT - ASSESSMENT
74yo male with acute on chronic diastolic CHF, moderate AS, COPD and hyperthyroidism with several week of rapid atrial flutter and CHF.  He has refused hospitalization as outpatient until yesterday when he came in with difficulty walking. He has as well refused endocrine consultation in the past for known hyperthyroidism.    Rec:  IV lasix and then change to po next 24-48 hours.  Continue cardizem; avoid beta blockers with COPD  Continue eliquis  Steroids per pulmonary.   Agree with treatment of hyperthyroidism as per endocrine; tapazole.  MARIA GUADALUPE/ablation timing per EP  Monitor I/O, weights  Sliding scale insulin coverage; on janumet as outpatient.

## 2019-04-09 NOTE — CONSULT NOTE ADULT - ASSESSMENT
72 yo male presents with progressive worsening of dyspnea, orthopnea, BALTAZAR, sleeps in chair, increased LE edema w pain on ambulation and increased abdominal girth.   No cough, no fevers, no N/V, no palpitations Ptn was seen by his pulmonologist Dr. Brand on 4/4 who started him on 40 mg Prednisone, ptn states he has no relief, and saw his planned Follow up w his cardiologist Dr. Campbell today, who sent him to the hospital for treatment.  PMH:  hyperthyrodism, untreated since ptn refused Endo F/U in the past, obesity, HTN, HLD, chronic heavy smoker, 50 pack years, COPD, LIMA, refuses CPAP, pulmonary nodules, dCHF, CAD, Afib on Eliquis, Pericardial effusion w drainage in 6/18, at which time was placed on Colchicine, refused to stay for work up after drainage,  DM2, Nephrolithiasis (08 Apr 2019 11:43)    ER vss, afebrile.  WBC 20.9 --> 16.4.  LFTs elevated.  UA mod LE/(-)nit, WBC - 0.  Lact 3.0.  Pro-BNP 3348.  CT chest: RUL stable few small solid nodules and small R pleural effusion.   Pt p/w SOB, b/l LE edema and abdominal distension,2/2 acute decompensated diastolic heart failure.  Pt started on IV diureses w/Lasix, on Tele monitoring.  Cardiology following.    Pt also on managment for COPD, on pred taper.  No abx started for now.        Recommend:    Leukocytosis    - Likely multifactorial and secondary to reactive process and steroids.  Repeat CBC improved WBC.    - No evidence for pna on CT imaging, pt without fever, no productive cough.  No need for antibiotics at this time.      - Cont managment of acute decompensated diastolic HF/A fib as per Cardiology:  on IV diureses with lasix, replete electrolytes PRN, cont tele monitoring, pt pending MARIA GUADALUPE/EPS evaluation.  On AC with eliquis.      - Cont managment of COPD.  Pulmonary following.  Pt on Prednisone taper,  cont duonebs, chest PT/acapella.  Pt with stable lung nodules on CT, for repeat imaging with Pulmonary in 6 months.      - If pt spikes fever, send bcx x 2, UA, ucx, repeat cxr.         Roseanna Funk  [phone]

## 2019-04-09 NOTE — PROGRESS NOTE ADULT - SUBJECTIVE AND OBJECTIVE BOX
Cardiology Progress Note    Interval: Diuresing well, denies chest pain and palpitations, SOB better but not fully resolved    Tele: 2:1 AFlutter     HPI:  74 yo male presents with progressive worsening of dyspnea, orthopnea, BALTAZAR, sleeps in chair, increased LE edema w pain on ambulation and increased abdominal girth.   No cough, no fevers, no N/V, no palpitations Ptn was seen by his pulmonologist Dr. Brand on  who started him on 40 mg Prednisone, ptn states he has no relief, and saw his planned Follow up w his cardiologist Dr. Campbell today, who sent him to the hospital for treatment.  PMH:  hyperthyrodism, untreated since ptn refused Endo F/U in the past, obesity, HTN, HLD, chronic heavy smoker, 50 pack years, COPD, LIMA, refuses CPAP, pulmonary nodules, dCHF, CAD, Afib on Eliquis, Pericardial effusion w drainage in , at which time was placed on Colchicine, refused to stay for work up after drainage,  DM2, Nephrolithiasis (2019 11:43)      Medications:  apixaban 5 milliGRAM(s) Oral every 12 hours  aspirin enteric coated 81 milliGRAM(s) Oral daily  atorvastatin 20 milliGRAM(s) Oral at bedtime  buDESOnide 160 MICROgram(s)/formoterol 4.5 MICROgram(s) Inhaler 2 Puff(s) Inhalation two times a day  colchicine 0.6 milliGRAM(s) Oral two times a day  dextrose 40% Gel 15 Gram(s) Oral once PRN  dextrose 5%. 1000 milliLiter(s) IV Continuous <Continuous>  dextrose 50% Injectable 12.5 Gram(s) IV Push once  dextrose 50% Injectable 25 Gram(s) IV Push once  dextrose 50% Injectable 25 Gram(s) IV Push once  diltiazem    Tablet 60 milliGRAM(s) Oral three times a day  fluticasone propionate 50 MICROgram(s)/spray Nasal Spray 1 Spray(s) Both Nostrils two times a day  furosemide Infusion 10 mG/Hr IV Continuous <Continuous>  glucagon  Injectable 1 milliGRAM(s) IntraMuscular once PRN  insulin lispro (HumaLOG) corrective regimen sliding scale   SubCutaneous three times a day before meals  insulin lispro (HumaLOG) corrective regimen sliding scale   SubCutaneous at bedtime  nicotine - 21 mG/24Hr(s) Patch 1 patch Transdermal daily  pantoprazole    Tablet 40 milliGRAM(s) Oral before breakfast  predniSONE   Tablet 30 milliGRAM(s) Oral daily  spironolactone 25 milliGRAM(s) Oral daily      Review of Systems:  Constitutional: [ ] Fever [ ] Chills [x] Fatigue [ ] Weight change   HEENT: [ ] Blurred vision [ ] Eye Pain [ ] Headache [ ] Runny nose [ ] Sore Throat   Respiratory: [ ] Cough [ ] Wheezing [ ] Shortness of breath  Cardiovascular: [ ] Chest Pain [ ] Palpitations [ ] BALTAZAR [ ] PND [ ] Orthopnea  Gastrointestinal: [ ] Abdominal Pain [ ] Diarrhea [ ] Constipation [ ] Hemorrhoids [ ] Nausea [ ] Vomiting  Genitourinary: [ ] Nocturia [ ] Dysuria [ ] Incontinence  Extremities: [ ] Swelling [ ] Joint Pain  Neurologic: [ ] Focal deficit [ ] Paresthesias [ ] Syncope  Lymphatic: [ ] Swelling [ ] Lymphadenopathy   Skin: [ ] Rash [ ] Ecchymoses [ ] Wounds [ ] Lesions  Psychiatry: [ ] Depression [ ] Suicidal/Homicidal Ideation [ ] Anxiety [ ] Sleep Disturbances  [ ] 10 point review of systems is otherwise negative except as mentioned above            [ ]Unable to obtain    Vitals:  T(C): 36.3 (19 @ 04:20), Max: 37.1 (19 @ 13:53)  HR: 65 (19 @ 04:20) (65 - 86)  BP: 109/69 (19 @ 04:20) (109/69 - 132/87)  BP(mean): --  RR: 20 (19 @ 04:20) (18 - 22)  SpO2: 96% (19 @ 04:20) (95% - 98%)  Wt(kg): --  Daily Height in cm: 160.02 (2019 22:43)    Daily Weight in k.7 (2019 07:23)  I&O's Summary    2019 07:01  -  2019 07:00  --------------------------------------------------------  IN: 120 mL / OUT: 2350 mL / NET: -2230 mL    2019 07:01  -  2019 09:50  --------------------------------------------------------  IN: 0 mL / OUT: 500 mL / NET: -500 mL        Physical Exam:  General: NAD  Eye: PERRL, EOMI  HENT: Normal oral mucosa NC/AT  CV: Normal S1/S2, RRR, No M/R/G, 1+ edema, thick neck girth  Resp: mildly dyspneic on exam, diminished breath sounds bilaterally, no wheezing, no crackles  Abd: Soft, Non-tender, Non-distended, BS+  Ext: No clubbing, No joint deformity   Neuro: Non-focal, motor and sensory intact  Lymph: No lymphadenopathy  Psych: AAOx3, Mood & affect appropriate  Skin: No rashes, No ecchymoses, No cyanosis    Labs:                        14.7   16.4  )-----------( 280      ( 2019 06:35 )             47.5         137  |  91<L>  |  36<H>  ----------------------------<  164<H>  4.4   |  33<H>  |  1.48<H>    Ca    9.3      2019 06:34    TPro  6.1  /  Alb  3.5  /  TBili  0.5  /  DBili  x   /  AST  50<H>  /  ALT  56<H>  /  AlkPhos  142<H>      PT/INR - ( 2019 11:21 )   PT: 19.7 sec;   INR: 1.69 ratio         PTT - ( 2019 11:21 )  PTT:27.7 sec      Serum Pro-Brain Natriuretic Peptide: 3348 pg/mL ( @ 10:08)          New results/imaging:

## 2019-04-09 NOTE — PROGRESS NOTE ADULT - ASSESSMENT
A/P: 73 year old M pt with PMH of a-fib on eliquis, HTN, HLD, DM2, and COPD p/w worsening SOB x 1 month.    - clinically stable with no chest pain, palpitations  - telemetry showing persistent 2:1 atrial flutter  - scheduled for MARIA GUADALUPE and AFl ablation today, NPO since midnight and cleared by pulm  - c/w medical management with a/c    Roberto Conley, #[id number]  EP Fellow

## 2019-04-09 NOTE — CONSULT NOTE ADULT - SUBJECTIVE AND OBJECTIVE BOX
Patient is a 73y old  Male who presents with a chief complaint of worsening dyspnea, LE edema, increase in abd girth over a month (2019 09:50)      HPI:  72 yo male presents with progressive worsening of dyspnea, orthopnea, BALTAZAR, sleeps in chair, increased LE edema w pain on ambulation and increased abdominal girth.   No cough, no fevers, no N/V, no palpitations Ptn was seen by his pulmonologist Dr. Brand on  who started him on 40 mg Prednisone, ptn states he has no relief, and saw his planned Follow up w his cardiologist Dr. Campbell today, who sent him to the hospital for treatment.  PMH:  hyperthyrodism, untreated since ptn refused Endo F/U in the past, obesity, HTN, HLD, chronic heavy smoker, 50 pack years, COPD, LIMA, refuses CPAP, pulmonary nodules, dCHF, CAD, Afib on Eliquis, Pericardial effusion w drainage in , at which time was placed on Colchicine, refused to stay for work up after drainage,  DM2, Nephrolithiasis (2019 11:43)      REVIEW OF SYSTEMS:    CONSTITUTIONAL: No fever, weight loss, or fatigue  EYES: No eye pain, visual disturbances, or discharge  ENMT:  No sore throat  NECK: No pain or stiffness  RESPIRATORY: No cough, wheezing, chills or hemoptysis; No shortness of breath  CARDIOVASCULAR: No chest pain, palpitations, dizziness, or leg swelling  GASTROINTESTINAL: No abdominal or epigastric pain. No nausea, vomiting, or hematemesis; No diarrhea or constipation. No melena or hematochezia.  GENITOURINARY: No dysuria, frequency, hematuria, or incontinence  NEUROLOGICAL: No headaches, memory loss, loss of strength, numbness, or tremors  SKIN: No itching, burning, rashes, or lesions   LYMPH NODES: No enlarged glands  MUSCULOSKELETAL: No joint pain or swelling; No muscle, back, or extremity pain      PAST MEDICAL & SURGICAL HISTORY:  Pulmonary nodules  COPD (chronic obstructive pulmonary disease)  Diabetes mellitus, type 2  Kidney stones  Hypertension  History of tonsillectomy  History of kidney stones      Allergies    penicillins (Unknown)    Intolerances        FAMILY HISTORY:  Family history of MI (myocardial infarction)      SOCIAL HISTORY:        MEDICATIONS  (STANDING):  apixaban 5 milliGRAM(s) Oral every 12 hours  aspirin enteric coated 81 milliGRAM(s) Oral daily  atorvastatin 20 milliGRAM(s) Oral at bedtime  buDESOnide 160 MICROgram(s)/formoterol 4.5 MICROgram(s) Inhaler 2 Puff(s) Inhalation two times a day  colchicine 0.6 milliGRAM(s) Oral two times a day  dextrose 5%. 1000 milliLiter(s) (50 mL/Hr) IV Continuous <Continuous>  dextrose 50% Injectable 12.5 Gram(s) IV Push once  dextrose 50% Injectable 25 Gram(s) IV Push once  dextrose 50% Injectable 25 Gram(s) IV Push once  diltiazem    Tablet 60 milliGRAM(s) Oral three times a day  fluticasone propionate 50 MICROgram(s)/spray Nasal Spray 1 Spray(s) Both Nostrils two times a day  furosemide Infusion 10 mG/Hr (5 mL/Hr) IV Continuous <Continuous>  insulin lispro (HumaLOG) corrective regimen sliding scale   SubCutaneous three times a day before meals  insulin lispro (HumaLOG) corrective regimen sliding scale   SubCutaneous at bedtime  nicotine - 21 mG/24Hr(s) Patch 1 patch Transdermal daily  pantoprazole    Tablet 40 milliGRAM(s) Oral before breakfast  predniSONE   Tablet 30 milliGRAM(s) Oral daily  spironolactone 25 milliGRAM(s) Oral daily    MEDICATIONS  (PRN):  dextrose 40% Gel 15 Gram(s) Oral once PRN Blood Glucose LESS THAN 70 milliGRAM(s)/deciliter  glucagon  Injectable 1 milliGRAM(s) IntraMuscular once PRN Glucose LESS THAN 70 milligrams/deciliter      Vital Signs Last 24 Hrs  T(C): 36.3 (2019 04:20), Max: 37.1 (2019 13:53)  T(F): 97.3 (2019 04:20), Max: 98.8 (2019 13:53)  HR: 65 (2019 04:20) (65 - 86)  BP: 109/69 (2019 04:20) (109/69 - 122/78)  BP(mean): --  RR: 20 (2019 04:20) (18 - 20)  SpO2: 96% (2019 04:20) (95% - 98%)    PHYSICAL EXAM:    GENERAL: NAD, well-groomed  HEAD:  Atraumatic, Normocephalic  EYES: EOMI, PERRLA, conjunctiva and sclera clear  ENMT: No tonsillar erythema, exudates, or enlargement; Moist mucous membranes  NECK: Supple, No JVD  CHEST/LUNG: Clear to percussion bilaterally; No rales, rhonchi, wheezing, or rubs  HEART: Regular rate and rhythm; No murmurs, rubs, or gallops  ABDOMEN: Soft, Nontender, Nondistended; Bowel sounds present  EXTREMITIES:  2+ Peripheral Pulses, No clubbing, cyanosis, or edema  LYMPH: No lymphadenopathy noted  SKIN: No rashes or lesions    LABS:  CBC Full  -  ( 2019 06:35 )  WBC Count : 16.4 K/uL  RBC Count : 5.26 M/uL  Hemoglobin : 14.7 g/dL  Hematocrit : 47.5 %  Platelet Count - Automated : 280 K/uL  Mean Cell Volume : 90.3 fl  Mean Cell Hemoglobin : 27.9 pg  Mean Cell Hemoglobin Concentration : 30.9 gm/dL  Auto Neutrophil # : x  Auto Lymphocyte # : x  Auto Monocyte # : x  Auto Eosinophil # : x  Auto Basophil # : x  Auto Neutrophil % : x  Auto Lymphocyte % : x  Auto Monocyte % : x  Auto Eosinophil % : x  Auto Basophil % : x          137  |  91<L>  |  36<H>  ----------------------------<  164<H>  4.4   |  33<H>  |  1.48<H>    Ca    9.3      2019 06:34    TPro  6.1  /  Alb  3.5  /  TBili  0.5  /  DBili  x   /  AST  50<H>  /  ALT  56<H>  /  AlkPhos  142<H>  04-08      LIVER FUNCTIONS - ( 2019 10:08 )  Alb: 3.5 g/dL / Pro: 6.1 g/dL / ALK PHOS: 142 U/L / ALT: 56 U/L / AST: 50 U/L / GGT: x                               MICROBIOLOGY:        Urinalysis Basic - ( 2019 22:59 )    Color: Yellow / Appearance: Clear / S.025 / pH: x  Gluc: x / Ketone: Negative  / Bili: Negative / Urobili: <2 mg/dL   Blood: x / Protein: Trace / Nitrite: Negative   Leuk Esterase: Moderate / RBC: 1 /HPF / WBC 0 /HPF   Sq Epi: x / Non Sq Epi: 0 /HPF / Bacteria: Few                RADIOLOGY: Patient is a 73y old  Male who presents with a chief complaint of worsening dyspnea, LE edema, increase in abd girth over a month (2019 09:50)      HPI:  74 yo male presents with progressive worsening of dyspnea, orthopnea, BALTAZAR, sleeps in chair, increased LE edema w pain on ambulation and increased abdominal girth.   No cough, no fevers, no N/V, no palpitations Ptn was seen by his pulmonologist Dr. Brand on  who started him on 40 mg Prednisone, ptn states he has no relief, and saw his planned Follow up w his cardiologist Dr. Campbell today, who sent him to the hospital for treatment.  PMH:  hyperthyrodism, untreated since ptn refused Endo F/U in the past, obesity, HTN, HLD, chronic heavy smoker, 50 pack years, COPD, LIMA, refuses CPAP, pulmonary nodules, dCHF, CAD, Afib on Eliquis, Pericardial effusion w drainage in , at which time was placed on Colchicine, refused to stay for work up after drainage,  DM2, Nephrolithiasis (2019 11:43)    ER vss, afebrile.  WBC 20.9 --> 16.4.  LFTs elevated.  UA mod LE/(-)nit, WBC - 0.  Lact 3.0.  Pro-BNP 3348.  CT chest: RUL stable few small solid nodules and small R pleural effusion.   Pt p/w SOB, b/l LE edema and abdominal distension,2/2 acute decompensated diastolic heart failure.  Pt started on IV diureses w/Lasix, on Tele monitoring.  Cardiology following.    Pt also on managment for COPD, on pred taper.  No abx started for now.          REVIEW OF SYSTEMS:    CONSTITUTIONAL: No fever, weight loss, or fatigue  EYES: No eye pain, visual disturbances, or discharge  ENMT:  No sore throat  NECK: No pain or stiffness  RESPIRATORY: shortness of breath improved, no purulent sputum  CARDIOVASCULAR: No chest pain, palpitations, dizziness, or leg swelling  GASTROINTESTINAL: increased abd girth  GENITOURINARY: No dysuria, frequency, hematuria, or incontinence  NEUROLOGICAL: No headaches, memory loss, loss of strength, numbness, or tremors  SKIN: No itching, burning, rashes, or lesions   LYMPH NODES: No enlarged glands  MUSCULOSKELETAL: No joint pain or swelling; No muscle, back, or extremity pain      PAST MEDICAL & SURGICAL HISTORY:  Pulmonary nodules  COPD (chronic obstructive pulmonary disease)  Diabetes mellitus, type 2  Kidney stones  Hypertension  History of tonsillectomy  History of kidney stones      Allergies    penicillins (Unknown)    Intolerances        FAMILY HISTORY:  Family history of MI (myocardial infarction)      SOCIAL HISTORY:  active smoker 1 ppd, 50 pack yrs,       MEDICATIONS  (STANDING):  apixaban 5 milliGRAM(s) Oral every 12 hours  aspirin enteric coated 81 milliGRAM(s) Oral daily  atorvastatin 20 milliGRAM(s) Oral at bedtime  buDESOnide 160 MICROgram(s)/formoterol 4.5 MICROgram(s) Inhaler 2 Puff(s) Inhalation two times a day  colchicine 0.6 milliGRAM(s) Oral two times a day  dextrose 5%. 1000 milliLiter(s) (50 mL/Hr) IV Continuous <Continuous>  dextrose 50% Injectable 12.5 Gram(s) IV Push once  dextrose 50% Injectable 25 Gram(s) IV Push once  dextrose 50% Injectable 25 Gram(s) IV Push once  diltiazem    Tablet 60 milliGRAM(s) Oral three times a day  fluticasone propionate 50 MICROgram(s)/spray Nasal Spray 1 Spray(s) Both Nostrils two times a day  furosemide Infusion 10 mG/Hr (5 mL/Hr) IV Continuous <Continuous>  insulin lispro (HumaLOG) corrective regimen sliding scale   SubCutaneous three times a day before meals  insulin lispro (HumaLOG) corrective regimen sliding scale   SubCutaneous at bedtime  nicotine - 21 mG/24Hr(s) Patch 1 patch Transdermal daily  pantoprazole    Tablet 40 milliGRAM(s) Oral before breakfast  predniSONE   Tablet 30 milliGRAM(s) Oral daily  spironolactone 25 milliGRAM(s) Oral daily    MEDICATIONS  (PRN):  dextrose 40% Gel 15 Gram(s) Oral once PRN Blood Glucose LESS THAN 70 milliGRAM(s)/deciliter  glucagon  Injectable 1 milliGRAM(s) IntraMuscular once PRN Glucose LESS THAN 70 milligrams/deciliter      Vital Signs Last 24 Hrs  T(C): 36.3 (2019 04:20), Max: 37.1 (2019 13:53)  T(F): 97.3 (2019 04:20), Max: 98.8 (2019 13:53)  HR: 65 (2019 04:20) (65 - 86)  BP: 109/69 (2019 04:20) (109/69 - 122/78)  BP(mean): --  RR: 20 (2019 04:20) (18 - 20)  SpO2: 96% (2019 04:20) (95% - 98%)    PHYSICAL EXAM:    GENERAL: NAD, awake and alert  HEAD:  Atraumatic, Normocephalic  EYES: EOMI, PERRLA, conjunctiva and sclera clear  ENMT: No tonsillar erythema, exudates, or enlargement; Moist mucous membranes  NECK: Supple, No JVD  CHEST/LUNG: Clear to percussion bilaterally; No rales, rhonchi, wheezing, or rubs  HEART: Regular rate and rhythm;   ABDOMEN: Soft, Nontender, Nondistended; Bowel sounds present, increased girth  EXTREMITIES:  2+ Peripheral Pulses, (+) b/l LE edema  LYMPH: No lymphadenopathy noted  SKIN: No rashes or lesions    LABS:  CBC Full  -  ( 2019 06:35 )  WBC Count : 16.4 K/uL  RBC Count : 5.26 M/uL  Hemoglobin : 14.7 g/dL  Hematocrit : 47.5 %  Platelet Count - Automated : 280 K/uL  Mean Cell Volume : 90.3 fl  Mean Cell Hemoglobin : 27.9 pg  Mean Cell Hemoglobin Concentration : 30.9 gm/dL  Auto Neutrophil # : x  Auto Lymphocyte # : x  Auto Monocyte # : x  Auto Eosinophil # : x  Auto Basophil # : x  Auto Neutrophil % : x  Auto Lymphocyte % : x  Auto Monocyte % : x  Auto Eosinophil % : x  Auto Basophil % : x          137  |  91<L>  |  36<H>  ----------------------------<  164<H>  4.4   |  33<H>  |  1.48<H>    Ca    9.3      2019 06:34    TPro  6.1  /  Alb  3.5  /  TBili  0.5  /  DBili  x   /  AST  50<H>  /  ALT  56<H>  /  AlkPhos  142<H>  08      LIVER FUNCTIONS - ( 2019 10:08 )  Alb: 3.5 g/dL / Pro: 6.1 g/dL / ALK PHOS: 142 U/L / ALT: 56 U/L / AST: 50 U/L / GGT: x                               MICROBIOLOGY:        Urinalysis Basic - ( 2019 22:59 )    Color: Yellow / Appearance: Clear / S.025 / pH: x  Gluc: x / Ketone: Negative  / Bili: Negative / Urobili: <2 mg/dL   Blood: x / Protein: Trace / Nitrite: Negative   Leuk Esterase: Moderate / RBC: 1 /HPF / WBC 0 /HPF   Sq Epi: x / Non Sq Epi: 0 /HPF / Bacteria: Few                RADIOLOGY:    < from: CT Chest No Cont (19 @ 12:18) >  EXAM:  CT CHEST                            PROCEDURE DATE:  2019            INTERPRETATION:  Clinical information: Chest pain and shortness of   breath. Exam is compared to previous study of 2018.    CT scan of the chest was obtained without administration of intravenous   contrast.    Small low-attenuation lesions are noted within the thyroid gland. They   are unchanged when compared to previous exam.    Few lymph nodes are present in the pretracheal space, AP window and the   anterior mediastinum. They are unchanged when compared to previous exam.     Heart is enlarged in size. Calcification of the aortic valve and the   coronary arteries is noted. No pericardial effusion is noted.     No endobronchial lesions are noted. Centrilobular emphysema is noted   bilaterally. Few less than 0.4 cm solid nodules are noted in the right   upper lobe. They're unchanged when compared to previous exam. Compressive   atelectasis is noted involving portion of the right lower lobe. This is   secondary to small right pleural effusion.    Below the diaphragm, visualized portions of the abdomen demonstrate   ascites and cholelithiasis. Small calcification is noted in the right   kidney. Low-attenuation lesion in the left kidney is incompletely imaged   on this exam.     Degenerative changes of the spine are noted.    Impression: Stable few very small solid nodules in the right upper lobe   when compared to previous exam.    Small right pleural effusion.    < end of copied text >          < from: Xray Chest 2 Views PA/Lat (19 @ 10:07) >  Impression:    The heart is slightly enlarged. Bilateral small pleural effusion. Mild   pulmonary vascular congestion. Calcified aortic knob.    < end of copied text >

## 2019-04-09 NOTE — PROGRESS NOTE ADULT - SUBJECTIVE AND OBJECTIVE BOX
Patient is a 73y old  Male who presents with a chief complaint of worsening dyspnea, LE edema, increase in abd girth over a month (2019 04:54)    He is sitting in a chair. Less SOB.  He denies c/o chest painor palpitations.    Allergies    penicillins (Unknown)    Intolerances      MEDICATIONS  (STANDING):  apixaban 5 milliGRAM(s) Oral every 12 hours  aspirin enteric coated 81 milliGRAM(s) Oral daily  atorvastatin 20 milliGRAM(s) Oral at bedtime  buDESOnide 160 MICROgram(s)/formoterol 4.5 MICROgram(s) Inhaler 2 Puff(s) Inhalation two times a day  colchicine 0.6 milliGRAM(s) Oral two times a day  dextrose 5%. 1000 milliLiter(s) (50 mL/Hr) IV Continuous <Continuous>  dextrose 50% Injectable 12.5 Gram(s) IV Push once  dextrose 50% Injectable 25 Gram(s) IV Push once  dextrose 50% Injectable 25 Gram(s) IV Push once  diltiazem    Tablet 60 milliGRAM(s) Oral three times a day  fluticasone propionate 50 MICROgram(s)/spray Nasal Spray 1 Spray(s) Both Nostrils two times a day  furosemide Infusion 10 mG/Hr (5 mL/Hr) IV Continuous <Continuous>  insulin lispro (HumaLOG) corrective regimen sliding scale   SubCutaneous three times a day before meals  insulin lispro (HumaLOG) corrective regimen sliding scale   SubCutaneous at bedtime  nicotine - 21 mG/24Hr(s) Patch 1 patch Transdermal daily  pantoprazole    Tablet 40 milliGRAM(s) Oral before breakfast  predniSONE   Tablet 30 milliGRAM(s) Oral daily  spironolactone 25 milliGRAM(s) Oral daily    MEDICATIONS  (PRN):  dextrose 40% Gel 15 Gram(s) Oral once PRN Blood Glucose LESS THAN 70 milliGRAM(s)/deciliter  glucagon  Injectable 1 milliGRAM(s) IntraMuscular once PRN Glucose LESS THAN 70 milligrams/deciliter      PHYSICAL EXAM:  Vital Signs Last 24 Hrs  T(C): 36.3 (2019 04:20), Max: 37.1 (2019 13:53)  T(F): 97.3 (2019 04:20), Max: 98.8 (2019 13:53)  HR: 65 (2019 04:20) (65 - 87)  BP: 109/69 (2019 04:20) (109/69 - 132/87)  BP(mean): --  RR: 20 (2019 04:20) (18 - 22)  SpO2: 96% (2019 04:20) (92% - 98%)  Daily Height in cm: 160.02 (2019 22:43)    Daily Weight in k.5 (2019 04:20)  I&O's Summary    2019 07:01  -  2019 06:53  --------------------------------------------------------  IN: 120 mL / OUT: 2350 mL / NET: -2230 mL        General Appearance: 	 Alert, cooperative  HEENT: normocephalic, atraumatic,  Neck: no JVD,  carotid 2+  bilaterally with delayed upstroke  Lungs:  increased expiratory phase bilaterally; rhonchi  Cor:  pmi 5th ICS MCL, regular rate and rhythm, S1 normal intensity, S2 decreaed intensity, Grade II/VI mid-peaking systolic murmur ULSB  Abdomen: soft, non-tender; bowel sounds normal; no masses,  no organomegaly  Extremities: without cyanosis, clubbing; 2-3+ LE edema  Vasc: 2-+ PT and DP pulses;     Telemetry: atrial flutter   Labs:  CBC Full  -  ( 2019 10:08 )  WBC Count : 20.9 K/uL  RBC Count : 5.11 M/uL  Hemoglobin : 14.8 g/dL  Hematocrit : 46.3 %  Platelet Count - Automated : 279 K/uL  Mean Cell Volume : 90.6 fl  Mean Cell Hemoglobin : 28.9 pg  Mean Cell Hemoglobin Concentration : 31.9 gm/dL  Auto Neutrophil # : 19.1 K/uL  Auto Lymphocyte # : 0.7 K/uL  Auto Monocyte # : 1.1 K/uL  Auto Eosinophil # : 0.1 K/uL  Auto Basophil # : 0.0 K/uL  Auto Neutrophil % : 85.0 %  Auto Lymphocyte % : 4.0 %  Auto Monocyte % : 6.0 %  Auto Eosinophil % : 1.0 %  Auto Basophil % : 0.0 %    08    136  |  97  |  33<H>  ----------------------------<  177<H>  5.4<H>   |  26  |  1.32<H>    Ca    9.3      2019 10:08    TPro  6.1  /  Alb  3.5  /  TBili  0.5  /  DBili  x   /  AST  50<H>  /  ALT  56<H>  /  AlkPhos  142<H>  04-08        PT/INR - ( 2019 11:21 )   PT: 19.7 sec;   INR: 1.69 ratio         PTT - ( 2019 11:21 )  PTT:27.7 sec  Urinalysis Basic - ( 2019 22:59 )    Color: Yellow / Appearance: Clear / S.025 / pH: x  Gluc: x / Ketone: Negative  / Bili: Negative / Urobili: <2 mg/dL   Blood: x / Protein: Trace / Nitrite: Negative   Leuk Esterase: Moderate / RBC: 1 /HPF / WBC 0 /HPF   Sq Epi: x / Non Sq Epi: 0 /HPF / Bacteria: Few        Radiology/Imaging:                Marco Campbell MD [facility]  [phone]

## 2019-04-09 NOTE — PROGRESS NOTE ADULT - SUBJECTIVE AND OBJECTIVE BOX
CCU MIDNIGHT ROUNDS    Patient is a 73y old  Male who presents with a chief complaint of worsening dyspnea, LE edema, increase in abd girth over a month (09 Apr 2019 19:34)    Event	  HPI:  HOSPITAL COURSE: 72 y/o male with PMH of hyperthyroidism, pulmonary hypertension HTN, HLD, chronic heavy smoker (50 p/year), COPD, LIMA (refused CPAP), pulmonary nodules, CHF, CAD, Afib ([id number] 4 on Eliquis), DM2, nephrolitiasis, presented with worsening dyspnea, increased LE edema with pain on ambulation and increased abdominal girth. Seen by pulmonologist  on 4/4 who started him on prednisone 40mg with no improvement then f/u with his cardiologist Dr. Lara who sent him to the hospital for MARIA GUADALUPE/DCCV. s/p AFlutter ablation and MARIA GUADALUPE which showed severe MR/AS. Pt was started on lasix gtt for diuresis and required BiPAP transferred to CCU for further management.     MEDICATIONS  (STANDING):  acetaminophen  IVPB .. 1000 milliGRAM(s) IV Intermittent once  apixaban 5 milliGRAM(s) Oral every 12 hours  aspirin enteric coated 81 milliGRAM(s) Oral daily  atorvastatin 20 milliGRAM(s) Oral at bedtime  buDESOnide 160 MICROgram(s)/formoterol 4.5 MICROgram(s) Inhaler 2 Puff(s) Inhalation two times a day  chlorhexidine 4% Liquid 1 Application(s) Topical <User Schedule>  colchicine 0.6 milliGRAM(s) Oral two times a day  dextrose 5%. 1000 milliLiter(s) (50 mL/Hr) IV Continuous <Continuous>  dextrose 50% Injectable 12.5 Gram(s) IV Push once  dextrose 50% Injectable 25 Gram(s) IV Push once  dextrose 50% Injectable 25 Gram(s) IV Push once  diltiazem    Tablet 60 milliGRAM(s) Oral three times a day  fluticasone propionate 50 MICROgram(s)/spray Nasal Spray 1 Spray(s) Both Nostrils two times a day  furosemide Infusion 10 mG/Hr (5 mL/Hr) IV Continuous <Continuous>  insulin lispro (HumaLOG) corrective regimen sliding scale   SubCutaneous three times a day before meals  insulin lispro (HumaLOG) corrective regimen sliding scale   SubCutaneous at bedtime  nicotine - 21 mG/24Hr(s) Patch 1 patch Transdermal daily  pantoprazole    Tablet 40 milliGRAM(s) Oral before breakfast  predniSONE   Tablet 30 milliGRAM(s) Oral daily  spironolactone 25 milliGRAM(s) Oral daily    MEDICATIONS  (PRN):  ALBUTerol/ipratropium for Nebulization 3 milliLiter(s) Nebulizer every 6 hours PRN Shortness of Breath and/or Wheezing  dextrose 40% Gel 15 Gram(s) Oral once PRN Blood Glucose LESS THAN 70 milliGRAM(s)/deciliter  glucagon  Injectable 1 milliGRAM(s) IntraMuscular once PRN Glucose LESS THAN 70 milligrams/deciliter      REVIEW OF SYSTEMS:  Otherwise, 10 point ROS done and otherwise negative.    PHYSICAL EXAM:  Vital Signs Last 24 Hrs  T(C): 36.3 (09 Apr 2019 20:50), Max: 36.6 (09 Apr 2019 16:50)  T(F): 97.4 (09 Apr 2019 20:50), Max: 97.9 (09 Apr 2019 16:50)  HR: 102 (09 Apr 2019 22:00) (65 - 109)  BP: 116/82 (09 Apr 2019 22:00) (106/83 - 167/98)  BP(mean): 93 (09 Apr 2019 22:00) (71 - 120)  RR: 20 (09 Apr 2019 22:00) (15 - 24)  SpO2: 98% (09 Apr 2019 22:00) (93% - 100%)  I&O's Summary    08 Apr 2019 07:01  -  09 Apr 2019 07:00  --------------------------------------------------------  IN: 120 mL / OUT: 2350 mL / NET: -2230 mL    09 Apr 2019 07:01  -  09 Apr 2019 23:30  --------------------------------------------------------  IN: 270 mL / OUT: 1500 mL / NET: -1230 mL      PHYSICAL EXAM:  GENERAL: NAD, well-developed  HEAD:  Atraumatic, Normocephalic  EYES: EOMI, PERRLA, conjunctiva and sclera clear  NECK: Supple, No JVD  CHEST/LUNG: Clear to auscultation bilaterally; No wheeze  HEART: Regular rate and rhythm; No murmurs, rubs, or gallops  ABDOMEN: Soft, Nontender, Nondistended; Bowel sounds present  EXTREMITIES:  2+ Peripheral Pulses, No clubbing, cyanosis, or edema  PSYCH: AAOx3  NEUROLOGY: non-focal  SKIN: No rashes or     LABS:	 	                        14.7   16.4  )-----------( 280      ( 09 Apr 2019 06:35 )             47.5     Auto Eosinophil # x     / Auto Eosinophil % x     / Auto Neutrophil # x     / Auto Neutrophil % x     / BANDS % x                            14.8   20.9  )-----------( 279      ( 08 Apr 2019 10:08 )             46.3     Auto Eosinophil # 0.1   / Auto Eosinophil % 1.0   / Auto Neutrophil # 19.1  / Auto Neutrophil % 85.0  / BANDS % 2        INR: 1.69 ratio (04-08 @ 11:21)    04-09    137  |  91<L>  |  36<H>  ----------------------------<  164<H>  4.4   |  33<H>  |  1.48<H>  04-08    136  |  97  |  33<H>  ----------------------------<  177<H>  5.4<H>   |  26  |  1.32<H>    Ca    9.3      09 Apr 2019 06:34  TPro  6.1  /  Alb  3.5  /  TBili  0.5  /  DBili  x   /  AST  50<H>  /  ALT  56<H>  /  AlkPhos  142<H>  04-08        proBNP: Serum Pro-Brain Natriuretic Peptide: 3348 pg/mL (04-08 @ 10:08)    Lipid Profile: 04-09 Chol 125 LDL 54 HDL 51 Trig 101  HgA1c: 8.2 % (04-09 @ 10:10)    TSH: Thyroid Stimulating Hormone, Serum: 0.03 uIU/mL (04-09 @ 09:20)

## 2019-04-09 NOTE — PROGRESS NOTE ADULT - ASSESSMENT
ASSESSMENT & PLAN:  72 y/o male with PMH of hyperthyroidism, pulmonary hypertension HTN, HLD, chronic heavy smoker (50 p/year), COPD, LIMA (refused CPAP), pulmonary nodules, CHF, CAD, Afib ([id number] 4 on Eliquis), DM2, nephrolitiasis, presented with worsening dyspnea, increased LE edema with pain on ambulation and increased abdominal girth. s/p Aflutter ablation and MARIA GUADALUPE showing new severe AS/MR started on lasix gtt and requiring BiPAP    #Pulm  COPD  -C/w duoNebs q6h PRN and symbicort 160 BID  -oxygen as needed   -c/w prednisone 30mg daily, taper later this week    #Cardiac  Acute decompensated heart failure   -C/w lasix gtt, Trend CMP/lytes, replete as needed  -strict I+O, O>I daily weights  -TTE in the AM  -c/w cardizem 60mg TID, avoid BB due to COPD  -c/w spironolactone 25mg daily  -admission labs, BMP, CBC, TSH with T3/T4, HgbA1C, proBNP    Aflutter s/p ablation  -c/w Eliquis tonight     CAD  -c/w ASA and statin      #Endo  hyperthyroidism  -avoid amiodarone and iodine contrast dye  -Repeat TSH, T3/T4   -f/u endo recs    DM2  -ISS.

## 2019-04-09 NOTE — PROGRESS NOTE ADULT - SUBJECTIVE AND OBJECTIVE BOX
CHIEF COMPLAINT: f/up resp insufficiency, copd exacerbation, osas, allergic rhinitis, preop pulm clearance for EPS/MARIA GUADALUPE---better over all-less sob and cough    Interval Events: EPS    REVIEW OF SYSTEMS:  Constitutional: No fevers or chills. No weight loss. + fatigue or generalized malaise.  Eyes: No itching or discharge from the eyes  ENT: No ear pain. No ear discharge. No nasal congestion. No post nasal drip. No epistaxis. No throat pain. No sore throat. No difficulty swallowing.   CV: No chest pain. No palpitations. No lightheadedness or dizziness.   Resp: No dyspnea at rest. + dyspnea on exertion. No orthopnea. No wheezing. No cough. No stridor. No sputum production. No chest pain with respiration.  GI: No nausea. No vomiting. No diarrhea.  MSK: No joint pain or pain in any extremities  Integumentary: No skin lesions. + pedal edema.  Neurological: No gross motor weakness. No sensory changes.  [+ ] All other systems negative  [ ] Unable to assess ROS because ________    OBJECTIVE:  ICU Vital Signs Last 24 Hrs  T(C): 36.4 (2019 22:43), Max: 37.1 (2019 13:53)  T(F): 97.6 (2019 22:43), Max: 98.8 (2019 13:53)  HR: 80 (2019 22:43) (79 - 87)  BP: 122/78 (2019 22:43) (111/74 - 132/87)  BP(mean): --  ABP: --  ABP(mean): --  RR: 20 (2019 22:43) (18 - 22)  SpO2: 95% (2019 22:43) (92% - 98%)         @ 07:01  -  09 @ 04:57  --------------------------------------------------------  IN: 120 mL / OUT: 800 mL / NET: -680 mL      CAPILLARY BLOOD GLUCOSE      POCT Blood Glucose.: 138 mg/dL (2019 23:09)      PHYSICAL EXAM: in chair NAD on NC O2  General: Awake, alert, oriented X 3.   HEENT: Atraumatic, normocephalic.                 Mallampatti Grade 3                No nasal congestion.                No tonsillar or pharyngeal exudates.  Lymph Nodes: No palpable lymphadenopathy  Neck: No JVD. No carotid bruit.   Respiratory: Normal chest expansion                         Normal percussion                         Normal and equal air entry                         very mild wheeze, no rhonchi or rales.  Cardiovascular: S1 S2 normal. + murmurs, rubs or gallops.   Abdomen: Soft, non-tender, non-distended. No organomegaly. Normoactive bowel sounds.  Extremities: Warm to touch. Peripheral pulse palpable. +++ pedal edema.   Skin: No rashes or skin lesions  Neurological: Motor and sensory examination equal and normal in all four extremities.  Psychiatry: Appropriate mood and affect.    HOSPITAL MEDICATIONS:  MEDICATIONS  (STANDING):  apixaban 5 milliGRAM(s) Oral every 12 hours  aspirin enteric coated 81 milliGRAM(s) Oral daily  atorvastatin 20 milliGRAM(s) Oral at bedtime  buDESOnide 160 MICROgram(s)/formoterol 4.5 MICROgram(s) Inhaler 2 Puff(s) Inhalation two times a day  colchicine 0.6 milliGRAM(s) Oral two times a day  dextrose 5%. 1000 milliLiter(s) (50 mL/Hr) IV Continuous <Continuous>  dextrose 50% Injectable 12.5 Gram(s) IV Push once  dextrose 50% Injectable 25 Gram(s) IV Push once  dextrose 50% Injectable 25 Gram(s) IV Push once  diltiazem    Tablet 60 milliGRAM(s) Oral three times a day  fluticasone propionate 50 MICROgram(s)/spray Nasal Spray 1 Spray(s) Both Nostrils two times a day  furosemide Infusion 10 mG/Hr (5 mL/Hr) IV Continuous <Continuous>  insulin lispro (HumaLOG) corrective regimen sliding scale   SubCutaneous three times a day before meals  insulin lispro (HumaLOG) corrective regimen sliding scale   SubCutaneous at bedtime  nicotine - 21 mG/24Hr(s) Patch 1 patch Transdermal daily  pantoprazole    Tablet 40 milliGRAM(s) Oral before breakfast  predniSONE   Tablet 30 milliGRAM(s) Oral daily  spironolactone 25 milliGRAM(s) Oral daily    MEDICATIONS  (PRN):  dextrose 40% Gel 15 Gram(s) Oral once PRN Blood Glucose LESS THAN 70 milliGRAM(s)/deciliter  glucagon  Injectable 1 milliGRAM(s) IntraMuscular once PRN Glucose LESS THAN 70 milligrams/deciliter      LABS:                        14.8   20.9  )-----------( 279      ( 2019 10:08 )             46.3         136  |  97  |  33<H>  ----------------------------<  177<H>  5.4<H>   |  26  |  1.32<H>    Ca    9.3      2019 10:08    TPro  6.1  /  Alb  3.5  /  TBili  0.5  /  DBili  x   /  AST  50<H>  /  ALT  56<H>  /  AlkPhos  142<H>      PT/INR - ( 2019 11:21 )   PT: 19.7 sec;   INR: 1.69 ratio         PTT - ( 2019 11:21 )  PTT:27.7 sec  Urinalysis Basic - ( 2019 22:59 )    Color: Yellow / Appearance: Clear / S.025 / pH: x  Gluc: x / Ketone: Negative  / Bili: Negative / Urobili: <2 mg/dL   Blood: x / Protein: Trace / Nitrite: Negative   Leuk Esterase: Moderate / RBC: x / WBC x   Sq Epi: x / Non Sq Epi: x / Bacteria: x        Venous Blood Gas:   @ 10:38  7.39/51/45/31/78  VBG Lactate: 3.0      MICROBIOLOGY:     RADIOLOGY:  [ ] Reviewed and interpreted by me    Point of Care Ultrasound Findings:    PFT:    EKG:

## 2019-04-09 NOTE — CHART NOTE - NSCHARTNOTEFT_GEN_A_CORE
MARIA GUADALUPE Noted.    To arrange TTE to assess AV peak and mean gradients.  To arrange TAVR consultation and MV clip vs TMVR.    To discuss with patient and family.    Marco Campbell MD

## 2019-04-09 NOTE — CHART NOTE - NSCHARTNOTEFT_GEN_A_CORE
CCU Accept Note    Transfer from: (  ) Medicine    (  ) Telemetry    (  ) RCU    (  ) Palliative     (  ) Stroke Unit    ( X ) _____PACU__________    Accepting Physican: [facility] COURSE: 74 y/o male with PMH of hyperthyroidism, pulmonary hypertension HTN, HLD, chronic heavy smoker (50 p/year), COPD, LIMA (refused CPAP), pulmonary nodules, CHF, CAD, Afib ([id number] 4 on Eliquis), DM2, nephrolitiasis, presented with worsening dyspnea, increased LE edema with pain on ambulation and increased abdominal girth. Seen by pulmonologist  on  who started him on prednisone 40mg with no improvement then f/u with his cardiologist Dr. Lara who sent him to the hospital for MARIA GUADALUPE/DCCV. s/p AFlutter ablation and MARIA GUADALUPE which showed severe MR/AS. Pt was started on lasix gtt for diuresis and required BiPAP transferred to CCU for further management.       REVIEW OF SYSTEMS:     CONSTITUTIONAL: No weakness, fevers or chills  EYES/ENT: No visual changes;  No vertigo or throat pain   NECK: No pain or stiffness  RESPIRATORY: No cough, wheezing, hemoptysis; No shortness of breath  CARDIOVASCULAR: No chest pain or palpitations  GASTROINTESTINAL: No abdominal or epigastric pain. No nausea, vomiting, or hematemesis; No diarrhea or constipation. No melena or hematochezia.  GENITOURINARY: No dysuria, frequency or hematuria  NEUROLOGICAL: No numbness or weakness  SKIN: No itching, rashes      MEDICATIONS  (STANDING):  acetaminophen  IVPB .. 1000 milliGRAM(s) IV Intermittent once  apixaban 5 milliGRAM(s) Oral every 12 hours  aspirin enteric coated 81 milliGRAM(s) Oral daily  atorvastatin 20 milliGRAM(s) Oral at bedtime  buDESOnide 160 MICROgram(s)/formoterol 4.5 MICROgram(s) Inhaler 2 Puff(s) Inhalation two times a day  colchicine 0.6 milliGRAM(s) Oral two times a day  dextrose 5%. 1000 milliLiter(s) (50 mL/Hr) IV Continuous <Continuous>  dextrose 50% Injectable 12.5 Gram(s) IV Push once  dextrose 50% Injectable 25 Gram(s) IV Push once  dextrose 50% Injectable 25 Gram(s) IV Push once  diltiazem    Tablet 60 milliGRAM(s) Oral three times a day  fluticasone propionate 50 MICROgram(s)/spray Nasal Spray 1 Spray(s) Both Nostrils two times a day  furosemide Infusion 10 mG/Hr (5 mL/Hr) IV Continuous <Continuous>  insulin lispro (HumaLOG) corrective regimen sliding scale   SubCutaneous three times a day before meals  insulin lispro (HumaLOG) corrective regimen sliding scale   SubCutaneous at bedtime  nicotine - 21 mG/24Hr(s) Patch 1 patch Transdermal daily  pantoprazole    Tablet 40 milliGRAM(s) Oral before breakfast  predniSONE   Tablet 30 milliGRAM(s) Oral daily  spironolactone 25 milliGRAM(s) Oral daily    MEDICATIONS  (PRN):  dextrose 40% Gel 15 Gram(s) Oral once PRN Blood Glucose LESS THAN 70 milliGRAM(s)/deciliter  glucagon  Injectable 1 milliGRAM(s) IntraMuscular once PRN Glucose LESS THAN 70 milligrams/deciliter      Allergies    penicillins (Unknown)    Intolerances        Vital Signs Last 24 Hrs  T(C): 36.3 (2019 20:50), Max: 36.6 (2019 16:50)  T(F): 97.4 (2019 20:50), Max: 97.9 (2019 16:50)  HR: 102 (2019 21:00) (65 - 109)  BP: 114/80 (2019 21:00) (106/83 - 167/98)  BP(mean): 95 (2019 21:00) (71 - 120)  RR: 19 (2019 20:50) (15 - 24)  SpO2: 97% (2019 21:00) (93% - 100%)    I&O's Summary    2019 07:01  -  2019 07:00  --------------------------------------------------------  IN: 120 mL / OUT: 2350 mL / NET: -2230 mL    2019 07:01  -  2019 21:28  --------------------------------------------------------  IN: 260 mL / OUT: 700 mL / NET: -440 mL        Physical Exam:  General: Well-appearing, NAD  HEENT: PERRL, EOMI, normal sclera and conjunctiva, normal oropharynx  Neck: Supple, no JVD, thyroid without masses or enlargement  Chest/Lungs: CTA bilaterally, no wheezing, rales, rhonchi or rub  Heart: RRR, (+) systolic murmur  Abdomen: Soft and distended, NTTP, normoactive bowel sounds  Extremities: 2+ peripheral pulses b/l, 3+ b/l lower extremities edema  no clubbing or cyanosis  Skin: Warm, well-perfused, no rashes or lesions  Neurological: A&Ox3, moves all extremities, no focal deficits    LABS:                         14.7   16.4  )-----------( 280      ( 2019 06:35 )             47.5         137  |  91<L>  |  36<H>  ----------------------------<  164<H>  4.4   |  33<H>  |  1.48<H>    Ca    9.3      2019 06:34    TPro  6.1  /  Alb  3.5  /  TBili  0.5  /  DBili  x   /  AST  50<H>  /  ALT  56<H>  /  AlkPhos  142<H>  04-08    LIVER FUNCTIONS - ( 2019 10:08 )  Alb: 3.5 g/dL / Pro: 6.1 g/dL / ALK PHOS: 142 U/L / ALT: 56 U/L / AST: 50 U/L / GGT: x           PT/INR - ( 2019 11:21 )   PT: 19.7 sec;   INR: 1.69 ratio         PTT - ( 2019 11:21 )  PTT:27.7 sec  ABG - ( 2019 16:38 )  pH, Arterial: 7.36  pH, Blood: x     /  pCO2: 60    /  pO2: 90    / HCO3: 34    / Base Excess: 6.3   /  SaO2: 96          Culture - Urine (collected 2019 00:32)  Source: .Urine  Final Report (2019 18:17):    No growth      Urinalysis Basic - ( 2019 22:59 )    Color: Yellow / Appearance: Clear / S.025 / pH: x  Gluc: x / Ketone: Negative  / Bili: Negative / Urobili: <2 mg/dL   Blood: x / Protein: Trace / Nitrite: Negative   Leuk Esterase: Moderate / RBC: 1 /HPF / WBC 0 /HPF   Sq Epi: x / Non Sq Epi: 0 /HPF / Bacteria: Few        ECG:    Telemetry (24 Hrs):    Echocardiogram:  < from: Transesophageal Echocardiogram w/o TTE (19 @ 15:06) >      Patient name: MILAGROS VIERA  YOB: 1945   Age: 73 (M)   MR#: [id number]  Study Date: 2019  Location: [address] [place], [place] [id number]: Francy Martínez MD  Study quality: Technically good  Referring Physician: Agustina Lopez MD  Height:160 cm  Weight: 93 kg  BSA: 2 m2  ------------------------------------------------------------------------  PROCEDURE: Transesophageal (MARIA GUADALUPE) was performed.  Informed  consent was first obtained for MARIA GUADALUPE. The patient was sedated  - see anesthesia record.  The procedure was monitored with  automatic blood pressure monitoring, ECG tracings and pulse  oximetry. The transesophageal probe was placed in the  esophagus posterior to the heart without complications.  INDICATION: Unspecified atrial flutter (I48.92)  ------------------------------------------------------------------------  Observations:  Mitral Valve: Posterior mitral annular calcification with  calcified posterior mitral leaflets. The posterior leaflet  is restricted. Severe, eccentric, posterior and  laterally-directed mitral regurgitation.  Aortic Valve/Aorta: Calcified trileaflet aortic valve with  decreased opening. Aortic valve appears severely stenotic.  BUSTER by planimetryabout 0.8-0.9 cmsq. No aortic valve  regurgitation seen.  Normal aortic root, aortic arch and descending thoracic  aorta. Mild atheroma.  Left Atrium: No left atrial or left atrial appendage  thrombus. Normal left atrial appendage function (maximum  velocityabout 60 cm/s).  Lipomatous hypertrophy of  interatrial septum.  Left Ventricle: Endocardium not well visualized; grossly  normal left ventricular systolic function.  Right Heart: No right atrial thrombus. Grossly right  ventricle appears enlarged with decreased right ventricular  systolic function. Normal tricuspid valve. Moderate  tricuspid regurgitation.  Pericardium/Pleura: Normal pericardium with no pericardial  effusion.  Hemodynamic: Color flow doppler demonstrates no evidence of  a patent foramen ovale.  ------------------------------------------------------------------------  Conclusions:  1. Posterior mitral annular calcification with calcified  posterior mitral leaflets. The posterior leaflet is  restricted. Severe, eccentric, posterior and  laterally-directed mitral regurgitation.  2. Calcified trileaflet aortic valve with decreased  opening. Aortic valve appears severely stenotic. BUSTER by  planimetryabout 0.8-0.9 cmsq. No aortic valve regurgitation  seen.  3. No left atrial or left atrial appendage thrombus. Normal  left atrial appendage function (maximum velocityabout 60  cm/s).  Lipomatous hypertrophy of interatrial septum.  4. Endocardium not well visualized; grossly normal left  ventricular systolic function.  5. Grossly right ventricle appears enlarged with decreased  right ventricular systolic function.  Transesophageal echocardiogram performed in the EP lab  prior to atrial flutter ablation in the setting of atrial  flutter with HRabout 100 bpm. Recommend repeat assessment  of valvular disease in sinus rhythm atslower HRs.  ------------------------------------------------------------------------  Confirmed on  2019 - 17:25:45 by Rome Martínez M.D.  ------------------------------------------------------------------------    < end of copied text >      IMAGING:    ASSESSMENT & PLAN:  74 y/o male with PMH of hyperthyroidism, pulmonary hypertension HTN, HLD, chronic heavy smoker (50 p/year), COPD, LIMA (refused CPAP), pulmonary nodules, CHF, CAD, Afib ([id number] 4 on Eliquis), DM2, nephrolitiasis, presented with worsening dyspnea, increased LE edema with pain on ambulation and increased abdominal girth. s/p Aflutter ablation and MARIA GUADALUPE showing new severe AS/MR started on lasix gtt and requiring BiPAP    #Pulm  COPD  -C/w duoNebs q6h PRN and symbicort 160 BID  -oxygen as needed   -c/w prednisone 30mg daily, taper later this week    #Cardiac  Acute decompensated heart failure   -C/w lasix gtt, Trend CMP/lytes, replete as needed  -strict I+O, O>I daily weights  -TTE in the AM  -c/w cardizem 60mg TID, avoid BB due to COPD  -c/w spironolactone 25mg daily    Aflutter s/p ablation  -c/w Eliquis tonight     CAD  -c/w ASA and statin      #Endo  hyperthyroidism  -avoid amiodarone and iodine contrast dye  -Repeat TSH, T3/T4   -f/u endo recs    DM2  -ISS CCU Accept Note    Transfer from: (  ) Medicine    (  ) Telemetry    (  ) RCU    (  ) Palliative     (  ) Stroke Unit    ( X ) _____PACU__________    Accepting Physican: [facility] COURSE: 74 y/o male with PMH of hyperthyroidism, pulmonary hypertension HTN, HLD, chronic heavy smoker (50 p/year), COPD, LIMA (refused CPAP), pulmonary nodules, CHF, CAD, Afib ([id number] 4 on Eliquis), DM2, nephrolitiasis, presented with worsening dyspnea, increased LE edema with pain on ambulation and increased abdominal girth. Seen by pulmonologist  on  who started him on prednisone 40mg with no improvement then f/u with his cardiologist Dr. Lara who sent him to the hospital for MARIA GUADALUPE/DCCV. s/p AFlutter ablation and MARIA GUADALUPE which showed severe MR/AS. Pt was started on lasix gtt for diuresis and required BiPAP transferred to CCU for further management.       REVIEW OF SYSTEMS:     CONSTITUTIONAL: No weakness, fevers or chills  EYES/ENT: No visual changes;  No vertigo or throat pain   NECK: No pain or stiffness  RESPIRATORY: No cough, wheezing, hemoptysis; No shortness of breath  CARDIOVASCULAR: No chest pain or palpitations  GASTROINTESTINAL: No abdominal or epigastric pain. No nausea, vomiting, or hematemesis; No diarrhea or constipation. No melena or hematochezia.  GENITOURINARY: No dysuria, frequency or hematuria  NEUROLOGICAL: No numbness or weakness  SKIN: No itching, rashes      MEDICATIONS  (STANDING):  acetaminophen  IVPB .. 1000 milliGRAM(s) IV Intermittent once  apixaban 5 milliGRAM(s) Oral every 12 hours  aspirin enteric coated 81 milliGRAM(s) Oral daily  atorvastatin 20 milliGRAM(s) Oral at bedtime  buDESOnide 160 MICROgram(s)/formoterol 4.5 MICROgram(s) Inhaler 2 Puff(s) Inhalation two times a day  colchicine 0.6 milliGRAM(s) Oral two times a day  dextrose 5%. 1000 milliLiter(s) (50 mL/Hr) IV Continuous <Continuous>  dextrose 50% Injectable 12.5 Gram(s) IV Push once  dextrose 50% Injectable 25 Gram(s) IV Push once  dextrose 50% Injectable 25 Gram(s) IV Push once  diltiazem    Tablet 60 milliGRAM(s) Oral three times a day  fluticasone propionate 50 MICROgram(s)/spray Nasal Spray 1 Spray(s) Both Nostrils two times a day  furosemide Infusion 10 mG/Hr (5 mL/Hr) IV Continuous <Continuous>  insulin lispro (HumaLOG) corrective regimen sliding scale   SubCutaneous three times a day before meals  insulin lispro (HumaLOG) corrective regimen sliding scale   SubCutaneous at bedtime  nicotine - 21 mG/24Hr(s) Patch 1 patch Transdermal daily  pantoprazole    Tablet 40 milliGRAM(s) Oral before breakfast  predniSONE   Tablet 30 milliGRAM(s) Oral daily  spironolactone 25 milliGRAM(s) Oral daily    MEDICATIONS  (PRN):  dextrose 40% Gel 15 Gram(s) Oral once PRN Blood Glucose LESS THAN 70 milliGRAM(s)/deciliter  glucagon  Injectable 1 milliGRAM(s) IntraMuscular once PRN Glucose LESS THAN 70 milligrams/deciliter      Allergies    penicillins (Unknown)    Intolerances        Vital Signs Last 24 Hrs  T(C): 36.3 (2019 20:50), Max: 36.6 (2019 16:50)  T(F): 97.4 (2019 20:50), Max: 97.9 (2019 16:50)  HR: 102 (2019 21:00) (65 - 109)  BP: 114/80 (2019 21:00) (106/83 - 167/98)  BP(mean): 95 (2019 21:00) (71 - 120)  RR: 19 (2019 20:50) (15 - 24)  SpO2: 97% (2019 21:00) (93% - 100%)    I&O's Summary    2019 07:01  -  2019 07:00  --------------------------------------------------------  IN: 120 mL / OUT: 2350 mL / NET: -2230 mL    2019 07:01  -  2019 21:28  --------------------------------------------------------  IN: 260 mL / OUT: 700 mL / NET: -440 mL        Physical Exam:  General: Well-appearing, NAD  HEENT: PERRL, EOMI, normal sclera and conjunctiva, normal oropharynx  Neck: Supple, no JVD, thyroid without masses or enlargement  Chest/Lungs: CTA bilaterally, no wheezing, rales, rhonchi or rub  Heart: RRR, (+) systolic murmur  Abdomen: Soft and distended, NTTP, normoactive bowel sounds  Extremities: 2+ peripheral pulses b/l, 3+ b/l lower extremities edema  no clubbing or cyanosis  Skin: Warm, well-perfused, no rashes or lesions  Neurological: A&Ox3, moves all extremities, no focal deficits    LABS:                         14.7   16.4  )-----------( 280      ( 2019 06:35 )             47.5         137  |  91<L>  |  36<H>  ----------------------------<  164<H>  4.4   |  33<H>  |  1.48<H>    Ca    9.3      2019 06:34    TPro  6.1  /  Alb  3.5  /  TBili  0.5  /  DBili  x   /  AST  50<H>  /  ALT  56<H>  /  AlkPhos  142<H>  04-08    LIVER FUNCTIONS - ( 2019 10:08 )  Alb: 3.5 g/dL / Pro: 6.1 g/dL / ALK PHOS: 142 U/L / ALT: 56 U/L / AST: 50 U/L / GGT: x           PT/INR - ( 2019 11:21 )   PT: 19.7 sec;   INR: 1.69 ratio         PTT - ( 2019 11:21 )  PTT:27.7 sec  ABG - ( 2019 16:38 )  pH, Arterial: 7.36  pH, Blood: x     /  pCO2: 60    /  pO2: 90    / HCO3: 34    / Base Excess: 6.3   /  SaO2: 96          Culture - Urine (collected 2019 00:32)  Source: .Urine  Final Report (2019 18:17):    No growth      Urinalysis Basic - ( 2019 22:59 )    Color: Yellow / Appearance: Clear / S.025 / pH: x  Gluc: x / Ketone: Negative  / Bili: Negative / Urobili: <2 mg/dL   Blood: x / Protein: Trace / Nitrite: Negative   Leuk Esterase: Moderate / RBC: 1 /HPF / WBC 0 /HPF   Sq Epi: x / Non Sq Epi: 0 /HPF / Bacteria: Few        ECG:    Telemetry (24 Hrs):    Echocardiogram:  < from: Transesophageal Echocardiogram w/o TTE (19 @ 15:06) >      Patient name: MILAGROS VIERA  YOB: 1945   Age: 73 (M)   MR#: [id number]  Study Date: 2019  Location: [address] [place], [place] [id number]: Francy Martínez MD  Study quality: Technically good  Referring Physician: Agustina Lopez MD  Height:160 cm  Weight: 93 kg  BSA: 2 m2  ------------------------------------------------------------------------  PROCEDURE: Transesophageal (MARIA GUADALUPE) was performed.  Informed  consent was first obtained for MARIA GUADALUPE. The patient was sedated  - see anesthesia record.  The procedure was monitored with  automatic blood pressure monitoring, ECG tracings and pulse  oximetry. The transesophageal probe was placed in the  esophagus posterior to the heart without complications.  INDICATION: Unspecified atrial flutter (I48.92)  ------------------------------------------------------------------------  Observations:  Mitral Valve: Posterior mitral annular calcification with  calcified posterior mitral leaflets. The posterior leaflet  is restricted. Severe, eccentric, posterior and  laterally-directed mitral regurgitation.  Aortic Valve/Aorta: Calcified trileaflet aortic valve with  decreased opening. Aortic valve appears severely stenotic.  BUSTER by planimetryabout 0.8-0.9 cmsq. No aortic valve  regurgitation seen.  Normal aortic root, aortic arch and descending thoracic  aorta. Mild atheroma.  Left Atrium: No left atrial or left atrial appendage  thrombus. Normal left atrial appendage function (maximum  velocityabout 60 cm/s).  Lipomatous hypertrophy of  interatrial septum.  Left Ventricle: Endocardium not well visualized; grossly  normal left ventricular systolic function.  Right Heart: No right atrial thrombus. Grossly right  ventricle appears enlarged with decreased right ventricular  systolic function. Normal tricuspid valve. Moderate  tricuspid regurgitation.  Pericardium/Pleura: Normal pericardium with no pericardial  effusion.  Hemodynamic: Color flow doppler demonstrates no evidence of  a patent foramen ovale.  ------------------------------------------------------------------------  Conclusions:  1. Posterior mitral annular calcification with calcified  posterior mitral leaflets. The posterior leaflet is  restricted. Severe, eccentric, posterior and  laterally-directed mitral regurgitation.  2. Calcified trileaflet aortic valve with decreased  opening. Aortic valve appears severely stenotic. BUSTER by  planimetryabout 0.8-0.9 cmsq. No aortic valve regurgitation  seen.  3. No left atrial or left atrial appendage thrombus. Normal  left atrial appendage function (maximum velocityabout 60  cm/s).  Lipomatous hypertrophy of interatrial septum.  4. Endocardium not well visualized; grossly normal left  ventricular systolic function.  5. Grossly right ventricle appears enlarged with decreased  right ventricular systolic function.  Transesophageal echocardiogram performed in the EP lab  prior to atrial flutter ablation in the setting of atrial  flutter with HRabout 100 bpm. Recommend repeat assessment  of valvular disease in sinus rhythm atslower HRs.  ------------------------------------------------------------------------  Confirmed on  2019 - 17:25:45 by Rome Martínez M.D.  ------------------------------------------------------------------------    < end of copied text >      IMAGING:    ASSESSMENT & PLAN:  74 y/o male with PMH of hyperthyroidism, pulmonary hypertension HTN, HLD, chronic heavy smoker (50 p/year), COPD, LIMA (refused CPAP), pulmonary nodules, CHF, CAD, Afib ([id number] 4 on Eliquis), DM2, nephrolitiasis, presented with worsening dyspnea, increased LE edema with pain on ambulation and increased abdominal girth. s/p Aflutter ablation and MARIA GUADALUPE showing new severe AS/MR started on lasix gtt and requiring BiPAP    #Pulm  COPD  -C/w duoNebs q6h PRN and symbicort 160 BID  -oxygen as needed   -c/w prednisone 30mg daily, taper later this week    #Cardiac  Acute decompensated heart failure   -C/w lasix gtt, Trend CMP/lytes, replete as needed  -strict I+O, O>I daily weights  -TTE in the AM  -c/w cardizem 60mg TID, avoid BB due to COPD  -c/w spironolactone 25mg daily  -admission labs, BMP, CBC, TSH with T3/T4, HgbA1C, proBNP    Aflutter s/p ablation  -c/w Eliquis tonight     CAD  -c/w ASA and statin      #Endo  hyperthyroidism  -avoid amiodarone and iodine contrast dye  -Repeat TSH, T3/T4   -f/u endo recs    DM2  -ISS

## 2019-04-10 DIAGNOSIS — I50.33 ACUTE ON CHRONIC DIASTOLIC (CONGESTIVE) HEART FAILURE: ICD-10-CM

## 2019-04-10 DIAGNOSIS — I48.2 CHRONIC ATRIAL FIBRILLATION: ICD-10-CM

## 2019-04-10 DIAGNOSIS — F17.213 NICOTINE DEPENDENCE, CIGARETTES, WITH WITHDRAWAL: ICD-10-CM

## 2019-04-10 DIAGNOSIS — E11.9 TYPE 2 DIABETES MELLITUS WITHOUT COMPLICATIONS: ICD-10-CM

## 2019-04-10 DIAGNOSIS — E05.90 THYROTOXICOSIS, UNSPECIFIED WITHOUT THYROTOXIC CRISIS OR STORM: ICD-10-CM

## 2019-04-10 DIAGNOSIS — I35.0 NONRHEUMATIC AORTIC (VALVE) STENOSIS: ICD-10-CM

## 2019-04-10 LAB
ALBUMIN SERPL ELPH-MCNC: 3.6 G/DL — SIGNIFICANT CHANGE UP (ref 3.3–5)
ALP SERPL-CCNC: 128 U/L — HIGH (ref 40–120)
ALT FLD-CCNC: 50 U/L — HIGH (ref 10–45)
ANION GAP SERPL CALC-SCNC: 17 MMOL/L — SIGNIFICANT CHANGE UP (ref 5–17)
AST SERPL-CCNC: 39 U/L — SIGNIFICANT CHANGE UP (ref 10–40)
BILIRUB SERPL-MCNC: 0.6 MG/DL — SIGNIFICANT CHANGE UP (ref 0.2–1.2)
BUN SERPL-MCNC: 29 MG/DL — HIGH (ref 7–23)
CALCIUM SERPL-MCNC: 9.1 MG/DL — SIGNIFICANT CHANGE UP (ref 8.4–10.5)
CHLORIDE SERPL-SCNC: 89 MMOL/L — LOW (ref 96–108)
CHOLEST SERPL-MCNC: 129 MG/DL — SIGNIFICANT CHANGE UP (ref 10–199)
CO2 SERPL-SCNC: 33 MMOL/L — HIGH (ref 22–31)
CREAT SERPL-MCNC: 1.16 MG/DL — SIGNIFICANT CHANGE UP (ref 0.5–1.3)
GLUCOSE BLDC GLUCOMTR-MCNC: 159 MG/DL — HIGH (ref 70–99)
GLUCOSE BLDC GLUCOMTR-MCNC: 195 MG/DL — HIGH (ref 70–99)
GLUCOSE BLDC GLUCOMTR-MCNC: 223 MG/DL — HIGH (ref 70–99)
GLUCOSE BLDC GLUCOMTR-MCNC: 283 MG/DL — HIGH (ref 70–99)
GLUCOSE SERPL-MCNC: 139 MG/DL — HIGH (ref 70–99)
HBA1C BLD-MCNC: 8.3 % — HIGH (ref 4–5.6)
HCT VFR BLD CALC: 47.8 % — SIGNIFICANT CHANGE UP (ref 39–50)
HDLC SERPL-MCNC: 47 MG/DL — SIGNIFICANT CHANGE UP
HGB BLD-MCNC: 15.9 G/DL — SIGNIFICANT CHANGE UP (ref 13–17)
LIPID PNL WITH DIRECT LDL SERPL: 57 MG/DL — SIGNIFICANT CHANGE UP
MAGNESIUM SERPL-MCNC: 1.7 MG/DL — SIGNIFICANT CHANGE UP (ref 1.6–2.6)
MCHC RBC-ENTMCNC: 30.3 PG — SIGNIFICANT CHANGE UP (ref 27–34)
MCHC RBC-ENTMCNC: 33.3 GM/DL — SIGNIFICANT CHANGE UP (ref 32–36)
MCV RBC AUTO: 90.9 FL — SIGNIFICANT CHANGE UP (ref 80–100)
NT-PROBNP SERPL-SCNC: 3782 PG/ML — HIGH (ref 0–300)
PHOSPHATE SERPL-MCNC: 4 MG/DL — SIGNIFICANT CHANGE UP (ref 2.5–4.5)
PLATELET # BLD AUTO: 243 K/UL — SIGNIFICANT CHANGE UP (ref 150–400)
POTASSIUM SERPL-MCNC: 4.2 MMOL/L — SIGNIFICANT CHANGE UP (ref 3.5–5.3)
POTASSIUM SERPL-SCNC: 4.2 MMOL/L — SIGNIFICANT CHANGE UP (ref 3.5–5.3)
PROT SERPL-MCNC: 6.7 G/DL — SIGNIFICANT CHANGE UP (ref 6–8.3)
RBC # BLD: 5.26 M/UL — SIGNIFICANT CHANGE UP (ref 4.2–5.8)
RBC # FLD: 14.7 % — HIGH (ref 10.3–14.5)
SODIUM SERPL-SCNC: 139 MMOL/L — SIGNIFICANT CHANGE UP (ref 135–145)
T3 SERPL-MCNC: 107 NG/DL — SIGNIFICANT CHANGE UP (ref 80–200)
T4 AB SER-ACNC: 7.8 UG/DL — SIGNIFICANT CHANGE UP (ref 4.6–12)
TOTAL CHOLESTEROL/HDL RATIO MEASUREMENT: 2.7 RATIO — LOW (ref 3.4–9.6)
TRIGL SERPL-MCNC: 124 MG/DL — SIGNIFICANT CHANGE UP (ref 10–149)
TSH SERPL-MCNC: 0.02 UIU/ML — LOW (ref 0.27–4.2)
WBC # BLD: 16 K/UL — HIGH (ref 3.8–10.5)
WBC # FLD AUTO: 16 K/UL — HIGH (ref 3.8–10.5)

## 2019-04-10 PROCEDURE — 99232 SBSQ HOSP IP/OBS MODERATE 35: CPT

## 2019-04-10 PROCEDURE — 93657 TX L/R ATRIAL FIB ADDL: CPT

## 2019-04-10 PROCEDURE — 93662 INTRACARDIAC ECG (ICE): CPT | Mod: 26

## 2019-04-10 PROCEDURE — 93010 ELECTROCARDIOGRAM REPORT: CPT

## 2019-04-10 PROCEDURE — 99233 SBSQ HOSP IP/OBS HIGH 50: CPT | Mod: 25

## 2019-04-10 PROCEDURE — 93613 INTRACARDIAC EPHYS 3D MAPG: CPT

## 2019-04-10 PROCEDURE — 93623 PRGRMD STIMJ&PACG IV RX NFS: CPT | Mod: 26

## 2019-04-10 PROCEDURE — 99233 SBSQ HOSP IP/OBS HIGH 50: CPT

## 2019-04-10 PROCEDURE — 93656 COMPRE EP EVAL ABLTJ ATR FIB: CPT

## 2019-04-10 PROCEDURE — 99233 SBSQ HOSP IP/OBS HIGH 50: CPT | Mod: GC

## 2019-04-10 RX ORDER — INSULIN GLARGINE 100 [IU]/ML
10 INJECTION, SOLUTION SUBCUTANEOUS AT BEDTIME
Qty: 0 | Refills: 0 | Status: DISCONTINUED | OUTPATIENT
Start: 2019-04-10 | End: 2019-04-14

## 2019-04-10 RX ORDER — MAGNESIUM SULFATE 500 MG/ML
2 VIAL (ML) INJECTION ONCE
Qty: 0 | Refills: 0 | Status: COMPLETED | OUTPATIENT
Start: 2019-04-10 | End: 2019-04-10

## 2019-04-10 RX ORDER — LANOLIN ALCOHOL/MO/W.PET/CERES
3 CREAM (GRAM) TOPICAL ONCE
Qty: 0 | Refills: 0 | Status: COMPLETED | OUTPATIENT
Start: 2019-04-10 | End: 2019-04-10

## 2019-04-10 RX ADMIN — Medication 60 MILLIGRAM(S): at 14:14

## 2019-04-10 RX ADMIN — Medication 0.6 MILLIGRAM(S): at 16:58

## 2019-04-10 RX ADMIN — PANTOPRAZOLE SODIUM 40 MILLIGRAM(S): 20 TABLET, DELAYED RELEASE ORAL at 05:08

## 2019-04-10 RX ADMIN — Medication 2: at 08:27

## 2019-04-10 RX ADMIN — Medication 1 PATCH: at 22:42

## 2019-04-10 RX ADMIN — APIXABAN 5 MILLIGRAM(S): 2.5 TABLET, FILM COATED ORAL at 05:08

## 2019-04-10 RX ADMIN — Medication 3 MILLIGRAM(S): at 22:42

## 2019-04-10 RX ADMIN — BUDESONIDE AND FORMOTEROL FUMARATE DIHYDRATE 2 PUFF(S): 160; 4.5 AEROSOL RESPIRATORY (INHALATION) at 16:58

## 2019-04-10 RX ADMIN — ATORVASTATIN CALCIUM 20 MILLIGRAM(S): 80 TABLET, FILM COATED ORAL at 21:48

## 2019-04-10 RX ADMIN — APIXABAN 5 MILLIGRAM(S): 2.5 TABLET, FILM COATED ORAL at 16:58

## 2019-04-10 RX ADMIN — Medication 5 MG/HR: at 14:14

## 2019-04-10 RX ADMIN — Medication 5 MG/HR: at 21:48

## 2019-04-10 RX ADMIN — INSULIN GLARGINE 10 UNIT(S): 100 INJECTION, SOLUTION SUBCUTANEOUS at 21:48

## 2019-04-10 RX ADMIN — Medication 1 PATCH: at 22:04

## 2019-04-10 RX ADMIN — Medication 5 MG/HR: at 16:57

## 2019-04-10 RX ADMIN — Medication 1 PATCH: at 08:06

## 2019-04-10 RX ADMIN — BUDESONIDE AND FORMOTEROL FUMARATE DIHYDRATE 2 PUFF(S): 160; 4.5 AEROSOL RESPIRATORY (INHALATION) at 05:54

## 2019-04-10 RX ADMIN — Medication 0.6 MILLIGRAM(S): at 05:08

## 2019-04-10 RX ADMIN — Medication 4: at 16:57

## 2019-04-10 RX ADMIN — Medication 1 SPRAY(S): at 18:06

## 2019-04-10 RX ADMIN — Medication 1 PATCH: at 19:42

## 2019-04-10 RX ADMIN — Medication 50 GRAM(S): at 08:28

## 2019-04-10 RX ADMIN — SPIRONOLACTONE 25 MILLIGRAM(S): 25 TABLET, FILM COATED ORAL at 05:08

## 2019-04-10 RX ADMIN — Medication 81 MILLIGRAM(S): at 11:27

## 2019-04-10 RX ADMIN — Medication 60 MILLIGRAM(S): at 05:08

## 2019-04-10 RX ADMIN — Medication 6: at 11:27

## 2019-04-10 RX ADMIN — Medication 60 MILLIGRAM(S): at 21:48

## 2019-04-10 RX ADMIN — Medication 30 MILLIGRAM(S): at 05:08

## 2019-04-10 NOTE — PROGRESS NOTE ADULT - ASSESSMENT
A/P: 73 year old M pt with PMH of a-fib on eliquis, HTN, HLD, DM2, and COPD p/w worsening SOB x 1 month.    - clinically stable with no chest pain, palpitations s/p AFL ablation  - telemetry showing coarse fib since approx 7AM today  - MARIA GUADALUPE showing severe MR and severe AS, structural consulted  - c/w medical management with rate control (ditliazem) and a/c (Eliquis)    Roberto Conley, #[id number]  EP Fellow

## 2019-04-10 NOTE — CONSULT NOTE ADULT - SUBJECTIVE AND OBJECTIVE BOX
HPI:  72 y/o male, sitting comfortably in a chair, stating he feels tired. He was brought to the hospital after he had an appointment with his primary cardiologist, Dr. Campbell, and he was noted to have increased pedal edema and abdominal girth. The patient states he felt like his feet were "in cement". He was noted also to be in Rapid Fib/Flutter, and underwent an ablation yesterday, and the MARIA GUADALUPE used for procedure noted that he has severe AS and Severe MR. He apparently had an echocardiogram last year which showed moderate AS. In June of last year he had a pericardial effusion that was drained, however he refused to take the colchicine he was given. He has a history of COPD, Current smoker (50 pack year), LIMA (Refusing BiPap), Hyperthyroid (Refusing Endo follow up). He is currently on a lasix drip, and he states that his edema is better.     He currently lives at home with his wife, and performs his own ADL's. He still works as a CPA, though he states he is close to [employer]. He has noticed the swelling getting worse over the last 1-2 months, and he needs to sleep in a chair at night, and he recently was put on a Prednisone taper by his pulmonologist Dr. Brand. Patients wife at bedside during interview and exam.          PAST MEDICAL & SURGICAL HISTORY:  Pulmonary nodules  COPD (chronic obstructive pulmonary disease)  Diabetes mellitus, type 2  Kidney stones  Hypertension  History of tonsillectomy  History of kidney stones      penicillins (Unknown)    acetaminophen  IVPB .. 1000 milliGRAM(s) IV Intermittent once  apixaban 5 milliGRAM(s) Oral every 12 hours  aspirin enteric coated 81 milliGRAM(s) Oral daily  atorvastatin 20 milliGRAM(s) Oral at bedtime  buDESOnide 160 MICROgram(s)/formoterol 4.5 MICROgram(s) Inhaler 2 Puff(s) Inhalation two times a day  chlorhexidine 4% Liquid 1 Application(s) Topical <User Schedule>  colchicine 0.6 milliGRAM(s) Oral two times a day  dextrose 5%. 1000 milliLiter(s) IV Continuous <Continuous>  dextrose 50% Injectable 12.5 Gram(s) IV Push once  dextrose 50% Injectable 25 Gram(s) IV Push once  dextrose 50% Injectable 25 Gram(s) IV Push once  diltiazem    Tablet 60 milliGRAM(s) Oral three times a day  fluticasone propionate 50 MICROgram(s)/spray Nasal Spray 1 Spray(s) Both Nostrils two times a day  furosemide Infusion 10 mG/Hr IV Continuous <Continuous>  insulin lispro (HumaLOG) corrective regimen sliding scale   SubCutaneous three times a day before meals  insulin lispro (HumaLOG) corrective regimen sliding scale   SubCutaneous at bedtime  nicotine - 21 mG/24Hr(s) Patch 1 patch Transdermal daily  pantoprazole    Tablet 40 milliGRAM(s) Oral before breakfast  predniSONE   Tablet 30 milliGRAM(s) Oral daily  spironolactone 25 milliGRAM(s) Oral daily      T(C): 36.4 (04-10-19 @ 06:00), Max: 36.6 (04-09-19 @ 16:50)  HR: 70 (04-10-19 @ 11:00) (70 - 109)  BP: 93/72 (04-10-19 @ 11:00) (92/63 - 167/98)  RR: 20 (04-10-19 @ 11:00) (14 - 26)  SpO2: 94% (04-10-19 @ 11:00) (91% - 100%)  Wt(kg): --    04-09 @ 07:01  -  04-10 @ 07:00  --------------------------------------------------------  IN: 310 mL / OUT: 2850 mL / NET: -2540 mL    04-10 @ 07:01  -  04-10 @ 11:30  --------------------------------------------------------  IN: 215 mL / OUT: 1050 mL / NET: -835 mL        Review of Symptoms:  General: Awake, Follows commands, States he feels tired  Respiratory: + SOB, + Cough  Cardiac: Denies CP, Occasional Palpitations  Gastrointestinal: Denies Abd Pain, No N/V  Extremities:+ Edema, no joint pain  Vascular: Negative  Neurological: Negative  Endocrine Diabetes history      Physical Exam:  Gen: A/Ox3, Follows commands  Pulmonary: Diminshed at bases, Fine End expiratory wheeze, no accessory muscle use  Cardiac: S1S2, Irregular, II/VI DAVID  ECG: AFib  Gastrointestinal: Soft, NT, Mildly distended, + Bowel sounds, Dull to percussion  Extremities: 3+ Lower extremity Pitting edema, no joint pain  Vascular: No Carotid Bruits, No JVD, 1+ Pulses B/L  Neurological: No motor or sensory defecits, non focal  Skin: Warm/Dry/Pink. No rashes      Laboratory:                        15.9   16.0  )-----------( 243      ( 10 Apr 2019 05:25 )             47.8    04-10    139  |  89<L>  |  29<H>  ----------------------------<  139<H>  4.2   |  33<H>  |  1.16    Ca    9.1      10 Apr 2019 05:25  Phos  4.0     04-10  Mg     1.7     04-10    TPro  6.7  /  Alb  3.6  /  TBili  0.6  /  DBili  x   /  AST  39  /  ALT  50<H>  /  AlkPhos  128<H>  04-10       Pro-BNP: Serum Pro-Brain Natriuretic Peptide (04.10.19 @ 05:25)    Serum Pro-Brain Natriuretic Peptide: 3782 pg/mL      TransEsophageal Echo: (Done with Ablation)      < from: Transesophageal Echocardiogram w/o TTE (04.09.19 @ 15:06) >  Observations:  Mitral Valve: Posterior mitral annular calcification with  calcified posterior mitral leaflets. The posterior leaflet  is restricted. Severe, eccentric, posterior and  laterally-directed mitral regurgitation.  Aortic Valve/Aorta: Calcified trileaflet aortic valve with  decreased opening. Aortic valve appears severely stenotic.  BUSTER by planimetryabout 0.8-0.9 cmsq. No aortic valve  regurgitation seen.  Normal aortic root, aortic arch and descending thoracic  aorta. Mild atheroma.  Left Atrium: No left atrial or left atrial appendage  thrombus. Normal left atrial appendage function (maximum  velocityabout 60 cm/s).  Lipomatous hypertrophy of  interatrial septum.  Left Ventricle: Endocardium not well visualized; grossly  normal left ventricular systolic function.  Right Heart: No right atrial thrombus. Grossly right  ventricle appears enlarged with decreased right ventricular  systolic function. Normal tricuspid valve. Moderate  tricuspid regurgitation.  Pericardium/Pleura: Normal pericardium with no pericardial  effusion.  Hemodynamic: Color flow doppler demonstrates no evidence of  a patent foramen ovale.  ------------------------------------------------------------------------  Conclusions:  1. Posterior mitral annular calcification with calcified  posterior mitral leaflets. The posterior leaflet is  restricted. Severe, eccentric, posterior and  laterally-directed mitral regurgitation.  2. Calcified trileaflet aortic valve with decreased  opening. Aortic valve appears severely stenotic. BUSTER by  planimetryabout 0.8-0.9 cmsq. No aortic valve regurgitation  seen.  3. No left atrial or left atrial appendage thrombus. Normal  left atrial appendage function (maximum velocityabout 60  cm/s).  Lipomatous hypertrophy of interatrial septum.  4. Endocardium not well visualized; grossly normal left  ventricular systolic function.  5. Grossly right ventricle appears enlarged with decreased  right ventricular systolic function.  Transesophageal echocardiogram performed in the EP lab  prior to atrial flutter ablation in the setting of atrial  flutter with HRabout 100 bpm. Recommend repeat assessment  of valvular disease in sinus rhythm atslower HRs.    < end of copied text >    Cardiac Cath:  Pending      CXR:    < from: Xray Chest 1 View- PORTABLE-Urgent (04.09.19 @ 22:10) >  INTERPRETATION:  CLINICAL INFORMATION: COPD. Shortness of breath. Status   post a flutter ablation.    EXAM: Single view of the chest (AP).    COMPARISON: Chest radiograph from 4/8/2019    IMPRESSION:  Mild pulmonary vascular congestion. Small right pleural effusion. No   pneumothorax.. The heart size is normal. No acute osseous findings.      < end of copied text >

## 2019-04-10 NOTE — PROGRESS NOTE ADULT - ASSESSMENT
74yo male with acute on chronic diastolic CHF, COPD and hyperthyroidism with several week of rapid atrial flutter and CHF.  Now s/p flutter ablation but remains in flutter currently.  Found to have severe AS and MR, in the setting of volume overload.     Plan:  Continue aggressive IV lasix; Transthoracic to better assess MV/MR after diuresis and rate control to see if candidate for TAVR/single or double valve disease.  Continue cardizem for rate control; avoid beta blockers with COPD  Continue eliquis  Steroids per pulmonary.   Agree with treatment of hyperthyroidism as per endocrine; tapazole.    Loyd Castillo MD  [facility] 72yo male with acute on chronic diastolic CHF, COPD and hyperthyroidism with several week of rapid atrial flutter and CHF.  Now s/p flutter ablation but remains in AF, rate controlled currently.  Found to have severe AS and MR, in the setting of volume overload.     Plan:  Continue aggressive IV lasix; Transthoracic to better assess MV/MR after diuresis and rate control to see if candidate for TAVR/single or double valve disease.  Continue cardizem for rate control; avoid beta blockers with COPD  Continue eliquis  Steroids per pulmonary.   Agree with treatment of hyperthyroidism as per endocrine; tapazole.    Loyd Castillo MD  [facility]

## 2019-04-10 NOTE — PROGRESS NOTE ADULT - SUBJECTIVE AND OBJECTIVE BOX
Patient is a 73y old  Male who presents with a chief complaint of worsening dyspnea, LE edema, increase in abd girth over a month (10 Apr 2019 18:27)      SUBJECTIVE / OVERNIGHT EVENTS: ptn feels better, denies dyspnea, back in Afib on tele, diuresing well on lasix drip    MEDICATIONS  (STANDING):  acetaminophen  IVPB .. 1000 milliGRAM(s) IV Intermittent once  apixaban 5 milliGRAM(s) Oral every 12 hours  aspirin enteric coated 81 milliGRAM(s) Oral daily  atorvastatin 20 milliGRAM(s) Oral at bedtime  buDESOnide 160 MICROgram(s)/formoterol 4.5 MICROgram(s) Inhaler 2 Puff(s) Inhalation two times a day  chlorhexidine 4% Liquid 1 Application(s) Topical <User Schedule>  colchicine 0.6 milliGRAM(s) Oral two times a day  dextrose 5%. 1000 milliLiter(s) (50 mL/Hr) IV Continuous <Continuous>  dextrose 50% Injectable 12.5 Gram(s) IV Push once  dextrose 50% Injectable 25 Gram(s) IV Push once  dextrose 50% Injectable 25 Gram(s) IV Push once  diltiazem    Tablet 60 milliGRAM(s) Oral three times a day  fluticasone propionate 50 MICROgram(s)/spray Nasal Spray 1 Spray(s) Both Nostrils two times a day  furosemide Infusion 10 mG/Hr (5 mL/Hr) IV Continuous <Continuous>  insulin glargine Injectable (LANTUS) 10 Unit(s) SubCutaneous at bedtime  insulin lispro (HumaLOG) corrective regimen sliding scale   SubCutaneous at bedtime  insulin lispro (HumaLOG) corrective regimen sliding scale   SubCutaneous three times a day before meals  methimazole 5 milliGRAM(s) Oral daily  nicotine - 21 mG/24Hr(s) Patch 1 patch Transdermal daily  pantoprazole    Tablet 40 milliGRAM(s) Oral before breakfast  predniSONE   Tablet 30 milliGRAM(s) Oral daily  spironolactone 25 milliGRAM(s) Oral daily    MEDICATIONS  (PRN):  ALBUTerol/ipratropium for Nebulization 3 milliLiter(s) Nebulizer every 6 hours PRN Shortness of Breath and/or Wheezing  dextrose 40% Gel 15 Gram(s) Oral once PRN Blood Glucose LESS THAN 70 milliGRAM(s)/deciliter  glucagon  Injectable 1 milliGRAM(s) IntraMuscular once PRN Glucose LESS THAN 70 milligrams/deciliter      Vital Signs Last 24 Hrs  T(F): 97.7 (04-10-19 @ 20:02), Max: 98.7 (04-10-19 @ 14:00)  HR: 81 (04-10-19 @ 20:02) (70 - 100)  BP: 117/71 (04-10-19 @ 20:02) (91/53 - 125/84)  RR: 18 (04-10-19 @ 20:02) (14 - 26)  SpO2: 97% (04-10-19 @ 20:02) (91% - 98%)  Telemetry:   CAPILLARY BLOOD GLUCOSE      POCT Blood Glucose.: 159 mg/dL (10 Apr 2019 20:56)  POCT Blood Glucose.: 223 mg/dL (10 Apr 2019 16:31)  POCT Blood Glucose.: 283 mg/dL (10 Apr 2019 11:12)  POCT Blood Glucose.: 195 mg/dL (10 Apr 2019 08:18)    I&O's Summary    2019 07:  -  10 Apr 2019 07:00  --------------------------------------------------------  IN: 310 mL / OUT: 2850 mL / NET: -2540 mL    10 Apr 2019 07:01  -  10 Apr 2019 22:54  --------------------------------------------------------  IN: 810 mL / OUT: 2250 mL / NET: -1440 mL        PHYSICAL EXAM:  GENERAL: NAD, well-developed  HEAD:  Atraumatic, Normocephalic  EYES: EOMI, PERRLA, conjunctiva and sclera clear  NECK: Supple, No JVD  CHEST/LUNG: Clear to auscultation bilaterally; No wheeze  HEART: Regular rate and rhythm; No murmurs, rubs, or gallops  ABDOMEN: Soft, Nontender, Nondistended; Bowel sounds present  EXTREMITIES:  2+ Peripheral Pulses, No clubbing, cyanosis, or edema  PSYCH: AAOx3  NEUROLOGY: non-focal  SKIN: No rashes or lesions    LABS:                        15.9   16.0  )-----------( 243      ( 10 Apr 2019 05:25 )             47.8     04-10    139  |  89<L>  |  29<H>  ----------------------------<  139<H>  4.2   |  33<H>  |  1.16    Ca    9.1      10 Apr 2019 05:25  Phos  4.0     04-10  Mg     1.7     -10    TPro  6.7  /  Alb  3.6  /  TBili  0.6  /  DBili  x   /  AST  39  /  ALT  50<H>  /  AlkPhos  128<H>  04-10          Urinalysis Basic - ( 2019 22:59 )    Color: Yellow / Appearance: Clear / S.025 / pH: x  Gluc: x / Ketone: Negative  / Bili: Negative / Urobili: <2 mg/dL   Blood: x / Protein: Trace / Nitrite: Negative   Leuk Esterase: Moderate / RBC: 1 /HPF / WBC 0 /HPF   Sq Epi: x / Non Sq Epi: 0 /HPF / Bacteria: Few        RADIOLOGY & ADDITIONAL TESTS:    Imaging Personally Reviewed:    Consultant(s) Notes Reviewed:      Care Discussed with Consultants/Other Providers:

## 2019-04-10 NOTE — PROGRESS NOTE ADULT - SUBJECTIVE AND OBJECTIVE BOX
Cardiology Progress Note    Interval: Pt resting comfortably in a chair. Noted SOB is better and diuresing well.    Tele: Coarse fibrillation    HPI:  72 yo male presents with progressive worsening of dyspnea, orthopnea, BALTAZAR, sleeps in chair, increased LE edema w pain on ambulation and increased abdominal girth.   No cough, no fevers, no N/V, no palpitations Ptn was seen by his pulmonologist Dr. Brand on  who started him on 40 mg Prednisone, ptn states he has no relief, and saw his planned Follow up w his cardiologist Dr. Campbell today, who sent him to the hospital for treatment.  PMH:  hyperthyrodism, untreated since ptn refused Endo F/U in the past, obesity, HTN, HLD, chronic heavy smoker, 50 pack years, COPD, LIMA, refuses CPAP, pulmonary nodules, dCHF, CAD, Afib on Eliquis, Pericardial effusion w drainage in , at which time was placed on Colchicine, refused to stay for work up after drainage,  DM2, Nephrolithiasis (2019 11:43)      Medications:  acetaminophen  IVPB .. 1000 milliGRAM(s) IV Intermittent once  ALBUTerol/ipratropium for Nebulization 3 milliLiter(s) Nebulizer every 6 hours PRN  apixaban 5 milliGRAM(s) Oral every 12 hours  aspirin enteric coated 81 milliGRAM(s) Oral daily  atorvastatin 20 milliGRAM(s) Oral at bedtime  buDESOnide 160 MICROgram(s)/formoterol 4.5 MICROgram(s) Inhaler 2 Puff(s) Inhalation two times a day  chlorhexidine 4% Liquid 1 Application(s) Topical <User Schedule>  colchicine 0.6 milliGRAM(s) Oral two times a day  dextrose 40% Gel 15 Gram(s) Oral once PRN  dextrose 5%. 1000 milliLiter(s) IV Continuous <Continuous>  dextrose 50% Injectable 12.5 Gram(s) IV Push once  dextrose 50% Injectable 25 Gram(s) IV Push once  dextrose 50% Injectable 25 Gram(s) IV Push once  diltiazem    Tablet 60 milliGRAM(s) Oral three times a day  fluticasone propionate 50 MICROgram(s)/spray Nasal Spray 1 Spray(s) Both Nostrils two times a day  furosemide Infusion 10 mG/Hr IV Continuous <Continuous>  glucagon  Injectable 1 milliGRAM(s) IntraMuscular once PRN  insulin lispro (HumaLOG) corrective regimen sliding scale   SubCutaneous three times a day before meals  insulin lispro (HumaLOG) corrective regimen sliding scale   SubCutaneous at bedtime  nicotine - 21 mG/24Hr(s) Patch 1 patch Transdermal daily  pantoprazole    Tablet 40 milliGRAM(s) Oral before breakfast  predniSONE   Tablet 30 milliGRAM(s) Oral daily  spironolactone 25 milliGRAM(s) Oral daily      Review of Systems:  Constitutional: [ ] Fever [ ] Chills [ ] Fatigue [ ] Weight change   HEENT: [ ] Blurred vision [ ] Eye Pain [ ] Headache [ ] Runny nose [ ] Sore Throat   Respiratory: [ ] Cough [ ] Wheezing [ ] Shortness of breath  Cardiovascular: [ ] Chest Pain [ ] Palpitations [ ] BALTAZAR [ ] PND [ ] Orthopnea  Gastrointestinal: [ ] Abdominal Pain [ ] Diarrhea [ ] Constipation [ ] Hemorrhoids [ ] Nausea [ ] Vomiting  Genitourinary: [ ] Nocturia [ ] Dysuria [ ] Incontinence  Extremities: [ ] Swelling [ ] Joint Pain  Neurologic: [ ] Focal deficit [ ] Paresthesias [ ] Syncope  Lymphatic: [ ] Swelling [ ] Lymphadenopathy   Skin: [ ] Rash [ ] Ecchymoses [ ] Wounds [ ] Lesions  Psychiatry: [ ] Depression [ ] Suicidal/Homicidal Ideation [ ] Anxiety [ ] Sleep Disturbances  [ ] 10 point review of systems is otherwise negative except as mentioned above            [ ]Unable to obtain    Vitals:  T(C): 36.4 (04-10-19 @ 06:00), Max: 36.6 (19 @ 16:50)  HR: 80 (04-10-19 @ 09:00) (74 - 109)  BP: 99/56 (04-10-19 @ 09:00) (92/63 - 167/98)  BP(mean): 70 (04-10-19 @ 09:00) (70 - 120)  RR: 20 (04-10-19 @ 09:00) (14 - 26)  SpO2: 92% (04-10-19 @ 09:00) (91% - 100%)  Wt(kg): --  Daily Height in cm: 160.02 (2019 13:00)    Daily Weight in k.3 (10 Apr 2019 06:00)  I&O's Summary    2019 07:01  -  10 Apr 2019 07:00  --------------------------------------------------------  IN: 310 mL / OUT: 2850 mL / NET: -2540 mL    10 Apr 2019 07:  -  10 Apr 2019 10:07  --------------------------------------------------------  IN: 210 mL / OUT: 850 mL / NET: -640 mL        Physical Exam:  General: NAD  Eye: PERRL, EOMI  HENT: Normal oral mucosa NC/AT  CV: Normal S1/S2, irregular, No M/R/G, 1+ edema, no elevation in JVP  Resp: Normal respiratory effort, clear to auscultation bilaterally, no wheezing, no crackles  Abd: Soft, Non-tender, Non-distended, BS+  Ext: No clubbing, No joint deformity   Neuro: Non-focal, motor and sensory intact  Lymph: No lymphadenopathy  Psych: AAOx3, Mood & affect appropriate  Skin: No rashes, No ecchymoses, No cyanosis    Labs:                        15.9   16.0  )-----------( 243      ( 10 Apr 2019 05:25 )             47.8     04-10    139  |  89<L>  |  29<H>  ----------------------------<  139<H>  4.2   |  33<H>  |  1.16    Ca    9.1      10 Apr 2019 05:25  Phos  4.0     04-10  Mg     1.7     04-10    TPro  6.7  /  Alb  3.6  /  TBili  0.6  /  DBili  x   /  AST  39  /  ALT  50<H>  /  AlkPhos  128<H>  04-10    PT/INR - ( 2019 11:21 )   PT: 19.7 sec;   INR: 1.69 ratio         PTT - ( 2019 11:21 )  PTT:27.7 sec      Serum Pro-Brain Natriuretic Peptide: 3782 pg/mL (04-10 @ 05:25)  Serum Pro-Brain Natriuretic Peptide: 3348 pg/mL ( @ 10:08)    Total Cholesterol: 125  LDL: 54  HDL: 51  T    Hemoglobin A1C, Whole Blood: 8.2 % ( @ 10:10)      New results/imaging:

## 2019-04-10 NOTE — PROGRESS NOTE ADULT - SUBJECTIVE AND OBJECTIVE BOX
Chief Complaint: 74 y/o Type 2 DM, Hyperthyroidism, HTN, HLD, COPD CHF, Admitted with SOB and anasarca.    AF noted, S/P ablation.  -220 last 24 hours.  Patient on scale humalog- mid only, HbA1c 8.2 at home.  Still on prednisone 30 mg daily.    MEDICATIONS  (STANDING):  acetaminophen  IVPB .. 1000 milliGRAM(s) IV Intermittent once  apixaban 5 milliGRAM(s) Oral every 12 hours  aspirin enteric coated 81 milliGRAM(s) Oral daily  atorvastatin 20 milliGRAM(s) Oral at bedtime  buDESOnide 160 MICROgram(s)/formoterol 4.5 MICROgram(s) Inhaler 2 Puff(s) Inhalation two times a day  chlorhexidine 4% Liquid 1 Application(s) Topical <User Schedule>  colchicine 0.6 milliGRAM(s) Oral two times a day  dextrose 5%. 1000 milliLiter(s) (50 mL/Hr) IV Continuous <Continuous>  dextrose 50% Injectable 12.5 Gram(s) IV Push once  dextrose 50% Injectable 25 Gram(s) IV Push once  dextrose 50% Injectable 25 Gram(s) IV Push once  diltiazem    Tablet 60 milliGRAM(s) Oral three times a day  fluticasone propionate 50 MICROgram(s)/spray Nasal Spray 1 Spray(s) Both Nostrils two times a day  furosemide Infusion 10 mG/Hr (5 mL/Hr) IV Continuous <Continuous>  insulin lispro (HumaLOG) corrective regimen sliding scale   SubCutaneous three times a day before meals  insulin lispro (HumaLOG) corrective regimen sliding scale   SubCutaneous at bedtime  methimazole 5 milliGRAM(s) Oral daily  nicotine - 21 mG/24Hr(s) Patch 1 patch Transdermal daily  pantoprazole    Tablet 40 milliGRAM(s) Oral before breakfast  predniSONE   Tablet 30 milliGRAM(s) Oral daily  spironolactone 25 milliGRAM(s) Oral daily    MEDICATIONS  (PRN):  ALBUTerol/ipratropium for Nebulization 3 milliLiter(s) Nebulizer every 6 hours PRN Shortness of Breath and/or Wheezing  dextrose 40% Gel 15 Gram(s) Oral once PRN Blood Glucose LESS THAN 70 milliGRAM(s)/deciliter  glucagon  Injectable 1 milliGRAM(s) IntraMuscular once PRN Glucose LESS THAN 70 milligrams/deciliter      Allergies    penicillins (Unknown)    Intolerances        PHYSICAL EXAM:  VITALS: T(C): 36.3 (04-10-19 @ 16:00)  T(F): 97.3 (04-10-19 @ 16:00), Max: 98.7 (04-10-19 @ 14:00)  HR: 86 (04-10-19 @ 16:00) (70 - 107)  BP: 110/73 (04-10-19 @ 16:00) (91/53 - 158/81)  RR:  (14 - 26)  SpO2:  (91% - 98%)  GENERAL: NAD  EYES: No proptosis, no lid lag, anicteric  HEENT:  Atraumatic, Normocephalic, moist mucous membranes  THYROID: Normal size, no palpable nodules, no cervical LN.  RESPIRATORY: Clear to auscultation bilaterally; decreased breath sounds both bases. Mild wheezing  CARDIOVASCULAR: Regular rate and rhythm; No murmurs; + peripheral edema  GI: Soft, nontender, distended, normal bowel sounds  SKIN: Dry, intact, No rashes or lesions  MUSCULOSKELETAL: Full range of motion, decreased strength  NEURO: no focal deficits.  PSYCH: Alert and oriented x 3, normal affect, normal mood      POCT Blood Glucose.: 223 mg/dL (04-10-19 @ 16:31)  POCT Blood Glucose.: 283 mg/dL (04-10-19 @ 11:12)  POCT Blood Glucose.: 195 mg/dL (04-10-19 @ 08:18)  POCT Blood Glucose.: 152 mg/dL (04-09-19 @ 21:38)  POCT Blood Glucose.: 181 mg/dL (04-09-19 @ 17:32)  POCT Blood Glucose.: 157 mg/dL (04-09-19 @ 08:54)  POCT Blood Glucose.: 138 mg/dL (04-08-19 @ 23:09)  POCT Blood Glucose.: 268 mg/dL (04-08-19 @ 18:35)                            15.9   16.0  )-----------( 243      ( 10 Apr 2019 05:25 )             47.8       04-10    139  |  89<L>  |  29<H>  ----------------------------<  139<H>  4.2   |  33<H>  |  1.16    EGFR if : 72  EGFR if non : 62    Ca    9.1      04-10  Mg     1.7     04-10  Phos  4.0     04-10    TPro  6.7  /  Alb  3.6  /  TBili  0.6  /  DBili  x   /  AST  39  /  ALT  50<H>  /  AlkPhos  128<H>  04-10      Thyroid Function Tests:  04-10 @ 09:17 TSH 0.02 FreeT4 -- T3 107 Anti TPO -- Anti Thyroglobulin Ab -- TSI --  04-09 @ 09:20 TSH 0.03 FreeT4 1.7 T3 97 Anti TPO -- Anti Thyroglobulin Ab -- TSI --      Hemoglobin A1C, Whole Blood: 8.3 % <H> [4.0 - 5.6] (04-10-19 @ 09:25)  Hemoglobin A1C, Whole Blood: 8.2 % <H> [4.0 - 5.6] (04-09-19 @ 10:10)

## 2019-04-10 NOTE — PROGRESS NOTE ADULT - ASSESSMENT
73 M current smoker with a PMH of obesity, hyperthyrodism, HTN, HLD, CAD, diastolic HF,  A fib on Eliquis,DM2, COPD, LIMA not on CPAP, now presenting with SOB, b/l LE edema and abdominal distension, likely 2/2 acute decompensated diastolic heart failure     1. Acute decompensated diastolic HF/ A fib  - on lasix drip w good urine output,  - Check electrolytes daily. Keep  K+>4, Mag > 2  - Telemetry monitoring. Follow cardiac enzymes. s/p cardioversion, but this am back in AFib  -- BPs improved,  cont Diltiazem 60 mg q8H  - Cont AC with Eliquis,  2. Valvular HD   -MARIA GUADALUPE c/w RV sysloic dysfunction, severe MR, severe AS, nl LV systolic function.  has h/o CAD, % on LHC from 9/2016. cont statins, ASA. Not on BB 2/2 COPD. Structural heart team consult pending    3. COPD  - cont po Prednisone at 30 mg daily, Pulm input appreciated  - Duonebs prn, no sign of an acute infection. ID input appreciated  - Incentive spirometry  - Supplemental O2 to keep sats > 90 %  - presently on BIPAP, ABG is improving , ptn is a chronic CO2 retainer    4. Lung nodules:  - Few unchanged 0.4 cm RUL nodules  - Requires outpt follow up with repeat imaging in 6 months     5. LIMA  - refuses CPAP    6. Nicotine addiction/ Smoking cessation:  - Will place on Nicotine patch while inptn   - Smoking cessation counseling provided , ptn is aware he should not be smoking and will try harder  7. Endo  - HA1C is 8.2%, on FS before meals w Insulin on a sliding scale, Tsh is low c/w Hyperthyrodism,  on  tapazole 5 mg. Amiodarone and Iodine contrast to be avoided. ENDO consult appreciated  8. Preventive  - no need for DVT ppx, ptn is on Eliquis  -GI ppx w Protonix

## 2019-04-10 NOTE — PROGRESS NOTE ADULT - ASSESSMENT
73 year old M w/ pmh of untreated hyperthyroid, pulm HTN, , HTN HLD, 73 year old M w/ pmh of untreated hyperthyroid, pulm HTN, , HTN HLD, hx of noncompliance who presented with AdHF c/b AFlutter pt underwent an AFlutter ablation and found to have severe AS and severe MR now undergoing eval for TAVR vs open heart sx

## 2019-04-10 NOTE — PROGRESS NOTE ADULT - PROBLEM SELECTOR PLAN 8
- Continue eliquis  - s/p AFlutter ablation - Continue eliquis  - s/p AFlutter ablation    - Stable for tx to the floor. Dr. Diana accepted the patient. Dr. Campbell will follow.

## 2019-04-10 NOTE — PROGRESS NOTE ADULT - ASSESSMENT
72 y/o Type 2 DM, Hyperthyroidism, HTN, HLD, COPD CHF, Admitted with SOB and anasarca.    72 y/o morbid obesity, heavy smoker P/H Type 2 DM treated with Janumet 50/1,000 daily. Patient not fully compliant with diet, not following FS. HbA1c last admission 7.5%. Patient started on treatment with prednisone 40-->30 mg daily for COPD exacerbation one week ago. Noted polyuria. FS on admission 177 mg/dL.    Hyperthyroidism- patient has history of hyperthyroidism for the past 2-3 years. He has no family history of thyroid disease. No eye complaints. Chest CT noted with thyroid nodularity. Weight increasing, no palpitations, no tremor. TFT's 6/18: TSH <0.01, T4- 11.1, T3-162. He never had treatment for thyroid disease.    He did not get iodine contrast dye recently or during tis admission.    DM- control? check HbA1c.  Expect hyperglycemia on steroid treatment.  AF noted, S/P ablation.  -220 last 24 hours.  Patient on scale humalog- mid only, HbA1c 8.2 at home.  Still on prednisone 30 mg daily.    Hyperthyroidism-  Clinical story, stable hyperthyroidism for 2 years and CT noted thyroid nodularity are consistent with MNG with borderline hyperthyroidism.  Avoid iodine contrast dye and amiodarone.  Repeat TFT's noted, lower T3 this time is m/p the result of steroids the patient is on (decrease T4 to T3 conversion) but as patient had SVT in the presence of supressed TSH indicated fore treatment.      Suggest:  Steroid taper ?  Lantus 10 units at HS premeal Humalog mid scale.  Start Tapazol 5 mg daily, after 1 week can decrease to 2.5 mg daily. Patient will most probably require stable treatment with this dose.  F/U TFT's, LFT's and WBC in 2 months.

## 2019-04-10 NOTE — CONSULT NOTE ADULT - ATTENDING COMMENTS
The Structural Heart team was asked to evaluate Mr Murry in the setting of complex valvular heart disease.  He was admitted with severely decompensated diastolic congestive heart failure in the setting of atrial flutter, for which ablation was performed.  He is now in atrial fibrillation with HR in the 70-90s.  His intraoperative MARIA GUADALUPE demonstrated severe AS and severe, eccentric MR--albeit in the setting of severe volume overload and atrial flutter with rapid ventricular rates.  Our recommendation would be to continue to diurese (he notes marked improvement since admission) and once he is essentially euvolemic, repeat a MARIA GUADALUPE (ultimately, a MARIA GUADALUPE may also need to be repeated).  He remains volume excess with 2-3+ bilateral LE edema (and globally diminished breath sounds c/w COPD, soft DAVID at the RUSB and mid axilla c/w AS and MR, respectively).  If the TTE demonstrates severe AS and severe MR, the next step would be for a cardiac surgeon to evaluate for AVR/MVR.  If he is deemed high risk (or prohibitive risk) for such intervention, catheter-based options can be considered--which would require right and left cardiac catheterization, a cardiac CT (for TAVR assessment), and a dedicated MARIA GUADALUPE (for MitraClip assessment).  I have explained this all to the patient in extensive detail--he is not sure how, or if, he wishes to proceed with such evaluation.  He would like for me to meet with his wife at the bedside tomorrow to discuss further, and I advised I would be able to do so some time in the afternoon. The Structural Heart team was asked to evaluate Mr Murry in the setting of complex valvular heart disease.  He was admitted with severely decompensated diastolic congestive heart failure in the setting of atrial flutter, for which ablation was performed.  He is now in atrial fibrillation with HR in the 70-90s.  His intraoperative MARIA GUADALUPE demonstrated severe AS and severe, eccentric MR--albeit in the setting of severe volume overload and atrial flutter with rapid ventricular rates.  Our recommendation would be to continue to diurese (he notes marked improvement since admission) and once he is essentially euvolemic, repeat a TTE (ultimately, a MARIA GUADALUPE may also need to be repeated).  He remains volume excess with 2-3+ bilateral LE edema (and globally diminished breath sounds c/w COPD, soft DAVID at the RUSB and mid axilla c/w AS and MR, respectively).  If the TTE demonstrates severe AS and severe MR, the next step would be for a cardiac surgeon to evaluate for AVR/MVR.  If he is deemed high risk (or prohibitive risk) for such intervention, catheter-based options can be considered--which would require right and left cardiac catheterization, a cardiac CT (for TAVR assessment), and a dedicated MARIA GUADALUPE (for MitraClip assessment).  I have explained this all to the patient in extensive detail--he is not sure how, or if, he wishes to proceed with such evaluation.  He would like for me to meet with his wife at the bedside tomorrow to discuss further, and I advised I would be able to do so some time in the afternoon.

## 2019-04-10 NOTE — PROGRESS NOTE ADULT - SUBJECTIVE AND OBJECTIVE BOX
MILARGOS VIERA    Patient is a 73y old  Male who presents with a chief complaint of worsening dyspnea, LE edema, increase in abd girth over a month (10 Apr 2019 11:29)    Still volume overloaded in CCU.    Allergies    penicillins (Unknown)    MEDICATIONS  (STANDING):  acetaminophen  IVPB .. 1000 milliGRAM(s) IV Intermittent once  apixaban 5 milliGRAM(s) Oral every 12 hours  aspirin enteric coated 81 milliGRAM(s) Oral daily  atorvastatin 20 milliGRAM(s) Oral at bedtime  buDESOnide 160 MICROgram(s)/formoterol 4.5 MICROgram(s) Inhaler 2 Puff(s) Inhalation two times a day  chlorhexidine 4% Liquid 1 Application(s) Topical <User Schedule>  colchicine 0.6 milliGRAM(s) Oral two times a day  dextrose 5%. 1000 milliLiter(s) (50 mL/Hr) IV Continuous <Continuous>  dextrose 50% Injectable 12.5 Gram(s) IV Push once  dextrose 50% Injectable 25 Gram(s) IV Push once  dextrose 50% Injectable 25 Gram(s) IV Push once  diltiazem    Tablet 60 milliGRAM(s) Oral three times a day  fluticasone propionate 50 MICROgram(s)/spray Nasal Spray 1 Spray(s) Both Nostrils two times a day  furosemide Infusion 10 mG/Hr (5 mL/Hr) IV Continuous <Continuous>  insulin lispro (HumaLOG) corrective regimen sliding scale   SubCutaneous three times a day before meals  insulin lispro (HumaLOG) corrective regimen sliding scale   SubCutaneous at bedtime  nicotine - 21 mG/24Hr(s) Patch 1 patch Transdermal daily  pantoprazole    Tablet 40 milliGRAM(s) Oral before breakfast  predniSONE   Tablet 30 milliGRAM(s) Oral daily  spironolactone 25 milliGRAM(s) Oral daily    MEDICATIONS  (PRN):  ALBUTerol/ipratropium for Nebulization 3 milliLiter(s) Nebulizer every 6 hours PRN Shortness of Breath and/or Wheezing  dextrose 40% Gel 15 Gram(s) Oral once PRN Blood Glucose LESS THAN 70 milliGRAM(s)/deciliter  glucagon  Injectable 1 milliGRAM(s) IntraMuscular once PRN Glucose LESS THAN 70 milligrams/deciliter    PHYSICAL EXAM:  Vital Signs Last 24 Hrs  T(C): 36.4 (10 Apr 2019 06:00), Max: 36.6 (2019 16:50)  T(F): 97.6 (10 Apr 2019 06:00), Max: 97.9 (2019 16:50)  HR: 70 (10 Apr 2019 11:00) (70 - 109)  BP: 93/72 (10 Apr 2019 11:00) (92/63 - 167/98)  BP(mean): 77 (10 Apr 2019 11:) (70 - 120)  RR: 20 (10 Apr 2019 11:) (14 - 26)  SpO2: 94% (10 Apr 2019 11:00) (91% - 100%)  Daily Height in cm: 160.02 (2019 13:00)    Daily Weight in k.3 (10 Apr 2019 06:00)  I&O's Summary    2019 07:01  -  10 Apr 2019 07:00  --------------------------------------------------------  IN: 310 mL / OUT: 2850 mL / NET: -2540 mL    10 Apr 2019 07:01  -  10 Apr 2019 12:47  --------------------------------------------------------  IN: 420 mL / OUT: 1350 mL / NET: -930 mL    General Appearance: 	 Alert, no distress at rest  Neck: JVP significantly elevated around 14-15  Lungs:  Bibasilar crackles  Cor:  pmi 5th ICS MCL, Irregular rhythm, S1 normal intensity, diminished S2, 2/6 DAVID  Abdomen:	 soft, non-tender; bowel sounds normal  Extremities: 2-3+ edema      EKG:  Telemetry: Atrial flutter    Labs:  CBC Full  -  ( 10 Apr 2019 05:25 )  WBC Count : 16.0 K/uL  RBC Count : 5.26 M/uL  Hemoglobin : 15.9 g/dL  Hematocrit : 47.8 %  Platelet Count - Automated : 243 K/uL  Mean Cell Volume : 90.9 fl  Mean Cell Hemoglobin : 30.3 pg  Mean Cell Hemoglobin Concentration : 33.3 gm/dL  Auto Neutrophil # : x  Auto Lymphocyte # : x  Auto Monocyte # : x  Auto Eosinophil # : x  Auto Basophil # : x  Auto Neutrophil % : x  Auto Lymphocyte % : x  Auto Monocyte % : x  Auto Eosinophil % : x  Auto Basophil % : x    Urinalysis Basic - ( 2019 22:59 )    Color: Yellow / Appearance: Clear / S.025 / pH: x  Gluc: x / Ketone: Negative  / Bili: Negative / Urobili: <2 mg/dL   Blood: x / Protein: Trace / Nitrite: Negative   Leuk Esterase: Moderate / RBC: 1 /HPF / WBC 0 /HPF   Sq Epi: x / Non Sq Epi: 0 /HPF / Bacteria: Few    Serum Pro-Brain Natriuretic Peptide (04.10.19 @ 05:25)    Serum Pro-Brain Natriuretic Peptide: 3782 pg/mL    Comprehensive Metabolic Panel (04.10.19 @ 05:25)    Sodium, Serum: 139 mmol/L    Potassium, Serum: 4.2 mmol/L    Chloride, Serum: 89 mmol/L    Carbon Dioxide, Serum: 33 mmol/L    Anion Gap, Serum: 17 mmol/L    Blood Urea Nitrogen, Serum: 29 mg/dL    Creatinine, Serum: 1.16 mg/dL    Glucose, Serum: 139 mg/dL    Calcium, Total Serum: 9.1 mg/dL    Protein Total, Serum: 6.7 g/dL    Albumin, Serum: 3.6 g/dL    Bilirubin Total, Serum: 0.6 mg/dL    Alkaline Phosphatase, Serum: 128 U/L    Aspartate Aminotransferase (AST/SGOT): 39 U/L    Alanine Aminotransferase (ALT/SGPT): 50 U/L    eGFR if Non : 62: Interpretative comment  The units for eGFR are mL/min/1.73M2 (normalized body surface area). The  eGFR is calculated from a serum creatinine using the CKD-EPI equation.  Other variables required for calculation are race, age and sex. Among  patients with chronic kidney disease (CKD), the eGFR is useful in  determining the stage of disease according to KDOQI CKD classification.  All eGFR results are reported numerically with the following  interpretation.          GFR                    With                 Without     (ml/min/1.73 m2)    Kidney Damage       Kidney Damage        >= 90                    Stage 1                     Normal        60-89                    Stage 2                     Decreased GFR        30-59     Stage 3                     Stage 3        15-29                    Stage 4                     Stage 4        < 15                      Stage 5                     Stage 5  Each stage of CKD assumes that the associated GFR level has been in  effect for at least 3 months. Determination of stages one and two (with  eGFR > 59 ml/min/m2) requires estimation of kidney damage for at least 3  months as defined by structural or functional abnormalities.  Limitations: All estimates of GFR will be less accurate for patients at  extremes of muscle mass (including but not limited to frail elderly,  critically ill, or cancer patients), those with unusual diets, and those  with conditions associated with reduced secretion or extrarenal  elimination of creatinine. The eGFR equation is not recommended for use  in patients with unstable creatinine levels. mL/min/1.73M2    eGFR if African American: 72 mL/min/1.73M2 MILAGROS VIERA    Patient is a 73y old  Male who presents with a chief complaint of worsening dyspnea, LE edema, increase in abd girth over a month (10 Apr 2019 11:29)    Still volume overloaded in CCU.    Allergies    penicillins (Unknown)    MEDICATIONS  (STANDING):  acetaminophen  IVPB .. 1000 milliGRAM(s) IV Intermittent once  apixaban 5 milliGRAM(s) Oral every 12 hours  aspirin enteric coated 81 milliGRAM(s) Oral daily  atorvastatin 20 milliGRAM(s) Oral at bedtime  buDESOnide 160 MICROgram(s)/formoterol 4.5 MICROgram(s) Inhaler 2 Puff(s) Inhalation two times a day  chlorhexidine 4% Liquid 1 Application(s) Topical <User Schedule>  colchicine 0.6 milliGRAM(s) Oral two times a day  dextrose 5%. 1000 milliLiter(s) (50 mL/Hr) IV Continuous <Continuous>  dextrose 50% Injectable 12.5 Gram(s) IV Push once  dextrose 50% Injectable 25 Gram(s) IV Push once  dextrose 50% Injectable 25 Gram(s) IV Push once  diltiazem    Tablet 60 milliGRAM(s) Oral three times a day  fluticasone propionate 50 MICROgram(s)/spray Nasal Spray 1 Spray(s) Both Nostrils two times a day  furosemide Infusion 10 mG/Hr (5 mL/Hr) IV Continuous <Continuous>  insulin lispro (HumaLOG) corrective regimen sliding scale   SubCutaneous three times a day before meals  insulin lispro (HumaLOG) corrective regimen sliding scale   SubCutaneous at bedtime  nicotine - 21 mG/24Hr(s) Patch 1 patch Transdermal daily  pantoprazole    Tablet 40 milliGRAM(s) Oral before breakfast  predniSONE   Tablet 30 milliGRAM(s) Oral daily  spironolactone 25 milliGRAM(s) Oral daily    MEDICATIONS  (PRN):  ALBUTerol/ipratropium for Nebulization 3 milliLiter(s) Nebulizer every 6 hours PRN Shortness of Breath and/or Wheezing  dextrose 40% Gel 15 Gram(s) Oral once PRN Blood Glucose LESS THAN 70 milliGRAM(s)/deciliter  glucagon  Injectable 1 milliGRAM(s) IntraMuscular once PRN Glucose LESS THAN 70 milligrams/deciliter    PHYSICAL EXAM:  Vital Signs Last 24 Hrs  T(C): 36.4 (10 Apr 2019 06:00), Max: 36.6 (2019 16:50)  T(F): 97.6 (10 Apr 2019 06:00), Max: 97.9 (2019 16:50)  HR: 70 (10 Apr 2019 11:00) (70 - 109)  BP: 93/72 (10 Apr 2019 11:00) (92/63 - 167/98)  BP(mean): 77 (10 Apr 2019 11:) (70 - 120)  RR: 20 (10 Apr 2019 11:) (14 - 26)  SpO2: 94% (10 Apr 2019 11:00) (91% - 100%)  Daily Height in cm: 160.02 (2019 13:00)    Daily Weight in k.3 (10 Apr 2019 06:00)  I&O's Summary    2019 07:01  -  10 Apr 2019 07:00  --------------------------------------------------------  IN: 310 mL / OUT: 2850 mL / NET: -2540 mL    10 Apr 2019 07:01  -  10 Apr 2019 12:47  --------------------------------------------------------  IN: 420 mL / OUT: 1350 mL / NET: -930 mL    General Appearance: 	 Alert, no distress at rest  Neck: JVP significantly elevated around 14-15  Lungs:  Bibasilar crackles  Cor:  pmi 5th ICS MCL, Irregular rhythm, S1 normal intensity, diminished S2, 2/6 DAVID  Abdomen:	 soft, non-tender; bowel sounds normal  Extremities: 2-3+ edema      EKG:  Telemetry: Atrial fibrillation    Labs:  CBC Full  -  ( 10 Apr 2019 05:25 )  WBC Count : 16.0 K/uL  RBC Count : 5.26 M/uL  Hemoglobin : 15.9 g/dL  Hematocrit : 47.8 %  Platelet Count - Automated : 243 K/uL  Mean Cell Volume : 90.9 fl  Mean Cell Hemoglobin : 30.3 pg  Mean Cell Hemoglobin Concentration : 33.3 gm/dL  Auto Neutrophil # : x  Auto Lymphocyte # : x  Auto Monocyte # : x  Auto Eosinophil # : x  Auto Basophil # : x  Auto Neutrophil % : x  Auto Lymphocyte % : x  Auto Monocyte % : x  Auto Eosinophil % : x  Auto Basophil % : x    Urinalysis Basic - ( 2019 22:59 )    Color: Yellow / Appearance: Clear / S.025 / pH: x  Gluc: x / Ketone: Negative  / Bili: Negative / Urobili: <2 mg/dL   Blood: x / Protein: Trace / Nitrite: Negative   Leuk Esterase: Moderate / RBC: 1 /HPF / WBC 0 /HPF   Sq Epi: x / Non Sq Epi: 0 /HPF / Bacteria: Few    Serum Pro-Brain Natriuretic Peptide (04.10.19 @ 05:25)    Serum Pro-Brain Natriuretic Peptide: 3782 pg/mL    Comprehensive Metabolic Panel (04.10.19 @ 05:25)    Sodium, Serum: 139 mmol/L    Potassium, Serum: 4.2 mmol/L    Chloride, Serum: 89 mmol/L    Carbon Dioxide, Serum: 33 mmol/L    Anion Gap, Serum: 17 mmol/L    Blood Urea Nitrogen, Serum: 29 mg/dL    Creatinine, Serum: 1.16 mg/dL    Glucose, Serum: 139 mg/dL    Calcium, Total Serum: 9.1 mg/dL    Protein Total, Serum: 6.7 g/dL    Albumin, Serum: 3.6 g/dL    Bilirubin Total, Serum: 0.6 mg/dL    Alkaline Phosphatase, Serum: 128 U/L    Aspartate Aminotransferase (AST/SGOT): 39 U/L    Alanine Aminotransferase (ALT/SGPT): 50 U/L    eGFR if Non : 62: Interpretative comment  The units for eGFR are mL/min/1.73M2 (normalized body surface area). The  eGFR is calculated from a serum creatinine using the CKD-EPI equation.  Other variables required for calculation are race, age and sex. Among  patients with chronic kidney disease (CKD), the eGFR is useful in  determining the stage of disease according to KDOQI CKD classification.  All eGFR results are reported numerically with the following  interpretation.          GFR                    With                 Without     (ml/min/1.73 m2)    Kidney Damage       Kidney Damage        >= 90                    Stage 1                     Normal        60-89                    Stage 2                     Decreased GFR        30-59     Stage 3                     Stage 3        15-29                    Stage 4                     Stage 4        < 15                      Stage 5                     Stage 5  Each stage of CKD assumes that the associated GFR level has been in  effect for at least 3 months. Determination of stages one and two (with  eGFR > 59 ml/min/m2) requires estimation of kidney damage for at least 3  months as defined by structural or functional abnormalities.  Limitations: All estimates of GFR will be less accurate for patients at  extremes of muscle mass (including but not limited to frail elderly,  critically ill, or cancer patients), those with unusual diets, and those  with conditions associated with reduced secretion or extrarenal  elimination of creatinine. The eGFR equation is not recommended for use  in patients with unstable creatinine levels. mL/min/1.73M2    eGFR if African American: 72 mL/min/1.73M2

## 2019-04-10 NOTE — PROGRESS NOTE ADULT - SUBJECTIVE AND OBJECTIVE BOX
CHIEF COMPLAINT:  f/up resp insufficiency, copd exacerbation, osas, allergic rhinitis, preop pulm clearance --better over all- less cough and wheeze    Interval Events: EPS f/up--TAVR work up    REVIEW OF SYSTEMS:  Constitutional: No fevers or chills. No weight loss. + fatigue or generalized malaise.  Eyes: No itching or discharge from the eyes  ENT: No ear pain. No ear discharge. No nasal congestion. No post nasal drip. No epistaxis. No throat pain. No sore throat. No difficulty swallowing.   CV: No chest pain. No palpitations. No lightheadedness or dizziness.   Resp: No dyspnea at rest. + dyspnea on exertion. No orthopnea. No wheezing. No cough. No stridor. No sputum production. No chest pain with respiration.  GI: No nausea. No vomiting. No diarrhea.  MSK: No joint pain or pain in any extremities  Integumentary: No skin lesions. +pedal edema.  Neurological: No gross motor weakness. No sensory changes.  [+ ] All other systems negative  [ ] Unable to assess ROS because ________    OBJECTIVE:  ICU Vital Signs Last 24 Hrs  T(C): 36.3 (2019 20:50), Max: 36.6 (2019 16:50)  T(F): 97.4 (2019 20:50), Max: 97.9 (2019 16:50)  HR: 96 (10 Apr 2019 04:00) (96 - 109)  BP: 125/84 (10 Apr 2019 04:00) (106/83 - 167/98)  BP(mean): 114 (10 Apr 2019 04:00) (71 - 120)  ABP: --  ABP(mean): --  RR: 14 (10 Apr 2019 04:00) (14 - 26)  SpO2: 95% (10 Apr 2019 04:00) (93% - 100%)         @ 07:01  -   @ 07:00  --------------------------------------------------------  IN: 120 mL / OUT: 2350 mL / NET: -2230 mL     @ 07:01  -  04-10 @ 05:29  --------------------------------------------------------  IN: 300 mL / OUT: 1950 mL / NET: -1650 mL      CAPILLARY BLOOD GLUCOSE      POCT Blood Glucose.: 152 mg/dL (2019 21:38)      PHYSICAL EXAM: NAD in bed NC O2  General: Awake, alert, oriented X 3.   HEENT: Atraumatic, normocephalic.                 Mallampatti Grade 3                No nasal congestion.                No tonsillar or pharyngeal exudates.  Lymph Nodes: No palpable lymphadenopathy  Neck: No JVD. No carotid bruit.   Respiratory: Normal chest expansion                         Normal percussion                         Normal and equal air entry                         rare wheeze,no  rhonchi or rales.  Cardiovascular: S1 S2 normal. + murmurs, rubs or gallops.   Abdomen: Soft, non-tender, non-distended. No organomegaly. Normoactive bowel sounds.  Extremities: Warm to touch. Peripheral pulse palpable. + pedal edema.   Skin: No rashes or skin lesions  Neurological: Motor and sensory examination equal and normal in all four extremities.  Psychiatry: Appropriate mood and affect.    HOSPITAL MEDICATIONS:  MEDICATIONS  (STANDING):  acetaminophen  IVPB .. 1000 milliGRAM(s) IV Intermittent once  apixaban 5 milliGRAM(s) Oral every 12 hours  aspirin enteric coated 81 milliGRAM(s) Oral daily  atorvastatin 20 milliGRAM(s) Oral at bedtime  buDESOnide 160 MICROgram(s)/formoterol 4.5 MICROgram(s) Inhaler 2 Puff(s) Inhalation two times a day  chlorhexidine 4% Liquid 1 Application(s) Topical <User Schedule>  colchicine 0.6 milliGRAM(s) Oral two times a day  dextrose 5%. 1000 milliLiter(s) (50 mL/Hr) IV Continuous <Continuous>  dextrose 50% Injectable 12.5 Gram(s) IV Push once  dextrose 50% Injectable 25 Gram(s) IV Push once  dextrose 50% Injectable 25 Gram(s) IV Push once  diltiazem    Tablet 60 milliGRAM(s) Oral three times a day  fluticasone propionate 50 MICROgram(s)/spray Nasal Spray 1 Spray(s) Both Nostrils two times a day  furosemide Infusion 10 mG/Hr (5 mL/Hr) IV Continuous <Continuous>  insulin lispro (HumaLOG) corrective regimen sliding scale   SubCutaneous three times a day before meals  insulin lispro (HumaLOG) corrective regimen sliding scale   SubCutaneous at bedtime  nicotine - 21 mG/24Hr(s) Patch 1 patch Transdermal daily  pantoprazole    Tablet 40 milliGRAM(s) Oral before breakfast  predniSONE   Tablet 30 milliGRAM(s) Oral daily  spironolactone 25 milliGRAM(s) Oral daily    MEDICATIONS  (PRN):  ALBUTerol/ipratropium for Nebulization 3 milliLiter(s) Nebulizer every 6 hours PRN Shortness of Breath and/or Wheezing  dextrose 40% Gel 15 Gram(s) Oral once PRN Blood Glucose LESS THAN 70 milliGRAM(s)/deciliter  glucagon  Injectable 1 milliGRAM(s) IntraMuscular once PRN Glucose LESS THAN 70 milligrams/deciliter      LABS:                        14.7   16.4  )-----------( 280      ( 2019 06:35 )             47.5         137  |  91<L>  |  36<H>  ----------------------------<  164<H>  4.4   |  33<H>  |  1.48<H>    Ca    9.3      2019 06:34    TPro  6.1  /  Alb  3.5  /  TBili  0.5  /  DBili  x   /  AST  50<H>  /  ALT  56<H>  /  AlkPhos  142<H>      PT/INR - ( 2019 11:21 )   PT: 19.7 sec;   INR: 1.69 ratio         PTT - ( 2019 11:21 )  PTT:27.7 sec  Urinalysis Basic - ( 2019 22:59 )    Color: Yellow / Appearance: Clear / S.025 / pH: x  Gluc: x / Ketone: Negative  / Bili: Negative / Urobili: <2 mg/dL   Blood: x / Protein: Trace / Nitrite: Negative   Leuk Esterase: Moderate / RBC: 1 /HPF / WBC 0 /HPF   Sq Epi: x / Non Sq Epi: 0 /HPF / Bacteria: Few      Arterial Blood Gas:   @ 16:38  7.36/60/90/34/96/6.3  ABG lactate: --  Arterial Blood Gas:   @ 16:11  7.27/81/253/36/99/5.8  ABG lactate: --    Venous Blood Gas:   @ 10:38  7.39/51/45/31/78  VBG Lactate: 3.0      MICROBIOLOGY:     RADIOLOGY:  [ ] Reviewed and interpreted by me    Point of Care Ultrasound Findings:    PFT:    EKG:

## 2019-04-10 NOTE — PROGRESS NOTE ADULT - ASSESSMENT
73 M current smoker with a PMH of obesity, HTN, HLD, CAD, diastolic HF,  A fib on Eliquis, type 2 diabetes, COPD ( not on home O2), LIMA now presenting with SOB, b/l LE edema and abdominal distension, likely 2/2 acute decompensated diastolic heart failure     1. Acute decompensated diastolic HF/ A fib  - Agree with IV diuresis with Lasix   - Requires strict I/Os/ daily weights. Goal to keep at least 1-2 negative daily   - Suggest following electrolytes BID while getting IV diuresis. Keep  K+>4, Mag > 2  - Telemetry monitoring. Follow cardiac enzymes s/p  MARIA GUADALUPE/EPS  - Cont to optimize rate control for A fib. Remains on AC with eliquis  - Cardiology follow up  2. COPD  -on Prednisone 30 mg daily and then taper over 1 week   - suggest Xopenex and Atrovent Q6H  -No clinical radiological evidence of infection. Hold off on antibiotics for now.   -- Chest PT/ acapella use to assist with mobilization of secretions - Incentive spirometry  - Supplemental O2 to keep sats > 90 %  3. Lung nodules:- Few unchanged 0.4 cm RUL nodules -outpt follow up with repeat imaging in 6 months   4. OS- Unwilling to use CPAP  5. Nicotine addiction/ Smoking cessation:  - Requires a nicotine patch - Smoking cessation counseling provided and different outpt options discussed at length. Time spent: 10 mins   ********************  4/9-better over all  4/10-CTS evaln for TAVR etc.

## 2019-04-10 NOTE — PROGRESS NOTE ADULT - SUBJECTIVE AND OBJECTIVE BOX
Events:    Review Of Systems:  Constitutional: denies fever, chills, Fatigue   HEENT: denies Blurred vision, Eye Pain, Headache   Respiratory: denies Cough, Wheezing , Shortness of breath  Cardiovascular: denies Chest Pain, Palpitations,  BALTAZAR   Gastrointestinal: denies Abdominal Pain, Diarrhea, Constipation   Genitourinary: denies Nocturia, Dysuria, Incontinence  Extremities: denies Swelling, Joint Pain  Neurologic: denies Focal deficit, Paresthesias, Syncope  Lymphatic: denies Swelling, Lymphadenopathy   Skin: denies Rash, Ecchymoses, Wounds   Psychiatry: denies Depression, Suicidal/Homicidal Ideation, anxiety  [X ] 10 point review of systems is otherwise negative except as mentioned above         Medications:  acetaminophen  IVPB .. 1000 milliGRAM(s) IV Intermittent once  ALBUTerol/ipratropium for Nebulization 3 milliLiter(s) Nebulizer every 6 hours PRN  apixaban 5 milliGRAM(s) Oral every 12 hours  aspirin enteric coated 81 milliGRAM(s) Oral daily  atorvastatin 20 milliGRAM(s) Oral at bedtime  buDESOnide 160 MICROgram(s)/formoterol 4.5 MICROgram(s) Inhaler 2 Puff(s) Inhalation two times a day  chlorhexidine 4% Liquid 1 Application(s) Topical <User Schedule>  colchicine 0.6 milliGRAM(s) Oral two times a day  dextrose 40% Gel 15 Gram(s) Oral once PRN  dextrose 5%. 1000 milliLiter(s) IV Continuous <Continuous>  dextrose 50% Injectable 12.5 Gram(s) IV Push once  dextrose 50% Injectable 25 Gram(s) IV Push once  dextrose 50% Injectable 25 Gram(s) IV Push once  diltiazem    Tablet 60 milliGRAM(s) Oral three times a day  fluticasone propionate 50 MICROgram(s)/spray Nasal Spray 1 Spray(s) Both Nostrils two times a day  furosemide Infusion 10 mG/Hr IV Continuous <Continuous>  glucagon  Injectable 1 milliGRAM(s) IntraMuscular once PRN  insulin lispro (HumaLOG) corrective regimen sliding scale   SubCutaneous three times a day before meals  insulin lispro (HumaLOG) corrective regimen sliding scale   SubCutaneous at bedtime  magnesium sulfate  IVPB 2 Gram(s) IV Intermittent once  nicotine - 21 mG/24Hr(s) Patch 1 patch Transdermal daily  pantoprazole    Tablet 40 milliGRAM(s) Oral before breakfast  predniSONE   Tablet 30 milliGRAM(s) Oral daily  spironolactone 25 milliGRAM(s) Oral daily    PMH/PSH/FH/SH: [ ] Unchanged  Vitals:  T(C): 36.4 (04-10-19 @ 06:00), Max: 36.6 (19 @ 16:50)  HR: 98 (04-10-19 @ 06:00) (96 - 109)  BP: 118/76 (04-10-19 @ 06:00) (106/83 - 167/98)  BP(mean): 89 (04-10-19 @ 06:00) (71 - 120)  RR: 15 (04-10-19 @ 06:00) (14 - 26)  SpO2: 94% (04-10-19 @ 06:00) (93% - 100%)  Wt(kg): --  Daily Height in cm: 160.02 (2019 13:00)    Daily Weight in k.3 (10 Apr 2019 06:00)  I&O's Summary    2019 07:01  -  10 Apr 2019 07:00  --------------------------------------------------------  IN: 310 mL / OUT: 2850 mL / NET: -2540 mL        Physical Exam:  Appearance: [ ] Normal [ ] NAD  Eyes: [ ] PERRL [ ] EOMI  HENT: [ ] Normal oral muscosa [ ]NC/AT  Cardiovascular: [ ] S1 [ ] S2 [ ] RRR [ ] No m/r/g [ ]No edema [ ] JVP  Procedural Access Site: [ ] No hematoma [ ] Non-tender to palpation [ ] 2+ pulse [ ] No bruit [ ] No Ecchymosis  Respiratory: [ ] Clear to auscultation bilaterally  Gastrointestinal: [ ] Soft [ ] Non-tender [ ] Non-distended [ ] BS+  Musculoskeletal: [ ] No clubbing [ ] No joint deformity   Neurologic: [ ] Non-focal  Lymphatic: [ ] No lymphadenopathy  Psychiatry: [ ] AAOx3 [ ] Mood & affect appropriate  Skin: [ ] No rashes [ ] No ecchymoses [ ] No cyanosis    04-10    139  |  89<L>  |  29<H>  ----------------------------<  139<H>  4.2   |  33<H>  |  1.16    Ca    9.1      10 Apr 2019 05:25  Phos  4.0     04-10  Mg     1.7     04-10    TPro  6.7  /  Alb  3.6  /  TBili  0.6  /  DBili  x   /  AST  39  /  ALT  50<H>  /  AlkPhos  128<H>  04-10    PT/INR - ( 2019 11:21 )   PT: 19.7 sec;   INR: 1.69 ratio         PTT - ( 2019 11:21 )  PTT:27.7 sec      Serum Pro-Brain Natriuretic Peptide: 3782 pg/mL (04-10 @ 05:25)  Serum Pro-Brain Natriuretic Peptide: 3348 pg/mL ( @ 10:08)    Total Cholesterol: 125  LDL: 54  HDL: 51  T    Hemoglobin A1C, Whole Blood: 8.2 % ( @ 10:10)      ECG:    Echo:    Stress Testing:     Cath:    Imaging:    Interpretation of Telemetry: HPI:  72 yo M who presents w/ progressive worsening of dyspnea, orthopnea, BALTAZAR, sleeps in chair, increased LE edema w pain on ambulation and increased abdominal girth Ptn was seen by his pulmonologist Dr. Brand on  who started him on 40 mg Prednisone, ptn states he has no relief, and saw his planned Follow up w his cardiologist Dr. Campbell today, who sent him to the hospital for treatment.  PMH:  hyperthyrodism, untreated since ptn refused Endo F/U in the past, obesity, HTN, HLD, chronic heavy smoker, 50 pack years, COPD, LIMA, refuses CPAP, pulmonary nodules, dCHF, CAD, Afib on Eliquis, Pericardial effusion w drainage in , at which time was placed on Colchicine, refused to stay for work up after drainage,  DM2, Nephrolithiasis (2019 11:43). During hospitaliation patient was diuresed, tapazole started for hyperthyroid. Pt noted to be in A Flutter and underwent an ablation on . During the [facility] pt noted to have severe MR and severe AS. Following the procedure pt required Bipap and lasix gtt started for fluid overload. Plan for TAVR work up.     Events: This AM pt in AFib asymptomatic and rate controlled. EP made aware.     Review Of Systems:  Constitutional: denies fever, chills, Fatigue   HEENT: denies Blurred vision, Eye Pain, Headache   Respiratory: denies Cough, Wheezing , Shortness of breath  Cardiovascular: denies Chest Pain, Palpitations,  BALTAZAR   Gastrointestinal: denies Abdominal Pain, Diarrhea, Constipation   Genitourinary: denies Nocturia, Dysuria, Incontinence  Extremities: denies Swelling, Joint Pain  Neurologic: denies Focal deficit, Paresthesias, Syncope  Lymphatic: denies Swelling, Lymphadenopathy   Skin: denies Rash, Ecchymoses, Wounds   Psychiatry: denies Depression, Suicidal/Homicidal Ideation, anxiety  [X ] 10 point review of systems is otherwise negative except as mentioned above         Medications:  acetaminophen  IVPB .. 1000 milliGRAM(s) IV Intermittent once  ALBUTerol/ipratropium for Nebulization 3 milliLiter(s) Nebulizer every 6 hours PRN  apixaban 5 milliGRAM(s) Oral every 12 hours  aspirin enteric coated 81 milliGRAM(s) Oral daily  atorvastatin 20 milliGRAM(s) Oral at bedtime  buDESOnide 160 MICROgram(s)/formoterol 4.5 MICROgram(s) Inhaler 2 Puff(s) Inhalation two times a day  chlorhexidine 4% Liquid 1 Application(s) Topical <User Schedule>  colchicine 0.6 milliGRAM(s) Oral two times a day  dextrose 40% Gel 15 Gram(s) Oral once PRN  dextrose 5%. 1000 milliLiter(s) IV Continuous <Continuous>  dextrose 50% Injectable 12.5 Gram(s) IV Push once  dextrose 50% Injectable 25 Gram(s) IV Push once  dextrose 50% Injectable 25 Gram(s) IV Push once  diltiazem    Tablet 60 milliGRAM(s) Oral three times a day  fluticasone propionate 50 MICROgram(s)/spray Nasal Spray 1 Spray(s) Both Nostrils two times a day  furosemide Infusion 10 mG/Hr IV Continuous <Continuous>  glucagon  Injectable 1 milliGRAM(s) IntraMuscular once PRN  insulin lispro (HumaLOG) corrective regimen sliding scale   SubCutaneous three times a day before meals  insulin lispro (HumaLOG) corrective regimen sliding scale   SubCutaneous at bedtime  magnesium sulfate  IVPB 2 Gram(s) IV Intermittent once  nicotine - 21 mG/24Hr(s) Patch 1 patch Transdermal daily  pantoprazole    Tablet 40 milliGRAM(s) Oral before breakfast  predniSONE   Tablet 30 milliGRAM(s) Oral daily  spironolactone 25 milliGRAM(s) Oral daily    Vitals:  T(C): 36.4 (04-10-19 @ 06:00), Max: 36.6 (19 @ 16:50)  HR: 98 (04-10-19 @ 06:00) (96 - 109)  BP: 118/76 (04-10-19 @ 06:00) (106/83 - 167/98)  BP(mean): 89 (04-10-19 @ 06:00) (71 - 120)  RR: 15 (04-10-19 @ 06:00) (14 - 26)  SpO2: 94% (04-10-19 @ 06:00) (93% - 100%)    Daily Height in cm: 160.02 (2019 13:00)    Daily Weight in k.3 (10 Apr 2019 06:00)  I&O's Summary    2019 07:01  -  10 Apr 2019 07:00  --------------------------------------------------------  IN: 310 mL / OUT: 2850 mL / NET: -2540 mL    Physical Exam:  Appearance: [ ] Normal [ ] NAD  Eyes: [ ] PERRL [ ] EOMI  HENT: [ ] Normal oral muscosa [ ]NC/AT  Cardiovascular: [ ] S1 [ ] S2 [ ] RRR [ ] No m/r/g [ ]No edema [ ] JVP  Procedural Access Site: [ ] No hematoma [ ] Non-tender to palpation [ ] 2+ pulse [ ] No bruit [ ] No Ecchymosis  Respiratory: [ ] Clear to auscultation bilaterally  Gastrointestinal: [ ] Soft [ ] Non-tender [ ] Non-distended [ ] BS+  Musculoskeletal: [ ] No clubbing [ ] No joint deformity   Neurologic: [ ] Non-focal  Lymphatic: [ ] No lymphadenopathy  Psychiatry: [ ] AAOx3 [ ] Mood & affect appropriate  Skin: [ ] No rashes [ ] No ecchymoses [ ] No cyanosis    04-10    139  |  89<L>  |  29<H>  ----------------------------<  139<H>  4.2   |  33<H>  |  1.16    Ca    9.1      10 Apr 2019 05:25  Phos  4.0     04-10  Mg     1.7     04-10    TPro  6.7  /  Alb  3.6  /  TBili  0.6  /  DBili  x   /  AST  39  /  ALT  50<H>  /  AlkPhos  128<H>  04-10    PT/INR - ( 2019 11:21 )   PT: 19.7 sec;   INR: 1.69 ratio         PTT - ( 2019 11:21 )  PTT:27.7 sec      Serum Pro-Brain Natriuretic Peptide: 3782 pg/mL (04-10 @ 05:25)  Serum Pro-Brain Natriuretic Peptide: 3348 pg/mL ( @ 10:08)    Total Cholesterol: 125  LDL: 54  HDL: 51  T    Hemoglobin A1C, Whole Blood: 8.2 % ( @ 10:10)      ECG:    Echo:    Stress Testing:     Cath:    Imaging:    Interpretation of Telemetry: HPI:  74 yo M who presents w/ progressive worsening of dyspnea, orthopnea, BALTAZAR, sleeps in chair, increased LE edema w pain on ambulation and increased abdominal girth Ptn was seen by his pulmonologist Dr. Brand on  who started him on 40 mg Prednisone, ptn states he has no relief, and saw his planned Follow up w his cardiologist Dr. Campbell today, who sent him to the hospital for treatment.  PMH:  hyperthyrodism, untreated since ptn refused Endo F/U in the past, obesity, HTN, HLD, chronic heavy smoker, 50 pack years, COPD, LIMA, refuses CPAP, pulmonary nodules, dCHF, CAD, Afib on Eliquis, Pericardial effusion w drainage in , at which time was placed on Colchicine, refused to stay for work up after drainage,  DM2, Nephrolithiasis (2019 11:43). During hospitaliation patient was diuresed, tapazole started for hyperthyroid. Pt noted to be in A Flutter and underwent an ablation on . During the [facility] pt noted to have severe MR and severe AS. Following the procedure pt required Bipap and lasix gtt started for fluid overload. Plan for TAVR work up.     Events: This AM pt in AFib asymptomatic and rate controlled. EP made aware.     Review Of Systems:  Constitutional: denies fever, chills, Fatigue   HEENT: denies Blurred vision, Eye Pain, Headache   Respiratory: denies Cough, Wheezing , Shortness of breath  Cardiovascular: denies Chest Pain, Palpitations,  BALTAZAR   Gastrointestinal: denies Abdominal Pain, Diarrhea, Constipation   Genitourinary: denies Nocturia, Dysuria, Incontinence  Extremities: denies Swelling, Joint Pain  Neurologic: denies Focal deficit, Paresthesias, Syncope  Lymphatic: denies Swelling, Lymphadenopathy   Skin: denies Rash, Ecchymoses, Wounds   Psychiatry: denies Depression, Suicidal/Homicidal Ideation, anxiety  [X ] 10 point review of systems is otherwise negative except as mentioned above         Medications:  acetaminophen  IVPB .. 1000 milliGRAM(s) IV Intermittent once  ALBUTerol/ipratropium for Nebulization 3 milliLiter(s) Nebulizer every 6 hours PRN  apixaban 5 milliGRAM(s) Oral every 12 hours  aspirin enteric coated 81 milliGRAM(s) Oral daily  atorvastatin 20 milliGRAM(s) Oral at bedtime  buDESOnide 160 MICROgram(s)/formoterol 4.5 MICROgram(s) Inhaler 2 Puff(s) Inhalation two times a day  chlorhexidine 4% Liquid 1 Application(s) Topical <User Schedule>  colchicine 0.6 milliGRAM(s) Oral two times a day  dextrose 40% Gel 15 Gram(s) Oral once PRN  dextrose 5%. 1000 milliLiter(s) IV Continuous <Continuous>  dextrose 50% Injectable 12.5 Gram(s) IV Push once  dextrose 50% Injectable 25 Gram(s) IV Push once  dextrose 50% Injectable 25 Gram(s) IV Push once  diltiazem    Tablet 60 milliGRAM(s) Oral three times a day  fluticasone propionate 50 MICROgram(s)/spray Nasal Spray 1 Spray(s) Both Nostrils two times a day  furosemide Infusion 10 mG/Hr IV Continuous <Continuous>  glucagon  Injectable 1 milliGRAM(s) IntraMuscular once PRN  insulin lispro (HumaLOG) corrective regimen sliding scale   SubCutaneous three times a day before meals  insulin lispro (HumaLOG) corrective regimen sliding scale   SubCutaneous at bedtime  magnesium sulfate  IVPB 2 Gram(s) IV Intermittent once  nicotine - 21 mG/24Hr(s) Patch 1 patch Transdermal daily  pantoprazole    Tablet 40 milliGRAM(s) Oral before breakfast  predniSONE   Tablet 30 milliGRAM(s) Oral daily  spironolactone 25 milliGRAM(s) Oral daily    Vitals:  T(C): 36.4 (04-10-19 @ 06:00), Max: 36.6 (19 @ 16:50)  HR: 98 (04-10-19 @ 06:00) (96 - 109)  BP: 118/76 (04-10-19 @ 06:00) (106/83 - 167/98)  BP(mean): 89 (04-10-19 @ 06:00) (71 - 120)  RR: 15 (04-10-19 @ 06:00) (14 - 26)  SpO2: 94% (04-10-19 @ 06:00) (93% - 100%)    Daily Height in cm: 160.02 (2019 13:00)    Daily Weight in k.3 (10 Apr 2019 06:00)  I&O's Summary    2019 07:01  -  10 Apr 2019 07:00  --------------------------------------------------------  IN: 310 mL / OUT: 2850 mL / NET: -2540 mL    Physical Exam:  Appearance: pt appears sleepy, not in any distress  Eyes: [ x] PERRL [x ] EOMI  HENT: [ x] Normal oral muscosa [x ]NC/AT  Cardiovascular: [x ] S1 [x ] S2, irregular rhythm , + systolic murmur heard in the right sternal border. B/L LE edema +2 shins.   Procedural Access Site: Access site C/D/I without bleeding, induration, or hematoma.  Respiratory: [ x] Clear to auscultation bilaterally  Gastrointestinal: [ x] Soft [x ] Non-tender, distended.   Musculoskeletal: [x ] No clubbing [x ] No joint deformity   Neurologic: [x ] Non-focal  Lymphatic: [ x] No lymphadenopathy  Psychiatry: [x ] AAOx3 [ x] Mood & affect appropriate  Skin: [x ] No rashes [ x] No ecchymoses [x ] No cyanosis    04-10    139  |  89<L>  |  29<H>  ----------------------------<  139<H>  4.2   |  33<H>  |  1.16    Ca    9.1      10 Apr 2019 05:25  Phos  4.0     04-10  Mg     1.7     04-10    TPro  6.7  /  Alb  3.6  /  TBili  0.6  /  DBili  x   /  AST  39  /  ALT  50<H>  /  AlkPhos  128<H>  04-10    PT/INR - ( 2019 11:21 )   PT: 19.7 sec;   INR: 1.69 ratio      PTT - ( 2019 11:21 )  PTT:27.7 sec    Serum Pro-Brain Natriuretic Peptide: 3782 pg/mL (04-10 @ 05:25)  Serum Pro-Brain Natriuretic Peptide: 3348 pg/mL ( @ 10:08)    Total Cholesterol: 125  LDL: 54  HDL: 51  T    Hemoglobin A1C, Whole Blood: 8.2 % ( @ 10:10)    ECG:    Echo:    Stress Testing:     Cath:    Imaging:    Interpretation of Telemetry: HPI:  72 yo M who presents w/ progressive worsening of dyspnea, orthopnea, BALTAZAR, sleeps in chair, increased LE edema w pain on ambulation and increased abdominal girth Ptn was seen by his pulmonologist Dr. Brand on  who started him on 40 mg Prednisone, ptn states he has no relief, and saw his planned Follow up w his cardiologist Dr. Campbell today, who sent him to the hospital for treatment.  PMH:  hyperthyrodism, untreated since ptn refused Endo F/U in the past, obesity, HTN, HLD, chronic heavy smoker, 50 pack years, COPD, LIMA, refuses CPAP, pulmonary nodules, dCHF, CAD, Afib on Eliquis, Pericardial effusion w drainage in , at which time was placed on Colchicine, refused to stay for work up after drainage,  DM2, Nephrolithiasis (2019 11:43). During hospitaliation patient was diuresed, tapazole started for hyperthyroid. Pt noted to be in A Flutter and underwent an ablation on . During the [facility] pt noted to have severe MR and severe AS. Following the procedure pt required Bipap and lasix gtt started for fluid overload. Plan for TAVR work up.     Events: This AM pt in AFib asymptomatic and rate controlled. EP made aware.     Review Of Systems:  Constitutional: denies fever, chills, Fatigue   HEENT: denies Blurred vision, Eye Pain, Headache   Respiratory: denies Cough, Wheezing , Shortness of breath  Cardiovascular: denies Chest Pain, Palpitations,  BALTAZAR   Gastrointestinal: denies Abdominal Pain, Diarrhea, Constipation   Genitourinary: denies Nocturia, Dysuria, Incontinence  Extremities: denies Swelling, Joint Pain  Neurologic: denies Focal deficit, Paresthesias, Syncope  Lymphatic: denies Swelling, Lymphadenopathy   Skin: denies Rash, Ecchymoses, Wounds   Psychiatry: denies Depression, Suicidal/Homicidal Ideation, anxiety  [X ] 10 point review of systems is otherwise negative except as mentioned above         Medications:  acetaminophen  IVPB .. 1000 milliGRAM(s) IV Intermittent once  ALBUTerol/ipratropium for Nebulization 3 milliLiter(s) Nebulizer every 6 hours PRN  apixaban 5 milliGRAM(s) Oral every 12 hours  aspirin enteric coated 81 milliGRAM(s) Oral daily  atorvastatin 20 milliGRAM(s) Oral at bedtime  buDESOnide 160 MICROgram(s)/formoterol 4.5 MICROgram(s) Inhaler 2 Puff(s) Inhalation two times a day  chlorhexidine 4% Liquid 1 Application(s) Topical <User Schedule>  colchicine 0.6 milliGRAM(s) Oral two times a day  dextrose 40% Gel 15 Gram(s) Oral once PRN  dextrose 5%. 1000 milliLiter(s) IV Continuous <Continuous>  dextrose 50% Injectable 12.5 Gram(s) IV Push once  dextrose 50% Injectable 25 Gram(s) IV Push once  dextrose 50% Injectable 25 Gram(s) IV Push once  diltiazem    Tablet 60 milliGRAM(s) Oral three times a day  fluticasone propionate 50 MICROgram(s)/spray Nasal Spray 1 Spray(s) Both Nostrils two times a day  furosemide Infusion 10 mG/Hr IV Continuous <Continuous>  glucagon  Injectable 1 milliGRAM(s) IntraMuscular once PRN  insulin lispro (HumaLOG) corrective regimen sliding scale   SubCutaneous three times a day before meals  insulin lispro (HumaLOG) corrective regimen sliding scale   SubCutaneous at bedtime  magnesium sulfate  IVPB 2 Gram(s) IV Intermittent once  nicotine - 21 mG/24Hr(s) Patch 1 patch Transdermal daily  pantoprazole    Tablet 40 milliGRAM(s) Oral before breakfast  predniSONE   Tablet 30 milliGRAM(s) Oral daily  spironolactone 25 milliGRAM(s) Oral daily    Vitals:  T(C): 36.4 (04-10-19 @ 06:00), Max: 36.6 (19 @ 16:50)  HR: 98 (04-10-19 @ 06:00) (96 - 109)  BP: 118/76 (04-10-19 @ 06:00) (106/83 - 167/98)  BP(mean): 89 (04-10-19 @ 06:00) (71 - 120)  RR: 15 (04-10-19 @ 06:00) (14 - 26)  SpO2: 94% (04-10-19 @ 06:00) (93% - 100%)    Daily Height in cm: 160.02 (2019 13:00)    Daily Weight in k.3 (10 Apr 2019 06:00)  I&O's Summary    2019 07:01  -  10 Apr 2019 07:00  --------------------------------------------------------  IN: 310 mL / OUT: 2850 mL / NET: -2540 mL    Physical Exam:  Appearance: pt appears sleepy, not in any distress  Eyes: [ x] PERRL [x ] EOMI  HENT: [ x] Normal oral muscosa [x ]NC/AT  Cardiovascular: [x ] S1 [x ] S2, irregular rhythm , + systolic murmur heard in the right sternal border. B/L LE edema +2 shins.   Procedural Access Site: Access site C/D/I without bleeding, induration, or hematoma.  Respiratory: [ x] Clear to auscultation bilaterally  Gastrointestinal: [ x] Soft [x ] Non-tender, distended.   Musculoskeletal: [x ] No clubbing [x ] No joint deformity   Neurologic: [x ] Non-focal  Lymphatic: [ x] No lymphadenopathy  Psychiatry: [x ] AAOx3 [ x] Mood & affect appropriate  Skin: [x ] No rashes [ x] No ecchymoses [x ] No cyanosis    04-10    139  |  89<L>  |  29<H>  ----------------------------<  139<H>  4.2   |  33<H>  |  1.16    Ca    9.1      10 Apr 2019 05:25  Phos  4.0     04-10  Mg     1.7     04-10    TPro  6.7  /  Alb  3.6  /  TBili  0.6  /  DBili  x   /  AST  39  /  ALT  50<H>  /  AlkPhos  128<H>  04-10    PT/INR - ( 2019 11:21 )   PT: 19.7 sec;   INR: 1.69 ratio      PTT - ( 2019 11:21 )  PTT:27.7 sec    Serum Pro-Brain Natriuretic Peptide: 3782 pg/mL (04-10 @ 05:25)  Serum Pro-Brain Natriuretic Peptide: 3348 pg/mL ( @ 10:08)    Total Cholesterol: 125  LDL: 54  HDL: 51  T    Hemoglobin A1C, Whole Blood: 8.2 % ( @ 10:10)    ECG: AFib 90s     Echo: < from: Transesophageal Echocardiogram w/o TTE (19 @ 15:06) >  1. Posterior mitral annular calcification with calcified  posterior mitral leaflets. The posterior leaflet is  restricted. Severe, eccentric, posterior and  laterally-directed mitral regurgitation.  2. Calcified trileaflet aortic valve with decreased  opening. Aortic valve appears severely stenotic. BUSTER by  planimetryabout 0.8-0.9 cmsq. No aortic valve regurgitation  seen.  3. No left atrial or left atrial appendage thrombus. Normal  left atrial appendage function (maximum velocityabout 60  cm/s).  Lipomatous hypertrophy of interatrial septum.  4. Endocardium not well visualized; grossly normal left  ventricular systolic function.  5. Grossly right ventricle appears enlarged with decreased  right ventricular systolic function.  Transesophageal echocardiogram performed in the EP lab  prior to atrial flutter ablation in the setting of atrial  flutter with HRabout 100 bpm. Recommend repeat assessment  of valvular disease in sinus rhythm atslower HRs.    Cath: < from: Cardiac Cath Lab - Adult (16 @ 18:14) >  VENTRICLES: Global left ventricular function was borderline normal. EF  estimated was 55 %.  CORONARY VESSELS: The coronary circulation is right dominant.  LM:   --  LM: Normal.  LAD:   --  Distal LAD: There was a 100 % stenosis.  CX:   --  Distal circumflex: There was a 40 % stenosis.  RCA:   --Mid RCA: There was a 30 % stenosis.  --  Distal RCA: There was a 40 % stenosis.  COMPLICATIONS: There were no complications.  DIAGNOSTIC RECOMMENDATIONS: The patient should continue with the present  medications.    Imaging: < from: Xray Chest 1 View- PORTABLE-Urgent (19 @ 22:10) >  Mild pulmonary vascular congestion. Small right pleural effusion. No   pneumothorax.. The heart size is normal. No acute osseous findings.    Interpretation of Telemetry: Af 80s CCU TRANSFER NOTE      HPI:  74 yo M who presents w/ progressive worsening of dyspnea, orthopnea, BALTAZAR, sleeps in chair, increased LE edema w pain on ambulation and increased abdominal girth Ptn was seen by his pulmonologist Dr. Brand on  who started him on 40 mg Prednisone, ptn states he has no relief, and saw his planned Follow up w his cardiologist Dr. Campbell today, who sent him to the hospital for treatment.  PMH:  hyperthyrodism, untreated since ptn refused Endo F/U in the past, obesity, HTN, HLD, chronic heavy smoker, 50 pack years, COPD, LIMA, refuses CPAP, pulmonary nodules, dCHF, CAD, Afib on Eliquis, Pericardial effusion w drainage in , at which time was placed on Colchicine, refused to stay for work up after drainage,  DM2, Nephrolithiasis (2019 11:43). During hospitaliation patient was diuresed, tapazole started for hyperthyroid. Pt noted to be in A Flutter and underwent an ablation on . During the [facility] pt noted to have severe MR and severe AS. Following the procedure pt required Bipap and lasix gtt started for fluid overload. Plan for TAVR work up.     Events: This AM pt in AFib asymptomatic and rate controlled. EP made aware.     Review Of Systems:  Constitutional: denies fever, chills, Fatigue   HEENT: denies Blurred vision, Eye Pain, Headache   Respiratory: denies Cough, Wheezing , Shortness of breath  Cardiovascular: denies Chest Pain, Palpitations,  BALTAZAR   Gastrointestinal: denies Abdominal Pain, Diarrhea, Constipation   Genitourinary: denies Nocturia, Dysuria, Incontinence  Extremities: denies Swelling, Joint Pain  Neurologic: denies Focal deficit, Paresthesias, Syncope  Lymphatic: denies Swelling, Lymphadenopathy   Skin: denies Rash, Ecchymoses, Wounds   Psychiatry: denies Depression, Suicidal/Homicidal Ideation, anxiety  [X ] 10 point review of systems is otherwise negative except as mentioned above         Medications:  acetaminophen  IVPB .. 1000 milliGRAM(s) IV Intermittent once  ALBUTerol/ipratropium for Nebulization 3 milliLiter(s) Nebulizer every 6 hours PRN  apixaban 5 milliGRAM(s) Oral every 12 hours  aspirin enteric coated 81 milliGRAM(s) Oral daily  atorvastatin 20 milliGRAM(s) Oral at bedtime  buDESOnide 160 MICROgram(s)/formoterol 4.5 MICROgram(s) Inhaler 2 Puff(s) Inhalation two times a day  chlorhexidine 4% Liquid 1 Application(s) Topical <User Schedule>  colchicine 0.6 milliGRAM(s) Oral two times a day  dextrose 40% Gel 15 Gram(s) Oral once PRN  dextrose 5%. 1000 milliLiter(s) IV Continuous <Continuous>  dextrose 50% Injectable 12.5 Gram(s) IV Push once  dextrose 50% Injectable 25 Gram(s) IV Push once  dextrose 50% Injectable 25 Gram(s) IV Push once  diltiazem    Tablet 60 milliGRAM(s) Oral three times a day  fluticasone propionate 50 MICROgram(s)/spray Nasal Spray 1 Spray(s) Both Nostrils two times a day  furosemide Infusion 10 mG/Hr IV Continuous <Continuous>  glucagon  Injectable 1 milliGRAM(s) IntraMuscular once PRN  insulin lispro (HumaLOG) corrective regimen sliding scale   SubCutaneous three times a day before meals  insulin lispro (HumaLOG) corrective regimen sliding scale   SubCutaneous at bedtime  magnesium sulfate  IVPB 2 Gram(s) IV Intermittent once  nicotine - 21 mG/24Hr(s) Patch 1 patch Transdermal daily  pantoprazole    Tablet 40 milliGRAM(s) Oral before breakfast  predniSONE   Tablet 30 milliGRAM(s) Oral daily  spironolactone 25 milliGRAM(s) Oral daily    Vitals:  T(C): 36.4 (04-10-19 @ 06:00), Max: 36.6 (19 @ 16:50)  HR: 98 (04-10-19 @ 06:00) (96 - 109)  BP: 118/76 (04-10-19 @ 06:00) (106/83 - 167/98)  BP(mean): 89 (04-10-19 @ 06:00) (71 - 120)  RR: 15 (04-10-19 @ 06:00) (14 - 26)  SpO2: 94% (04-10-19 @ 06:00) (93% - 100%)    Daily Height in cm: 160.02 (2019 13:00)    Daily Weight in k.3 (10 Apr 2019 06:00)  I&O's Summary    2019 07:01  -  10 Apr 2019 07:00  --------------------------------------------------------  IN: 310 mL / OUT: 2850 mL / NET: -2540 mL    Physical Exam:  Appearance: pt appears sleepy, not in any distress  Eyes: [ x] PERRL [x ] EOMI  HENT: [ x] Normal oral muscosa [x ]NC/AT  Cardiovascular: [x ] S1 [x ] S2, irregular rhythm , + systolic murmur heard in the right sternal border. B/L LE edema +2 shins.   Procedural Access Site: Access site C/D/I without bleeding, induration, or hematoma.  Respiratory: [ x] Clear to auscultation bilaterally  Gastrointestinal: [ x] Soft [x ] Non-tender, distended.   Musculoskeletal: [x ] No clubbing [x ] No joint deformity   Neurologic: [x ] Non-focal  Lymphatic: [ x] No lymphadenopathy  Psychiatry: [x ] AAOx3 [ x] Mood & affect appropriate  Skin: [x ] No rashes [ x] No ecchymoses [x ] No cyanosis    04-10    139  |  89<L>  |  29<H>  ----------------------------<  139<H>  4.2   |  33<H>  |  1.16    Ca    9.1      10 Apr 2019 05:25  Phos  4.0     04-10  Mg     1.7     04-10    TPro  6.7  /  Alb  3.6  /  TBili  0.6  /  DBili  x   /  AST  39  /  ALT  50<H>  /  AlkPhos  128<H>  04-10    PT/INR - ( 2019 11:21 )   PT: 19.7 sec;   INR: 1.69 ratio      PTT - ( 2019 11:21 )  PTT:27.7 sec    Serum Pro-Brain Natriuretic Peptide: 3782 pg/mL (04-10 @ 05:25)  Serum Pro-Brain Natriuretic Peptide: 3348 pg/mL ( @ 10:08)    Total Cholesterol: 125  LDL: 54  HDL: 51  T    Hemoglobin A1C, Whole Blood: 8.2 % ( @ 10:10)    ECG: AFib 90s     Echo: < from: Transesophageal Echocardiogram w/o TTE (19 @ 15:06) >  1. Posterior mitral annular calcification with calcified  posterior mitral leaflets. The posterior leaflet is  restricted. Severe, eccentric, posterior and  laterally-directed mitral regurgitation.  2. Calcified trileaflet aortic valve with decreased  opening. Aortic valve appears severely stenotic. BUSTER by  planimetryabout 0.8-0.9 cmsq. No aortic valve regurgitation  seen.  3. No left atrial or left atrial appendage thrombus. Normal  left atrial appendage function (maximum velocityabout 60  cm/s).  Lipomatous hypertrophy of interatrial septum.  4. Endocardium not well visualized; grossly normal left  ventricular systolic function.  5. Grossly right ventricle appears enlarged with decreased  right ventricular systolic function.  Transesophageal echocardiogram performed in the EP lab  prior to atrial flutter ablation in the setting of atrial  flutter with HRabout 100 bpm. Recommend repeat assessment  of valvular disease in sinus rhythm atslower HRs.    Cath: < from: Cardiac Cath Lab - Adult (16 @ 18:14) >  VENTRICLES: Global left ventricular function was borderline normal. EF  estimated was 55 %.  CORONARY VESSELS: The coronary circulation is right dominant.  LM:   --  LM: Normal.  LAD:   --  Distal LAD: There was a 100 % stenosis.  CX:   --  Distal circumflex: There was a 40 % stenosis.  RCA:   --Mid RCA: There was a 30 % stenosis.  --  Distal RCA: There was a 40 % stenosis.  COMPLICATIONS: There were no complications.  DIAGNOSTIC RECOMMENDATIONS: The patient should continue with the present  medications.    Imaging: < from: Xray Chest 1 View- PORTABLE-Urgent (19 @ 22:10) >  Mild pulmonary vascular congestion. Small right pleural effusion. No   pneumothorax.. The heart size is normal. No acute osseous findings.    Interpretation of Telemetry: Af 80s

## 2019-04-10 NOTE — CONSULT NOTE ADULT - PROBLEM SELECTOR RECOMMENDATION 9
MARIA GUADALUPE which shows severe Aortic Stenosis and Severe MR done for Ablation and not truly diagnostic. Plan to repeat TTE when he is rate controlled and better fluid optimized. He would also need a Cardiac Catheterization. I spent approximately 25 minutes discussing with him and his wife the process for the evaluation, and potential that the better option might be for traditional open heart surgery pending the results of all testing, given his age and risks. He is currently debating if he even wants any testing past his TTE, stating he doesn't want to undergo anesthesia and intubation again, though he wants time to think about it. Should it be determined by a surgeon that a Transcatheter approach would be better, then a cardiac CT would be needed.

## 2019-04-11 LAB
ALBUMIN SERPL ELPH-MCNC: 3.8 G/DL — SIGNIFICANT CHANGE UP (ref 3.3–5)
ALP SERPL-CCNC: 131 U/L — HIGH (ref 40–120)
ALT FLD-CCNC: 42 U/L — SIGNIFICANT CHANGE UP (ref 10–45)
ANION GAP SERPL CALC-SCNC: 12 MMOL/L — SIGNIFICANT CHANGE UP (ref 5–17)
APTT BLD: 26.9 SEC — LOW (ref 27.5–36.3)
AST SERPL-CCNC: 29 U/L — SIGNIFICANT CHANGE UP (ref 10–40)
BASOPHILS # BLD AUTO: 0.03 K/UL — SIGNIFICANT CHANGE UP (ref 0–0.2)
BASOPHILS NFR BLD AUTO: 0.2 % — SIGNIFICANT CHANGE UP (ref 0–2)
BILIRUB SERPL-MCNC: 0.6 MG/DL — SIGNIFICANT CHANGE UP (ref 0.2–1.2)
BUN SERPL-MCNC: 34 MG/DL — HIGH (ref 7–23)
CALCIUM SERPL-MCNC: 9.2 MG/DL — SIGNIFICANT CHANGE UP (ref 8.4–10.5)
CHLORIDE SERPL-SCNC: 85 MMOL/L — LOW (ref 96–108)
CO2 SERPL-SCNC: 41 MMOL/L — HIGH (ref 22–31)
CREAT SERPL-MCNC: 1.31 MG/DL — HIGH (ref 0.5–1.3)
EOSINOPHIL # BLD AUTO: 0.05 K/UL — SIGNIFICANT CHANGE UP (ref 0–0.5)
EOSINOPHIL NFR BLD AUTO: 0.3 % — SIGNIFICANT CHANGE UP (ref 0–6)
GLUCOSE BLDC GLUCOMTR-MCNC: 156 MG/DL — HIGH (ref 70–99)
GLUCOSE BLDC GLUCOMTR-MCNC: 170 MG/DL — HIGH (ref 70–99)
GLUCOSE BLDC GLUCOMTR-MCNC: 188 MG/DL — HIGH (ref 70–99)
GLUCOSE BLDC GLUCOMTR-MCNC: 234 MG/DL — HIGH (ref 70–99)
GLUCOSE SERPL-MCNC: 132 MG/DL — HIGH (ref 70–99)
HCT VFR BLD CALC: 45.3 % — SIGNIFICANT CHANGE UP (ref 39–50)
HGB BLD-MCNC: 14.3 G/DL — SIGNIFICANT CHANGE UP (ref 13–17)
IMM GRANULOCYTES NFR BLD AUTO: 1.8 % — HIGH (ref 0–1.5)
INR BLD: 1.62 RATIO — HIGH (ref 0.88–1.16)
LYMPHOCYTES # BLD AUTO: 1.13 K/UL — SIGNIFICANT CHANGE UP (ref 1–3.3)
LYMPHOCYTES # BLD AUTO: 6.1 % — LOW (ref 13–44)
MAGNESIUM SERPL-MCNC: 1.6 MG/DL — SIGNIFICANT CHANGE UP (ref 1.6–2.6)
MCHC RBC-ENTMCNC: 28.2 PG — SIGNIFICANT CHANGE UP (ref 27–34)
MCHC RBC-ENTMCNC: 31.6 GM/DL — LOW (ref 32–36)
MCV RBC AUTO: 89.3 FL — SIGNIFICANT CHANGE UP (ref 80–100)
MONOCYTES # BLD AUTO: 1.93 K/UL — HIGH (ref 0–0.9)
MONOCYTES NFR BLD AUTO: 10.5 % — SIGNIFICANT CHANGE UP (ref 2–14)
NEUTROPHILS # BLD AUTO: 14.92 K/UL — HIGH (ref 1.8–7.4)
NEUTROPHILS NFR BLD AUTO: 81.1 % — HIGH (ref 43–77)
PHOSPHATE SERPL-MCNC: 3.3 MG/DL — SIGNIFICANT CHANGE UP (ref 2.5–4.5)
PLATELET # BLD AUTO: 228 K/UL — SIGNIFICANT CHANGE UP (ref 150–400)
POTASSIUM SERPL-MCNC: 3.8 MMOL/L — SIGNIFICANT CHANGE UP (ref 3.5–5.3)
POTASSIUM SERPL-SCNC: 3.8 MMOL/L — SIGNIFICANT CHANGE UP (ref 3.5–5.3)
PROT SERPL-MCNC: 6.7 G/DL — SIGNIFICANT CHANGE UP (ref 6–8.3)
PROTHROM AB SERPL-ACNC: 18.8 SEC — HIGH (ref 10–13.1)
RAPID RVP RESULT: SIGNIFICANT CHANGE UP
RBC # BLD: 5.07 M/UL — SIGNIFICANT CHANGE UP (ref 4.2–5.8)
RBC # FLD: 15.3 % — HIGH (ref 10.3–14.5)
SODIUM SERPL-SCNC: 138 MMOL/L — SIGNIFICANT CHANGE UP (ref 135–145)
WBC # BLD: 18.4 K/UL — HIGH (ref 3.8–10.5)
WBC # FLD AUTO: 18.4 K/UL — HIGH (ref 3.8–10.5)

## 2019-04-11 PROCEDURE — 99232 SBSQ HOSP IP/OBS MODERATE 35: CPT

## 2019-04-11 PROCEDURE — 93306 TTE W/DOPPLER COMPLETE: CPT | Mod: 26

## 2019-04-11 PROCEDURE — 93010 ELECTROCARDIOGRAM REPORT: CPT

## 2019-04-11 RX ORDER — INSULIN LISPRO 100/ML
3 VIAL (ML) SUBCUTANEOUS
Qty: 0 | Refills: 0 | Status: DISCONTINUED | OUTPATIENT
Start: 2019-04-11 | End: 2019-04-14

## 2019-04-11 RX ORDER — LANOLIN ALCOHOL/MO/W.PET/CERES
3 CREAM (GRAM) TOPICAL ONCE
Qty: 0 | Refills: 0 | Status: COMPLETED | OUTPATIENT
Start: 2019-04-11 | End: 2019-04-11

## 2019-04-11 RX ADMIN — APIXABAN 5 MILLIGRAM(S): 2.5 TABLET, FILM COATED ORAL at 17:23

## 2019-04-11 RX ADMIN — Medication 0.6 MILLIGRAM(S): at 05:15

## 2019-04-11 RX ADMIN — APIXABAN 5 MILLIGRAM(S): 2.5 TABLET, FILM COATED ORAL at 05:15

## 2019-04-11 RX ADMIN — Medication 1 SPRAY(S): at 07:51

## 2019-04-11 RX ADMIN — Medication 30 MILLIGRAM(S): at 05:15

## 2019-04-11 RX ADMIN — BUDESONIDE AND FORMOTEROL FUMARATE DIHYDRATE 2 PUFF(S): 160; 4.5 AEROSOL RESPIRATORY (INHALATION) at 17:23

## 2019-04-11 RX ADMIN — Medication 5 MG/HR: at 07:52

## 2019-04-11 RX ADMIN — PANTOPRAZOLE SODIUM 40 MILLIGRAM(S): 20 TABLET, DELAYED RELEASE ORAL at 05:15

## 2019-04-11 RX ADMIN — Medication 1 PATCH: at 22:53

## 2019-04-11 RX ADMIN — Medication 60 MILLIGRAM(S): at 13:14

## 2019-04-11 RX ADMIN — SPIRONOLACTONE 25 MILLIGRAM(S): 25 TABLET, FILM COATED ORAL at 05:15

## 2019-04-11 RX ADMIN — Medication 0.6 MILLIGRAM(S): at 17:23

## 2019-04-11 RX ADMIN — Medication 1 PATCH: at 22:00

## 2019-04-11 RX ADMIN — Medication 4: at 12:20

## 2019-04-11 RX ADMIN — Medication 2: at 17:23

## 2019-04-11 RX ADMIN — Medication 1 PATCH: at 19:35

## 2019-04-11 RX ADMIN — Medication 60 MILLIGRAM(S): at 21:24

## 2019-04-11 RX ADMIN — BUDESONIDE AND FORMOTEROL FUMARATE DIHYDRATE 2 PUFF(S): 160; 4.5 AEROSOL RESPIRATORY (INHALATION) at 05:16

## 2019-04-11 RX ADMIN — Medication 2: at 07:52

## 2019-04-11 RX ADMIN — Medication 81 MILLIGRAM(S): at 12:19

## 2019-04-11 RX ADMIN — INSULIN GLARGINE 10 UNIT(S): 100 INJECTION, SOLUTION SUBCUTANEOUS at 21:23

## 2019-04-11 RX ADMIN — Medication 1 SPRAY(S): at 17:23

## 2019-04-11 RX ADMIN — Medication 5 MG/HR: at 22:51

## 2019-04-11 RX ADMIN — Medication 3 MILLIGRAM(S): at 22:51

## 2019-04-11 RX ADMIN — ATORVASTATIN CALCIUM 20 MILLIGRAM(S): 80 TABLET, FILM COATED ORAL at 21:24

## 2019-04-11 RX ADMIN — Medication 60 MILLIGRAM(S): at 05:15

## 2019-04-11 NOTE — PROGRESS NOTE ADULT - SUBJECTIVE AND OBJECTIVE BOX
CHIEF COMPLAINT: f/up resp insufficiency, copd exacerbation, osas, allergic rhinitis, preop pulm clearance --better over all- concerned over procedure (better than last 3 months)    Interval Events: CTS f/up    REVIEW OF SYSTEMS:  Constitutional: No fevers or chills. No weight loss. + fatigue or generalized malaise.  Eyes: No itching or discharge from the eyes  ENT: No ear pain. No ear discharge. No nasal congestion. No post nasal drip. No epistaxis. No throat pain. No sore throat. No difficulty swallowing.   CV: No chest pain. No palpitations. No lightheadedness or dizziness.   Resp: No dyspnea at rest. + dyspnea on exertion. No orthopnea. No wheezing. No cough. No stridor. No sputum production. No chest pain with respiration.  GI: No nausea. No vomiting. No diarrhea.  MSK: No joint pain or pain in any extremities  Integumentary: No skin lesions. + pedal edema.  Neurological: No gross motor weakness. No sensory changes.  [+ ] All other systems negative  [ ] Unable to assess ROS because ________    OBJECTIVE:  ICU Vital Signs Last 24 Hrs  T(C): 36.4 (11 Apr 2019 04:02), Max: 37.1 (10 Apr 2019 14:00)  T(F): 97.5 (11 Apr 2019 04:02), Max: 98.7 (10 Apr 2019 14:00)  HR: 86 (11 Apr 2019 04:02) (70 - 98)  BP: 105/65 (11 Apr 2019 04:02) (91/53 - 118/76)  BP(mean): 67 (10 Apr 2019 15:15) (67 - 89)  ABP: --  ABP(mean): --  RR: 18 (11 Apr 2019 04:02) (15 - 22)  SpO2: 98% (11 Apr 2019 04:02) (91% - 98%)        04-09 @ 07:01  -  04-10 @ 07:00  --------------------------------------------------------  IN: 310 mL / OUT: 2850 mL / NET: -2540 mL    04-10 @ 07:01  -  04-11 @ 04:45  --------------------------------------------------------  IN: 810 mL / OUT: 2900 mL / NET: -2090 mL      CAPILLARY BLOOD GLUCOSE      POCT Blood Glucose.: 159 mg/dL (10 Apr 2019 20:56)      PHYSICAL EXAM: NAD in chair on NC-2 liters  General: Awake, alert, oriented X 3.   HEENT: Atraumatic, normocephalic.                 Mallampatti Grade 3                No nasal congestion.                No tonsillar or pharyngeal exudates.  Lymph Nodes: No palpable lymphadenopathy  Neck: No JVD. No carotid bruit.   Respiratory: Normal chest expansion                         Normal percussion                         Normal and equal air entry                         No wheeze, rhonchi or rales.  Cardiovascular: S1 S2 normal. + murmurs, rubs or gallops.   Abdomen: Soft, non-tender, non-distended. No organomegaly. Normoactive bowel sounds.  Extremities: Warm to touch. Peripheral pulse palpable. ++ pedal edema.   Skin: No rashes or skin lesions  Neurological: Motor and sensory examination equal and normal in all four extremities.  Psychiatry: Appropriate mood and affect.    HOSPITAL MEDICATIONS:  MEDICATIONS  (STANDING):  acetaminophen  IVPB .. 1000 milliGRAM(s) IV Intermittent once  apixaban 5 milliGRAM(s) Oral every 12 hours  aspirin enteric coated 81 milliGRAM(s) Oral daily  atorvastatin 20 milliGRAM(s) Oral at bedtime  buDESOnide 160 MICROgram(s)/formoterol 4.5 MICROgram(s) Inhaler 2 Puff(s) Inhalation two times a day  chlorhexidine 4% Liquid 1 Application(s) Topical <User Schedule>  colchicine 0.6 milliGRAM(s) Oral two times a day  dextrose 5%. 1000 milliLiter(s) (50 mL/Hr) IV Continuous <Continuous>  dextrose 50% Injectable 12.5 Gram(s) IV Push once  dextrose 50% Injectable 25 Gram(s) IV Push once  dextrose 50% Injectable 25 Gram(s) IV Push once  diltiazem    Tablet 60 milliGRAM(s) Oral three times a day  fluticasone propionate 50 MICROgram(s)/spray Nasal Spray 1 Spray(s) Both Nostrils two times a day  furosemide Infusion 10 mG/Hr (5 mL/Hr) IV Continuous <Continuous>  insulin glargine Injectable (LANTUS) 10 Unit(s) SubCutaneous at bedtime  insulin lispro (HumaLOG) corrective regimen sliding scale   SubCutaneous at bedtime  insulin lispro (HumaLOG) corrective regimen sliding scale   SubCutaneous three times a day before meals  methimazole 5 milliGRAM(s) Oral daily  nicotine - 21 mG/24Hr(s) Patch 1 patch Transdermal daily  pantoprazole    Tablet 40 milliGRAM(s) Oral before breakfast  predniSONE   Tablet 30 milliGRAM(s) Oral daily  spironolactone 25 milliGRAM(s) Oral daily    MEDICATIONS  (PRN):  ALBUTerol/ipratropium for Nebulization 3 milliLiter(s) Nebulizer every 6 hours PRN Shortness of Breath and/or Wheezing  dextrose 40% Gel 15 Gram(s) Oral once PRN Blood Glucose LESS THAN 70 milliGRAM(s)/deciliter  glucagon  Injectable 1 milliGRAM(s) IntraMuscular once PRN Glucose LESS THAN 70 milligrams/deciliter      LABS:                        15.9   16.0  )-----------( 243      ( 10 Apr 2019 05:25 )             47.8     04-10    139  |  89<L>  |  29<H>  ----------------------------<  139<H>  4.2   |  33<H>  |  1.16    Ca    9.1      10 Apr 2019 05:25  Phos  4.0     04-10  Mg     1.7     04-10    TPro  6.7  /  Alb  3.6  /  TBili  0.6  /  DBili  x   /  AST  39  /  ALT  50<H>  /  AlkPhos  128<H>  04-10        Arterial Blood Gas:  04-09 @ 16:38  7.36/60/90/34/96/6.3  ABG lactate: --  Arterial Blood Gas:  04-09 @ 16:11  7.27/81/253/36/99/5.8  ABG lactate: --        MICROBIOLOGY:     RADIOLOGY:  [ ] Reviewed and interpreted by me    Point of Care Ultrasound Findings:    PFT:    EKG:

## 2019-04-11 NOTE — PROGRESS NOTE ADULT - ASSESSMENT
73 M current smoker with a PMH of obesity, HTN, HLD, CAD, diastolic HF,  A fib on Eliquis, type 2 diabetes, COPD ( not on home O2), LIMA now presenting with SOB, b/l LE edema and abdominal distension, likely 2/2 acute decompensated diastolic heart failure     1. Acute decompensated diastolic HF/ A fib  - Agree with IV diuresis with Lasix   - Requires strict I/Os/ daily weights. Goal to keep at least 1-2 negative daily   - continue following electrolytes BID while getting IV diuresis. Keep  K+>4, Mag > 2  - Telemetry monitoring. Follow cardiac enzymes s/p  MARIA GUADALUPE/EPS  - Cont to optimize rate control for A fib. Remains on AC with eliquis  2. COPD  -on Prednisone 30 mg D4/7 and then taper over 1 week   - continue Xopenex and Atrovent Q6H   -- Chest PT/ acapella use to assist with mobilization of secretions - Incentive spirometry  - Supplemental O2 to keep sats > 90 %  3. Lung nodules:- Few unchanged 0.4 cm RUL nodules -outpt follow up with repeat imaging in 6 months   4. OS- Unwilling to use CPAP  5. Nicotine addiction/ Smoking cessation:  - Requires a nicotine patch - Smoking cessation counseling provided and different outpt options discussed at length. Time spent: 10 mins   ********************  4/9-better over all  4/10-CTS evaln for TAVR etc.  4/11-tx to floor; CTS evaln in progress

## 2019-04-11 NOTE — PROGRESS NOTE ADULT - SUBJECTIVE AND OBJECTIVE BOX
CAMELIAMILAGROS FERNANDEZ    Patient is a 73y old  Male who presents with a chief complaint of worsening dyspnea, LE edema, increase in abd girth over a month (2019 10:15)    Still dyspneic at rest and severely volume overloaded.  Pt and wife expressing desire to leave AMA this am.   15 beat WCT, ?AF with aberrancy vs. VT.    Allergies    penicillins (Unknown)    MEDICATIONS  (STANDING):  acetaminophen  IVPB .. 1000 milliGRAM(s) IV Intermittent once  apixaban 5 milliGRAM(s) Oral every 12 hours  aspirin enteric coated 81 milliGRAM(s) Oral daily  atorvastatin 20 milliGRAM(s) Oral at bedtime  buDESOnide 160 MICROgram(s)/formoterol 4.5 MICROgram(s) Inhaler 2 Puff(s) Inhalation two times a day  chlorhexidine 4% Liquid 1 Application(s) Topical <User Schedule>  colchicine 0.6 milliGRAM(s) Oral two times a day  dextrose 5%. 1000 milliLiter(s) (50 mL/Hr) IV Continuous <Continuous>  dextrose 50% Injectable 12.5 Gram(s) IV Push once  dextrose 50% Injectable 25 Gram(s) IV Push once  dextrose 50% Injectable 25 Gram(s) IV Push once  diltiazem    Tablet 60 milliGRAM(s) Oral three times a day  fluticasone propionate 50 MICROgram(s)/spray Nasal Spray 1 Spray(s) Both Nostrils two times a day  furosemide Infusion 10 mG/Hr (5 mL/Hr) IV Continuous <Continuous>  insulin glargine Injectable (LANTUS) 10 Unit(s) SubCutaneous at bedtime  insulin lispro (HumaLOG) corrective regimen sliding scale   SubCutaneous at bedtime  insulin lispro (HumaLOG) corrective regimen sliding scale   SubCutaneous three times a day before meals  methimazole 5 milliGRAM(s) Oral daily  nicotine - 21 mG/24Hr(s) Patch 1 patch Transdermal daily  pantoprazole    Tablet 40 milliGRAM(s) Oral before breakfast  predniSONE   Tablet 30 milliGRAM(s) Oral daily  spironolactone 25 milliGRAM(s) Oral daily    MEDICATIONS  (PRN):  ALBUTerol/ipratropium for Nebulization 3 milliLiter(s) Nebulizer every 6 hours PRN Shortness of Breath and/or Wheezing  dextrose 40% Gel 15 Gram(s) Oral once PRN Blood Glucose LESS THAN 70 milliGRAM(s)/deciliter  glucagon  Injectable 1 milliGRAM(s) IntraMuscular once PRN Glucose LESS THAN 70 milligrams/deciliter    PHYSICAL EXAM:  Vital Signs Last 24 Hrs  T(C): 36.4 (2019 04:02), Max: 37.1 (10 Apr 2019 14:00)  T(F): 97.5 (2019 04:02), Max: 98.7 (10 Apr 2019 14:00)  HR: 86 (2019 04:02) (76 - 86)  BP: 105/65 (2019 04:02) (91/53 - 117/71)  BP(mean): 67 (10 Apr 2019 15:15) (67 - 89)  RR: 18 (2019 04:02) (18 - 22)  SpO2: 98% (2019 04:02) (92% - 98%)  Daily     Daily Weight in k.9 (2019 08:00)  I&O's Summary    10 Apr 2019 07:  -  2019 07:00  --------------------------------------------------------  IN: 870 mL / OUT: 3300 mL / NET: -2430 mL    2019 07:01  -  2019 11:46  --------------------------------------------------------  IN: 240 mL / OUT: 400 mL / NET: -160 mL      General Appearance: 	 Alert, cooperative, mildy dyspneic at rest  Neck: JVP elevated at 12-14  Lungs:  Decreased BS at bases  Cor:  pmi 5th ICS MCL, Irregular rhythm, S1 normal intensity, S2 normal intensity, 2/6 DAVID  Abdomen:	 softly distended, non-tender; bowel sounds normal  Extremities: 2-3+ edema BL    EKG:  Telemetry:  AF, 15 beat WCT, AF with aberrancy vs. VT    Labs:  CBC Full  -  ( 2019 08:37 )  WBC Count : 18.40 K/uL  RBC Count : 5.07 M/uL  Hemoglobin : 14.3 g/dL  Hematocrit : 45.3 %  Platelet Count - Automated : 228 K/uL  Mean Cell Volume : 89.3 fl  Mean Cell Hemoglobin : 28.2 pg  Mean Cell Hemoglobin Concentration : 31.6 gm/dL  Auto Neutrophil # : x  Auto Lymphocyte # : x  Auto Monocyte # : x  Auto Eosinophil # : x  Auto Basophil # : x  Auto Neutrophil % : x  Auto Lymphocyte % : x  Auto Monocyte % : x  Auto Eosinophil % : x  Auto Basophil % : x        PT/INR - ( 2019 09:14 )   PT: 18.8 sec;   INR: 1.62 ratio         PTT - ( 2019 09:14 )  PTT:26.9 sec    Magnesium, Serum (19 @ 06:12)    Magnesium, Serum: 1.6 mg/dL    Comprehensive Metabolic Panel (19 @ 06:12)    Sodium, Serum: 138 mmol/L    Potassium, Serum: 3.8 mmol/L    Chloride, Serum: 85 mmol/L    Carbon Dioxide, Serum: 41 mmol/L    Anion Gap, Serum: 12 mmol/L    Blood Urea Nitrogen, Serum: 34 mg/dL    Creatinine, Serum: 1.31 mg/dL    Glucose, Serum: 132 mg/dL    Calcium, Total Serum: 9.2 mg/dL    Protein Total, Serum: 6.7 g/dL    Albumin, Serum: 3.8 g/dL    Bilirubin Total, Serum: 0.6 mg/dL    Alkaline Phosphatase, Serum: 131 U/L    Aspartate Aminotransferase (AST/SGOT): 29 U/L    Alanine Aminotransferase (ALT/SGPT): 42 U/L    eGFR if Non : 53: Interpretative comment  The units for eGFR are mL/min/1.73M2 (normalized body surface area). The  eGFR is calculated from a serum creatinine using the CKD-EPI equation.  Other variables required for calculation are race, age and sex. Among  patients with chronic kidney disease (CKD), the eGFR is useful in  determining the stage of disease according to KDOQI CKD classification.  All eGFR results are reported numerically with the following  interpretation.          GFR                    With                 Without     (ml/min/1.73 m2)    Kidney Damage       Kidney Damage        >= 90                    Stage 1                     Normal        60-89                    Stage 2                     Decreased GFR        30-59     Stage 3                     Stage 3        15-29                    Stage 4                     Stage 4        < 15                      Stage 5                     Stage 5  Each stage of CKD assumes that the associated GFR level has been in  effect for at least 3 months. Determination of stages one and two (with  eGFR > 59 ml/min/m2) requires estimation of kidney damage for at least 3  months as defined by structural or functional abnormalities.  Limitations: All estimates of GFR will be less accurate for patients at  extremes of muscle mass (including but not limited to frail elderly,  critically ill, or cancer patients), those with unusual diets, and those  with conditions associated with reduced secretion or extrarenal  elimination of creatinine. The eGFR equation is not recommended for use  in patients with unstable creatinine levels. mL/min/1.73M2    eGFR if African American: 62 mL/min/1.73M2

## 2019-04-11 NOTE — PROGRESS NOTE ADULT - SUBJECTIVE AND OBJECTIVE BOX
*****Structural Heart Team*****    Subjective:    Patient sitting bolt upright in chair with some SOB, wife at bedside. Patient and wife agitated over his current course and threatening to leave AMA as they say his course is "taking too long". He remains on a lasix drip and is diuresing well, and he has 7 liters off in the past few days. He also does not know if he wants to do any further testing.    PAST MEDICAL & SURGICAL HISTORY:  Pulmonary nodules  COPD (chronic obstructive pulmonary disease)  Diabetes mellitus, type 2  Kidney stones  Hypertension  History of tonsillectomy  History of kidney stones        T(C): 36.4 (04-11-19 @ 04:02), Max: 37.1 (04-10-19 @ 14:00)  HR: 86 (04-11-19 @ 04:02) (76 - 86)  BP: 105/65 (04-11-19 @ 04:02) (91/53 - 117/71)  RR: 18 (04-11-19 @ 04:02) (18 - 22)  SpO2: 98% (04-11-19 @ 04:02) (92% - 98%)  Wt(kg): --  04-10 @ 07:01  -  04-11 @ 07:00  --------------------------------------------------------  IN: 870 mL / OUT: 3300 mL / NET: -2430 mL    04-11 @ 07:01  -  04-11 @ 11:23  --------------------------------------------------------  IN: 240 mL / OUT: 400 mL / NET: -160 mL          LOS Total -7.3 Liters    MEDICATIONS  (STANDING):  acetaminophen  IVPB .. 1000 milliGRAM(s) IV Intermittent once  apixaban 5 milliGRAM(s) Oral every 12 hours  aspirin enteric coated 81 milliGRAM(s) Oral daily  atorvastatin 20 milliGRAM(s) Oral at bedtime  buDESOnide 160 MICROgram(s)/formoterol 4.5 MICROgram(s) Inhaler 2 Puff(s) Inhalation two times a day  chlorhexidine 4% Liquid 1 Application(s) Topical <User Schedule>  colchicine 0.6 milliGRAM(s) Oral two times a day  dextrose 5%. 1000 milliLiter(s) (50 mL/Hr) IV Continuous <Continuous>  dextrose 50% Injectable 12.5 Gram(s) IV Push once  dextrose 50% Injectable 25 Gram(s) IV Push once  dextrose 50% Injectable 25 Gram(s) IV Push once  diltiazem    Tablet 60 milliGRAM(s) Oral three times a day  fluticasone propionate 50 MICROgram(s)/spray Nasal Spray 1 Spray(s) Both Nostrils two times a day  furosemide Infusion 10 mG/Hr (5 mL/Hr) IV Continuous <Continuous>  insulin glargine Injectable (LANTUS) 10 Unit(s) SubCutaneous at bedtime  insulin lispro (HumaLOG) corrective regimen sliding scale   SubCutaneous at bedtime  insulin lispro (HumaLOG) corrective regimen sliding scale   SubCutaneous three times a day before meals  methimazole 5 milliGRAM(s) Oral daily  nicotine - 21 mG/24Hr(s) Patch 1 patch Transdermal daily  pantoprazole    Tablet 40 milliGRAM(s) Oral before breakfast  predniSONE   Tablet 30 milliGRAM(s) Oral daily  spironolactone 25 milliGRAM(s) Oral daily    MEDICATIONS  (PRN):  ALBUTerol/ipratropium for Nebulization 3 milliLiter(s) Nebulizer every 6 hours PRN Shortness of Breath and/or Wheezing  dextrose 40% Gel 15 Gram(s) Oral once PRN Blood Glucose LESS THAN 70 milliGRAM(s)/deciliter  glucagon  Injectable 1 milliGRAM(s) IntraMuscular once PRN Glucose LESS THAN 70 milligrams/deciliter      Review of Symptoms:  Constitutional: Awake, following commands  Respiratory: + SOB, No Cough  Cardiac: Denies CP, + Palpitations  Gastrointestinal: + Distension, No Pain, No N/V  Vascular: Negative  Extremeties: + Pedal edema  Neurological: Negative  Endocrine: Diabetes history  Heme/Onc: Negative    Exam:  General: A/O x3, Mild distress,  HEENT: Neck Supple, Mild JVD, Trachea midline  Pulmonary: Diminshed at bases with fine crackles B/L, + mild accessory muscle use and mouth breathing, Oxygen @4 lpm  Cor: S1S2, Irregular, III/VI DAVID  ECG: AFib  Gastrointestinal: Soft, NT, + Ascites, + Bowel sounds  Neuro: Non focal, = motor and sensory B/L  Vascular: No Carotid Bruits, Cap refill < 2 seconds B/L  Extremeties: 4+ Pitting edema of lower extremities  Skin: Warm and dry, no rashes                          14.3   18.40 )-----------( 228      ( 11 Apr 2019 08:37 )             45.3   04-11    138  |  85<L>  |  34<H>  ----------------------------<  132<H>  3.8   |  41<H>  |  1.31<H>    Ca    9.2      11 Apr 2019 06:12  Phos  3.3     04-11  Mg     1.6     04-11    TPro  6.7  /  Alb  3.8  /  TBili  0.6  /  DBili  x   /  AST  29  /  ALT  42  /  AlkPhos  131<H>  04-11  PT/INR - ( 11 Apr 2019 09:14 )   PT: 18.8 sec;   INR: 1.62 ratio     PTT - ( 11 Apr 2019 09:14 )  PTT:26.9 sec    Serum Pro BNP  Serum Pro-Brain Natriuretic Peptide (04.10.19 @ 05:25)    Serum Pro-Brain Natriuretic Peptide: 3782 pg/mL    Imaging Reviewed:     TTE:  Pending  CXR:     < from: Xray Chest 1 View- PORTABLE-Urgent (04.09.19 @ 22:10) >  IMPRESSION:  Mild pulmonary vascular congestion. Small right pleural effusion. No   pneumothorax.. The heart size is normal. No acute osseous findings.        < end of copied text >    Assesment/Plan:  73 male with severe symptomatic Aortic Stenosis and Mitral Regurgitation, Acute on Chronic Diastolic Heart Failure, AFib,COPD    1.) Severe AS/MR: Patient had TTE today and awaiting results. Discussion had with patient and wife again regarding his course of treatment and goals, as well as evaluation for Valvular disease. Dr. Castillo from his primary cardiologists office and Medicine team NP also present and part of discussion. Patient still unsure what he wants to do, and his wife keeps saying she will sign him out AMA. Both Dr. Castillo and myself explained this would not be a good idea as he still needs to be diuresed and would need a cardiac catheterization also to rule out CAD. It was explained that in the state he was in, if he left AMA, he would probably be readmitted in a couple of days in heart failure, where the wife responded "We're not coming back to this or any other hospital". I left it with him to decide how he wants to proceed, and explained that if he is medically ready tomorrow, we could possibly set up his Cardiac catheterization for tomorrow afternoon ormonday. We will continue to follow. I also explained that once we have all the information, we will be better able to tell him what his best course of treatment would be.  2.) Acute on Chronic Heart Failure: Continue furosemide drip, monitor I and O's. Daily weight. He still has significant fluid overload, and will likely need a few more days of diuresing.  3.) COPD: Continue Bronchodilators and steroids per pulmonary  4.) AFib: Continue Apixiban, Rate control with Cardizem

## 2019-04-11 NOTE — PROGRESS NOTE ADULT - ASSESSMENT
72 yo male presents with progressive worsening of dyspnea, orthopnea, BALTAZAR, sleeps in chair, increased LE edema w pain on ambulation and increased abdominal girth.   No cough, no fevers, no N/V, no palpitations Ptn was seen by his pulmonologist Dr. Brand on 4/4 who started him on 40 mg Prednisone, ptn states he has no relief, and saw his planned Follow up w his cardiologist Dr. Campbell today, who sent him to the hospital for treatment.  PMH:  hyperthyrodism, untreated since ptn refused Endo F/U in the past, obesity, HTN, HLD, chronic heavy smoker, 50 pack years, COPD, LIMA, refuses CPAP, pulmonary nodules, dCHF, CAD, Afib on Eliquis, Pericardial effusion w drainage in 6/18, at which time was placed on Colchicine, refused to stay for work up after drainage,  DM2, Nephrolithiasis (08 Apr 2019 11:43)    ER vss, afebrile.  WBC 20.9 --> 16.4.  LFTs elevated.  UA mod LE/(-)nit, WBC - 0.  Lact 3.0.  Pro-BNP 3348.  CT chest: RUL stable few small solid nodules and small R pleural effusion.   Pt p/w SOB, b/l LE edema and abdominal distension,2/2 acute decompensated diastolic heart failure.  Pt started on IV diureses w/Lasix, on Tele monitoring.  Cardiology following.    Pt also on managment for COPD, on pred taper.  No abx started for now.        Recommend:    Leukocytosis    - Likely multifactorial and secondary to reactive process and steroids.  Pt on daily prednisone     - No evidence for pna on CT imaging, pt without fever, no productive cough.  No need for antibiotics at this time.      - Cont managment of acute decompensated diastolic HF/A fib as per Cardiology:  on IV diureses with lasix, replete electrolytes PRN, cont tele monitoring, pt s/p MARIA GUADALUPE/EPS evaluation.  On AC with eliquis and rate control for A.fib.  CTS eval in progress for TAVR    - Cont managment of COPD.  Pulmonary following.  Pt on Prednisone taper,  cont duonebs, chest PT/acapella.  Pt with stable lung nodules on CT, for repeat imaging with Pulmonary in 6 months.      - If pt spikes fever, send bcx x 2, UA, ucx, repeat cxr.    Check RVP.         (Dr. Mohsena Amin covering between 4/12 to 4/15 - [phone])

## 2019-04-11 NOTE — PROGRESS NOTE ADULT - SUBJECTIVE AND OBJECTIVE BOX
Chief Complaint: 74 y/o Type 2 DM, Hyperthyroidism, HTN, HLD, COPD CHF, Admitted with SOB and anasarca.    AF noted, S/P ablation.  Patient started on basal lantus 10 units daily.  FS lower at am but still 180-220 post meals. PO intake good.  Still on prednisone 30 mg daily.      MEDICATIONS  (STANDING):  acetaminophen  IVPB .. 1000 milliGRAM(s) IV Intermittent once  apixaban 5 milliGRAM(s) Oral every 12 hours  aspirin enteric coated 81 milliGRAM(s) Oral daily  atorvastatin 20 milliGRAM(s) Oral at bedtime  buDESOnide 160 MICROgram(s)/formoterol 4.5 MICROgram(s) Inhaler 2 Puff(s) Inhalation two times a day  chlorhexidine 4% Liquid 1 Application(s) Topical <User Schedule>  colchicine 0.6 milliGRAM(s) Oral two times a day  dextrose 5%. 1000 milliLiter(s) (50 mL/Hr) IV Continuous <Continuous>  dextrose 50% Injectable 12.5 Gram(s) IV Push once  dextrose 50% Injectable 25 Gram(s) IV Push once  dextrose 50% Injectable 25 Gram(s) IV Push once  diltiazem    Tablet 60 milliGRAM(s) Oral three times a day  fluticasone propionate 50 MICROgram(s)/spray Nasal Spray 1 Spray(s) Both Nostrils two times a day  furosemide Infusion 10 mG/Hr (5 mL/Hr) IV Continuous <Continuous>  insulin glargine Injectable (LANTUS) 10 Unit(s) SubCutaneous at bedtime  insulin lispro (HumaLOG) corrective regimen sliding scale   SubCutaneous at bedtime  insulin lispro (HumaLOG) corrective regimen sliding scale   SubCutaneous three times a day before meals  methimazole 5 milliGRAM(s) Oral daily  nicotine - 21 mG/24Hr(s) Patch 1 patch Transdermal daily  pantoprazole    Tablet 40 milliGRAM(s) Oral before breakfast  predniSONE   Tablet 30 milliGRAM(s) Oral daily  spironolactone 25 milliGRAM(s) Oral daily    MEDICATIONS  (PRN):  ALBUTerol/ipratropium for Nebulization 3 milliLiter(s) Nebulizer every 6 hours PRN Shortness of Breath and/or Wheezing  dextrose 40% Gel 15 Gram(s) Oral once PRN Blood Glucose LESS THAN 70 milliGRAM(s)/deciliter  glucagon  Injectable 1 milliGRAM(s) IntraMuscular once PRN Glucose LESS THAN 70 milligrams/deciliter      Allergies    penicillins (Unknown)    Intolerances        PHYSICAL EXAM:  VITALS: T(C): 36.3 (04-11-19 @ 12:00)  T(F): 97.3 (04-11-19 @ 12:00), Max: 97.7 (04-10-19 @ 20:02)  HR: 76 (04-11-19 @ 12:00) (76 - 86)  BP: 125/81 (04-11-19 @ 12:00) (105/65 - 125/81)  RR:  (18 - 18)  SpO2:  (96% - 98%)  GENERAL: NAD  EYES: No proptosis, no lid lag, anicteric  HEENT:  Atraumatic, Normocephalic, moist mucous membranes  THYROID: Normal size, no palpable nodules, no cervical LN.  RESPIRATORY: Clear to auscultation bilaterally; decreased breath sounds both bases. Mild wheezing  CARDIOVASCULAR: Regular rate and rhythm; No murmurs; + peripheral edema  GI: Soft, nontender, distended, normal bowel sounds  SKIN: Dry, intact, No rashes or lesions  MUSCULOSKELETAL: Full range of motion, decreased strength  NEURO: no focal deficits.  PSYCH: Alert and oriented x 3, normal affect, normal mood      POCT Blood Glucose.: 188 mg/dL (04-11-19 @ 16:30)  POCT Blood Glucose.: 234 mg/dL (04-11-19 @ 11:49)  POCT Blood Glucose.: 156 mg/dL (04-11-19 @ 07:40)  POCT Blood Glucose.: 159 mg/dL (04-10-19 @ 20:56)  POCT Blood Glucose.: 223 mg/dL (04-10-19 @ 16:31)  POCT Blood Glucose.: 283 mg/dL (04-10-19 @ 11:12)  POCT Blood Glucose.: 195 mg/dL (04-10-19 @ 08:18)  POCT Blood Glucose.: 152 mg/dL (04-09-19 @ 21:38)  POCT Blood Glucose.: 181 mg/dL (04-09-19 @ 17:32)  POCT Blood Glucose.: 157 mg/dL (04-09-19 @ 08:54)  POCT Blood Glucose.: 138 mg/dL (04-08-19 @ 23:09)  POCT Blood Glucose.: 268 mg/dL (04-08-19 @ 18:35)                            14.3   18.40 )-----------( 228      ( 11 Apr 2019 08:37 )             45.3       04-11    138  |  85<L>  |  34<H>  ----------------------------<  132<H>  3.8   |  41<H>  |  1.31<H>    EGFR if : 62  EGFR if non : 53<L>    Ca    9.2      04-11  Mg     1.6     04-11  Phos  3.3     04-11    TPro  6.7  /  Alb  3.8  /  TBili  0.6  /  DBili  x   /  AST  29  /  ALT  42  /  AlkPhos  131<H>  04-11      Thyroid Function Tests:  04-10 @ 09:17 TSH 0.02 FreeT4 -- T3 107 Anti TPO -- Anti Thyroglobulin Ab -- TSI --  04-09 @ 09:20 TSH 0.03 FreeT4 1.7 T3 97 Anti TPO -- Anti Thyroglobulin Ab -- TSI --      Hemoglobin A1C, Whole Blood: 8.3 % <H> [4.0 - 5.6] (04-10-19 @ 09:25)  Hemoglobin A1C, Whole Blood: 8.2 % <H> [4.0 - 5.6] (04-09-19 @ 10:10)

## 2019-04-11 NOTE — PROGRESS NOTE ADULT - ASSESSMENT
74yo male with acute on chronic diastolic CHF, COPD and hyperthyroidism with several week of rapid atrial flutter and CHF.  Now s/p flutter ablation but remains in AF, rate controlled currently.  Found to have severe AS and MR, in the setting of volume overload.     Plan:  Continue aggressive IV lasix; Transthoracic to better assess MV/MR after diuresis and rate control to see if candidate for TAVR/single or double valve disease.  I personally had long 30 min discussion with patient and wife re need for continued diuresis to prevent readmission.  They prefer to continue diuresis and get him stabilized on oral meds and complete as much of TAVR/structural heart disease workup as possible and then go home prior to any definitive procedure.  Any invasive procedure would require interruption of anticoagulation s/p recent flutter ablation, but he still remains in AF.  We would need to discuss with Debbie Mead and Adrian.    Continue cardizem for rate control; avoid beta blockers with COPD  Continue eliquis  Steroids per pulmonary.   Agree with treatment of hyperthyroidism as per endocrine; tapazole.    Loyd Castillo MD  [facility] [place] [facility] 74yo male with acute on chronic diastolic CHF, COPD and hyperthyroidism with several week of rapid atrial flutter and CHF.  Now s/p flutter ablation but remains in AF, rate controlled currently.  Found to have severe AS and MR, in the setting of volume overload.     Plan:  Continue aggressive IV lasix; Transthoracic to better assess MV/MR after diuresis and rate control to see if candidate for TAVR/single or double valve disease.  I personally had long 30 min discussion with patient and wife re need for continued diuresis to prevent readmission.  They prefer to continue diuresis and get him stabilized on oral meds and complete as much of TAVR/structural heart disease workup as possible and then go home prior to any definitive procedure.  Any invasive procedure would require interruption of anticoagulation s/p recent flutter ablation, but he still remains in AF.  We would need to discuss with Debbie Mead and Adrian.    15 beat WCT, ?AF with aberrancy vs. VT; EP followup, replete K>4, Mg>2.    Continue cardizem for rate control; avoid beta blockers with COPD  Continue eliquis  Steroids per pulmonary.   Agree with treatment of hyperthyroidism as per endocrine; tapazole.    Loyd Castillo MD  [facility]

## 2019-04-11 NOTE — PROGRESS NOTE ADULT - SUBJECTIVE AND OBJECTIVE BOX
Patient is a 73y old  Male who presents with a chief complaint of worsening dyspnea, LE edema, increase in abd girth over a month (11 Apr 2019 17:21)      SUBJECTIVE / OVERNIGHT EVENTS: ptn is considering TAVR, agrees to stay inptn and cont being diuresed    MEDICATIONS  (STANDING):  acetaminophen  IVPB .. 1000 milliGRAM(s) IV Intermittent once  apixaban 5 milliGRAM(s) Oral every 12 hours  aspirin enteric coated 81 milliGRAM(s) Oral daily  atorvastatin 20 milliGRAM(s) Oral at bedtime  buDESOnide 160 MICROgram(s)/formoterol 4.5 MICROgram(s) Inhaler 2 Puff(s) Inhalation two times a day  chlorhexidine 4% Liquid 1 Application(s) Topical <User Schedule>  colchicine 0.6 milliGRAM(s) Oral two times a day  dextrose 5%. 1000 milliLiter(s) (50 mL/Hr) IV Continuous <Continuous>  dextrose 50% Injectable 12.5 Gram(s) IV Push once  dextrose 50% Injectable 25 Gram(s) IV Push once  dextrose 50% Injectable 25 Gram(s) IV Push once  diltiazem    Tablet 60 milliGRAM(s) Oral three times a day  fluticasone propionate 50 MICROgram(s)/spray Nasal Spray 1 Spray(s) Both Nostrils two times a day  furosemide Infusion 10 mG/Hr (5 mL/Hr) IV Continuous <Continuous>  insulin glargine Injectable (LANTUS) 10 Unit(s) SubCutaneous at bedtime  insulin lispro (HumaLOG) corrective regimen sliding scale   SubCutaneous at bedtime  insulin lispro (HumaLOG) corrective regimen sliding scale   SubCutaneous three times a day before meals  insulin lispro Injectable (HumaLOG) 3 Unit(s) SubCutaneous three times a day before meals  methimazole 2.5 milliGRAM(s) Oral daily  nicotine - 21 mG/24Hr(s) Patch 1 patch Transdermal daily  pantoprazole    Tablet 40 milliGRAM(s) Oral before breakfast  predniSONE   Tablet 30 milliGRAM(s) Oral daily  spironolactone 25 milliGRAM(s) Oral daily    MEDICATIONS  (PRN):  ALBUTerol/ipratropium for Nebulization 3 milliLiter(s) Nebulizer every 6 hours PRN Shortness of Breath and/or Wheezing  dextrose 40% Gel 15 Gram(s) Oral once PRN Blood Glucose LESS THAN 70 milliGRAM(s)/deciliter  glucagon  Injectable 1 milliGRAM(s) IntraMuscular once PRN Glucose LESS THAN 70 milligrams/deciliter      Vital Signs Last 24 Hrs  T(F): 97.5 (04-11-19 @ 20:22), Max: 97.5 (04-11-19 @ 04:02)  HR: 91 (04-11-19 @ 21:15) (76 - 91)  BP: 123/84 (04-11-19 @ 21:15) (105/65 - 125/81)  RR: 18 (04-11-19 @ 20:22) (18 - 18)  SpO2: 95% (04-11-19 @ 20:22) (95% - 98%)  Telemetry:   CAPILLARY BLOOD GLUCOSE      POCT Blood Glucose.: 170 mg/dL (11 Apr 2019 21:19)  POCT Blood Glucose.: 188 mg/dL (11 Apr 2019 16:30)  POCT Blood Glucose.: 234 mg/dL (11 Apr 2019 11:49)  POCT Blood Glucose.: 156 mg/dL (11 Apr 2019 07:40)    I&O's Summary    10 Apr 2019 07:01  -  11 Apr 2019 07:00  --------------------------------------------------------  IN: 870 mL / OUT: 3300 mL / NET: -2430 mL    11 Apr 2019 07:01  -  11 Apr 2019 22:56  --------------------------------------------------------  IN: 1180 mL / OUT: 1900 mL / NET: -720 mL        PHYSICAL EXAM:  GENERAL: NAD, well-developed  HEAD:  Atraumatic, Normocephalic  EYES: EOMI, PERRLA, conjunctiva and sclera clear  NECK: Supple, No JVD  CHEST/LUNG: Clear to auscultation bilaterally; No wheeze  HEART: Regular rate and rhythm; No murmurs, rubs, or gallops  ABDOMEN: Soft, Nontender, Nondistended; Bowel sounds present  EXTREMITIES:  2+ Peripheral Pulses, No clubbing, cyanosis, or edema  PSYCH: AAOx3  NEUROLOGY: non-focal  SKIN: No rashes or lesions    LABS:                        14.3   18.40 )-----------( 228      ( 11 Apr 2019 08:37 )             45.3     04-11    138  |  85<L>  |  34<H>  ----------------------------<  132<H>  3.8   |  41<H>  |  1.31<H>    Ca    9.2      11 Apr 2019 06:12  Phos  3.3     04-11  Mg     1.6     04-11    TPro  6.7  /  Alb  3.8  /  TBili  0.6  /  DBili  x   /  AST  29  /  ALT  42  /  AlkPhos  131<H>  04-11    PT/INR - ( 11 Apr 2019 09:14 )   PT: 18.8 sec;   INR: 1.62 ratio         PTT - ( 11 Apr 2019 09:14 )  PTT:26.9 sec          RADIOLOGY & ADDITIONAL TESTS:    Imaging Personally Reviewed:    Consultant(s) Notes Reviewed:      Care Discussed with Consultants/Other Providers:

## 2019-04-11 NOTE — PROGRESS NOTE ADULT - SUBJECTIVE AND OBJECTIVE BOX
24H hour events: No acute events overnight. On Tele: Afib at 70-80's     MEDICATIONS:  apixaban 5 milliGRAM(s) Oral every 12 hours  aspirin enteric coated 81 milliGRAM(s) Oral daily  diltiazem    Tablet 60 milliGRAM(s) Oral three times a day  furosemide Infusion 10 mG/Hr IV Continuous <Continuous>  spironolactone 25 milliGRAM(s) Oral daily    ALBUTerol/ipratropium for Nebulization 3 milliLiter(s) Nebulizer every 6 hours PRN  buDESOnide 160 MICROgram(s)/formoterol 4.5 MICROgram(s) Inhaler 2 Puff(s) Inhalation two times a day    acetaminophen  IVPB .. 1000 milliGRAM(s) IV Intermittent once    pantoprazole    Tablet 40 milliGRAM(s) Oral before breakfast    atorvastatin 20 milliGRAM(s) Oral at bedtime  colchicine 0.6 milliGRAM(s) Oral two times a day  dextrose 40% Gel 15 Gram(s) Oral once PRN  dextrose 50% Injectable 12.5 Gram(s) IV Push once  dextrose 50% Injectable 25 Gram(s) IV Push once  dextrose 50% Injectable 25 Gram(s) IV Push once  glucagon  Injectable 1 milliGRAM(s) IntraMuscular once PRN  insulin glargine Injectable (LANTUS) 10 Unit(s) SubCutaneous at bedtime  insulin lispro (HumaLOG) corrective regimen sliding scale   SubCutaneous at bedtime  insulin lispro (HumaLOG) corrective regimen sliding scale   SubCutaneous three times a day before meals  methimazole 5 milliGRAM(s) Oral daily  predniSONE   Tablet 30 milliGRAM(s) Oral daily    chlorhexidine 4% Liquid 1 Application(s) Topical <User Schedule>  dextrose 5%. 1000 milliLiter(s) IV Continuous <Continuous>  fluticasone propionate 50 MICROgram(s)/spray Nasal Spray 1 Spray(s) Both Nostrils two times a day      REVIEW OF SYSTEMS:  Complete 10point ROS negative.    PHYSICAL EXAM:  T(C): 36.4 (04-11-19 @ 04:02), Max: 37.1 (04-10-19 @ 14:00)  HR: 86 (04-11-19 @ 04:02) (70 - 86)  BP: 105/65 (04-11-19 @ 04:02) (91/53 - 117/71)  RR: 18 (04-11-19 @ 04:02) (18 - 22)  SpO2: 98% (04-11-19 @ 04:02) (92% - 98%)  Wt(kg): --  I&O's Summary    10 Apr 2019 07:01  -  11 Apr 2019 07:00  --------------------------------------------------------  IN: 870 mL / OUT: 3300 mL / NET: -2430 mL    11 Apr 2019 07:01  -  11 Apr 2019 10:49  --------------------------------------------------------  IN: 240 mL / OUT: 400 mL / NET: -160 mL      Appearance: NAD, OOB sitting up in chair	  Neck: Supple	  Cardiovascular: Normal S1 S2, Irregular, No elevation in JVP, + systolic murmurs   Respiratory: Lungs clear to auscultation	  Psychiatry: A & O x 3, Mood & affect appropriate  Gastrointestinal:  Soft, Non-tender, + BS	  Skin: No rashes, No ecchymoses, No cyanosis	  Extremities: Normal range of motion, No clubbing, or cyanosis. B/L LE +2-3 edema   Vascular: Peripheral pulses palpable 2+ bilaterally      LABS:	 	    CBC Full  -  ( 11 Apr 2019 08:37 )  WBC Count : 18.40 K/uL  Hemoglobin : 14.3 g/dL  Hematocrit : 45.3 %  Platelet Count - Automated : 228 K/uL  Mean Cell Volume : 89.3 fl  Mean Cell Hemoglobin : 28.2 pg  Mean Cell Hemoglobin Concentration : 31.6 gm/dL      04-11    138  |  85<L>  |  34<H>  ----------------------------<  132<H>  3.8   |  41<H>  |  1.31<H>  04-10    139  |  89<L>  |  29<H>  ----------------------------<  139<H>  4.2   |  33<H>  |  1.16    Ca    9.2      11 Apr 2019 06:12  Ca    9.1      10 Apr 2019 05:25  Phos  3.3     04-11  Phos  4.0     04-10  Mg     1.6     04-11  Mg     1.7     04-10    TPro  6.7  /  Alb  3.8  /  TBili  0.6  /  DBili  x   /  AST  29  /  ALT  42  /  AlkPhos  131<H>  04-11  TPro  6.7  /  Alb  3.6  /  TBili  0.6  /  DBili  x   /  AST  39  /  ALT  50<H>  /  AlkPhos  128<H>  04-10      proBNP: Serum Pro-Brain Natriuretic Peptide: 3782 pg/mL (04-10 @ 05:25)  Serum Pro-Brain Natriuretic Peptide: 3348 pg/mL (04-08 @ 10:08)    TELEMETRY: Afib at 70-80's     < from: Transesophageal Echocardiogram w/o TTE (04.09.19 @ 15:06) >  Observations:  Mitral Valve: Posterior mitral annular calcification with  calcified posterior mitral leaflets. The posterior leaflet  is restricted. Severe, eccentric, posterior and  laterally-directed mitral regurgitation.  Aortic Valve/Aorta: Calcified trileaflet aortic valve with  decreased opening. Aortic valve appears severely stenotic.  BUSTER by planimetryabout 0.8-0.9 cmsq. No aortic valve  regurgitation seen.  Normal aortic root, aortic arch and descending thoracic  aorta. Mild atheroma.  Left Atrium: No left atrial or left atrial appendage  thrombus. Normal left atrial appendage function (maximum  velocityabout 60 cm/s).  Lipomatous hypertrophy of  interatrial septum.  Left Ventricle: Endocardium not well visualized; grossly  normal left ventricular systolic function.  Right Heart: No right atrial thrombus. Grossly right  ventricle appears enlarged with decreased right ventricular  systolic function. Normal tricuspid valve. Moderate  tricuspid regurgitation.  Pericardium/Pleura: Normal pericardium with no pericardial  effusion.  Hemodynamic: Color flow doppler demonstrates no evidence of  a patent foramen ovale.  ------------------------------------------------------------------------  Conclusions:  1. Posterior mitral annular calcification with calcifiedposterior mitral leaflets. The posterior leaflet is  restricted. Severe, eccentric, posterior and  laterally-directed mitral regurgitation.  2. Calcified trileaflet aortic valve with decreased  opening. Aortic valve appears severely stenotic. BUSTER by  planimetryabout 0.8-0.9 cmsq. No aortic valve regurgitation  seen.  3. No left atrial or left atrial appendage thrombus. Normal  left atrial appendage function (maximum velocityabout 60  cm/s).  Lipomatous hypertrophy of interatrial septum.  4. Endocardium not well visualized; grossly normal left  ventricular systolic function.  5. Grossly right ventricle appears enlarged with decreased  right ventricular systolic function.  Transesophageal echocardiogram performed in the EP lab  prior to atrial flutter ablation in the setting of atrial  flutter with HRabout 100 bpm. Recommend repeat assessment  of valvular disease in sinus rhythm atslower HRs.

## 2019-04-11 NOTE — PROGRESS NOTE ADULT - SUBJECTIVE AND OBJECTIVE BOX
Infectious Diseases progress note:    Subjective:  NAD, sitting comfortably.  Afebrile, denies productive cough, chills/rigors/congestion/sore throat/abd pain/diarrhea/dysuria.  WBC 18, on prednisone.      ROS:  CONSTITUTIONAL:  No fever, chills, rigors  CARDIOVASCULAR:  No chest pain or palpitations  RESPIRATORY:   No SOB, cough, dyspnea on exertion.  No wheezing  GASTROINTESTINAL:  No abd pain, N/V, diarrhea/constipation  EXTREMITIES:  No swelling or joint pain  GENITOURINARY:  No burning on urination, increased frequency or urgency.  No flank pain  NEUROLOGIC:  No HA, visual disturbances  SKIN: No rashes    Allergies    penicillins (Unknown)    Intolerances        ANTIBIOTICS/RELEVANT:  antimicrobials    immunologic:    OTHER:  acetaminophen  IVPB .. 1000 milliGRAM(s) IV Intermittent once  ALBUTerol/ipratropium for Nebulization 3 milliLiter(s) Nebulizer every 6 hours PRN  apixaban 5 milliGRAM(s) Oral every 12 hours  aspirin enteric coated 81 milliGRAM(s) Oral daily  atorvastatin 20 milliGRAM(s) Oral at bedtime  buDESOnide 160 MICROgram(s)/formoterol 4.5 MICROgram(s) Inhaler 2 Puff(s) Inhalation two times a day  chlorhexidine 4% Liquid 1 Application(s) Topical <User Schedule>  colchicine 0.6 milliGRAM(s) Oral two times a day  dextrose 40% Gel 15 Gram(s) Oral once PRN  dextrose 5%. 1000 milliLiter(s) IV Continuous <Continuous>  dextrose 50% Injectable 12.5 Gram(s) IV Push once  dextrose 50% Injectable 25 Gram(s) IV Push once  dextrose 50% Injectable 25 Gram(s) IV Push once  diltiazem    Tablet 60 milliGRAM(s) Oral three times a day  fluticasone propionate 50 MICROgram(s)/spray Nasal Spray 1 Spray(s) Both Nostrils two times a day  furosemide Infusion 10 mG/Hr IV Continuous <Continuous>  glucagon  Injectable 1 milliGRAM(s) IntraMuscular once PRN  insulin glargine Injectable (LANTUS) 10 Unit(s) SubCutaneous at bedtime  insulin lispro (HumaLOG) corrective regimen sliding scale   SubCutaneous at bedtime  insulin lispro (HumaLOG) corrective regimen sliding scale   SubCutaneous three times a day before meals  methimazole 5 milliGRAM(s) Oral daily  nicotine - 21 mG/24Hr(s) Patch 1 patch Transdermal daily  pantoprazole    Tablet 40 milliGRAM(s) Oral before breakfast  predniSONE   Tablet 30 milliGRAM(s) Oral daily  spironolactone 25 milliGRAM(s) Oral daily      Objective:  Vital Signs Last 24 Hrs  T(C): 36.3 (11 Apr 2019 12:00), Max: 36.5 (10 Apr 2019 20:02)  T(F): 97.3 (11 Apr 2019 12:00), Max: 97.7 (10 Apr 2019 20:02)  HR: 76 (11 Apr 2019 12:00) (76 - 86)  BP: 125/81 (11 Apr 2019 12:00) (105/65 - 125/81)  BP(mean): --  RR: 18 (11 Apr 2019 12:00) (18 - 18)  SpO2: 96% (11 Apr 2019 12:00) (96% - 98%)    PHYSICAL EXAM:  Constitutional:NAD  Eyes:NYDIA, EOMI  Ear/Nose/Throat: no thrush, mucositis.  Moist mucous membranes	  Neck:no JVD, no lymphadenopathy, supple  Respiratory: decr BS rt base  Cardiovascular: S1S2 RRR, no murmurs  Gastrointestinal:soft, nontender,  nondistended (+) BS  Extremities:no e/e/c  Skin:  no rashes, open wounds or ulcerations        LABS:                        14.3   18.40 )-----------( 228      ( 11 Apr 2019 08:37 )             45.3     04-11    138  |  85<L>  |  34<H>  ----------------------------<  132<H>  3.8   |  41<H>  |  1.31<H>    Ca    9.2      11 Apr 2019 06:12  Phos  3.3     04-11  Mg     1.6     04-11    TPro  6.7  /  Alb  3.8  /  TBili  0.6  /  DBili  x   /  AST  29  /  ALT  42  /  AlkPhos  131<H>  04-11    PT/INR - ( 11 Apr 2019 09:14 )   PT: 18.8 sec;   INR: 1.62 ratio         PTT - ( 11 Apr 2019 09:14 )  PTT:26.9 sec              MICROBIOLOGY:    Culture - Urine (04.09.19 @ 00:32)    Specimen Source: .Urine    Culture Results:   No growth    Culture - Blood (04.09.19 @ 00:17)    Specimen Source: .Blood    Culture Results:   No growth to date.    Culture - Blood (04.09.19 @ 00:17)    Specimen Source: .Blood    Culture Results:   No growth to date.          RADIOLOGY & ADDITIONAL STUDIES:    < from: Xray Chest 1 View- PORTABLE-Urgent (04.09.19 @ 22:10) >  EXAM: Single view of the chest (AP).    COMPARISON: Chest radiograph from 4/8/2019    IMPRESSION:  Mild pulmonary vascular congestion. Small right pleural effusion. No   pneumothorax.. The heart size is normal. No acute osseous findings.    < end of copied text >        < from: [facility] Physiol Extremity Lower 3+ Level, BI (04.09.19 @ 14:07) >  IMPRESSION:     Lower extremity arterial disease is not definitively identified.    < end of copied text >        < from: US Abdomen Limited (04.09.19 @ 11:30) >  INTERPRETATION:  Clinical information: CHF. Suggests amount of ascites.    Limited abdominal sonography was performed.    Findings: Right pleural effusion is noted. Left pleural effusion is noted.    There is trace to mild right upper quadrant ascites. There is mild right   lower quadrant ascites. Trace ascites is seen within the left upper   quadrant. No definitive free fluid is seen within the left lower quadrant.    Impression: Trace to mild ascites as described above with largest pocket   within the right lower quadrant.    Bilateral pleural effusions.    < end of copied text >          < from: CT Chest No Cont (04.08.19 @ 12:18) >  INTERPRETATION:  Clinical information: Chest pain and shortness of   breath. Exam is compared to previous study of 6/16/2018.    CT scan of the chest was obtained without administration of intravenous   contrast.    Small low-attenuation lesions are noted within the thyroid gland. They   are unchanged when compared to previous exam.    Few lymph nodes are present in the pretracheal space, AP window and the   anterior mediastinum. They are unchanged when compared to previous exam.     Heart is enlarged in size. Calcification of the aortic valve and the   coronary arteries is noted. No pericardial effusion is noted.     No endobronchial lesions are noted. Centrilobular emphysema is noted   bilaterally. Few less than 0.4 cm solid nodules are noted in the right   upper lobe. They're unchanged when compared to previous exam. Compressive   atelectasis is noted involving portion of the right lower lobe. This is   secondary to small right pleural effusion.    Below the diaphragm, visualized portions of the abdomen demonstrate   ascites and cholelithiasis. Small calcification is noted in the right   kidney. Low-attenuation lesion in the left kidney is incompletely imaged   on this exam.     Degenerative changes of the spine are noted.    Impression: Stable few very small solid nodules in the right upper lobe   when compared to previous exam.    Small right pleural effusion.    < end of copied text >

## 2019-04-11 NOTE — PROGRESS NOTE ADULT - ASSESSMENT
72 y/o Type 2 DM, Hyperthyroidism, HTN, HLD, COPD CHF, Admitted with SOB and anasarca.    72 y/o morbid obesity, heavy smoker P/H Type 2 DM treated with Janumet 50/1,000 daily. Patient not fully compliant with diet, not following FS. HbA1c last admission 7.5%. Patient started on treatment with prednisone 40-->30 mg daily for COPD exacerbation one week ago. Noted polyuria. FS on admission 177 mg/dL.    Hyperthyroidism- patient has history of hyperthyroidism for the past 2-3 years. He has no family history of thyroid disease. No eye complaints. Chest CT noted with thyroid nodularity. Weight increasing, no palpitations, no tremor. TFT's 6/18: TSH <0.01, T4- 11.1, T3-162. He never had treatment for thyroid disease.    He did not get iodine contrast dye recently or during tis admission.    DM- HbA1c 8.2%.  Expect hyperglycemia on steroid treatment.  AF noted, S/P ablation.  Patient started on basal lantus 10 units daily.  FS lower at am but still 180-220 post meals. PO intake good.  Still on prednisone 30 mg daily.  Pulmonary plans for 3+7 more days of steroids on taper.    Hyperthyroidism-  Clinical story, stable hyperthyroidism for 2 years and CT noted thyroid nodularity are consistent with MNG with borderline hyperthyroidism.  Avoid iodine contrast dye and amiodarone.  Repeat TFT's noted, lower T3 this time is m/p the result of steroids the patient is on (decrease T4 to T3 conversion) but as patient had SVT in the presence of supressed TSH indicated fore treatment.  As patient will be on steroids for the next ten days can decrease tapazol dose to 2.5 mg daily now.    Suggest:  Steroid taper plans noted.  Keep Lantus 10 units at HS, add  pre-meal Humalog 3 units per meal. Mid scale humalog.  Lower Tapazol 2.5 mg daily. Patient will most probably require stable treatment with this dose.  Follow creatinine- patient was on Janumet 50/1,000 prior to admission.  F/U TFT's, LFT's and WBC in 2 months.

## 2019-04-11 NOTE — CHART NOTE - NSCHARTNOTEFT_GEN_A_CORE
MEDICINE NP     Late entry   MILAGROS VIERA  73y Male  Patient is a 73y old  Male who presents with a chief complaint of worsening dyspnea, LE edema, increase in abd girth over a month (11 Apr 2019 17:21)       Event Summary:   Notified by RN patient with 15 beats of WCT at 11am   During this time patient was in conversation with Cardiology, and structural heart team  Patient wanted to leave against medical advice.  Per cardiology Dr. Castillo patient is fluid overload   and would benefit in remaining in the hospital for further treatment with Lasix drip and evaluation for possible   TAVR.  Wife was also at bedside for discussion on continued treatment of patient's heart failure.  After much   discussion wife and patient agreed to remain in the hospital.     Was informed of episode of WCT after this conversation- instructed RN to get EKG   No changes were noted. VS stable , patient without c/o CP or palpitation.       Vital Signs Last 24 Hrs  T(C): 36.4 (11 Apr 2019 20:22), Max: 36.4 (11 Apr 2019 04:02)  T(F): 97.5 (11 Apr 2019 20:22), Max: 97.5 (11 Apr 2019 04:02)  HR: 81 (11 Apr 2019 20:22) (76 - 86)  BP: 111/70 (11 Apr 2019 20:22) (105/65 - 125/81)  BP(mean): --  RR: 18 (11 Apr 2019 20:22) (18 - 18)  SpO2: 95% (11 Apr 2019 20:22) (95% - 98%)      Will continue to monitor    Carissa Urias, IRLANDA-C  Medicine Department

## 2019-04-11 NOTE — PROGRESS NOTE ADULT - ASSESSMENT
73 year old M pt with PMH of a-fib/Afl on eliquis, HTN, HLD, DM2, and COPD p/w worsening SOB x 1 month.     - clinically stable with no chest pain, palpitations s/p AFL ablation  - telemetry: remain in coarse fib with rates 70-80's   - MARIA GUADALUPE showing severe MR and severe AS, structural consulted, awaiting CTS eval for possible AVR/MVR  - not a good candidate for amio with COPD and hyperthyroidism   - c/w medical management with rate control (ditliazem) and a/c (Eliquis)    [id number]

## 2019-04-12 LAB
ALBUMIN SERPL ELPH-MCNC: 3.7 G/DL — SIGNIFICANT CHANGE UP (ref 3.3–5)
ALP SERPL-CCNC: 135 U/L — HIGH (ref 40–120)
ALT FLD-CCNC: 39 U/L — SIGNIFICANT CHANGE UP (ref 10–45)
ANION GAP SERPL CALC-SCNC: 12 MMOL/L — SIGNIFICANT CHANGE UP (ref 5–17)
APTT BLD: 28.8 SEC — SIGNIFICANT CHANGE UP (ref 27.5–36.3)
AST SERPL-CCNC: 31 U/L — SIGNIFICANT CHANGE UP (ref 10–40)
BASOPHILS # BLD AUTO: 0.03 K/UL — SIGNIFICANT CHANGE UP (ref 0–0.2)
BASOPHILS NFR BLD AUTO: 0.2 % — SIGNIFICANT CHANGE UP (ref 0–2)
BILIRUB SERPL-MCNC: 0.5 MG/DL — SIGNIFICANT CHANGE UP (ref 0.2–1.2)
BUN SERPL-MCNC: 39 MG/DL — HIGH (ref 7–23)
CALCIUM SERPL-MCNC: 9.3 MG/DL — SIGNIFICANT CHANGE UP (ref 8.4–10.5)
CHLORIDE SERPL-SCNC: 84 MMOL/L — LOW (ref 96–108)
CO2 SERPL-SCNC: 41 MMOL/L — HIGH (ref 22–31)
CREAT SERPL-MCNC: 1.13 MG/DL — SIGNIFICANT CHANGE UP (ref 0.5–1.3)
EOSINOPHIL # BLD AUTO: 0.07 K/UL — SIGNIFICANT CHANGE UP (ref 0–0.5)
EOSINOPHIL NFR BLD AUTO: 0.5 % — SIGNIFICANT CHANGE UP (ref 0–6)
GLUCOSE BLDC GLUCOMTR-MCNC: 165 MG/DL — HIGH (ref 70–99)
GLUCOSE BLDC GLUCOMTR-MCNC: 190 MG/DL — HIGH (ref 70–99)
GLUCOSE BLDC GLUCOMTR-MCNC: 199 MG/DL — HIGH (ref 70–99)
GLUCOSE BLDC GLUCOMTR-MCNC: 253 MG/DL — HIGH (ref 70–99)
GLUCOSE SERPL-MCNC: 117 MG/DL — HIGH (ref 70–99)
HCT VFR BLD CALC: 45.6 % — SIGNIFICANT CHANGE UP (ref 39–50)
HGB BLD-MCNC: 14.8 G/DL — SIGNIFICANT CHANGE UP (ref 13–17)
IMM GRANULOCYTES NFR BLD AUTO: 1.5 % — SIGNIFICANT CHANGE UP (ref 0–1.5)
INR BLD: 1.46 RATIO — HIGH (ref 0.88–1.16)
LYMPHOCYTES # BLD AUTO: 1.4 K/UL — SIGNIFICANT CHANGE UP (ref 1–3.3)
LYMPHOCYTES # BLD AUTO: 9.3 % — LOW (ref 13–44)
MAGNESIUM SERPL-MCNC: 1.5 MG/DL — LOW (ref 1.6–2.6)
MCHC RBC-ENTMCNC: 28.7 PG — SIGNIFICANT CHANGE UP (ref 27–34)
MCHC RBC-ENTMCNC: 32.5 GM/DL — SIGNIFICANT CHANGE UP (ref 32–36)
MCV RBC AUTO: 88.4 FL — SIGNIFICANT CHANGE UP (ref 80–100)
MONOCYTES # BLD AUTO: 1.56 K/UL — HIGH (ref 0–0.9)
MONOCYTES NFR BLD AUTO: 10.4 % — SIGNIFICANT CHANGE UP (ref 2–14)
NEUTROPHILS # BLD AUTO: 11.75 K/UL — HIGH (ref 1.8–7.4)
NEUTROPHILS NFR BLD AUTO: 78.1 % — HIGH (ref 43–77)
PHOSPHATE SERPL-MCNC: 2.7 MG/DL — SIGNIFICANT CHANGE UP (ref 2.5–4.5)
PLATELET # BLD AUTO: 208 K/UL — SIGNIFICANT CHANGE UP (ref 150–400)
POTASSIUM SERPL-MCNC: 3.6 MMOL/L — SIGNIFICANT CHANGE UP (ref 3.5–5.3)
POTASSIUM SERPL-SCNC: 3.6 MMOL/L — SIGNIFICANT CHANGE UP (ref 3.5–5.3)
PROT SERPL-MCNC: 6.9 G/DL — SIGNIFICANT CHANGE UP (ref 6–8.3)
PROTHROM AB SERPL-ACNC: 16.7 SEC — HIGH (ref 10–13.1)
RBC # BLD: 5.16 M/UL — SIGNIFICANT CHANGE UP (ref 4.2–5.8)
RBC # FLD: 14.9 % — HIGH (ref 10.3–14.5)
SODIUM SERPL-SCNC: 137 MMOL/L — SIGNIFICANT CHANGE UP (ref 135–145)
WBC # BLD: 15.03 K/UL — HIGH (ref 3.8–10.5)
WBC # FLD AUTO: 15.03 K/UL — HIGH (ref 3.8–10.5)

## 2019-04-12 PROCEDURE — 99233 SBSQ HOSP IP/OBS HIGH 50: CPT

## 2019-04-12 PROCEDURE — 99232 SBSQ HOSP IP/OBS MODERATE 35: CPT

## 2019-04-12 RX ORDER — LANOLIN ALCOHOL/MO/W.PET/CERES
3 CREAM (GRAM) TOPICAL ONCE
Qty: 0 | Refills: 0 | Status: COMPLETED | OUTPATIENT
Start: 2019-04-12 | End: 2019-04-12

## 2019-04-12 RX ORDER — POTASSIUM CHLORIDE 20 MEQ
40 PACKET (EA) ORAL ONCE
Qty: 0 | Refills: 0 | Status: COMPLETED | OUTPATIENT
Start: 2019-04-12 | End: 2019-04-12

## 2019-04-12 RX ORDER — METOPROLOL TARTRATE 50 MG
12.5 TABLET ORAL DAILY
Qty: 0 | Refills: 0 | Status: DISCONTINUED | OUTPATIENT
Start: 2019-04-12 | End: 2019-04-14

## 2019-04-12 RX ORDER — MAGNESIUM SULFATE 500 MG/ML
1 VIAL (ML) INJECTION ONCE
Qty: 0 | Refills: 0 | Status: COMPLETED | OUTPATIENT
Start: 2019-04-12 | End: 2019-04-12

## 2019-04-12 RX ORDER — CARVEDILOL PHOSPHATE 80 MG/1
3.12 CAPSULE, EXTENDED RELEASE ORAL EVERY 12 HOURS
Qty: 0 | Refills: 0 | Status: DISCONTINUED | OUTPATIENT
Start: 2019-04-12 | End: 2019-04-12

## 2019-04-12 RX ADMIN — Medication 1 PATCH: at 22:42

## 2019-04-12 RX ADMIN — Medication 3 UNIT(S): at 08:34

## 2019-04-12 RX ADMIN — ATORVASTATIN CALCIUM 20 MILLIGRAM(S): 80 TABLET, FILM COATED ORAL at 21:40

## 2019-04-12 RX ADMIN — Medication 3 UNIT(S): at 17:05

## 2019-04-12 RX ADMIN — Medication 1 PATCH: at 19:31

## 2019-04-12 RX ADMIN — Medication 60 MILLIGRAM(S): at 21:40

## 2019-04-12 RX ADMIN — Medication 1 PATCH: at 17:13

## 2019-04-12 RX ADMIN — BUDESONIDE AND FORMOTEROL FUMARATE DIHYDRATE 2 PUFF(S): 160; 4.5 AEROSOL RESPIRATORY (INHALATION) at 17:03

## 2019-04-12 RX ADMIN — Medication 60 MILLIGRAM(S): at 15:52

## 2019-04-12 RX ADMIN — Medication 81 MILLIGRAM(S): at 12:13

## 2019-04-12 RX ADMIN — Medication 6: at 12:14

## 2019-04-12 RX ADMIN — BUDESONIDE AND FORMOTEROL FUMARATE DIHYDRATE 2 PUFF(S): 160; 4.5 AEROSOL RESPIRATORY (INHALATION) at 05:09

## 2019-04-12 RX ADMIN — Medication 0.6 MILLIGRAM(S): at 17:04

## 2019-04-12 RX ADMIN — Medication 2: at 17:05

## 2019-04-12 RX ADMIN — INSULIN GLARGINE 10 UNIT(S): 100 INJECTION, SOLUTION SUBCUTANEOUS at 21:40

## 2019-04-12 RX ADMIN — Medication 100 GRAM(S): at 12:10

## 2019-04-12 RX ADMIN — Medication 60 MILLIGRAM(S): at 05:10

## 2019-04-12 RX ADMIN — PANTOPRAZOLE SODIUM 40 MILLIGRAM(S): 20 TABLET, DELAYED RELEASE ORAL at 05:10

## 2019-04-12 RX ADMIN — Medication 3 MILLIGRAM(S): at 22:57

## 2019-04-12 RX ADMIN — APIXABAN 5 MILLIGRAM(S): 2.5 TABLET, FILM COATED ORAL at 17:04

## 2019-04-12 RX ADMIN — Medication 2: at 08:34

## 2019-04-12 RX ADMIN — Medication 1 SPRAY(S): at 17:04

## 2019-04-12 RX ADMIN — Medication 3 UNIT(S): at 12:14

## 2019-04-12 RX ADMIN — CHLORHEXIDINE GLUCONATE 1 APPLICATION(S): 213 SOLUTION TOPICAL at 05:13

## 2019-04-12 RX ADMIN — SPIRONOLACTONE 25 MILLIGRAM(S): 25 TABLET, FILM COATED ORAL at 05:11

## 2019-04-12 RX ADMIN — Medication 0.6 MILLIGRAM(S): at 05:09

## 2019-04-12 RX ADMIN — Medication 30 MILLIGRAM(S): at 05:11

## 2019-04-12 RX ADMIN — Medication 1 PATCH: at 22:30

## 2019-04-12 RX ADMIN — APIXABAN 5 MILLIGRAM(S): 2.5 TABLET, FILM COATED ORAL at 05:09

## 2019-04-12 RX ADMIN — Medication 40 MILLIEQUIVALENT(S): at 12:13

## 2019-04-12 RX ADMIN — Medication 1 SPRAY(S): at 05:10

## 2019-04-12 NOTE — PROGRESS NOTE ADULT - SUBJECTIVE AND OBJECTIVE BOX
Patient is a 73y old  Male who presents with a chief complaint of worsening dyspnea, LE edema, increase in abd girth over a month (12 Apr 2019 15:22)      SUBJECTIVE / OVERNIGHT EVENTS: couldnt tolerate laying down for Ct Coronaries    MEDICATIONS  (STANDING):  acetaminophen  IVPB .. 1000 milliGRAM(s) IV Intermittent once  apixaban 5 milliGRAM(s) Oral every 12 hours  aspirin enteric coated 81 milliGRAM(s) Oral daily  atorvastatin 20 milliGRAM(s) Oral at bedtime  buDESOnide 160 MICROgram(s)/formoterol 4.5 MICROgram(s) Inhaler 2 Puff(s) Inhalation two times a day  chlorhexidine 4% Liquid 1 Application(s) Topical <User Schedule>  colchicine 0.6 milliGRAM(s) Oral two times a day  dextrose 5%. 1000 milliLiter(s) (50 mL/Hr) IV Continuous <Continuous>  dextrose 50% Injectable 12.5 Gram(s) IV Push once  dextrose 50% Injectable 25 Gram(s) IV Push once  dextrose 50% Injectable 25 Gram(s) IV Push once  diltiazem    Tablet 60 milliGRAM(s) Oral three times a day  fluticasone propionate 50 MICROgram(s)/spray Nasal Spray 1 Spray(s) Both Nostrils two times a day  furosemide Infusion 10 mG/Hr (5 mL/Hr) IV Continuous <Continuous>  insulin glargine Injectable (LANTUS) 10 Unit(s) SubCutaneous at bedtime  insulin lispro (HumaLOG) corrective regimen sliding scale   SubCutaneous at bedtime  insulin lispro (HumaLOG) corrective regimen sliding scale   SubCutaneous three times a day before meals  insulin lispro Injectable (HumaLOG) 3 Unit(s) SubCutaneous three times a day before meals  methimazole 2.5 milliGRAM(s) Oral daily  nicotine - 21 mG/24Hr(s) Patch 1 patch Transdermal daily  pantoprazole    Tablet 40 milliGRAM(s) Oral before breakfast  predniSONE   Tablet 20 milliGRAM(s) Oral daily  spironolactone 25 milliGRAM(s) Oral daily    MEDICATIONS  (PRN):  ALBUTerol/ipratropium for Nebulization 3 milliLiter(s) Nebulizer every 6 hours PRN Shortness of Breath and/or Wheezing  dextrose 40% Gel 15 Gram(s) Oral once PRN Blood Glucose LESS THAN 70 milliGRAM(s)/deciliter  glucagon  Injectable 1 milliGRAM(s) IntraMuscular once PRN Glucose LESS THAN 70 milligrams/deciliter      Vital Signs Last 24 Hrs  T(F): 97.6 (04-12-19 @ 12:00), Max: 97.6 (04-12-19 @ 12:00)  HR: 91 (04-12-19 @ 15:45) (81 - 94)  BP: 129/74 (04-12-19 @ 15:45) (103/71 - 129/74)  RR: 18 (04-12-19 @ 15:45) (18 - 18)  SpO2: 95% (04-12-19 @ 15:45) (95% - 96%)  Telemetry:   CAPILLARY BLOOD GLUCOSE      POCT Blood Glucose.: 199 mg/dL (12 Apr 2019 16:29)  POCT Blood Glucose.: 253 mg/dL (12 Apr 2019 11:54)  POCT Blood Glucose.: 165 mg/dL (12 Apr 2019 07:51)  POCT Blood Glucose.: 170 mg/dL (11 Apr 2019 21:19)    I&O's Summary    11 Apr 2019 07:01  -  12 Apr 2019 07:00  --------------------------------------------------------  IN: 1360 mL / OUT: 2300 mL / NET: -940 mL    12 Apr 2019 07:01  -  12 Apr 2019 17:35  --------------------------------------------------------  IN: 480 mL / OUT: 800 mL / NET: -320 mL        PHYSICAL EXAM:  GENERAL: NAD, well-developed  HEAD:  Atraumatic, Normocephalic  EYES: EOMI, PERRLA, conjunctiva and sclera clear  NECK: Supple, No JVD  CHEST/LUNG: Clear to auscultation bilaterally; No wheeze  HEART: Regular rate and rhythm; No murmurs, rubs, or gallops  ABDOMEN: Soft, Nontender, Nondistended; Bowel sounds present  EXTREMITIES:  2+ Peripheral Pulses, No clubbing, cyanosis, or edema  PSYCH: AAOx3  NEUROLOGY: non-focal  SKIN: No rashes or lesions    LABS:                        14.8   15.03 )-----------( 208      ( 12 Apr 2019 09:03 )             45.6     04-12    137  |  84<L>  |  39<H>  ----------------------------<  117<H>  3.6   |  41<H>  |  1.13    Ca    9.3      12 Apr 2019 06:48  Phos  2.7     04-12  Mg     1.5     04-12    TPro  6.9  /  Alb  3.7  /  TBili  0.5  /  DBili  x   /  AST  31  /  ALT  39  /  AlkPhos  135<H>  04-12    PT/INR - ( 12 Apr 2019 08:24 )   PT: 16.7 sec;   INR: 1.46 ratio         PTT - ( 12 Apr 2019 08:24 )  PTT:28.8 sec          RADIOLOGY & ADDITIONAL TESTS:    Imaging Personally Reviewed:    Consultant(s) Notes Reviewed:      Care Discussed with Consultants/Other Providers: Patient is a 73y old  Male who presents with a chief complaint of worsening dyspnea, LE edema, increase in abd girth over a month (12 Apr 2019 15:22)      SUBJECTIVE / OVERNIGHT EVENTS: couldnt tolerate laying down for Ct Coronaries, NSVT on tele    MEDICATIONS  (STANDING):  acetaminophen  IVPB .. 1000 milliGRAM(s) IV Intermittent once  apixaban 5 milliGRAM(s) Oral every 12 hours  aspirin enteric coated 81 milliGRAM(s) Oral daily  atorvastatin 20 milliGRAM(s) Oral at bedtime  buDESOnide 160 MICROgram(s)/formoterol 4.5 MICROgram(s) Inhaler 2 Puff(s) Inhalation two times a day  chlorhexidine 4% Liquid 1 Application(s) Topical <User Schedule>  colchicine 0.6 milliGRAM(s) Oral two times a day  dextrose 5%. 1000 milliLiter(s) (50 mL/Hr) IV Continuous <Continuous>  dextrose 50% Injectable 12.5 Gram(s) IV Push once  dextrose 50% Injectable 25 Gram(s) IV Push once  dextrose 50% Injectable 25 Gram(s) IV Push once  diltiazem    Tablet 60 milliGRAM(s) Oral three times a day  fluticasone propionate 50 MICROgram(s)/spray Nasal Spray 1 Spray(s) Both Nostrils two times a day  furosemide Infusion 10 mG/Hr (5 mL/Hr) IV Continuous <Continuous>  insulin glargine Injectable (LANTUS) 10 Unit(s) SubCutaneous at bedtime  insulin lispro (HumaLOG) corrective regimen sliding scale   SubCutaneous at bedtime  insulin lispro (HumaLOG) corrective regimen sliding scale   SubCutaneous three times a day before meals  insulin lispro Injectable (HumaLOG) 3 Unit(s) SubCutaneous three times a day before meals  methimazole 2.5 milliGRAM(s) Oral daily  nicotine - 21 mG/24Hr(s) Patch 1 patch Transdermal daily  pantoprazole    Tablet 40 milliGRAM(s) Oral before breakfast  predniSONE   Tablet 20 milliGRAM(s) Oral daily  spironolactone 25 milliGRAM(s) Oral daily    MEDICATIONS  (PRN):  ALBUTerol/ipratropium for Nebulization 3 milliLiter(s) Nebulizer every 6 hours PRN Shortness of Breath and/or Wheezing  dextrose 40% Gel 15 Gram(s) Oral once PRN Blood Glucose LESS THAN 70 milliGRAM(s)/deciliter  glucagon  Injectable 1 milliGRAM(s) IntraMuscular once PRN Glucose LESS THAN 70 milligrams/deciliter      Vital Signs Last 24 Hrs  T(F): 97.6 (04-12-19 @ 12:00), Max: 97.6 (04-12-19 @ 12:00)  HR: 91 (04-12-19 @ 15:45) (81 - 94)  BP: 129/74 (04-12-19 @ 15:45) (103/71 - 129/74)  RR: 18 (04-12-19 @ 15:45) (18 - 18)  SpO2: 95% (04-12-19 @ 15:45) (95% - 96%)  Telemetry:   CAPILLARY BLOOD GLUCOSE      POCT Blood Glucose.: 199 mg/dL (12 Apr 2019 16:29)  POCT Blood Glucose.: 253 mg/dL (12 Apr 2019 11:54)  POCT Blood Glucose.: 165 mg/dL (12 Apr 2019 07:51)  POCT Blood Glucose.: 170 mg/dL (11 Apr 2019 21:19)    I&O's Summary    11 Apr 2019 07:01  -  12 Apr 2019 07:00  --------------------------------------------------------  IN: 1360 mL / OUT: 2300 mL / NET: -940 mL    12 Apr 2019 07:01  -  12 Apr 2019 17:35  --------------------------------------------------------  IN: 480 mL / OUT: 800 mL / NET: -320 mL        PHYSICAL EXAM:  GENERAL: NAD, well-developed  HEAD:  Atraumatic, Normocephalic  EYES: EOMI, PERRLA, conjunctiva and sclera clear  NECK: Supple, No JVD  CHEST/LUNG: Clear to auscultation bilaterally; No wheeze  HEART: Regular rate and rhythm; No murmurs, rubs, or gallops  ABDOMEN: Soft, Nontender, Nondistended; Bowel sounds present  EXTREMITIES:  2+ Peripheral Pulses, No clubbing, cyanosis, or edema  PSYCH: AAOx3  NEUROLOGY: non-focal  SKIN: No rashes or lesions    LABS:                        14.8   15.03 )-----------( 208      ( 12 Apr 2019 09:03 )             45.6     04-12    137  |  84<L>  |  39<H>  ----------------------------<  117<H>  3.6   |  41<H>  |  1.13    Ca    9.3      12 Apr 2019 06:48  Phos  2.7     04-12  Mg     1.5     04-12    TPro  6.9  /  Alb  3.7  /  TBili  0.5  /  DBili  x   /  AST  31  /  ALT  39  /  AlkPhos  135<H>  04-12    PT/INR - ( 12 Apr 2019 08:24 )   PT: 16.7 sec;   INR: 1.46 ratio         PTT - ( 12 Apr 2019 08:24 )  PTT:28.8 sec          RADIOLOGY & ADDITIONAL TESTS:    Imaging Personally Reviewed:    Consultant(s) Notes Reviewed:      Care Discussed with Consultants/Other Providers:

## 2019-04-12 NOTE — PROGRESS NOTE ADULT - SUBJECTIVE AND OBJECTIVE BOX
Cardiology Progress Note    Interval: Resting comfortably in bed. No chest pain, palpitations, and SOB. Breathing much better since admission.    Tele: a-fib 80's    HPI:  74 yo male presents with progressive worsening of dyspnea, orthopnea, BALTAZAR, sleeps in chair, increased LE edema w pain on ambulation and increased abdominal girth.   No cough, no fevers, no N/V, no palpitations Ptn was seen by his pulmonologist Dr. Brand on  who started him on 40 mg Prednisone, ptn states he has no relief, and saw his planned Follow up w his cardiologist Dr. Campbell today, who sent him to the hospital for treatment.  PMH:  hyperthyrodism, untreated since ptn refused Endo F/U in the past, obesity, HTN, HLD, chronic heavy smoker, 50 pack years, COPD, LIMA, refuses CPAP, pulmonary nodules, dCHF, CAD, Afib on Eliquis, Pericardial effusion w drainage in , at which time was placed on Colchicine, refused to stay for work up after drainage,  DM2, Nephrolithiasis (2019 11:43)      Medications:  acetaminophen  IVPB .. 1000 milliGRAM(s) IV Intermittent once  ALBUTerol/ipratropium for Nebulization 3 milliLiter(s) Nebulizer every 6 hours PRN  apixaban 5 milliGRAM(s) Oral every 12 hours  aspirin enteric coated 81 milliGRAM(s) Oral daily  atorvastatin 20 milliGRAM(s) Oral at bedtime  buDESOnide 160 MICROgram(s)/formoterol 4.5 MICROgram(s) Inhaler 2 Puff(s) Inhalation two times a day  chlorhexidine 4% Liquid 1 Application(s) Topical <User Schedule>  colchicine 0.6 milliGRAM(s) Oral two times a day  dextrose 40% Gel 15 Gram(s) Oral once PRN  dextrose 5%. 1000 milliLiter(s) IV Continuous <Continuous>  dextrose 50% Injectable 12.5 Gram(s) IV Push once  dextrose 50% Injectable 25 Gram(s) IV Push once  dextrose 50% Injectable 25 Gram(s) IV Push once  diltiazem    Tablet 60 milliGRAM(s) Oral three times a day  fluticasone propionate 50 MICROgram(s)/spray Nasal Spray 1 Spray(s) Both Nostrils two times a day  furosemide Infusion 10 mG/Hr IV Continuous <Continuous>  glucagon  Injectable 1 milliGRAM(s) IntraMuscular once PRN  insulin glargine Injectable (LANTUS) 10 Unit(s) SubCutaneous at bedtime  insulin lispro (HumaLOG) corrective regimen sliding scale   SubCutaneous at bedtime  insulin lispro (HumaLOG) corrective regimen sliding scale   SubCutaneous three times a day before meals  insulin lispro Injectable (HumaLOG) 3 Unit(s) SubCutaneous three times a day before meals  magnesium sulfate  IVPB 1 Gram(s) IV Intermittent once  methimazole 2.5 milliGRAM(s) Oral daily  nicotine - 21 mG/24Hr(s) Patch 1 patch Transdermal daily  pantoprazole    Tablet 40 milliGRAM(s) Oral before breakfast  potassium chloride    Tablet ER 40 milliEquivalent(s) Oral once  predniSONE   Tablet 20 milliGRAM(s) Oral daily  spironolactone 25 milliGRAM(s) Oral daily      Review of Systems:  Constitutional: [ ] Fever [ ] Chills [ ] Fatigue [ ] Weight change   HEENT: [ ] Blurred vision [ ] Eye Pain [ ] Headache [ ] Runny nose [ ] Sore Throat   Respiratory: [ ] Cough [ ] Wheezing [ ] Shortness of breath  Cardiovascular: [ ] Chest Pain [ ] Palpitations [ ] BALTAZAR [ ] PND [ ] Orthopnea  Gastrointestinal: [ ] Abdominal Pain [ ] Diarrhea [ ] Constipation [ ] Hemorrhoids [ ] Nausea [ ] Vomiting  Genitourinary: [ ] Nocturia [ ] Dysuria [ ] Incontinence  Extremities: [ ] Swelling [ ] Joint Pain  Neurologic: [ ] Focal deficit [ ] Paresthesias [ ] Syncope  Lymphatic: [ ] Swelling [ ] Lymphadenopathy   Skin: [ ] Rash [ ] Ecchymoses [ ] Wounds [ ] Lesions  Psychiatry: [ ] Depression [ ] Suicidal/Homicidal Ideation [ ] Anxiety [ ] Sleep Disturbances  [ ] 10 point review of systems is otherwise negative except as mentioned above            [ ]Unable to obtain    Vitals:  T(C): 36.2 (19 @ 05:00), Max: 36.4 (19 @ 20:22)  HR: 85 (19 @ 05:00) (76 - 91)  BP: 107/71 (19 @ 05:00) (107/71 - 125/81)  BP(mean): --  RR: 18 (19 @ 05:00) (18 - 18)  SpO2: 96% (19 @ 05:00) (95% - 96%)  Wt(kg): --  Daily     Daily Weight in k.1 (2019 08:00)  I&O's Summary    2019 07:01  -  2019 07:00  --------------------------------------------------------  IN: 1360 mL / OUT: 2300 mL / NET: -940 mL    2019 07:01  -  2019 11:01  --------------------------------------------------------  IN: 240 mL / OUT: 600 mL / NET: -360 mL        Physical Exam:  General: NAD  Eye: PERRL, EOMI  HENT: Normal oral mucosa NC/AT  CV: Normal S1/S2, RRR, No M/R/G, no edema, no elevation in JVP  Resp: Normal respiratory effort, clear to auscultation bilaterally, no wheezing, no crackles  Abd: Soft, Non-tender, Non-distended, BS+  Ext: No clubbing, No joint deformity   Neuro: Non-focal, motor and sensory intact  Lymph: No lymphadenopathy  Psych: AAOx3, Mood & affect appropriate  Skin: No rashes, No ecchymoses, No cyanosis    Labs:                        14.8   15.03 )-----------( 208      ( 2019 09:03 )             45.6     04-12    137  |  84<L>  |  39<H>  ----------------------------<  117<H>  3.6   |  41<H>  |  1.13    Ca    9.3      2019 06:48  Phos  2.7     04-12  Mg     1.5     -12    TPro  6.9  /  Alb  3.7  /  TBili  0.5  /  DBili  x   /  AST  31  /  ALT  39  /  AlkPhos  135<H>      PT/INR - ( 2019 08:24 )   PT: 16.7 sec;   INR: 1.46 ratio         PTT - ( 2019 08:24 )  PTT:28.8 sec      Serum Pro-Brain Natriuretic Peptide: 3782 pg/mL (04-10 @ 05:25)  Serum Pro-Brain Natriuretic Peptide: 3348 pg/mL ( @ 10:08)          New results/imaging:

## 2019-04-12 NOTE — PROGRESS NOTE ADULT - PROBLEM SELECTOR PLAN 1
multifactorial--chf/af, copd, debillity-----O2 2 liters NC
-Blood glucose 150-200s; HA1C 8.4  - Pt nonadherent to consistent carb diet despite re-education  - Continue ISS, endo following

## 2019-04-12 NOTE — PROGRESS NOTE ADULT - PROBLEM SELECTOR PLAN 3
continue on Prednisone 30 mg daily-D4/7  -continue Xopenex and Atrovent Q6H  - Leukocytosis likely 2/2 steroids. No clinical radiological evidence of infection. Hold off on antibiotics for now.   - Please check RVP  - Chest PT/ acapella use to assist with mobilization of secretions   - Incentive spirometry  - Supplemental O2 to keep sats > 90 %
continue on Prednisone 30 mg daily-taper latter part of week  -continue Xopenex and Atrovent Q6H  - Leukocytosis likely 2/2 steroids. No clinical radiological evidence of infection. Hold off on antibiotics for now.   - Please check RVP  - Chest PT/ acapella use to assist with mobilization of secretions   - Incentive spirometry  - Supplemental O2 to keep sats > 90 %
continue on Prednisone 30 mg daily-taper latter part of week  -continue Xopenex and Atrovent Q6H  - Leukocytosis likely 2/2 steroids. No clinical radiological evidence of infection. Hold off on antibiotics for now.   - Please check RVP  - Chest PT/ acapella use to assist with mobilization of secretions   - Incentive spirometry  - Supplemental O2 to keep sats > 90 %
on Prednisone 30 mg daily-can reduce to 20mg per day as of today  -continue Xopenex and Atrovent Q6H  - Leukocytosis likely 2/2 steroids. No clinical radiological evidence of infection. Hold off on antibiotics for now.   - Please check RVP  - Chest PT/ acapella use to assist with mobilization of secretions   - Incentive spirometry  - Supplemental O2 to keep sats > 90 %
- also with severe MR .  - Pt being evaluated by structural heart. Once euvolemic will obtain repeat echo and possibly CT coronaries to evaluated whether structural heart vs open heart double valve surgery.  - Pt and wife very hesitant to have any anesthesia or surgery given patient's severe COPD.

## 2019-04-12 NOTE — PROGRESS NOTE ADULT - ASSESSMENT
73 M current smoker with a PMH of obesity, HTN, HLD, CAD, diastolic HF,  A fib on Eliquis, type 2 diabetes, COPD ( not on home O2), LIMA now presenting with SOB, b/l LE edema and abdominal distension, likely 2/2 acute decompensated diastolic heart failure     1. Acute decompensated diastolic HF/ A fib  - Agree with IV diuresis with Lasix   - Requires strict I/Os/ daily weights. Goal to keep at least 1-2 negative daily   - continue following electrolytes BID while getting IV diuresis. Keep  K+>4, Mag > 2  - Telemetry monitoring. Follow cardiac enzymes s/p  MARIA GUADALUPE/EPS  - Cont to optimize rate control for A fib. Remains on AC with eliquis  2. COPD  -on Prednisone 30 mg -can taper to 20mg per day today 4/12   - continue Xopenex and Atrovent Q6H   -- Chest PT/ acapella use to assist with mobilization of secretions - Incentive spirometry  - Supplemental O2 to keep sats > 90 %  3. Lung nodules:- Few unchanged 0.4 cm RUL nodules -outpt follow up with repeat imaging in 6 months   4. OS- Unwilling to use CPAP  5. Nicotine addiction/ Smoking cessation:  - Requires a nicotine patch - Smoking cessation counseling provided and different outpt options discussed at length. Time spent: 10 mins   ********************  4/9-better over all  4/10-CTS evaln for TAVR etc.  4/11-tx to floor; CTS evaln in progress       4/12-better-can reduce pred to 20mg per day

## 2019-04-12 NOTE — CONSULT NOTE ADULT - ATTENDING COMMENTS
Pt seen and examined.  Agree with above.  Recommend repeat TTE when pt is euvolemic to determine the degree of MR and if intervention is needed for MR in addition to the TAVR.

## 2019-04-12 NOTE — PROGRESS NOTE ADULT - SUBJECTIVE AND OBJECTIVE BOX
MILAGROS VIERA    Patient is a 73y old  Male who presents with a chief complaint of worsening dyspnea, LE edema, increase in abd girth over a month (2019 11:18)    Still volume overloaded.  Having some runs of WCT, NSVT vs. AF with aberrancy    Allergies    penicillins (Unknown)    MEDICATIONS  (STANDING):  acetaminophen  IVPB .. 1000 milliGRAM(s) IV Intermittent once  apixaban 5 milliGRAM(s) Oral every 12 hours  aspirin enteric coated 81 milliGRAM(s) Oral daily  atorvastatin 20 milliGRAM(s) Oral at bedtime  buDESOnide 160 MICROgram(s)/formoterol 4.5 MICROgram(s) Inhaler 2 Puff(s) Inhalation two times a day  chlorhexidine 4% Liquid 1 Application(s) Topical <User Schedule>  colchicine 0.6 milliGRAM(s) Oral two times a day  dextrose 5%. 1000 milliLiter(s) (50 mL/Hr) IV Continuous <Continuous>  dextrose 50% Injectable 12.5 Gram(s) IV Push once  dextrose 50% Injectable 25 Gram(s) IV Push once  dextrose 50% Injectable 25 Gram(s) IV Push once  diltiazem    Tablet 60 milliGRAM(s) Oral three times a day  fluticasone propionate 50 MICROgram(s)/spray Nasal Spray 1 Spray(s) Both Nostrils two times a day  furosemide Infusion 10 mG/Hr (5 mL/Hr) IV Continuous <Continuous>  insulin glargine Injectable (LANTUS) 10 Unit(s) SubCutaneous at bedtime  insulin lispro (HumaLOG) corrective regimen sliding scale   SubCutaneous at bedtime  insulin lispro (HumaLOG) corrective regimen sliding scale   SubCutaneous three times a day before meals  insulin lispro Injectable (HumaLOG) 3 Unit(s) SubCutaneous three times a day before meals  methimazole 2.5 milliGRAM(s) Oral daily  nicotine - 21 mG/24Hr(s) Patch 1 patch Transdermal daily  pantoprazole    Tablet 40 milliGRAM(s) Oral before breakfast  predniSONE   Tablet 20 milliGRAM(s) Oral daily  spironolactone 25 milliGRAM(s) Oral daily    MEDICATIONS  (PRN):  ALBUTerol/ipratropium for Nebulization 3 milliLiter(s) Nebulizer every 6 hours PRN Shortness of Breath and/or Wheezing  dextrose 40% Gel 15 Gram(s) Oral once PRN Blood Glucose LESS THAN 70 milliGRAM(s)/deciliter  glucagon  Injectable 1 milliGRAM(s) IntraMuscular once PRN Glucose LESS THAN 70 milligrams/deciliter    PHYSICAL EXAM:  Vital Signs Last 24 Hrs  T(C): 36.4 (2019 12:00), Max: 36.4 (2019 20:22)  T(F): 97.6 (2019 12:00), Max: 97.6 (2019 12:00)  HR: 94 (2019 12:00) (81 - 94)  BP: 103/71 (2019 12:00) (103/71 - 123/84)  BP(mean): --  RR: 18 (2019 12:00) (18 - 18)  SpO2: 95% (2019 12:00) (95% - 96%)  Daily     Daily Weight in k.1 (2019 08:00)  I&O's Summary    2019 07:  -  2019 07:00  --------------------------------------------------------  IN: 1360 mL / OUT: 2300 mL / NET: -940 mL    2019 07:01  -  2019 15:23  --------------------------------------------------------  IN: 480 mL / OUT: 800 mL / NET: -320 mL      General Appearance: 	 Alert, cooperative, comfortable at rest  HEENT: normocephalic, atraumatic, PERRLA, EOMI, conjunctiva normal, sclera anicteric,   Neck: JVP estimated at 12 cm  Lungs:  Decreased BS at bases  Cor:  pmi 5th ICS MCL, Irregular rhythm, S1 normal intensity, S2 normal intensity, DAVID  Comprehensive Metabolic Panel (19 @ 06:48)    Sodium, Serum: 137 mmol/L    Potassium, Serum: 3.6 mmol/L    Chloride, Serum: 84 mmol/L    Carbon Dioxide, Serum: 41 mmol/L    Anion Gap, Serum: 12 mmol/L    Blood Urea Nitrogen, Serum: 39 mg/dL    Creatinine, Serum: 1.13 mg/dL    Glucose, Serum: 117 mg/dL    Calcium, Total Serum: 9.3 mg/dL    Protein Total, Serum: 6.9 g/dL    Albumin, Serum: 3.7 g/dL    Bilirubin Total, Serum: 0.5 mg/dL    Alkaline Phosphatase, Serum: 135 U/L    Aspartate Aminotransferase (AST/SGOT): 31 U/L    Alanine Aminotransferase (ALT/SGPT): 39 U/L    eGFR if Non : 64: Interpretative comment  The units for eGFR are mL/min/1.73M2 (normalized body surface area). The  eGFR is calculated from a serum creatinine using the CKD-EPI equation.  Other variables required for calculation are race, age and sex. Among  patients with chronic kidney disease (CKD), the eGFR is useful in  determining the stage of disease according to KDOQI CKD classification.  All eGFR results are reported numerically with the following  interpretation.          GFR                    With                 Without     (ml/min/1.73 m2)    Kidney Damage       Kidney Damage        >= 90                    Stage 1                     Normal        60-89                    Stage 2                     Decreased GFR        30-59     Stage 3                     Stage 3        15-29                    Stage 4                     Stage 4        < 15                      Stage 5                     Stage 5  Each stage of CKD assumes that the associated GFR level has been in  effect for at least 3 months. Determination of stages one and two (with  eGFR > 59 ml/min/m2) requires estimation of kidney damage for at least 3  months as defined by structural or functional abnormalities.  Limitations: All estimates of GFR will be less accurate for patients at  extremes of muscle mass (including but not limited to frail elderly,  critically ill, or cancer patients), those with unusual diets, and those  with conditions associated with reduced secretion or extrarenal  elimination of creatinine. The eGFR equation is not recommended for use  in patients with unstable creatinine levels. mL/min/1.73M2    eGFR if African American: 74 mL/min/1.73M2    Abdomen:	 soft, non-tender; bowel sounds normal  Extremities: 2-3+ edema      EKG:  Telemetry:  As above    Labs:  CBC Full  -  ( 2019 09:03 )  WBC Count : 15.03 K/uL  RBC Count : 5.16 M/uL  Hemoglobin : 14.8 g/dL  Hematocrit : 45.6 %  Platelet Count - Automated : 208 K/uL  Mean Cell Volume : 88.4 fl  Mean Cell Hemoglobin : 28.7 pg  Mean Cell Hemoglobin Concentration : 32.5 gm/dL  Auto Neutrophil # : 11.75 K/uL  Auto Lymphocyte # : 1.40 K/uL  Auto Monocyte # : 1.56 K/uL  Auto Eosinophil # : 0.07 K/uL  Auto Basophil # : 0.03 K/uL  Auto Neutrophil % : 78.1 %  Auto Lymphocyte % : 9.3 %  Auto Monocyte % : 10.4 %  Auto Eosinophil % : 0.5 %  Auto Basophil % : 0.2 %        PT/INR - ( 2019 08:24 )   PT: 16.7 sec;   INR: 1.46 ratio         PTT - ( 2019 08:24 )  PTT:28.8 sec

## 2019-04-12 NOTE — CONSULT NOTE ADULT - REASON FOR ADMISSION
worsening dyspnea, LE edema, increase in abd girth over a month
worsening dyspnea, LE edema, increase in abd girth over a month
Shortness of breath/ LE edema
worsening dyspnea, LE edema, increase in abd girth over a month

## 2019-04-12 NOTE — PROGRESS NOTE ADULT - ASSESSMENT
73 M current smoker with a PMH of obesity, hyperthyrodism, HTN, HLD, CAD, diastolic HF,  A fib on Eliquis,DM2, COPD, LIMA not on CPAP, now presenting with SOB, b/l LE edema and abdominal distension, likely 2/2 acute decompensated diastolic heart failure     1. Acute decompensated diastolic HF/ A fib  - on lasix drip w good urine output,  - Check electrolytes daily. Keep  K+>4, Mag > 2  - Telemetry monitoring. Follow cardiac enzymes. s/p cardioversion, but this am back in AFib  -- BPs improved,  cont Diltiazem 60 mg q8H  - Cont AC with Eliquis,  2. Valvular HD   -MARIA GUADALUPE c/w RV sysloic dysfunction, severe MR, severe AS, nl LV systolic function.  has h/o CAD, % on LHC from 9/2016. cont statins, ASA. Not on BB 2/2 COPD. Structural heart team following, awaiting Ct Coronaries, prob will be on outptn basis. plan is to dc home post diuresis once euvolemic    3. COPD  - cont po Prednisone at 30 mg daily, Pulm input appreciated  - Duonebs prn, no sign of an acute infection. ID input appreciated  - Incentive spirometry  - Supplemental O2 to keep sats > 90 %  - presently on BIPAP, ABG is improving , ptn is a chronic CO2 retainer    4. Lung nodules:  - Few unchanged 0.4 cm RUL nodules  - Requires outpt follow up with repeat imaging in 6 months     5. LIMA  - refuses CPAP    6. Nicotine addiction/ Smoking cessation:  - Will place on Nicotine patch while inptn   - Smoking cessation counseling provided , ptn is aware he should not be smoking and will try harder  7. Endo  - HA1C is 8.2%, on FS before meals w Insulin on a sliding scale, Tsh is low c/w Hyperthyrodism,  on  tapazole 5 mg. Amiodarone and Iodine contrast to be avoided. ENDO consult appreciated  8. Preventive  - no need for DVT ppx, ptn is on Eliquis  -GI ppx w Protonix 73 M current smoker with a PMH of obesity, hyperthyrodism, HTN, HLD, CAD, diastolic HF,  A fib on Eliquis,DM2, COPD, LIMA not on CPAP, now presenting with SOB, b/l LE edema and abdominal distension, likely 2/2 acute decompensated diastolic heart failure     1. Acute decompensated diastolic HF/ A fib  - on lasix drip w good urine output,  - Check electrolytes daily. Keep  K+>4, Mag > 2  - Telemetry monitoring. Follow cardiac enzymes. s/p cardioversion, but this am back in AFib  -- BPs improved,  cont Diltiazem 60 mg q8H  - Cont AC with Eliquis,  2. Valvular HD   -MARIA GUADALUPE c/w RV sysloic dysfunction, severe MR, severe AS, nl LV systolic function.  has h/o CAD, % on LHC from 9/2016. cont statins, ASA. Not on BB 2/2 COPD. Structural heart team following, awaiting Ct Coronaries, to be done once ptn can lie down  - ptn now w NSVT, will place on sm dose BB: Toprol XL 12.5 mg daily. ptn is at risk for sudden death if gets discharged prior to TAVR    3. COPD  - cont po Prednisone at 30 mg daily, Pulm input appreciated  - Duonebs prn, no sign of an acute infection. ID input appreciated  - Incentive spirometry  - Supplemental O2 to keep sats > 90 %  - presently on BIPAP, ABG is improving , ptn is a chronic CO2 retainer    4. Lung nodules:  - Few unchanged 0.4 cm RUL nodules  - Requires outpt follow up with repeat imaging in 6 months     5. LIMA  - refuses CPAP    6. Nicotine addiction/ Smoking cessation:  - Will place on Nicotine patch while inptn   - Smoking cessation counseling provided , ptn is aware he should not be smoking and will try harder  7. Endo  - HA1C is 8.2%, on FS before meals w Insulin on a sliding scale, Tsh is low c/w Hyperthyrodism,  on  tapazole 5 mg. Amiodarone and Iodine contrast to be avoided. ENDO consult appreciated  8. Preventive  - no need for DVT ppx, ptn is on Eliquis  -GI ppx w Protonix

## 2019-04-12 NOTE — PROGRESS NOTE ADULT - PROBLEM SELECTOR PROBLEM 7
Diabetes mellitus, type 2
Pulmonary nodules

## 2019-04-12 NOTE — PROGRESS NOTE ADULT - ASSESSMENT
A/P: 73 year old M pt with PMH of a-fib on eliquis, HTN, HLD, DM2, and COPD p/w worsening SOB x 1 month.    - clinically stable with no chest pain, palpitations s/p AFL ablation  - telemetry showing persistent a-fib  - MARIA GUADALUPE showing severe MR and severe AS, structural on board  - c/w medical management with rate control (dilt) and a/c (Eliquis)    Roberto Conley, #[id number]  EP Fellow

## 2019-04-12 NOTE — PROGRESS NOTE ADULT - PROBLEM SELECTOR PLAN 9
no absolute pulm contras to OHS
no absolute pulm contras to EPS MARIA GUADALUPE or ablation
no absolute pulm contras to EPS MARIA GUADALUPE or ablation
no absolute pulm contras to OHS

## 2019-04-12 NOTE — PROGRESS NOTE ADULT - PROBLEM SELECTOR PROBLEM 8
Prophylactic measure
Chronic atrial fibrillation

## 2019-04-12 NOTE — PROGRESS NOTE ADULT - ASSESSMENT
74yo male with acute on chronic diastolic CHF, COPD and hyperthyroidism with several week of rapid atrial flutter and CHF.  Now s/p flutter ablation but remains in AF, rate controlled currently.  Found to have severe AS and MR, in the setting of volume overload.     Plan:  Continue aggressive IV lasix; pt adamant about leaving Sunday despite clinical status. Would put him on high dose lasix and zaroxolyn upon discharge.    15 beat WCT, ?AF with aberrancy vs. VT; EP followup, replete K>4, Mg>2.    Continue cardizem for rate control; avoid beta blockers with COPD  Continue eliquis  Steroids per pulmonary.   Agree with treatment of hyperthyroidism as per endocrine; tapazole.    oLyd Castillo MD  [facility]

## 2019-04-12 NOTE — PROGRESS NOTE ADULT - SUBJECTIVE AND OBJECTIVE BOX
*****Structural Heart Team*****    Subjective:    PAtient sitting comfortably in a chair, stating he feels better, but still a little weak. He remains on a lasix drip, and is diuresing well. Wife at bedside.      PAST MEDICAL & SURGICAL HISTORY:  Pulmonary nodules  COPD (chronic obstructive pulmonary disease)  Diabetes mellitus, type 2  Kidney stones  Hypertension  History of tonsillectomy  History of kidney stones        T(C): 36.2 (04-12-19 @ 05:00), Max: 36.4 (04-11-19 @ 20:22)  HR: 85 (04-12-19 @ 05:00) (76 - 91)  BP: 107/71 (04-12-19 @ 05:00) (107/71 - 125/81)  RR: 18 (04-12-19 @ 05:00) (18 - 18)  SpO2: 96% (04-12-19 @ 05:00) (95% - 96%)  Wt(kg): --  04-11 @ 07:01  -  04-12 @ 07:00  --------------------------------------------------------  IN: 1360 mL / OUT: 2300 mL / NET: -940 mL    04-12 @ 07:01  -  04-12 @ 09:13  --------------------------------------------------------  IN: 0 mL / OUT: 600 mL / NET: -600 mL     LOS -8.3L      MEDICATIONS  (STANDING):  acetaminophen  IVPB .. 1000 milliGRAM(s) IV Intermittent once  apixaban 5 milliGRAM(s) Oral every 12 hours  aspirin enteric coated 81 milliGRAM(s) Oral daily  atorvastatin 20 milliGRAM(s) Oral at bedtime  buDESOnide 160 MICROgram(s)/formoterol 4.5 MICROgram(s) Inhaler 2 Puff(s) Inhalation two times a day  chlorhexidine 4% Liquid 1 Application(s) Topical <User Schedule>  colchicine 0.6 milliGRAM(s) Oral two times a day  dextrose 5%. 1000 milliLiter(s) (50 mL/Hr) IV Continuous <Continuous>  dextrose 50% Injectable 12.5 Gram(s) IV Push once  dextrose 50% Injectable 25 Gram(s) IV Push once  dextrose 50% Injectable 25 Gram(s) IV Push once  diltiazem    Tablet 60 milliGRAM(s) Oral three times a day  fluticasone propionate 50 MICROgram(s)/spray Nasal Spray 1 Spray(s) Both Nostrils two times a day  furosemide Infusion 10 mG/Hr (5 mL/Hr) IV Continuous <Continuous>  insulin glargine Injectable (LANTUS) 10 Unit(s) SubCutaneous at bedtime  insulin lispro (HumaLOG) corrective regimen sliding scale   SubCutaneous at bedtime  insulin lispro (HumaLOG) corrective regimen sliding scale   SubCutaneous three times a day before meals  insulin lispro Injectable (HumaLOG) 3 Unit(s) SubCutaneous three times a day before meals  methimazole 2.5 milliGRAM(s) Oral daily  nicotine - 21 mG/24Hr(s) Patch 1 patch Transdermal daily  pantoprazole    Tablet 40 milliGRAM(s) Oral before breakfast  predniSONE   Tablet 20 milliGRAM(s) Oral daily  spironolactone 25 milliGRAM(s) Oral daily    MEDICATIONS  (PRN):  ALBUTerol/ipratropium for Nebulization 3 milliLiter(s) Nebulizer every 6 hours PRN Shortness of Breath and/or Wheezing  dextrose 40% Gel 15 Gram(s) Oral once PRN Blood Glucose LESS THAN 70 milliGRAM(s)/deciliter  glucagon  Injectable 1 milliGRAM(s) IntraMuscular once PRN Glucose LESS THAN 70 milligrams/deciliter      Review of Symptoms:  Constitutional: Awake, Alert  Respiratory: + SOB, + Loose Cough  Cardiac: Deies CP, Denies Palpitations  Gastrointestinal: Denies Pain, Denies N/V, + Ascites  Vascular: Negative  Extremeties: + Edema  Neurological: Negative  Endocrine: Negative  Heme/Onc: Negative    Exam:  General: A/Ox3, Following commands  HEENT: Supple, Trachea Midline, no JVD  Pulmonary: Fine exp wheezes, no accessory muscle use  Cor: S1S2, Irregular, II/VI DAVID  ECG: AFib  Gastrointestinal: Soft, + Ascites, NT  Neuro: No motor or sensory defecits, Non focal  Vascular: No Carotid bruits, 1+ Femoral Pulses  Extremeties: 3+ Pitting edema B/L, + PMSx4  Skin: Warm Dry and Pink, No rashes                          14.8   15.03 )-----------( 208      ( 12 Apr 2019 09:03 )             45.6   04-12    137  |  84<L>  |  39<H>  ----------------------------<  117<H>  3.6   |  41<H>  |  1.13    Ca    9.3      12 Apr 2019 06:48  Phos  2.7     04-12  Mg     1.5     04-12    TPro  6.9  /  Alb  3.7  /  TBili  0.5  /  DBili  x   /  AST  31  /  ALT  39  /  AlkPhos  135<H>  04-12  PT/INR - ( 12 Apr 2019 08:24 )   PT: 16.7 sec;   INR: 1.46 ratio         PTT - ( 12 Apr 2019 08:24 )  PTT:28.8 sec       TTE:      < from: Transthoracic Echocardiogram (04.11.19 @ 08:19) >  EF (Visual Estimate): 55-60 %  Doppler Peak Velocity (m/sec): AoV=3.1  ------------------------------------------------------------------------  Observations:  Mitral Valve: Mitral annular calcification. Restricted  posterior leaflet. Severe eccentric mitral regurgitation.  Aortic Valve/Aorta: Calcified trileaflet aortic valve with  decreased opening. Peak transaortic valve gradient equals  38 mm Hg, mean transaortic valve gradient equals 23 mm Hg,  estimated aortic valve area equals 0.8 sqcm (by continuity  equation), aortic valve velocity time integral equals 63  cm, consistent with severe aortic stenosis. Peak left  ventricular outflow tract gradient equals 3 mm Hg, mean  gradient is equal to 2 mm Hg, LVOT velocity time integral  equals 15 cm.  Aortic Root: 3.2 cm.  Left Atrium: Severely dilated left atrium.  LA volume index  = 60 cc/m2.  Left Ventricle: Endocardium not well visualized; grossly  normal left ventricular systolic function. Septal motion  consistent with right ventricular overload. Mild concentric  left ventricular hypertrophy.  Right Heart: Moderate right atrial enlargement. Right  ventricular enlargement with normal right ventricular  systolic function. Moderate tricuspid regurgitation.  Pulmonic valve not well visualized.  Pericardium/Pleura: Normal pericardium with no pericardial  effusion.  Hemodynamic: Estimated right atrial pressure is 8 mm Hg.  Estimated right ventricular systolic pressure equals 35 mm  Hg, assuming right atrial pressure equals 8 mm Hg,  consistent with borderline pulmonary hypertension    < end of copied text >    ECG: AFib   Assesment/Plan:    73 Male with Severe Symptomatic Aortic Stenosis, Severe Mitral Regurgitation Acute Diastolic Heart Failure, AFib, Smoker    1.) Severe Aortic Stenosis: Will continue with TAVR work up, scheduled for Cardiac CT today. Will get Cardiac Catheterization in a couple of weeks as an out patient, as discussed with Dr. Campbell. Will have Dr. Flores come up to see patient today. Once all testing complete, will discuss with SH team to determine candidacy of TAVR vs. sAVR and timing. By STS, his risk for AVR is 6 %, placing him in the Intermediate risk category.  2.) Severe MR: Continue to monitor and treat medically. If patient goes TAVR route, we will observe the mitral valve, and if needed, will get MARIA GUADALUPE after to see if MitraClip Candidate.  3.) Acute Heart Failure: Continue IV Diuretics for now, monitor creatinine. Monitor Daily weight, along with I and O's  4.) Ambulate patient with assistance.    Overall plan as above discussed with patient and his wife this morning. Both of them are in agreement with plan. They will need to come into Valve clinic in two weeks to see how he is doing also, and this was also explained. Possible discharge to home Sunday or Monday.    LINDEN Rea  [id number]

## 2019-04-12 NOTE — PROGRESS NOTE ADULT - PROBLEM SELECTOR PLAN 5
refuse rx
- Continue duonebs and prednisone taper  - Dr. Brand following

## 2019-04-12 NOTE — PROGRESS NOTE ADULT - SUBJECTIVE AND OBJECTIVE BOX
CHIEF COMPLAINT: f/up resp insufficiency, copd exacerbation, osas, allergic rhinitis, preop pulm clearance --better over all-less sob     Interval Events: minimal ambulation; CTS, cards f/up    REVIEW OF SYSTEMS:  Constitutional: No fevers or chills. No weight loss. + fatigue or generalized malaise.  Eyes: No itching or discharge from the eyes  ENT: No ear pain. No ear discharge. No nasal congestion. No post nasal drip. No epistaxis. No throat pain. No sore throat. No difficulty swallowing.   CV: No chest pain. No palpitations. No lightheadedness or dizziness.   Resp: No dyspnea at rest. + dyspnea on exertion. No orthopnea. No wheezing. No cough. No stridor. No sputum production. No chest pain with respiration.  GI: No nausea. No vomiting. No diarrhea.  MSK: No joint pain or pain in any extremities  Integumentary: No skin lesions. + pedal edema.  Neurological: No gross motor weakness. No sensory changes.  [+ ] All other systems negative  [ ] Unable to assess ROS because ________    OBJECTIVE:  ICU Vital Signs Last 24 Hrs  T(C): 36.2 (12 Apr 2019 05:00), Max: 36.4 (11 Apr 2019 20:22)  T(F): 97.2 (12 Apr 2019 05:00), Max: 97.5 (11 Apr 2019 20:22)  HR: 85 (12 Apr 2019 05:00) (76 - 91)  BP: 107/71 (12 Apr 2019 05:00) (107/71 - 125/81)  BP(mean): --  ABP: --  ABP(mean): --  RR: 18 (12 Apr 2019 05:00) (18 - 18)  SpO2: 96% (12 Apr 2019 05:00) (95% - 96%)        04-10 @ 07:01  -  04-11 @ 07:00  --------------------------------------------------------  IN: 870 mL / OUT: 3300 mL / NET: -2430 mL    04-11 @ 07:01  -  04-12 @ 05:52  --------------------------------------------------------  IN: 1180 mL / OUT: 2300 mL / NET: -1120 mL      CAPILLARY BLOOD GLUCOSE      POCT Blood Glucose.: 170 mg/dL (11 Apr 2019 21:19)      PHYSICAL EXAM:  General: Awake, alert, oriented X 3.   HEENT: Atraumatic, normocephalic.                 Mallampatti Grade 3                No nasal congestion.                No tonsillar or pharyngeal exudates.  Lymph Nodes: No palpable lymphadenopathy  Neck: No JVD. No carotid bruit.   Respiratory: Normal chest expansion                         Normal percussion                         Normal and equal air entry                         No wheeze, rhonchi or rales.  Cardiovascular: S1 S2 normal. No murmurs, rubs or gallops.   Abdomen: Soft, non-tender, non-distended. No organomegaly. Normoactive bowel sounds.  Extremities: Warm to touch. Peripheral pulse palpable. ++ pedal edema.   Skin: No rashes or skin lesions  Neurological: Motor and sensory examination equal and normal in all four extremities.  Psychiatry: Appropriate mood and affect.    HOSPITAL MEDICATIONS:  MEDICATIONS  (STANDING):  acetaminophen  IVPB .. 1000 milliGRAM(s) IV Intermittent once  apixaban 5 milliGRAM(s) Oral every 12 hours  aspirin enteric coated 81 milliGRAM(s) Oral daily  atorvastatin 20 milliGRAM(s) Oral at bedtime  buDESOnide 160 MICROgram(s)/formoterol 4.5 MICROgram(s) Inhaler 2 Puff(s) Inhalation two times a day  chlorhexidine 4% Liquid 1 Application(s) Topical <User Schedule>  colchicine 0.6 milliGRAM(s) Oral two times a day  dextrose 5%. 1000 milliLiter(s) (50 mL/Hr) IV Continuous <Continuous>  dextrose 50% Injectable 12.5 Gram(s) IV Push once  dextrose 50% Injectable 25 Gram(s) IV Push once  dextrose 50% Injectable 25 Gram(s) IV Push once  diltiazem    Tablet 60 milliGRAM(s) Oral three times a day  fluticasone propionate 50 MICROgram(s)/spray Nasal Spray 1 Spray(s) Both Nostrils two times a day  furosemide Infusion 10 mG/Hr (5 mL/Hr) IV Continuous <Continuous>  insulin glargine Injectable (LANTUS) 10 Unit(s) SubCutaneous at bedtime  insulin lispro (HumaLOG) corrective regimen sliding scale   SubCutaneous at bedtime  insulin lispro (HumaLOG) corrective regimen sliding scale   SubCutaneous three times a day before meals  insulin lispro Injectable (HumaLOG) 3 Unit(s) SubCutaneous three times a day before meals  methimazole 2.5 milliGRAM(s) Oral daily  nicotine - 21 mG/24Hr(s) Patch 1 patch Transdermal daily  pantoprazole    Tablet 40 milliGRAM(s) Oral before breakfast  predniSONE   Tablet 30 milliGRAM(s) Oral daily  spironolactone 25 milliGRAM(s) Oral daily    MEDICATIONS  (PRN):  ALBUTerol/ipratropium for Nebulization 3 milliLiter(s) Nebulizer every 6 hours PRN Shortness of Breath and/or Wheezing  dextrose 40% Gel 15 Gram(s) Oral once PRN Blood Glucose LESS THAN 70 milliGRAM(s)/deciliter  glucagon  Injectable 1 milliGRAM(s) IntraMuscular once PRN Glucose LESS THAN 70 milligrams/deciliter      LABS:                        14.3   18.40 )-----------( 228      ( 11 Apr 2019 08:37 )             45.3     04-11    138  |  85<L>  |  34<H>  ----------------------------<  132<H>  3.8   |  41<H>  |  1.31<H>    Ca    9.2      11 Apr 2019 06:12  Phos  3.3     04-11  Mg     1.6     04-11    TPro  6.7  /  Alb  3.8  /  TBili  0.6  /  DBili  x   /  AST  29  /  ALT  42  /  AlkPhos  131<H>  04-11    PT/INR - ( 11 Apr 2019 09:14 )   PT: 18.8 sec;   INR: 1.62 ratio         PTT - ( 11 Apr 2019 09:14 )  PTT:26.9 sec          MICROBIOLOGY:     RADIOLOGY:  [ ] Reviewed and interpreted by me    Point of Care Ultrasound Findings:    PFT:    EKG:

## 2019-04-12 NOTE — PROGRESS NOTE ADULT - PROBLEM SELECTOR PROBLEM 5
LIMA (obstructive sleep apnea)
COPD exacerbation

## 2019-04-12 NOTE — PROGRESS NOTE ADULT - ATTENDING COMMENTS
Ed continues to diurese.  He (and his wife) are amenable to staying in the hospital in order to allow further optimization of his heart failure--he is improving but remains volume excess.  Dr Campbell would like to get his cardiac CT while here (which he would need to determine if he is a TAVR candidate) and proceed with his catheterization as an outpatient in the next few weeks.  He may ultimately need to repeat a MARIA GUADALUPE if we are to determine his candidacy for MitraClip.  A cardiac surgeon will also see him to discuss AVR/MVR, though the patient has stated that he would not wish to pursue such option (he does not feel that he is well enough for cardiac surgery).  Socrates Brannon MD
Patient is seen and examined with fellow, NP and the CCU house-staff. I agree with the history, physical and the assessment and plan.  f/u with TTE (obtain prior echos regarding valve disease)  will c/w diuresis  maintain egative fluid balance
seen and examined with fellow. I agree with H & P, A & P.  AF post CTI.  Not a good candidate for amio with COPD and hyperthyroidism.  Continue rate control and AC. structural heart eval for AS.
seen and examined with fellow. I agree with H & P, A & P.  AS discussed with PMD will challenge with low dose Toprol while in the hospital; for NSVT in the setting of structural heart disease.
as above-multifactorial dyspnea-O2 NC--better over all  copd exacerbation-pred 30mg D4/7 per day, xopenex/atrovent via HHN q 6, pulmicort .5 bid, acapella/incentive spirometry  Xcdtx-KIP-Jtpu/Beldner-s/p MARIA GUADALUPE/EPS-f/up; CTS evaln in progress  OSAS-refused rx  Nicotine addiction-patch  PPI/BS control/OOB                    Preop-no absolute pulm contras to EPS or CTS surgery etc. (pulm signif better)  Frederic Brand MD-Pulmonary   [phone]
as above-multifactorial dyspnea-O2 NC  copd exacerbation-pred 30mg per day, xopenex/atrovent via HHN q 6, pulmicort .5 bid, acapella/incentive spirometry  Bqwok-ODZ-Cdtb/Beldner-s/p MARIA GUADALUPE/EPS-f/up; TAVR evaln in progress  OSAS-refused rx  Nicotine addiction-patch  PPI/BS control/OOB                    Preop-no absolute pulm contras to EPS or TAVR etc. (pulm signif better)  Frederic Brand MD-Pulmonary   [phone]
as above-multifactorial dyspnea-O2 NC  copd exacerbation-pred 30mg per day, xopenex/atrovent via HHN q 6, pulmicort .5 bid, acapella/incentive spirometry  Drodf-BGG-Pyer/Alyce-for MARIA GUADALUPE/EPS-ablation; diuresis  OSAS-refused rx  Nicotine addiction-patch  PPI/BS control/OOB                    Preop-no absolute pulm contras to EPS-MARIA GUADALUPE etc.  Frederic Brand MD-Pulmonary   [phone]
as above-multifactorial dyspnea-O2 NC--better over all  copd exacerbation-can reduce pred from 30mg to 20mg per day, xopenex/atrovent via HHN q 6, pulmicort .5 bid, acapella/incentive spirometry  Ocjox-CXV-Dtyz/Beldner-s/p MARIA GUADALUPE/EPS-f/up; CTS evaln in progress  OSAS-refused rx  Nicotine addiction-patch  PPI/BS control/OOB                    Preop-no absolute pulm contras to EPS or CTS surgery etc. (pulm signif better)  Frederic Brand MD-Pulmonary   [phone]

## 2019-04-12 NOTE — PROGRESS NOTE ADULT - PROBLEM SELECTOR PLAN 2
-  IV diuresis with Lasix   - Requires strict I/Os/ daily weights. Goal to keep at least 1-2 negative daily   - Suggest following electrolytes BID while getting IV diuresis. Keep  K+>4, Mag > 2  - Telemetry monitoring. Follow cardiac enzymes  - s/p MARIA GUADALUPE (Alyce-4/9)- TAVR evaluation in progress  - Cont to optimize rate control for A fib. Remains on AC with eliquis  - Cardiology follow up
-  IV diuresis with Lasix   - Requires strict I/Os/ daily weights. Goal to keep at least 1-2 negative daily   - Suggest following electrolytes BID while getting IV diuresis. Keep  K+>4, Mag > 2  - Telemetry monitoring. Follow cardiac enzymes  - For MARIA GUADALUPE (Alyce-4/9)  - Cont to optimize rate control for A fib. Remains on AC with eliquis  - Cardiology follow up
-  IV diuresis with Lasix   - Requires strict I/Os/ daily weights. Goal to keep at least 1-2 negative daily   - Suggest following electrolytes BID while getting IV diuresis. Keep  K+>4, Mag > 2  - Telemetry monitoring. Follow cardiac enzymes  - s/p MARIA GUADALUPE (Alyce-4/9)- TAVR evaluation in progress  - Cont to optimize rate control for A fib. Remains on AC with eliquis  - Cardiology follow up
-  IV diuresis with Lasix   - Requires strict I/Os/ daily weights. Goal to keep at least 1-2 negative daily   - Suggest following electrolytes BID while getting IV diuresis. Keep  K+>4, Mag > 2  - Telemetry monitoring. Follow cardiac enzymes  - s/p MARIA GUADALUPE (Alyce-4/9)- TAVR evaluation in progress  - Cont to optimize rate control for A fib. Remains on AC with eliquis  - Cardiology follow up
- Continue lasix gtt; pt still very fluid overloaded  - baseline weight 198lbs, Obtain standing weights  - Net neg -1.6

## 2019-04-12 NOTE — CONSULT NOTE ADULT - SUBJECTIVE AND OBJECTIVE BOX
HPI:  74 yo male presents with progressive worsening of dyspnea, orthopnea, BALTAZAR, sleeps in chair, increased LE edema w pain on ambulation and increased abdominal girth.   No cough, no fevers, no N/V, no palpitations Ptn was seen by his pulmonologist Dr. Brand on 4/4 who started him on 40 mg Prednisone, ptn states he has no relief, and saw his planned Follow up w his cardiologist Dr. Campbell today, who sent him to the hospital for treatment.  PMH:  hyperthyrodism, untreated since ptn refused Endo F/U in the past, obesity, HTN, HLD, chronic heavy smoker, 50 pack years, COPD, LIMA, refuses CPAP, pulmonary nodules, dCHF, CAD, Afib on Eliquis, Pericardial effusion w drainage in 6/18, at which time was placed on Colchicine, refused to stay for work up after drainage,  DM2, Nephrolithiasis (08 Apr 2019 11:43)      Allergies    penicillins (Unknown)    Intolerances      PAST MEDICAL & SURGICAL HISTORY:  Pulmonary nodules  COPD (chronic obstructive pulmonary disease)  Diabetes mellitus, type 2  Kidney stones  Hypertension  History of tonsillectomy  History of kidney stones    MEDICATIONS  (STANDING):  acetaminophen  IVPB .. 1000 milliGRAM(s) IV Intermittent once  apixaban 5 milliGRAM(s) Oral every 12 hours  aspirin enteric coated 81 milliGRAM(s) Oral daily  atorvastatin 20 milliGRAM(s) Oral at bedtime  buDESOnide 160 MICROgram(s)/formoterol 4.5 MICROgram(s) Inhaler 2 Puff(s) Inhalation two times a day  chlorhexidine 4% Liquid 1 Application(s) Topical <User Schedule>  colchicine 0.6 milliGRAM(s) Oral two times a day  dextrose 5%. 1000 milliLiter(s) (50 mL/Hr) IV Continuous <Continuous>  dextrose 50% Injectable 12.5 Gram(s) IV Push once  dextrose 50% Injectable 25 Gram(s) IV Push once  dextrose 50% Injectable 25 Gram(s) IV Push once  diltiazem    Tablet 60 milliGRAM(s) Oral three times a day  fluticasone propionate 50 MICROgram(s)/spray Nasal Spray 1 Spray(s) Both Nostrils two times a day  furosemide Infusion 10 mG/Hr (5 mL/Hr) IV Continuous <Continuous>  insulin glargine Injectable (LANTUS) 10 Unit(s) SubCutaneous at bedtime  insulin lispro (HumaLOG) corrective regimen sliding scale   SubCutaneous at bedtime  insulin lispro (HumaLOG) corrective regimen sliding scale   SubCutaneous three times a day before meals  insulin lispro Injectable (HumaLOG) 3 Unit(s) SubCutaneous three times a day before meals  magnesium sulfate  IVPB 1 Gram(s) IV Intermittent once  methimazole 2.5 milliGRAM(s) Oral daily  nicotine - 21 mG/24Hr(s) Patch 1 patch Transdermal daily  pantoprazole    Tablet 40 milliGRAM(s) Oral before breakfast  potassium chloride    Tablet ER 40 milliEquivalent(s) Oral once  predniSONE   Tablet 20 milliGRAM(s) Oral daily  spironolactone 25 milliGRAM(s) Oral daily      REVIEW OF SYSTEMS:    CONSTITUTIONAL: No weakness, fevers or chills  EYES/ENT: No visual changes;  No vertigo or throat pain   NECK: No pain or stiffness  RESPIRATORY: No cough, wheezing, hemoptysis; No shortness of breath  CARDIOVASCULAR: No chest pain or palpitations  GASTROINTESTINAL: No abdominal or epigastric pain. No nausea, vomiting, or hematemesis; No diarrhea or constipation. No melena or hematochezia.  GENITOURINARY: No dysuria, frequency or hematuria  NEUROLOGICAL: No numbness or weakness  SKIN: No itching, rashes      Vital Signs Last 24 Hrs  T(C): 36.2 (12 Apr 2019 05:00), Max: 36.4 (11 Apr 2019 20:22)  T(F): 97.2 (12 Apr 2019 05:00), Max: 97.5 (11 Apr 2019 20:22)  HR: 85 (12 Apr 2019 05:00) (76 - 91)  BP: 107/71 (12 Apr 2019 05:00) (107/71 - 125/81)  BP(mean): --  RR: 18 (12 Apr 2019 05:00) (18 - 18)  SpO2: 96% (12 Apr 2019 05:00) (95% - 96%)                          14.8   15.03 )-----------( 208      ( 12 Apr 2019 09:03 )             45.6   04-12    137  |  84<L>  |  39<H>  ----------------------------<  117<H>  3.6   |  41<H>  |  1.13    Ca    9.3      12 Apr 2019 06:48  Phos  2.7     04-12  Mg     1.5     04-12    TPro  6.9  /  Alb  3.7  /  TBili  0.5  /  DBili  x   /  AST  31  /  ALT  39  /  AlkPhos  135<H>  04-12    PT/INR - ( 12 Apr 2019 08:24 )   PT: 16.7 sec;   INR: 1.46 ratio         PTT - ( 12 Apr 2019 08:24 )  PTT:28.8 sec    I&O's Summary    11 Apr 2019 07:01  -  12 Apr 2019 07:00  --------------------------------------------------------  IN: 1360 mL / OUT: 2300 mL / NET: -940 mL    12 Apr 2019 07:01  -  12 Apr 2019 11:20  --------------------------------------------------------  IN: 240 mL / OUT: 600 mL / NET: -360 mL          General: A/Ox3, Following commands  HEENT: Supple, Trachea Midline, no JVD  Pulmonary: Fine exp wheezes, no accessory muscle use  Cor: S1S2, Irregular, II/VI DAVID  ECG: AFib  Gastrointestinal: Soft, + Ascites, NT  Neuro: No motor or sensory defecits, Non focal  Vascular: No Carotid bruits, 1+ Femoral Pulses  Extremeties: 3+ Pitting edema B/L, + PMSx4  Skin: Warm Dry and Pink, No rashes    < from: Transthoracic Echocardiogram (04.11.19 @ 08:19) >  Dimensions:    Normal Values:  LA:     5.4    2.0 - 4.0 cm  Ao:     3.2    2.0 - 3.8 cm  SEPTUM: 1.1    0.6 - 1.2 cm  PWT:   1.2    0.6 - 1.1 cm  LVIDd:  4.9    3.0 - 5.6 cm  LVIDs:  3.4    1.8 - 4.0 cm  Derived variables:  LVMI: 110 g/m2  RWT: 0.48  Fractional short: 31 %  EF (Visual Estimate): 55-60 %  Doppler Peak Velocity (m/sec): AoV=3.1  ------------------------------------------------------------------------  Observations:  Mitral Valve: Mitral annular calcification. Restricted  posterior leaflet. Severe eccentric mitral regurgitation.  Aortic Valve/Aorta: Calcified trileaflet aortic valve with  decreased opening. Peak transaortic valve gradient equals  38 mm Hg, mean transaortic valve gradient equals 23 mm Hg,  estimated aortic valve area equals 0.8 sqcm (by continuity  equation), aortic valve velocity time integral equals 63  cm, consistent with severe aortic stenosis. Peak left  ventricular outflow tract gradient equals 3 mm Hg, mean  gradient is equal to 2 mm Hg, LVOT velocity time integral  equals 15 cm.  Aortic Root: 3.2 cm.  Left Atrium: Severely dilated left atrium.  LA volume index  = 60 cc/m2.  Left Ventricle: Endocardium not well visualized; grossly  normal left ventricular systolic function. Septal motion  consistent with right ventricular overload. Mild concentric  left ventricular hypertrophy.  Right Heart: Moderate right atrial enlargement. Right  ventricular enlargement with normal right ventricular  systolic function. Moderate tricuspid regurgitation.  Pulmonic valve not well visualized.  Pericardium/Pleura: Normal pericardium with no pericardial  effusion.  Hemodynamic: Estimated right atrial pressure is 8 mm Hg.  Estimated right ventricular systolic pressure equals 35 mm  Hg, assuming right atrial pressure equals 8 mm Hg,  consistent with borderline pulmonary hypertension.  ------------------------------------------------------------------------  Conclusions:  1. Mitral annular calcification. Restricted posterior  leaflet. Severe eccentric mitral regurgitation.  2. Calcified trileaflet aortic valve with decreased  opening. Peak transaortic valve gradient equals 38 mmHg,  mean transaortic valve gradient equals 23 mm Hg, estimated  aortic valve area equals 0.8 sqcm (by continuity equation),  aortic valve velocity time integral equals 63 cm,  consistent with severe aortic stenosis.  3. Severely dilated left atrium. LA volume index = 60  cc/m2.  4. Mild concentric left ventricular hypertrophy.  5. Endocardium not well visualized; grossly normal left  ventricular systolic function. Septal motion consistent  with right ventricular overload.  6. Moderate right atrial enlargement.  7. Right ventricular enlargement with normal right  ventricular systolic function.  8. Moderate tricuspid regurgitation.  9. Estimated pulmonary artery systolic pressure equals 35  mm Hg, assuming right atrial pressure equals 8 mm Hg,  consistent with borderline pulmonary pressures.  10. Normal pericardium with no pericardial effusion.  11. IVC Collapses with respiration.  *** Compared with echocardiogram of 4/9/2019, no  significant changes noted.    < end of copied text >    Assessment/Plan:  73 Male with Severe Symptomatic Aortic Stenosis, Severe Mitral Regurgitation Acute Diastolic Heart Failure, AFib, Smoker    1.) Severe Aortic Stenosis: Will continue with TAVR work up, scheduled for Cardiac CT today. Will get Cardiac Catheterization in a couple of weeks as an out patient, as discussed with Dr. Campbell.  Once all testing complete, will discuss with SH team to determine candidacy of TAVR vs. sAVR and timing. By STS, his risk for AVR is 6 %, placing him in the Intermediate risk category.  2.) Severe MR: Continue to monitor and treat medically. If patient goes TAVR route, we will observe the mitral valve, and if needed, will get MARIA GUADALUPE after to see if MitraClip Candidate.  3.) Acute Heart Failure: Continue IV Diuretics for now, monitor creatinine. Monitor Daily weight, along with I and O's  4.) Ambulate patient with assistance.    Overall plan as above discussed with patient and his wife this morning. Both of them are in agreement with plan. They will need to come into Valve clinic in two weeks to see how he is doing also, and this was also explained. Possible discharge to home Sunday or Monday.

## 2019-04-12 NOTE — PROGRESS NOTE ADULT - PROBLEM SELECTOR PROBLEM 4
Pulmonary nodules
Cigarette nicotine dependence with withdrawal

## 2019-04-13 LAB
ALBUMIN SERPL ELPH-MCNC: 3.9 G/DL — SIGNIFICANT CHANGE UP (ref 3.3–5)
ALP SERPL-CCNC: 145 U/L — HIGH (ref 40–120)
ALT FLD-CCNC: 47 U/L — HIGH (ref 10–45)
ANION GAP SERPL CALC-SCNC: 14 MMOL/L — SIGNIFICANT CHANGE UP (ref 5–17)
ANION GAP SERPL CALC-SCNC: 15 MMOL/L — SIGNIFICANT CHANGE UP (ref 5–17)
AST SERPL-CCNC: 35 U/L — SIGNIFICANT CHANGE UP (ref 10–40)
BILIRUB SERPL-MCNC: 0.6 MG/DL — SIGNIFICANT CHANGE UP (ref 0.2–1.2)
BUN SERPL-MCNC: 40 MG/DL — HIGH (ref 7–23)
BUN SERPL-MCNC: 40 MG/DL — HIGH (ref 7–23)
CALCIUM SERPL-MCNC: 9.5 MG/DL — SIGNIFICANT CHANGE UP (ref 8.4–10.5)
CALCIUM SERPL-MCNC: 9.6 MG/DL — SIGNIFICANT CHANGE UP (ref 8.4–10.5)
CHLORIDE SERPL-SCNC: 84 MMOL/L — LOW (ref 96–108)
CHLORIDE SERPL-SCNC: 85 MMOL/L — LOW (ref 96–108)
CO2 SERPL-SCNC: 39 MMOL/L — HIGH (ref 22–31)
CO2 SERPL-SCNC: 41 MMOL/L — HIGH (ref 22–31)
CREAT SERPL-MCNC: 1.03 MG/DL — SIGNIFICANT CHANGE UP (ref 0.5–1.3)
CREAT SERPL-MCNC: 1.05 MG/DL — SIGNIFICANT CHANGE UP (ref 0.5–1.3)
GLUCOSE BLDC GLUCOMTR-MCNC: 112 MG/DL — HIGH (ref 70–99)
GLUCOSE BLDC GLUCOMTR-MCNC: 143 MG/DL — HIGH (ref 70–99)
GLUCOSE BLDC GLUCOMTR-MCNC: 176 MG/DL — HIGH (ref 70–99)
GLUCOSE BLDC GLUCOMTR-MCNC: 222 MG/DL — HIGH (ref 70–99)
GLUCOSE SERPL-MCNC: 121 MG/DL — HIGH (ref 70–99)
GLUCOSE SERPL-MCNC: 123 MG/DL — HIGH (ref 70–99)
HCT VFR BLD CALC: 46.3 % — SIGNIFICANT CHANGE UP (ref 39–50)
HGB BLD-MCNC: 15 G/DL — SIGNIFICANT CHANGE UP (ref 13–17)
MAGNESIUM SERPL-MCNC: 1.6 MG/DL — SIGNIFICANT CHANGE UP (ref 1.6–2.6)
MCHC RBC-ENTMCNC: 29.6 PG — SIGNIFICANT CHANGE UP (ref 27–34)
MCHC RBC-ENTMCNC: 32.5 GM/DL — SIGNIFICANT CHANGE UP (ref 32–36)
MCV RBC AUTO: 91.2 FL — SIGNIFICANT CHANGE UP (ref 80–100)
PHOSPHATE SERPL-MCNC: 2.8 MG/DL — SIGNIFICANT CHANGE UP (ref 2.5–4.5)
PLATELET # BLD AUTO: 194 K/UL — SIGNIFICANT CHANGE UP (ref 150–400)
POTASSIUM SERPL-MCNC: 3.7 MMOL/L — SIGNIFICANT CHANGE UP (ref 3.5–5.3)
POTASSIUM SERPL-MCNC: 3.7 MMOL/L — SIGNIFICANT CHANGE UP (ref 3.5–5.3)
POTASSIUM SERPL-SCNC: 3.7 MMOL/L — SIGNIFICANT CHANGE UP (ref 3.5–5.3)
POTASSIUM SERPL-SCNC: 3.7 MMOL/L — SIGNIFICANT CHANGE UP (ref 3.5–5.3)
PROT SERPL-MCNC: 7 G/DL — SIGNIFICANT CHANGE UP (ref 6–8.3)
RBC # BLD: 5.08 M/UL — SIGNIFICANT CHANGE UP (ref 4.2–5.8)
RBC # FLD: 14.6 % — HIGH (ref 10.3–14.5)
SODIUM SERPL-SCNC: 139 MMOL/L — SIGNIFICANT CHANGE UP (ref 135–145)
SODIUM SERPL-SCNC: 139 MMOL/L — SIGNIFICANT CHANGE UP (ref 135–145)
URATE SERPL-MCNC: 10.8 MG/DL — HIGH (ref 3.4–8.8)
WBC # BLD: 16 K/UL — HIGH (ref 3.8–10.5)
WBC # FLD AUTO: 16 K/UL — HIGH (ref 3.8–10.5)

## 2019-04-13 PROCEDURE — 99233 SBSQ HOSP IP/OBS HIGH 50: CPT | Mod: GC

## 2019-04-13 RX ORDER — LANOLIN ALCOHOL/MO/W.PET/CERES
3 CREAM (GRAM) TOPICAL ONCE
Qty: 0 | Refills: 0 | Status: COMPLETED | OUTPATIENT
Start: 2019-04-13 | End: 2019-04-13

## 2019-04-13 RX ADMIN — Medication 1 PATCH: at 23:04

## 2019-04-13 RX ADMIN — Medication 1 SPRAY(S): at 17:08

## 2019-04-13 RX ADMIN — PANTOPRAZOLE SODIUM 40 MILLIGRAM(S): 20 TABLET, DELAYED RELEASE ORAL at 05:24

## 2019-04-13 RX ADMIN — APIXABAN 5 MILLIGRAM(S): 2.5 TABLET, FILM COATED ORAL at 05:20

## 2019-04-13 RX ADMIN — Medication 4: at 12:21

## 2019-04-13 RX ADMIN — Medication 60 MILLIGRAM(S): at 21:54

## 2019-04-13 RX ADMIN — Medication 0.6 MILLIGRAM(S): at 17:09

## 2019-04-13 RX ADMIN — BUDESONIDE AND FORMOTEROL FUMARATE DIHYDRATE 2 PUFF(S): 160; 4.5 AEROSOL RESPIRATORY (INHALATION) at 05:22

## 2019-04-13 RX ADMIN — Medication 1 PATCH: at 08:34

## 2019-04-13 RX ADMIN — Medication 81 MILLIGRAM(S): at 12:20

## 2019-04-13 RX ADMIN — Medication 2: at 08:34

## 2019-04-13 RX ADMIN — ATORVASTATIN CALCIUM 20 MILLIGRAM(S): 80 TABLET, FILM COATED ORAL at 21:54

## 2019-04-13 RX ADMIN — Medication 60 MILLIGRAM(S): at 05:21

## 2019-04-13 RX ADMIN — Medication 3 UNIT(S): at 17:08

## 2019-04-13 RX ADMIN — Medication 0.6 MILLIGRAM(S): at 05:20

## 2019-04-13 RX ADMIN — Medication 60 MILLIGRAM(S): at 14:24

## 2019-04-13 RX ADMIN — Medication 3 UNIT(S): at 12:20

## 2019-04-13 RX ADMIN — Medication 1 PATCH: at 22:00

## 2019-04-13 RX ADMIN — BUDESONIDE AND FORMOTEROL FUMARATE DIHYDRATE 2 PUFF(S): 160; 4.5 AEROSOL RESPIRATORY (INHALATION) at 17:08

## 2019-04-13 RX ADMIN — Medication 1 PATCH: at 19:00

## 2019-04-13 RX ADMIN — Medication 3 MILLIGRAM(S): at 23:04

## 2019-04-13 RX ADMIN — APIXABAN 5 MILLIGRAM(S): 2.5 TABLET, FILM COATED ORAL at 17:09

## 2019-04-13 RX ADMIN — INSULIN GLARGINE 10 UNIT(S): 100 INJECTION, SOLUTION SUBCUTANEOUS at 21:54

## 2019-04-13 RX ADMIN — Medication 20 MILLIGRAM(S): at 05:21

## 2019-04-13 RX ADMIN — Medication 3 UNIT(S): at 08:33

## 2019-04-13 RX ADMIN — Medication 5 MG/HR: at 23:04

## 2019-04-13 RX ADMIN — Medication 12.5 MILLIGRAM(S): at 05:21

## 2019-04-13 RX ADMIN — CHLORHEXIDINE GLUCONATE 1 APPLICATION(S): 213 SOLUTION TOPICAL at 06:29

## 2019-04-13 RX ADMIN — SPIRONOLACTONE 25 MILLIGRAM(S): 25 TABLET, FILM COATED ORAL at 05:20

## 2019-04-13 RX ADMIN — Medication 1 SPRAY(S): at 05:21

## 2019-04-13 NOTE — PROGRESS NOTE ADULT - SUBJECTIVE AND OBJECTIVE BOX
MILAGROS VIERA    Patient is a 73y old  Male who presents with a chief complaint of worsening dyspnea, LE edema, increase in abd girth over a month,acute on chronic diastolic CHF,PAF,HTN,hyperlipidemia,obesity,LIMA,COPD.No CP + SOB and edema.Now with NSVT    Allergies    penicillins (Unknown)    Intolerances      MEDICATIONS  (STANDING):  acetaminophen  IVPB .. 1000 milliGRAM(s) IV Intermittent once  apixaban 5 milliGRAM(s) Oral every 12 hours  aspirin enteric coated 81 milliGRAM(s) Oral daily  atorvastatin 20 milliGRAM(s) Oral at bedtime  buDESOnide 160 MICROgram(s)/formoterol 4.5 MICROgram(s) Inhaler 2 Puff(s) Inhalation two times a day  chlorhexidine 4% Liquid 1 Application(s) Topical <User Schedule>  colchicine 0.6 milliGRAM(s) Oral two times a day  dextrose 5%. 1000 milliLiter(s) (50 mL/Hr) IV Continuous <Continuous>  dextrose 50% Injectable 12.5 Gram(s) IV Push once  dextrose 50% Injectable 25 Gram(s) IV Push once  dextrose 50% Injectable 25 Gram(s) IV Push once  diltiazem    Tablet 60 milliGRAM(s) Oral three times a day  fluticasone propionate 50 MICROgram(s)/spray Nasal Spray 1 Spray(s) Both Nostrils two times a day  furosemide Infusion 10 mG/Hr (5 mL/Hr) IV Continuous <Continuous>  insulin glargine Injectable (LANTUS) 10 Unit(s) SubCutaneous at bedtime  insulin lispro (HumaLOG) corrective regimen sliding scale   SubCutaneous at bedtime  insulin lispro (HumaLOG) corrective regimen sliding scale   SubCutaneous three times a day before meals  insulin lispro Injectable (HumaLOG) 3 Unit(s) SubCutaneous three times a day before meals  methimazole 2.5 milliGRAM(s) Oral daily  metoprolol succinate ER 12.5 milliGRAM(s) Oral daily  nicotine - 21 mG/24Hr(s) Patch 1 patch Transdermal daily  pantoprazole    Tablet 40 milliGRAM(s) Oral before breakfast  predniSONE   Tablet 20 milliGRAM(s) Oral daily  spironolactone 25 milliGRAM(s) Oral daily    MEDICATIONS  (PRN):  ALBUTerol/ipratropium for Nebulization 3 milliLiter(s) Nebulizer every 6 hours PRN Shortness of Breath and/or Wheezing  dextrose 40% Gel 15 Gram(s) Oral once PRN Blood Glucose LESS THAN 70 milliGRAM(s)/deciliter  glucagon  Injectable 1 milliGRAM(s) IntraMuscular once PRN Glucose LESS THAN 70 milligrams/deciliter      ROS:  Positive:    General: Denies weight loss, fevers, rash, decreased hearing  Cardiac: Denies chest pain, SOB, BALTAZAR, orthopnea, PND, claudication, edema, snoring, daytime somnolence, palpitations, syncope  Resp: Denies SOB, BALTAZAR, cough, sputum, wheezing, hemoptysis  GI: Denies change in bowel habits, diarrhea, weight loss, melena, tarry stools,   nausea, vomiting, jaundice, abdominal pain, dysphagia  : Denies dysuria, nocturia, hematuria  Neuro: Denies tinnitus, headache, visual changes, weakness, dizziness or vertigo  Musculoskeletal: Denies neck pain back pain joint pain.  Skin: Denies rash, itching, dryness.  Endocrine: Denies polydipsia, polyuria  Psychiatric: Denies depression, anxiety      PHYSICAL EXAM:  Vital Signs Last 24 Hrs  T(C): 36.4 (2019 04:14), Max: 36.6 (2019 20:33)  T(F): 97.5 (2019 04:14), Max: 97.9 (2019 20:33)  HR: 84 (2019 04:14) (62 - 94)  BP: 108/70 (2019 04:14) (103/71 - 129/74)  BP(mean): --  RR: 18 (2019 04:14) (18 - 18)  SpO2: 95% (2019 04:14) (95% - 97%)  Daily     Daily Weight in k.1 (2019 08:00)  I&O's Summary    2019 07:01  -  2019 07:00  --------------------------------------------------------  IN: 1360 mL / OUT: 2300 mL / NET: -940 mL    2019 07:01  -  2019 06:35  --------------------------------------------------------  IN: 805 mL / OUT: 2200 mL / NET: -1395 mL        General Appearance: 	 Alert, cooperative, no distress  HEENT: normocephalic, atraumatic, PERRLA, EOMI, conjunctiva normal, sclera anicteric,   Neck: no JVD,  carotid 2+  bilaterally without bruits, thyroid normal to inspection and palpation, no adenopathy, trachea midline  Lungs:  clear to auscultation and percussion bilaterally  Cor:  pmi 5th ICS MCL, regular rate and rhythm, S1 normal intensity, S2 normal intensity, 2/6 systolic murmur base and apex  Abdomen:	 soft, non-tender; bowel sounds normal; no masses,  no organomegaly  Extremities: without cyanosis, clubbing 3+ edema  Vasc: 2-+ PT and DP pulses; no varicosities  Neurologic: A&O x 3 (time, place, person). Symmetric strength; limited exam  Musculoskeletal: no kyphosis, scoliosis; normal gait, normal tone  Skin: no rashes; limited exam    EKG:  Telemetry:NSVT    Labs:  CBC Full  -  ( 2019 05:43 )  WBC Count : 16.0 K/uL  RBC Count : 5.08 M/uL  Hemoglobin : 15.0 g/dL  Hematocrit : 46.3 %  Platelet Count - Automated : 194 K/uL  Mean Cell Volume : 91.2 fl  Mean Cell Hemoglobin : 29.6 pg  Mean Cell Hemoglobin Concentration : 32.5 gm/dL  Auto Neutrophil # : x  Auto Lymphocyte # : x  Auto Monocyte # : x  Auto Eosinophil # : x  Auto Basophil # : x  Auto Neutrophil % : x  Auto Lymphocyte % : x  Auto Monocyte % : x  Auto Eosinophil % : x  Auto Basophil % : x          PT/INR - ( 2019 08:24 )   PT: 16.7 sec;   INR: 1.46 ratio         PTT - ( 2019 08:24 )  PTT:28.8 sec        Impression/Plan:Patient with acute on chronic diastolic CHF,AS,MR,PAF,HTN,hyperlipidemia,COPD,LIMA, with mildly improved edema.Now with NSVT would continue diltiazem and add low dose toprol.Continue Laix O>I,supplement K >4,MG > 2.0.F/U EP and TAVR team.Will need TAVR.        Maico Mccormack MD, [facility]  [facility]

## 2019-04-13 NOTE — PROGRESS NOTE ADULT - SUBJECTIVE AND OBJECTIVE BOX
Patient is a 73y old  Male who presents with a chief complaint of worsening dyspnea, LE edema, increase in abd girth over a month (13 Apr 2019 12:46)      SUBJECTIVE / OVERNIGHT EVENTS: no new c/o, cont to diurese well, IsOs -1215 overnight, lost 10 kg since admission, tolerating low dose Toprol, raising the dose up to cardiology. SBPs     MEDICATIONS  (STANDING):  acetaminophen  IVPB .. 1000 milliGRAM(s) IV Intermittent once  apixaban 5 milliGRAM(s) Oral every 12 hours  aspirin enteric coated 81 milliGRAM(s) Oral daily  atorvastatin 20 milliGRAM(s) Oral at bedtime  buDESOnide 160 MICROgram(s)/formoterol 4.5 MICROgram(s) Inhaler 2 Puff(s) Inhalation two times a day  chlorhexidine 4% Liquid 1 Application(s) Topical <User Schedule>  colchicine 0.6 milliGRAM(s) Oral two times a day  dextrose 5%. 1000 milliLiter(s) (50 mL/Hr) IV Continuous <Continuous>  dextrose 50% Injectable 12.5 Gram(s) IV Push once  dextrose 50% Injectable 25 Gram(s) IV Push once  dextrose 50% Injectable 25 Gram(s) IV Push once  diltiazem    Tablet 60 milliGRAM(s) Oral three times a day  fluticasone propionate 50 MICROgram(s)/spray Nasal Spray 1 Spray(s) Both Nostrils two times a day  furosemide Infusion 10 mG/Hr (5 mL/Hr) IV Continuous <Continuous>  insulin glargine Injectable (LANTUS) 10 Unit(s) SubCutaneous at bedtime  insulin lispro (HumaLOG) corrective regimen sliding scale   SubCutaneous at bedtime  insulin lispro (HumaLOG) corrective regimen sliding scale   SubCutaneous three times a day before meals  insulin lispro Injectable (HumaLOG) 3 Unit(s) SubCutaneous three times a day before meals  methimazole 2.5 milliGRAM(s) Oral daily  metoprolol succinate ER 12.5 milliGRAM(s) Oral daily  nicotine - 21 mG/24Hr(s) Patch 1 patch Transdermal daily  pantoprazole    Tablet 40 milliGRAM(s) Oral before breakfast  predniSONE   Tablet 20 milliGRAM(s) Oral daily  spironolactone 25 milliGRAM(s) Oral daily    MEDICATIONS  (PRN):  ALBUTerol/ipratropium for Nebulization 3 milliLiter(s) Nebulizer every 6 hours PRN Shortness of Breath and/or Wheezing  dextrose 40% Gel 15 Gram(s) Oral once PRN Blood Glucose LESS THAN 70 milliGRAM(s)/deciliter  glucagon  Injectable 1 milliGRAM(s) IntraMuscular once PRN Glucose LESS THAN 70 milligrams/deciliter      Vital Signs Last 24 Hrs  T(F): 97.6 (04-13-19 @ 20:12), Max: 98 (04-13-19 @ 12:06)  HR: 88 (04-13-19 @ 20:12) (62 - 88)  BP: 111/74 (04-13-19 @ 20:12) (101/69 - 111/74)  RR: 18 (04-13-19 @ 20:12) (18 - 18)  SpO2: 97% (04-13-19 @ 20:12) (95% - 97%)  Telemetry:   CAPILLARY BLOOD GLUCOSE      POCT Blood Glucose.: 112 mg/dL (13 Apr 2019 16:21)  POCT Blood Glucose.: 222 mg/dL (13 Apr 2019 12:04)  POCT Blood Glucose.: 176 mg/dL (13 Apr 2019 08:16)  POCT Blood Glucose.: 190 mg/dL (12 Apr 2019 21:11)    I&O's Summary    12 Apr 2019 07:01  -  13 Apr 2019 07:00  --------------------------------------------------------  IN: 985 mL / OUT: 2200 mL / NET: -1215 mL    13 Apr 2019 07:01  -  13 Apr 2019 21:04  --------------------------------------------------------  IN: 320 mL / OUT: 2000 mL / NET: -1680 mL        PHYSICAL EXAM:  GENERAL: NAD, well-developed  HEAD:  Atraumatic, Normocephalic  EYES: EOMI, PERRLA, conjunctiva and sclera clear  NECK: Supple, No JVD  CHEST/LUNG: Clear to auscultation bilaterally; No wheeze  HEART: Regular rate and rhythm; No murmurs, rubs, or gallops  ABDOMEN: Soft, Nontender, Nondistended; Bowel sounds present  EXTREMITIES:  2+ Peripheral Pulses, No clubbing, cyanosis, or edema  PSYCH: AAOx3  NEUROLOGY: non-focal  SKIN: No rashes or lesions    LABS:                        15.0   16.0  )-----------( 194      ( 13 Apr 2019 05:43 )             46.3     04-13    139  |  84<L>  |  40<H>  ----------------------------<  123<H>  3.7   |  41<H>  |  1.03    Ca    9.6      13 Apr 2019 05:43  Phos  2.8     04-13  Mg     1.6     04-13    TPro  7.0  /  Alb  3.9  /  TBili  0.6  /  DBili  x   /  AST  35  /  ALT  47<H>  /  AlkPhos  145<H>  04-13    PT/INR - ( 12 Apr 2019 08:24 )   PT: 16.7 sec;   INR: 1.46 ratio         PTT - ( 12 Apr 2019 08:24 )  PTT:28.8 sec          RADIOLOGY & ADDITIONAL TESTS:    Imaging Personally Reviewed:    Consultant(s) Notes Reviewed:      Care Discussed with Consultants/Other Providers:

## 2019-04-13 NOTE — PROGRESS NOTE ADULT - ASSESSMENT
73 M current smoker with a PMH of obesity, hyperthyrodism, HTN, HLD, CAD, diastolic HF,  A fib on Eliquis,DM2, COPD, LIMA not on CPAP, now presenting with SOB, b/l LE edema and abdominal distension, likely 2/2 acute decompensated diastolic heart failure     1. Acute decompensated diastolic HF/ A fib  - on lasix drip w good urine output,lost 10 kg since admission  - cont Aldactone  - Check electrolytes daily. Keep  K+>4, Mag > 2  -  s/p cardioversion, but this am back in AFib  - cont Diltiazem 60 mg q8H.   -  NSVT, cont Toprol XL 12.5 mg  - Cont AC with Eliquis,  2. Valvular HD   -MARIA GUADALUPE c/w RV sysloic dysfunction, severe MR, severe AS, nl LV systolic function.  has h/o CAD, % on LHC from 9/2016. cont statins, ASA. Not on BB 2/2 COPD. Structural heart team following, awaiting Ct Coronaries, to be done once ptn can lie down  - ptn now w NSVT, tolerating sm dose BB: Toprol XL 12.5 mg daily. ptn is at risk for sudden death if gets discharged prior to TAVR    3. COPD  - cont po Prednisone at 30 mg daily, Pulm input appreciated  - Duonebs prn, no sign of an acute infection. ID input appreciated  - Incentive spirometry  - Supplemental O2 to keep sats > 90 %  - presently on BIPAP, ABG is improving , ptn is a chronic CO2 retainer    4. Lung nodules:  - Few unchanged 0.4 cm RUL nodules  - Requires outpt follow up with repeat imaging in 6 months     5. LIMA  - refuses CPAP    6. Nicotine addiction/ Smoking cessation:  - Will place on Nicotine patch while inptn   - Smoking cessation counseling provided , ptn is aware he should not be smoking and will try harder  7. Endo  - HA1C is 8.2%, on FS before meals w Insulin on a sliding scale, Tsh is low c/w Hyperthyrodism,  on  tapazole 5 mg. Amiodarone and Iodine contrast to be avoided. ENDO consult appreciated  8. Preventive  - no need for DVT ppx, ptn is on Eliquis  -GI ppx w Protonix

## 2019-04-13 NOTE — PROGRESS NOTE ADULT - SUBJECTIVE AND OBJECTIVE BOX
Patient is a 73y old  Male who presents with a chief complaint of worsening dyspnea, LE edema, increase in abd girth over a month,acute on chronic diastolic CHF,PAF,HTN,hyperlipidemia,obesity,LIMA,COPD.No CP + SOB and edema.Now with NSVT    Allergies    penicillins (Unknown)    Intolerances      MEDICATIONS  (STANDING):  acetaminophen  IVPB .. 1000 milliGRAM(s) IV Intermittent once  apixaban 5 milliGRAM(s) Oral every 12 hours  aspirin enteric coated 81 milliGRAM(s) Oral daily  atorvastatin 20 milliGRAM(s) Oral at bedtime  buDESOnide 160 MICROgram(s)/formoterol 4.5 MICROgram(s) Inhaler 2 Puff(s) Inhalation two times a day  chlorhexidine 4% Liquid 1 Application(s) Topical <User Schedule>  colchicine 0.6 milliGRAM(s) Oral two times a day  dextrose 5%. 1000 milliLiter(s) (50 mL/Hr) IV Continuous <Continuous>  dextrose 50% Injectable 12.5 Gram(s) IV Push once  dextrose 50% Injectable 25 Gram(s) IV Push once  dextrose 50% Injectable 25 Gram(s) IV Push once  diltiazem    Tablet 60 milliGRAM(s) Oral three times a day  fluticasone propionate 50 MICROgram(s)/spray Nasal Spray 1 Spray(s) Both Nostrils two times a day  furosemide Infusion 10 mG/Hr (5 mL/Hr) IV Continuous <Continuous>  insulin glargine Injectable (LANTUS) 10 Unit(s) SubCutaneous at bedtime  insulin lispro (HumaLOG) corrective regimen sliding scale   SubCutaneous at bedtime  insulin lispro (HumaLOG) corrective regimen sliding scale   SubCutaneous three times a day before meals  insulin lispro Injectable (HumaLOG) 3 Unit(s) SubCutaneous three times a day before meals  methimazole 2.5 milliGRAM(s) Oral daily  metoprolol succinate ER 12.5 milliGRAM(s) Oral daily  nicotine - 21 mG/24Hr(s) Patch 1 patch Transdermal daily  pantoprazole    Tablet 40 milliGRAM(s) Oral before breakfast  predniSONE   Tablet 20 milliGRAM(s) Oral daily  spironolactone 25 milliGRAM(s) Oral daily    MEDICATIONS  (PRN):  ALBUTerol/ipratropium for Nebulization 3 milliLiter(s) Nebulizer every 6 hours PRN Shortness of Breath and/or Wheezing  dextrose 40% Gel 15 Gram(s) Oral once PRN Blood Glucose LESS THAN 70 milliGRAM(s)/deciliter  glucagon  Injectable 1 milliGRAM(s) IntraMuscular once PRN Glucose LESS THAN 70 milligrams/deciliter      ROS:  Positive:    General: Denies weight loss, fevers, rash, decreased hearing  Cardiac: Denies chest pain, SOB, BALTAZAR, orthopnea, PND, claudication, edema, snoring, daytime somnolence, palpitations, syncope  Resp: Denies SOB, BALTAZAR, cough, sputum, wheezing, hemoptysis  GI: Denies change in bowel habits, diarrhea, weight loss, melena, tarry stools,   nausea, vomiting, jaundice, abdominal pain, dysphagia  : Denies dysuria, nocturia, hematuria  Neuro: Denies tinnitus, headache, visual changes, weakness, dizziness or vertigo  Musculoskeletal: Denies neck pain back pain joint pain.  Skin: Denies rash, itching, dryness.  Endocrine: Denies polydipsia, polyuria  Psychiatric: Denies depression, anxiety      PHYSICAL EXAM:  Vital Signs Last 24 Hrs  T(C): 36.4 (2019 04:14), Max: 36.6 (2019 20:33)  T(F): 97.5 (2019 04:14), Max: 97.9 (2019 20:33)  HR: 84 (2019 04:14) (62 - 94)  BP: 108/70 (2019 04:14) (103/71 - 129/74)  BP(mean): --  RR: 18 (2019 04:14) (18 - 18)  SpO2: 95% (2019 04:14) (95% - 97%)  Daily     Daily Weight in k.1 (2019 08:00)  I&O's Summary    2019 07:01  -  2019 07:00  --------------------------------------------------------  IN: 1360 mL / OUT: 2300 mL / NET: -940 mL    2019 07:01  -  2019 06:35  --------------------------------------------------------  IN: 805 mL / OUT: 2200 mL / NET: -1395 mL        General Appearance: 	 Alert, cooperative, no distress  HEENT: normocephalic, atraumatic, PERRLA, EOMI, conjunctiva normal, sclera anicteric,   Neck: no JVD,  carotid 2+  bilaterally without bruits, thyroid normal to inspection and palpation, no adenopathy, trachea midline  Lungs:  clear to auscultation and percussion bilaterally  Cor:  pmi 5th ICS MCL, regular rate and rhythm, S1 normal intensity, S2 normal intensity, 2/6 systolic murmur base and apex  Abdomen:	 soft, non-tender; bowel sounds normal; no masses,  no organomegaly  Extremities: without cyanosis, clubbing 3+ edema  Vasc: 2-+ PT and DP pulses; no varicosities  Neurologic: A&O x 3 (time, place, person). Symmetric strength; limited exam  Musculoskeletal: no kyphosis, scoliosis; normal gait, normal tone  Skin: no rashes; limited exam    EKG:  Telemetry:NSVT    Labs:  CBC Full  -  ( 2019 05:43 )  WBC Count : 16.0 K/uL  RBC Count : 5.08 M/uL  Hemoglobin : 15.0 g/dL  Hematocrit : 46.3 %  Platelet Count - Automated : 194 K/uL  Mean Cell Volume : 91.2 fl  Mean Cell Hemoglobin : 29.6 pg  Mean Cell Hemoglobin Concentration : 32.5 gm/dL  Auto Neutrophil # : x  Auto Lymphocyte # : x  Auto Monocyte # : x  Auto Eosinophil # : x  Auto Basophil # : x  Auto Neutrophil % : x  Auto Lymphocyte % : x  Auto Monocyte % : x  Auto Eosinophil % : x  Auto Basophil % : x          PT/INR - ( 2019 08:24 )   PT: 16.7 sec;   INR: 1.46 ratio         PTT - ( 2019 08:24 )  PTT:28.8 sec        Impression/Plan: Patient with severe LE edema from diastolic dysfunction and valvular heart disease. Patient is considering all options, including palliation. He is still very edematous and told to watch weights carefully at home if d/cd.  Continue trelegy, nebulizer tx prn.

## 2019-04-14 ENCOUNTER — TRANSCRIPTION ENCOUNTER (OUTPATIENT)
Age: 74
End: 2019-04-14

## 2019-04-14 VITALS — OXYGEN SATURATION: 91 % | RESPIRATION RATE: 22 BRPM

## 2019-04-14 DIAGNOSIS — I34.0 NONRHEUMATIC MITRAL (VALVE) INSUFFICIENCY: ICD-10-CM

## 2019-04-14 LAB
ANION GAP SERPL CALC-SCNC: 16 MMOL/L — SIGNIFICANT CHANGE UP (ref 5–17)
BUN SERPL-MCNC: 45 MG/DL — HIGH (ref 7–23)
CALCIUM SERPL-MCNC: 9.8 MG/DL — SIGNIFICANT CHANGE UP (ref 8.4–10.5)
CHLORIDE SERPL-SCNC: 83 MMOL/L — LOW (ref 96–108)
CO2 SERPL-SCNC: 36 MMOL/L — HIGH (ref 22–31)
CREAT SERPL-MCNC: 1.13 MG/DL — SIGNIFICANT CHANGE UP (ref 0.5–1.3)
CULTURE RESULTS: SIGNIFICANT CHANGE UP
CULTURE RESULTS: SIGNIFICANT CHANGE UP
GLUCOSE BLDC GLUCOMTR-MCNC: 171 MG/DL — HIGH (ref 70–99)
GLUCOSE SERPL-MCNC: 273 MG/DL — HIGH (ref 70–99)
HCT VFR BLD CALC: 48.5 % — SIGNIFICANT CHANGE UP (ref 39–50)
HGB BLD-MCNC: 16 G/DL — SIGNIFICANT CHANGE UP (ref 13–17)
MAGNESIUM SERPL-MCNC: 1.6 MG/DL — SIGNIFICANT CHANGE UP (ref 1.6–2.6)
MCHC RBC-ENTMCNC: 29.8 PG — SIGNIFICANT CHANGE UP (ref 27–34)
MCHC RBC-ENTMCNC: 32.9 GM/DL — SIGNIFICANT CHANGE UP (ref 32–36)
MCV RBC AUTO: 90.6 FL — SIGNIFICANT CHANGE UP (ref 80–100)
PLATELET # BLD AUTO: 187 K/UL — SIGNIFICANT CHANGE UP (ref 150–400)
POTASSIUM SERPL-MCNC: 3.9 MMOL/L — SIGNIFICANT CHANGE UP (ref 3.5–5.3)
POTASSIUM SERPL-SCNC: 3.9 MMOL/L — SIGNIFICANT CHANGE UP (ref 3.5–5.3)
RBC # BLD: 5.35 M/UL — SIGNIFICANT CHANGE UP (ref 4.2–5.8)
RBC # FLD: 14.6 % — HIGH (ref 10.3–14.5)
SODIUM SERPL-SCNC: 135 MMOL/L — SIGNIFICANT CHANGE UP (ref 135–145)
SPECIMEN SOURCE: SIGNIFICANT CHANGE UP
SPECIMEN SOURCE: SIGNIFICANT CHANGE UP
WBC # BLD: 16.7 K/UL — HIGH (ref 3.8–10.5)
WBC # FLD AUTO: 16.7 K/UL — HIGH (ref 3.8–10.5)

## 2019-04-14 PROCEDURE — 84132 ASSAY OF SERUM POTASSIUM: CPT

## 2019-04-14 PROCEDURE — 87040 BLOOD CULTURE FOR BACTERIA: CPT

## 2019-04-14 PROCEDURE — 82947 ASSAY GLUCOSE BLOOD QUANT: CPT

## 2019-04-14 PROCEDURE — 82565 ASSAY OF CREATININE: CPT

## 2019-04-14 PROCEDURE — 87581 M.PNEUMON DNA AMP PROBE: CPT

## 2019-04-14 PROCEDURE — 99285 EMERGENCY DEPT VISIT HI MDM: CPT | Mod: 25

## 2019-04-14 PROCEDURE — 82962 GLUCOSE BLOOD TEST: CPT

## 2019-04-14 PROCEDURE — 83880 ASSAY OF NATRIURETIC PEPTIDE: CPT

## 2019-04-14 PROCEDURE — C1733: CPT

## 2019-04-14 PROCEDURE — 86850 RBC ANTIBODY SCREEN: CPT

## 2019-04-14 PROCEDURE — 84295 ASSAY OF SERUM SODIUM: CPT

## 2019-04-14 PROCEDURE — 93621 COMP EP EVL L PAC&REC C SINS: CPT

## 2019-04-14 PROCEDURE — 71045 X-RAY EXAM CHEST 1 VIEW: CPT

## 2019-04-14 PROCEDURE — 84443 ASSAY THYROID STIM HORMONE: CPT

## 2019-04-14 PROCEDURE — 83605 ASSAY OF LACTIC ACID: CPT

## 2019-04-14 PROCEDURE — 84100 ASSAY OF PHOSPHORUS: CPT

## 2019-04-14 PROCEDURE — 80061 LIPID PANEL: CPT

## 2019-04-14 PROCEDURE — 93005 ELECTROCARDIOGRAM TRACING: CPT

## 2019-04-14 PROCEDURE — 86900 BLOOD TYPING SEROLOGIC ABO: CPT

## 2019-04-14 PROCEDURE — C1894: CPT

## 2019-04-14 PROCEDURE — 84550 ASSAY OF BLOOD/URIC ACID: CPT

## 2019-04-14 PROCEDURE — 83036 HEMOGLOBIN GLYCOSYLATED A1C: CPT

## 2019-04-14 PROCEDURE — 84436 ASSAY OF TOTAL THYROXINE: CPT

## 2019-04-14 PROCEDURE — 84480 ASSAY TRIIODOTHYRONINE (T3): CPT

## 2019-04-14 PROCEDURE — 82306 VITAMIN D 25 HYDROXY: CPT

## 2019-04-14 PROCEDURE — 71250 CT THORAX DX C-: CPT

## 2019-04-14 PROCEDURE — 93623 PRGRMD STIMJ&PACG IV RX NFS: CPT

## 2019-04-14 PROCEDURE — 85610 PROTHROMBIN TIME: CPT

## 2019-04-14 PROCEDURE — 86901 BLOOD TYPING SEROLOGIC RH(D): CPT

## 2019-04-14 PROCEDURE — 87086 URINE CULTURE/COLONY COUNT: CPT

## 2019-04-14 PROCEDURE — 93306 TTE W/DOPPLER COMPLETE: CPT

## 2019-04-14 PROCEDURE — 93923 UPR/LXTR ART STDY 3+ LVLS: CPT

## 2019-04-14 PROCEDURE — 93609 INTRA-VNTR MAPG TCHYCAR SITE: CPT

## 2019-04-14 PROCEDURE — 87798 DETECT AGENT NOS DNA AMP: CPT

## 2019-04-14 PROCEDURE — 82803 BLOOD GASES ANY COMBINATION: CPT

## 2019-04-14 PROCEDURE — 80048 BASIC METABOLIC PNL TOTAL CA: CPT

## 2019-04-14 PROCEDURE — 85730 THROMBOPLASTIN TIME PARTIAL: CPT

## 2019-04-14 PROCEDURE — 93653 COMPRE EP EVAL TX SVT: CPT

## 2019-04-14 PROCEDURE — 71046 X-RAY EXAM CHEST 2 VIEWS: CPT

## 2019-04-14 PROCEDURE — 85014 HEMATOCRIT: CPT

## 2019-04-14 PROCEDURE — 93312 ECHO TRANSESOPHAGEAL: CPT

## 2019-04-14 PROCEDURE — 82330 ASSAY OF CALCIUM: CPT

## 2019-04-14 PROCEDURE — 85027 COMPLETE CBC AUTOMATED: CPT

## 2019-04-14 PROCEDURE — 87633 RESP VIRUS 12-25 TARGETS: CPT

## 2019-04-14 PROCEDURE — 93325 DOPPLER ECHO COLOR FLOW MAPG: CPT

## 2019-04-14 PROCEDURE — 83735 ASSAY OF MAGNESIUM: CPT

## 2019-04-14 PROCEDURE — 84484 ASSAY OF TROPONIN QUANT: CPT

## 2019-04-14 PROCEDURE — 84439 ASSAY OF FREE THYROXINE: CPT

## 2019-04-14 PROCEDURE — 82435 ASSAY OF BLOOD CHLORIDE: CPT

## 2019-04-14 PROCEDURE — 76705 ECHO EXAM OF ABDOMEN: CPT

## 2019-04-14 PROCEDURE — 81001 URINALYSIS AUTO W/SCOPE: CPT

## 2019-04-14 PROCEDURE — C1893: CPT

## 2019-04-14 PROCEDURE — 87486 CHLMYD PNEUM DNA AMP PROBE: CPT

## 2019-04-14 PROCEDURE — 94640 AIRWAY INHALATION TREATMENT: CPT

## 2019-04-14 PROCEDURE — 96374 THER/PROPH/DIAG INJ IV PUSH: CPT

## 2019-04-14 PROCEDURE — 94660 CPAP INITIATION&MGMT: CPT

## 2019-04-14 PROCEDURE — 93320 DOPPLER ECHO COMPLETE: CPT

## 2019-04-14 PROCEDURE — 80053 COMPREHEN METABOLIC PANEL: CPT

## 2019-04-14 RX ORDER — SPIRONOLACTONE 25 MG/1
1 TABLET, FILM COATED ORAL
Qty: 30 | Refills: 0
Start: 2019-04-14 | End: 2019-05-13

## 2019-04-14 RX ORDER — FLUTICASONE PROPIONATE 50 MCG
1 SPRAY, SUSPENSION NASAL
Qty: 0 | Refills: 0 | DISCHARGE
Start: 2019-04-14 | End: 2019-05-13

## 2019-04-14 RX ORDER — PANTOPRAZOLE SODIUM 20 MG/1
1 TABLET, DELAYED RELEASE ORAL
Qty: 30 | Refills: 0
Start: 2019-04-14 | End: 2019-05-13

## 2019-04-14 RX ORDER — FUROSEMIDE 40 MG
1 TABLET ORAL
Qty: 0 | Refills: 0 | DISCHARGE
Start: 2019-04-14 | End: 2019-05-13

## 2019-04-14 RX ORDER — FUROSEMIDE 40 MG
1 TABLET ORAL
Qty: 60 | Refills: 0
Start: 2019-04-14 | End: 2019-05-13

## 2019-04-14 RX ORDER — NICOTINE POLACRILEX 2 MG
1 GUM BUCCAL
Qty: 1 | Refills: 0
Start: 2019-04-14 | End: 2019-05-13

## 2019-04-14 RX ORDER — METHIMAZOLE 10 MG/1
0.5 TABLET ORAL
Qty: 15 | Refills: 0
Start: 2019-04-14 | End: 2019-05-13

## 2019-04-14 RX ORDER — FUROSEMIDE 40 MG
1 TABLET ORAL
Qty: 0 | Refills: 0 | COMMUNITY

## 2019-04-14 RX ORDER — METOPROLOL TARTRATE 50 MG
1 TABLET ORAL
Qty: 30 | Refills: 0
Start: 2019-04-14 | End: 2019-05-13

## 2019-04-14 RX ORDER — DILTIAZEM HCL 120 MG
1 CAPSULE, EXT RELEASE 24 HR ORAL
Qty: 0 | Refills: 0 | COMMUNITY

## 2019-04-14 RX ORDER — FLUTICASONE PROPIONATE 50 MCG
1 SPRAY, SUSPENSION NASAL
Qty: 1 | Refills: 0
Start: 2019-04-14 | End: 2019-05-13

## 2019-04-14 RX ORDER — DILTIAZEM HCL 120 MG
1 CAPSULE, EXT RELEASE 24 HR ORAL
Qty: 30 | Refills: 0
Start: 2019-04-14 | End: 2019-05-13

## 2019-04-14 RX ORDER — METOPROLOL TARTRATE 50 MG
0.5 TABLET ORAL
Qty: 15 | Refills: 0 | OUTPATIENT
Start: 2019-04-14 | End: 2019-05-13

## 2019-04-14 RX ORDER — BUDESONIDE AND FORMOTEROL FUMARATE DIHYDRATE 160; 4.5 UG/1; UG/1
2 AEROSOL RESPIRATORY (INHALATION)
Qty: 1 | Refills: 0
Start: 2019-04-14 | End: 2019-05-13

## 2019-04-14 RX ORDER — SPIRONOLACTONE 25 MG/1
12.5 TABLET, FILM COATED ORAL
Qty: 0 | Refills: 0 | COMMUNITY

## 2019-04-14 RX ADMIN — APIXABAN 5 MILLIGRAM(S): 2.5 TABLET, FILM COATED ORAL at 05:22

## 2019-04-14 RX ADMIN — PANTOPRAZOLE SODIUM 40 MILLIGRAM(S): 20 TABLET, DELAYED RELEASE ORAL at 05:22

## 2019-04-14 RX ADMIN — CHLORHEXIDINE GLUCONATE 1 APPLICATION(S): 213 SOLUTION TOPICAL at 06:33

## 2019-04-14 RX ADMIN — Medication 60 MILLIGRAM(S): at 05:22

## 2019-04-14 RX ADMIN — Medication 20 MILLIGRAM(S): at 05:22

## 2019-04-14 RX ADMIN — Medication 12.5 MILLIGRAM(S): at 05:22

## 2019-04-14 RX ADMIN — SPIRONOLACTONE 25 MILLIGRAM(S): 25 TABLET, FILM COATED ORAL at 05:22

## 2019-04-14 RX ADMIN — BUDESONIDE AND FORMOTEROL FUMARATE DIHYDRATE 2 PUFF(S): 160; 4.5 AEROSOL RESPIRATORY (INHALATION) at 05:22

## 2019-04-14 RX ADMIN — Medication 3 UNIT(S): at 08:04

## 2019-04-14 RX ADMIN — Medication 0.6 MILLIGRAM(S): at 05:22

## 2019-04-14 RX ADMIN — Medication 5 MG/HR: at 05:52

## 2019-04-14 RX ADMIN — Medication 1 SPRAY(S): at 05:22

## 2019-04-14 RX ADMIN — Medication 2: at 08:05

## 2019-04-14 NOTE — DISCHARGE NOTE PROVIDER - HOSPITAL COURSE
73 M current smoker with a PMH of obesity, hyperthyrodism, HTN, HLD, CAD, diastolic HF,  A fib on Eliquis,DM2, COPD, LIMA not on CPAP, now presenting with SOB, b/l LE edema and abdominal distension, likely 2/2 acute decompensated diastolic heart failure         1. Acute decompensated diastolic HF/ A fib    - on lasix drip w good urine output,lost 10 kg since admission    - cont Aldactone    - Check electrolytes daily. Keep  K+>4, Mag > 2    -  s/p cardioversion, but this am back in AFib    - cont Diltiazem 60 mg q8H.     -  NSVT, cont Toprol XL 12.5 mg    - Cont AC with Eliquis,    2. Valvular HD     -MARIA GUADALUPE c/w RV sysloic dysfunction, severe MR, severe AS, nl LV systolic function.  has h/o CAD, % on LHC from 9/2016. cont statins, ASA. Not on BB 2/2 COPD. Structural heart team following, awaiting Ct Coronaries, to be done once ptn can lie down    - ptn now w NSVT, tolerating sm dose BB: Toprol XL 12.5 mg daily. ptn is at risk for sudden death if gets discharged prior to TAVR        3. COPD    - cont po Prednisone at 30 mg daily, Pulm input appreciated    - Duonebs prn, no sign of an acute infection. ID input appreciated    - Incentive spirometry    - Supplemental O2 to keep sats > 90 %    - presently on BIPAP, ABG is improving , ptn is a chronic CO2 retainer        4. Lung nodules:    - Few unchanged 0.4 cm RUL nodules    - Requires outpt follow up with repeat imaging in 6 months         5. LIMA    - refuses CPAP        6. Nicotine addiction/ Smoking cessation:    - Will place on Nicotine patch while inptn     - Smoking cessation counseling provided , ptn is aware he should not be smoking and will try harder    7. Endo    - HA1C is 8.2%, on FS before meals w Insulin on a sliding scale, Tsh is low c/w Hyperthyrodism,  on  tapazole 5 mg. Amiodarone and Iodine contrast to be avoided. ENDO consult appreciated    8. Preventive    - no need for DVT ppx, ptn is on Eliquis    -GI ppx w Protonix        4/14/19 Pt and Wife at bedside demanding to leave the hospital AMA- They understand that leaving the hospital against medical advise may worsen disease and lead to death. Discharged on lasix 80BID, Toprol 25 mg and tapazole 2.5. Pt advised to follow up with with cardiology ASAP.

## 2019-04-14 NOTE — DISCHARGE NOTE NURSING/CASE MANAGEMENT/SOCIAL WORK - NSDCDPATPORTLINK_GEN_ALL_CORE
You can access the [facility], offered by [facility], by registering with the following website: [email or web address]

## 2019-04-14 NOTE — PROGRESS NOTE ADULT - SUBJECTIVE AND OBJECTIVE BOX
MILAGROS VIERA    Patient is a 73y old  Male who presents with a chief complaint of worsening dyspnea, LE edema, increase in abd girth over a month,chronic diastolic CHF,AS,MR,atrial fib.SOB and edema improved.      Allergies    penicillins (Unknown)    Intolerances      MEDICATIONS  (STANDING):  acetaminophen  IVPB .. 1000 milliGRAM(s) IV Intermittent once  apixaban 5 milliGRAM(s) Oral every 12 hours  aspirin enteric coated 81 milliGRAM(s) Oral daily  atorvastatin 20 milliGRAM(s) Oral at bedtime  buDESOnide 160 MICROgram(s)/formoterol 4.5 MICROgram(s) Inhaler 2 Puff(s) Inhalation two times a day  chlorhexidine 4% Liquid 1 Application(s) Topical <User Schedule>  colchicine 0.6 milliGRAM(s) Oral two times a day  dextrose 5%. 1000 milliLiter(s) (50 mL/Hr) IV Continuous <Continuous>  dextrose 50% Injectable 12.5 Gram(s) IV Push once  dextrose 50% Injectable 25 Gram(s) IV Push once  dextrose 50% Injectable 25 Gram(s) IV Push once  diltiazem    Tablet 60 milliGRAM(s) Oral three times a day  fluticasone propionate 50 MICROgram(s)/spray Nasal Spray 1 Spray(s) Both Nostrils two times a day  furosemide Infusion 10 mG/Hr (5 mL/Hr) IV Continuous <Continuous>  insulin glargine Injectable (LANTUS) 10 Unit(s) SubCutaneous at bedtime  insulin lispro (HumaLOG) corrective regimen sliding scale   SubCutaneous at bedtime  insulin lispro (HumaLOG) corrective regimen sliding scale   SubCutaneous three times a day before meals  insulin lispro Injectable (HumaLOG) 3 Unit(s) SubCutaneous three times a day before meals  methimazole 2.5 milliGRAM(s) Oral daily  metoprolol succinate ER 12.5 milliGRAM(s) Oral daily  nicotine - 21 mG/24Hr(s) Patch 1 patch Transdermal daily  pantoprazole    Tablet 40 milliGRAM(s) Oral before breakfast  predniSONE   Tablet 20 milliGRAM(s) Oral daily  spironolactone 25 milliGRAM(s) Oral daily    MEDICATIONS  (PRN):  ALBUTerol/ipratropium for Nebulization 3 milliLiter(s) Nebulizer every 6 hours PRN Shortness of Breath and/or Wheezing  dextrose 40% Gel 15 Gram(s) Oral once PRN Blood Glucose LESS THAN 70 milliGRAM(s)/deciliter  glucagon  Injectable 1 milliGRAM(s) IntraMuscular once PRN Glucose LESS THAN 70 milligrams/deciliter      ROS:  Positive:BALTAZAR,edema    General: Denies weight loss, fevers, rash, decreased hearing  Cardiac: Denies chest pain, orthopnea, PND, claudication,snoring, daytime somnolence, palpitations, syncope  Resp: Denies  cough, sputum, wheezing, hemoptysis  GI: Denies change in bowel habits, diarrhea, weight loss, melena, tarry stools,   nausea, vomiting, jaundice, abdominal pain, dysphagia  : Denies dysuria, nocturia, hematuria  Neuro: Denies tinnitus, headache, visual changes, weakness, dizziness or vertigo  Musculoskeletal: Denies neck pain back pain joint pain.  Skin: Denies rash, itching, dryness.  Endocrine: Denies polydipsia, polyuria  Psychiatric: Denies depression, anxiety      PHYSICAL EXAM:  Vital Signs Last 24 Hrs  T(C): 36.6 (14 Apr 2019 04:00), Max: 36.7 (13 Apr 2019 12:06)  T(F): 97.8 (14 Apr 2019 04:00), Max: 98 (13 Apr 2019 12:06)  HR: 61 (14 Apr 2019 04:00) (61 - 88)  BP: 109/78 (14 Apr 2019 04:00) (101/69 - 118/77)  BP(mean): --  RR: 18 (14 Apr 2019 04:00) (18 - 18)  SpO2: 98% (14 Apr 2019 04:00) (96% - 99%)  Daily     Daily   I&O's Summary    13 Apr 2019 07:01  -  14 Apr 2019 07:00  --------------------------------------------------------  IN: 620 mL / OUT: 3860 mL / NET: -3240 mL        General Appearance: 	 Alert, cooperative, no distress  HEENT: normocephalic, atraumatic, PERRLA, EOMI, conjunctiva normal, sclera anicteric,   Neck: no JVD,  carotid 2+  bilaterally without bruits, thyroid normal to inspection and palpation, no adenopathy, trachea midline  Lungs:  Decreased BS bases  Cor:  Irregular,2/6 mid peaking murmur base,2/6 apical systolic murmur  Abdomen:	 soft, non-tender; bowel sounds normal; no masses,  no organomegaly  Extremities: 2+ edema,chronic venous stasis  Vasc: 2-+ PT and DP pulses; no varicosities  Neurologic: A&O x 3 (time, place, person). Symmetric strength; limited exam  Musculoskeletal: no kyphosis, scoliosis; normal gait, normal tone  Skin: no rashes; limited exam    EKG:  Telemetry:A fib,no VT    Labs:  CBC Full  -  ( 13 Apr 2019 05:43 )  WBC Count : 16.0 K/uL  RBC Count : 5.08 M/uL  Hemoglobin : 15.0 g/dL  Hematocrit : 46.3 %  Platelet Count - Automated : 194 K/uL  Mean Cell Volume : 91.2 fl  Mean Cell Hemoglobin : 29.6 pg  Mean Cell Hemoglobin Concentration : 32.5 gm/dL  Auto Neutrophil # : x  Auto Lymphocyte # : x  Auto Monocyte # : x  Auto Eosinophil # : x  Auto Basophil # : x  Auto Neutrophil % : x  Auto Lymphocyte % : x  Auto Monocyte % : x  Auto Eosinophil % : x  Auto Basophil % : x          PT/INR - ( 12 Apr 2019 08:24 )   PT: 16.7 sec;   INR: 1.46 ratio         PTT - ( 12 Apr 2019 08:24 )  PTT:28.8 sec        Impression/Plan:Patient with diastolic CHF,atrial fib,AS,MR,obesity,HTN,hyperlipidemia,COPD,LIMA,atrial flutter ablation back in atrial fib.SOB,edema mildly improved.No further VT.Would increase Toprol 25 mg QD.Continue Lasix muñiz  to 80 mg BID.Continue  Eliquis,ASA,cardizem,lipitor,aldactone,prednisone, protonix and tapazole.With good Lv Fx contine Toprol for rate control and arrythmia control.patient refuses to stay for TAVR at present.D/C planning [facility] F/U Dr. Campbell this week and evaluation for TAVR and MV clip.Supplement K and MG K >4,Mag >2.        Maico Mccormack MD, [facility]  [facility]

## 2019-04-14 NOTE — PROGRESS NOTE ADULT - NSHPATTENDINGPLANDISCUSS_GEN_ALL_CORE
ptn, wife, card, pulm
patient and wife
Patient and his wife (by phone)
pt-Alyce

## 2019-04-14 NOTE — DISCHARGE NOTE PROVIDER - CARE PROVIDER_API CALL
Maico Mccormack)  Cardiovascular Disease; Internal Medicine  [address] [place] [address]  Phone: [phone]  Fax: [phone]  Follow Up Time: Maico Mccormack)  Cardiovascular Disease; Internal Medicine  [address], [address]  [place] [address]  Phone: [phone]  Fax: [phone]  Follow Up Time:     Frederic Brand)  Internal Medicine; Pulmonary Disease  [address], [address]  [place] [address]  Phone: [phone]  Fax: [phone]  Follow Up Time:     Elton Cisse)  EndocrinologyMetabDiabetes; Internal Medicine  [address], [address]  [place] [address]  Phone: [phone]  Fax: [phone]  Follow Up Time:

## 2019-04-14 NOTE — CHART NOTE - NSCHARTNOTEFT_GEN_A_CORE
Called to evaluate patient as wife at bedside demanding the patient to go home. Pt states he feels better, he " just wants to leave"  . Wife states she doesn't want to be here, we are " killing him" . States they must leave, " now" and she does not care if " he goes home and drops dead". Explained at length that patient remains on lasix gtt and has not been medically cleared for discharge. Pt and wife state they understand the consequences and will leave Against medical advice. Discussed at length with .   Pt left the hospital AMA> advised to continue medications as prescribed, lasix 80 BID , Toprol 25, aldactone , tapazole. Follow up with Dr. Adrian BOSS and if symptoms worsen return to hospital immediately.

## 2019-04-14 NOTE — DISCHARGE NOTE NURSING/CASE MANAGEMENT/SOCIAL WORK - NSDCFUADDAPPT_GEN_ALL_CORE_FT
*** YOU ARE LEAVING THE HOSPITAL AGAINST MEDICAL ADVISE: YOU HAVE VERBALIZED UNDERSTANDING OF THE POSSIBLE RISKS OF DISCHARGE BEFORE COMPLETING MEDICAL TREATMENT UP TO AND INCLUDING PERMANENT DISABILITY AND DEATH>

## 2019-04-14 NOTE — PROGRESS NOTE ADULT - PROVIDER SPECIALTY LIST ADULT
CCU
CCU
Cardiology
Electrophysiology
Electrophysiology
Endocrinology
Infectious Disease
Internal Medicine
Pulmonology
Structural Heart
Structural Heart
Electrophysiology
Electrophysiology
Internal Medicine
Pulmonology

## 2019-04-14 NOTE — PROGRESS NOTE ADULT - SUBJECTIVE AND OBJECTIVE BOX
Patient is a 73y old  Male who presents with a chief complaint of worsening dyspnea, LE edema, increase in abd girth over a month (14 Apr 2019 10:36)      SUBJECTIVE / OVERNIGHT EVENTS: ptn and iwfe insist on ptn's discharge, " i dont care if he drops dead at home" as per wife. Ptn though none in past 24 hrs, had NSVT and it was felt he should not be DCed without TAVR. Ptn wants to sign out AMA. will place on LASIX 80 bid, cont low dose TOPROL, cont the rest of present meds    MEDICATIONS  (STANDING):  acetaminophen  IVPB .. 1000 milliGRAM(s) IV Intermittent once  apixaban 5 milliGRAM(s) Oral every 12 hours  aspirin enteric coated 81 milliGRAM(s) Oral daily  atorvastatin 20 milliGRAM(s) Oral at bedtime  buDESOnide 160 MICROgram(s)/formoterol 4.5 MICROgram(s) Inhaler 2 Puff(s) Inhalation two times a day  chlorhexidine 4% Liquid 1 Application(s) Topical <User Schedule>  colchicine 0.6 milliGRAM(s) Oral two times a day  dextrose 5%. 1000 milliLiter(s) (50 mL/Hr) IV Continuous <Continuous>  dextrose 50% Injectable 12.5 Gram(s) IV Push once  dextrose 50% Injectable 25 Gram(s) IV Push once  dextrose 50% Injectable 25 Gram(s) IV Push once  diltiazem    Tablet 60 milliGRAM(s) Oral three times a day  fluticasone propionate 50 MICROgram(s)/spray Nasal Spray 1 Spray(s) Both Nostrils two times a day  furosemide Infusion 10 mG/Hr (5 mL/Hr) IV Continuous <Continuous>  insulin glargine Injectable (LANTUS) 10 Unit(s) SubCutaneous at bedtime  insulin lispro (HumaLOG) corrective regimen sliding scale   SubCutaneous at bedtime  insulin lispro (HumaLOG) corrective regimen sliding scale   SubCutaneous three times a day before meals  insulin lispro Injectable (HumaLOG) 3 Unit(s) SubCutaneous three times a day before meals  methimazole 2.5 milliGRAM(s) Oral daily  metoprolol succinate ER 12.5 milliGRAM(s) Oral daily  nicotine - 21 mG/24Hr(s) Patch 1 patch Transdermal daily  pantoprazole    Tablet 40 milliGRAM(s) Oral before breakfast  predniSONE   Tablet 20 milliGRAM(s) Oral daily  spironolactone 25 milliGRAM(s) Oral daily    MEDICATIONS  (PRN):  ALBUTerol/ipratropium for Nebulization 3 milliLiter(s) Nebulizer every 6 hours PRN Shortness of Breath and/or Wheezing  dextrose 40% Gel 15 Gram(s) Oral once PRN Blood Glucose LESS THAN 70 milliGRAM(s)/deciliter  glucagon  Injectable 1 milliGRAM(s) IntraMuscular once PRN Glucose LESS THAN 70 milligrams/deciliter      Vital Signs Last 24 Hrs  T(F): 97.8 (04-14-19 @ 04:00), Max: 97.9 (04-14-19 @ 00:00)  HR: 61 (04-14-19 @ 04:00) (61 - 88)  BP: 109/78 (04-14-19 @ 04:00) (109/78 - 118/77)  RR: 22 (04-14-19 @ 09:00) (18 - 22)  SpO2: 91% (04-14-19 @ 09:00) (91% - 99%)  Telemetry:   CAPILLARY BLOOD GLUCOSE      POCT Blood Glucose.: 171 mg/dL (14 Apr 2019 07:37)  POCT Blood Glucose.: 143 mg/dL (13 Apr 2019 21:21)    I&O's Summary    13 Apr 2019 07:01  -  14 Apr 2019 07:00  --------------------------------------------------------  IN: 620 mL / OUT: 3860 mL / NET: -3240 mL    14 Apr 2019 07:01  -  14 Apr 2019 17:00  --------------------------------------------------------  IN: 240 mL / OUT: 1000 mL / NET: -760 mL        PHYSICAL EXAM:  GENERAL: NAD, well-developed  HEAD:  Atraumatic, Normocephalic  EYES: EOMI, PERRLA, conjunctiva and sclera clear  NECK: Supple, No JVD  CHEST/LUNG: Clear to auscultation bilaterally; No wheeze  HEART: Regular rate and rhythm; No murmurs, rubs, or gallops  ABDOMEN: Soft, Nontender, Nondistended; Bowel sounds present  EXTREMITIES:  2+ Peripheral Pulses, No clubbing, cyanosis, or edema  PSYCH: AAOx3  NEUROLOGY: non-focal  SKIN: No rashes or lesions    LABS:                        16.0   16.7  )-----------( 187      ( 14 Apr 2019 09:58 )             48.5     04-14    135  |  83<L>  |  45<H>  ----------------------------<  273<H>  3.9   |  36<H>  |  1.13    Ca    9.8      14 Apr 2019 09:58  Phos  2.8     04-13  Mg     1.6     04-14    TPro  7.0  /  Alb  3.9  /  TBili  0.6  /  DBili  x   /  AST  35  /  ALT  47<H>  /  AlkPhos  145<H>  04-13              RADIOLOGY & ADDITIONAL TESTS:    Imaging Personally Reviewed:    Consultant(s) Notes Reviewed:      Care Discussed with Consultants/Other Providers:

## 2019-04-14 NOTE — PROGRESS NOTE ADULT - REASON FOR ADMISSION
worsening dyspnea, LE edema, increase in abd girth over a month
CHF
worsening dyspnea, LE edema, increase in abd girth over a month

## 2019-04-14 NOTE — DISCHARGE NOTE PROVIDER - CARE PROVIDERS DIRECT ADDRESSES
,DirectAddress_Unknown ,DirectAddress_Unknown,[email or web address],DirectAddress_Unknown

## 2019-04-14 NOTE — DISCHARGE NOTE PROVIDER - PROVIDER TOKENS
PROVIDER:[TOKEN:[[id number]]] PROVIDER:[TOKEN:[[id number]]],PROVIDER:[TOKEN:[[id number]]],PROVIDER:[TOKEN:[[id number]]]

## 2019-04-14 NOTE — PROGRESS NOTE ADULT - ASSESSMENT
74 y/o Type 2 DM, Hyperthyroidism, HTN, HLD, COPD CHF, Admitted with SOB and anasarca.    74 y/o morbid obesity, heavy smoker P/H Type 2 DM treated with Janumet 50/1,000 daily. Patient not fully compliant with diet, not following FS. HbA1c last admission 7.5%. Patient started on treatment with prednisone 40-->30 mg daily for COPD exacerbation one week ago. Noted polyuria. FS on admission 177 mg/dL.    Hyperthyroidism- patient has history of hyperthyroidism for the past 2-3 years. He has no family history of thyroid disease. No eye complaints. Chest CT noted with thyroid nodularity. Weight increasing, no palpitations, no tremor. TFT's 6/18: TSH <0.01, T4- 11.1, T3-162. He never had treatment for thyroid disease.    He did not get iodine contrast dye recently or during tis admission.    DM- HbA1c 8.2%.  Expect hyperglycemia on steroid treatment.  AF noted, S/P ablation.  AF noted, S/P ablation.   Patient started on basal lantus 10 units daily, pre-meal humalog insulin 3 units daily.  Prednisone tapered to 20 mg daily.  Recent -171 mg/dL. PO intake good.    Hyperthyroidism-  Clinical story, stable hyperthyroidism for 2 years and CT noted thyroid nodularity are consistent with MNG with borderline hyperthyroidism.  Avoid iodine contrast dye and amiodarone.  Repeat TFT's noted, lower T3 this time is m/p the result of steroids the patient is on (decrease T4 to T3 conversion) but as patient had SVT in the presence of supressed TSH indicated fore treatment.  On tapazol 2.5 mg daily to be D/Sanjiv on this dose.    Suggest:  Steroid taper noted.  Keep Lantus 10 units at HS, add  pre-meal Humalog 3 units per meal. Mid scale humalog.  Tapazol 2.5 mg daily. Patient will require stable treatment with this dose.  Follow creatinine- patient was on Janumet 50/1,000 prior to admission.  F/U TFT's, LFT's and WBC in 2 months.

## 2019-04-14 NOTE — PROGRESS NOTE ADULT - SUBJECTIVE AND OBJECTIVE BOX
Chief Complaint: 72 y/o Type 2 DM, Hyperthyroidism, HTN, HLD, COPD CHF, Admitted with SOB and anasarca.    AF noted, S/P ablation.   Patient started on basal lantus 10 units daily, pre-meal humalog insulin 3 units daily.  Prednisone tapered to 20 mg daily.  Recent -171 mg/dL. PO intake good.    MEDICATIONS  (STANDING):  acetaminophen  IVPB .. 1000 milliGRAM(s) IV Intermittent once  apixaban 5 milliGRAM(s) Oral every 12 hours  aspirin enteric coated 81 milliGRAM(s) Oral daily  atorvastatin 20 milliGRAM(s) Oral at bedtime  buDESOnide 160 MICROgram(s)/formoterol 4.5 MICROgram(s) Inhaler 2 Puff(s) Inhalation two times a day  chlorhexidine 4% Liquid 1 Application(s) Topical <User Schedule>  colchicine 0.6 milliGRAM(s) Oral two times a day  dextrose 5%. 1000 milliLiter(s) (50 mL/Hr) IV Continuous <Continuous>  dextrose 50% Injectable 12.5 Gram(s) IV Push once  dextrose 50% Injectable 25 Gram(s) IV Push once  dextrose 50% Injectable 25 Gram(s) IV Push once  diltiazem    Tablet 60 milliGRAM(s) Oral three times a day  fluticasone propionate 50 MICROgram(s)/spray Nasal Spray 1 Spray(s) Both Nostrils two times a day  furosemide Infusion 10 mG/Hr (5 mL/Hr) IV Continuous <Continuous>  insulin glargine Injectable (LANTUS) 10 Unit(s) SubCutaneous at bedtime  insulin lispro (HumaLOG) corrective regimen sliding scale   SubCutaneous at bedtime  insulin lispro (HumaLOG) corrective regimen sliding scale   SubCutaneous three times a day before meals  insulin lispro Injectable (HumaLOG) 3 Unit(s) SubCutaneous three times a day before meals  methimazole 2.5 milliGRAM(s) Oral daily  metoprolol succinate ER 12.5 milliGRAM(s) Oral daily  nicotine - 21 mG/24Hr(s) Patch 1 patch Transdermal daily  pantoprazole    Tablet 40 milliGRAM(s) Oral before breakfast  predniSONE   Tablet 20 milliGRAM(s) Oral daily  spironolactone 25 milliGRAM(s) Oral daily    MEDICATIONS  (PRN):  ALBUTerol/ipratropium for Nebulization 3 milliLiter(s) Nebulizer every 6 hours PRN Shortness of Breath and/or Wheezing  dextrose 40% Gel 15 Gram(s) Oral once PRN Blood Glucose LESS THAN 70 milliGRAM(s)/deciliter  glucagon  Injectable 1 milliGRAM(s) IntraMuscular once PRN Glucose LESS THAN 70 milligrams/deciliter      Allergies    penicillins (Unknown)    Intolerances        PHYSICAL EXAM:  VITALS: T(C): 36.6 (04-14-19 @ 04:00)  T(F): 97.8 (04-14-19 @ 04:00), Max: 98 (04-13-19 @ 12:06)  HR: 61 (04-14-19 @ 04:00) (61 - 88)  BP: 109/78 (04-14-19 @ 04:00) (101/69 - 118/77)  RR:  (18 - 22)  SpO2:  (91% - 99%)  GENERAL: SOB, anasarca.  HEENT:  Atraumatic, Normocephalic, moist mucous membranes  RESPIRATORY: decreased breath sounds both bases, Mild wheezing  CARDIOVASCULAR: Regular rate and rhythm; No murmurs; +2 peripheral edema  GI: Soft, nontender, distended, normal bowel sounds  SKIN: Dry, intact, No rashes or lesions, anasarca.  MUSCULOSKELETAL: Full range of motion, decreased strength  NEURO: no focal deficits.  PSYCH: Alert and oriented x 3, normal affect, normal mood      POCT Blood Glucose.: 171 mg/dL (04-14-19 @ 07:37)  POCT Blood Glucose.: 143 mg/dL (04-13-19 @ 21:21)  POCT Blood Glucose.: 112 mg/dL (04-13-19 @ 16:21)  POCT Blood Glucose.: 222 mg/dL (04-13-19 @ 12:04)  POCT Blood Glucose.: 176 mg/dL (04-13-19 @ 08:16)  POCT Blood Glucose.: 190 mg/dL (04-12-19 @ 21:11)  POCT Blood Glucose.: 199 mg/dL (04-12-19 @ 16:29)  POCT Blood Glucose.: 253 mg/dL (04-12-19 @ 11:54)  POCT Blood Glucose.: 165 mg/dL (04-12-19 @ 07:51)  POCT Blood Glucose.: 170 mg/dL (04-11-19 @ 21:19)  POCT Blood Glucose.: 188 mg/dL (04-11-19 @ 16:30)  POCT Blood Glucose.: 234 mg/dL (04-11-19 @ 11:49)                            16.0   16.7  )-----------( 187      ( 14 Apr 2019 09:58 )             48.5       04-14    135  |  83<L>  |  45<H>  ----------------------------<  273<H>  3.9   |  36<H>  |  1.13    EGFR if : 74  EGFR if non : 64    Ca    9.8      04-14  Mg     1.6     04-14  Phos  2.8     04-13    TPro  7.0  /  Alb  3.9  /  TBili  0.6  /  DBili  x   /  AST  35  /  ALT  47<H>  /  AlkPhos  145<H>  04-13      Thyroid Function Tests:  04-10 @ 09:17 TSH 0.02 FreeT4 -- T3 107 Anti TPO -- Anti Thyroglobulin Ab -- TSI --  04-09 @ 09:20 TSH 0.03 FreeT4 1.7 T3 97 Anti TPO -- Anti Thyroglobulin Ab -- TSI --      Hemoglobin A1C, Whole Blood: 8.3 % <H> [4.0 - 5.6] (04-10-19 @ 09:25)  Hemoglobin A1C, Whole Blood: 8.2 % <H> [4.0 - 5.6] (04-09-19 @ 10:10)

## 2019-04-14 NOTE — DISCHARGE NOTE PROVIDER - NSDCCPCAREPLAN_GEN_ALL_CORE_FT
PRINCIPAL DISCHARGE DIAGNOSIS  Diagnosis: Acute congestive heart failure  Assessment and Plan of Treatment: Weigh yourself daily.  If you gain 3lbs in 3 days, or 5lbs in a week call your Health Care Provider.  Do not eat or drink foods containing more than 2000mg of salt (sodium) in your diet every day.  Call your Health Care Provider if you have any swelling or increased swelling in your feet, ankles, and/or stomach.  Take all of your medication as directed.  If you become dizzy call your Health Care Provider.        SECONDARY DISCHARGE DIAGNOSES  Diagnosis: Smoker  Assessment and Plan of Treatment: smoking cessation clinic [phone]    Diagnosis: Lung nodule  Assessment and Plan of Treatment: Follow up for repeat imaging    Diagnosis: Type 2 diabetes mellitus without complication, without long-term current use of insulin  Assessment and Plan of Treatment: Type 2 diabetes mellitus without complication, without long-term current use of insulin    Diagnosis: Diabetes mellitus, type 2  Assessment and Plan of Treatment: Diabetes mellitus, type 2    Diagnosis: Chronic atrial fibrillation  Assessment and Plan of Treatment: Chronic atrial fibrillation    Diagnosis: Severe aortic stenosis  Assessment and Plan of Treatment: Severe aortic stenosis PRINCIPAL DISCHARGE DIAGNOSIS  Diagnosis: Acute congestive heart failure  Assessment and Plan of Treatment: Weigh yourself daily.  If you gain 3lbs in 3 days, or 5lbs in a week call your Health Care Provider.  Do not eat or drink foods containing more than 2000mg of salt (sodium) in your diet every day.  Call your Health Care Provider if you have any swelling or increased swelling in your feet, ankles, and/or stomach.  Take all of your medication as directed.  If you become dizzy call your Health Care Provider.  Lasix as prescibed follow up with Dr. Campbell tomorrow         SECONDARY DISCHARGE DIAGNOSES  Diagnosis: Ventricular tachycardia  Assessment and Plan of Treatment: Continue torpol. Follow up with Dr. Campbell for managment    Diagnosis: Smoker  Assessment and Plan of Treatment: smoking cessation clinic [phone]    Diagnosis: Lung nodule  Assessment and Plan of Treatment: Follow up for repeat imaging for R upper lobe nodule    Diagnosis: Diabetes mellitus, type 2  Assessment and Plan of Treatment: HgA1C this admission was 8.3  Make sure you get your HgA1c checked every three months.  If you take oral diabetes medications, check your blood glucose two times a day.  If you take insulin, check your blood glucose before meals and at bedtime.  It's important not to skip any meals.  Keep a log of your blood glucose results and always take it with you to your doctor appointments.  Keep a list of your current medications including injectables and over the counter medications and bring this medication list with you to all your doctor appointments.  If you have not seen your ophthalmologist this year call for appointment.  Check your feet daily for redness, sores, or openings. Do not self treat. If no improvement in two days call your primary care physician for an appointment.  Low blood sugar (hypoglycemia) is a blood sugar below 70mg/dl. Check your blood sugar if you feel signs/symptoms of hypoglycemia. If your blood sugar is below 70 take 15 grams of carbohydrates (ex 4 oz of apple juice, 3-4 glucose tablets, or 4-6 oz of regular soda) wait 15 minutes and repeat blood sugar to make sure it comes up above 70.  If your blood sugar is above 70 and you are due for a meal, have a meal.  If you are not due for a meal have a snack.  This snack helps keeps your blood sugar at a safe range.      Diagnosis: Chronic atrial fibrillation  Assessment and Plan of Treatment: Atrial fibrillation is the most common heart rhythm problem.  The condition puts you at risk for has stroke and heart attack  It helps if you control your blood pressure, not drink more than 1-2 alcohol drinks per day, cut down on caffeine, getting treatment for over active thyroid gland, and get regular exercise  Call your doctor if you feel your heart racing or beating unusually, chest tightness or pain, lightheaded, faint, shortness of breath especially with exercise  It is important to take your heart medication as prescribed  You may be on anticoagulation which is very important to take as directed - you may need blood work to monitor drug levels      Diagnosis: Adenomatous goiter, toxic or with hyperthyroidism  Assessment and Plan of Treatment: Continue Tapazole, follow up with endocrine    Diagnosis: Mitral regurgitation  Assessment and Plan of Treatment: Follow up with cardiology for evaluation of mitral clip surgery    Diagnosis: Severe aortic stenosis  Assessment and Plan of Treatment: Follow up with with Dr. Flores for TAVR evaluation

## 2019-04-14 NOTE — PHYSICAL THERAPY INITIAL EVALUATION ADULT - PERTINENT HX OF CURRENT PROBLEM, REHAB EVAL
73YM adm with progressive worsening of dyspnea, orthopnea, BALTAZAR, sleeps in chair, increased LE edema w pain on ambulation and increased abdominal girth. HPI: Seen by cardiologist who sent pt to ED; Hosp Course: CXR +pulm vasc congestion; on diuretics; 4/9 s/p Ablation; MARIA GUADALUPE c/w RV sysloic dysfunction, severe MR, severe AS, nl LV systolic function;

## 2019-04-14 NOTE — PROGRESS NOTE ADULT - ASSESSMENT
73 M current smoker with a PMH of obesity, hyperthyrodism, HTN, HLD, CAD, diastolic HF,  A fib on Eliquis,DM2, COPD, LIMA not on CPAP, now presenting with SOB, b/l LE edema and abdominal distension, likely 2/2 acute decompensated diastolic heart failure     1. Acute decompensated diastolic HF/ A fib  - on lasix drip w good urine output,lost 10 kg since admission  - cont Aldactone  - Check electrolytes daily. Keep  K+>4, Mag > 2  -  s/p cardioversion, but this am back in AFib  - cont Diltiazem 60 mg q8H.   -  NSVT, cont Toprol XL 12.5 mg  - Cont AC with Eliquis,  2. Valvular HD   -MARIA GUADALUPE c/w RV sysloic dysfunction, severe MR, severe AS, nl LV systolic function.  has h/o CAD, % on LHC from 9/2016. cont statins, ASA. Not on BB 2/2 COPD. Structural heart team following, awaiting Ct Coronaries, to be done once ptn can lie down  - ptn now w NSVT, tolerating sm dose BB: Toprol XL 12.5 mg daily. ptn is at risk for sudden death if gets discharged prior to TAVR. he chooses to sign out AMA.    3. COPD  - cont po Prednisone at 30 mg daily, Pulm input appreciated  - Duonebs prn, no sign of an acute infection. ID input appreciated  - Incentive spirometry  - Supplemental O2 to keep sats > 90 %  - presently on BIPAP, ABG is improving , ptn is a chronic CO2 retainer    4. Lung nodules:  - Few unchanged 0.4 cm RUL nodules  - Requires outpt follow up with repeat imaging in 6 months     5. LIMA  - refuses CPAP    6. Nicotine addiction/ Smoking cessation:  - Will place on Nicotine patch while inptn   - Smoking cessation counseling provided , ptn is aware he should not be smoking and will try harder  7. Endo  - HA1C is 8.2%, on FS before meals w Insulin on a sliding scale, Tsh is low c/w Hyperthyrodism,  on  tapazole 5 mg. Amiodarone and Iodine contrast to be avoided. ENDO consult appreciated  8. Preventive  - no need for DVT ppx, ptn is on Eliquis  -GI ppx w Protonix

## 2019-04-14 NOTE — PROGRESS NOTE ADULT - PROBLEM SELECTOR PROBLEM 1
SOB (shortness of breath)
Uncontrolled type 2 diabetes mellitus with hyperglycemia
Type 2 diabetes mellitus without complication, without long-term current use of insulin

## 2019-04-14 NOTE — PROGRESS NOTE ADULT - PROBLEM SELECTOR PROBLEM 2
Adenomatous goiter, toxic or with hyperthyroidism
CHF (congestive heart failure)
Acute on chronic diastolic CHF (congestive heart failure), NYHA class 3

## 2019-04-23 ENCOUNTER — APPOINTMENT (OUTPATIENT)
Dept: CV DIAGNOSITCS | Facility: HOSPITAL | Age: 74
End: 2019-04-23

## 2019-04-23 ENCOUNTER — NON-APPOINTMENT (OUTPATIENT)
Age: 74
End: 2019-04-23

## 2019-04-23 ENCOUNTER — APPOINTMENT (OUTPATIENT)
Dept: CARDIOTHORACIC SURGERY | Facility: CLINIC | Age: 74
End: 2019-04-23
Payer: COMMERCIAL

## 2019-04-23 VITALS
TEMPERATURE: 97.7 F | DIASTOLIC BLOOD PRESSURE: 76 MMHG | RESPIRATION RATE: 12 BRPM | BODY MASS INDEX: 38.87 KG/M2 | SYSTOLIC BLOOD PRESSURE: 112 MMHG | HEART RATE: 81 BPM | HEIGHT: 60 IN | WEIGHT: 198 LBS | OXYGEN SATURATION: 96 %

## 2019-04-23 PROCEDURE — 99215 OFFICE O/P EST HI 40 MIN: CPT

## 2019-04-23 PROCEDURE — 94010 BREATHING CAPACITY TEST: CPT

## 2019-04-23 PROCEDURE — 93000 ELECTROCARDIOGRAM COMPLETE: CPT

## 2019-04-23 RX ORDER — FLUTICASONE FUROATE, UMECLIDINIUM BROMIDE AND VILANTEROL TRIFENATATE 100; 62.5; 25 UG/1; UG/1; UG/1
100-62.5-25 POWDER RESPIRATORY (INHALATION)
Qty: 3 | Refills: 1 | Status: COMPLETED | COMMUNITY
Start: 2018-06-27 | End: 2019-04-23

## 2019-04-23 RX ORDER — PREDNISONE 20 MG/1
20 TABLET ORAL
Qty: 30 | Refills: 0 | Status: DISCONTINUED | COMMUNITY
Start: 2019-04-14

## 2019-04-23 RX ORDER — SPIRONOLACTONE 25 MG/1
25 TABLET ORAL
Refills: 0 | Status: ACTIVE | COMMUNITY

## 2019-04-23 RX ORDER — METOPROLOL SUCCINATE 25 MG/1
25 TABLET, EXTENDED RELEASE ORAL
Qty: 30 | Refills: 0 | Status: ACTIVE | COMMUNITY
Start: 2019-04-14

## 2019-04-23 RX ORDER — FUROSEMIDE 80 MG/1
80 TABLET ORAL TWICE DAILY
Refills: 0 | Status: ACTIVE | COMMUNITY
Start: 2019-04-14

## 2019-04-23 RX ORDER — PREDNISONE 10 MG/1
10 TABLET ORAL
Qty: 50 | Refills: 0 | Status: COMPLETED | COMMUNITY
Start: 2019-04-04 | End: 2019-04-23

## 2019-04-23 RX ORDER — FLUTICASONE FUROATE 27.5 UG/1
27.5 SPRAY, METERED NASAL
Refills: 0 | Status: ACTIVE | COMMUNITY
Start: 2019-04-23

## 2019-04-23 RX ORDER — DILTIAZEM HYDROCHLORIDE 180 MG/1
180 CAPSULE, EXTENDED RELEASE ORAL
Qty: 30 | Refills: 0 | Status: ACTIVE | COMMUNITY
Start: 2018-05-15

## 2019-04-23 RX ORDER — PITAVASTATIN CALCIUM 1.04 MG/1
1 TABLET, FILM COATED ORAL
Qty: 30 | Refills: 0 | Status: ACTIVE | COMMUNITY
Start: 2018-10-25

## 2019-04-23 RX ORDER — METHIMAZOLE 5 MG/1
5 TABLET ORAL
Qty: 15 | Refills: 0 | Status: ACTIVE | COMMUNITY
Start: 2019-04-14

## 2019-04-23 RX ORDER — DILTIAZEM HYDROCHLORIDE 240 MG/1
240 CAPSULE, EXTENDED RELEASE ORAL
Qty: 30 | Refills: 0 | Status: DISCONTINUED | COMMUNITY
Start: 2019-03-15

## 2019-04-23 NOTE — PHYSICAL EXAM
[General Appearance - Well Developed] : well developed [General Appearance - Well Nourished] : well nourished [General Appearance - In No Acute Distress] : no acute distress [Normal Jugular Venous A Waves Present] : normal jugular venous A waves present [Normal Jugular Venous V Waves Present] : normal jugular venous V waves present [No Jugular Venous Antonio A Waves] : no jugular venous antonio A waves [II] : a grade 2 [III] : a grade 3 [___ +] : bilateral [unfilled]U+ pitting edema to the knees [Rhonchi Right Base] : rhonchi were heard over the right base [Rhonchi Left Base] : rhonchi were heard over the left base [Dry Skin] : dry skin [Eyelids - No Xanthelasma] : the eyelids demonstrated no xanthelasmas [No Oral Pallor] : no oral pallor [No Oral Cyanosis] : no oral cyanosis [Right Carotid Bruit] : no bruit heard over the right carotid [Left Carotid Bruit] : no bruit heard over the left carotid [Bowel Sounds] : normal bowel sounds [Abdomen Soft] : soft [Abdomen Tenderness] : non-tender [FreeTextEntry1] : gait assessed [Nail Clubbing] : no clubbing of the fingernails [Cyanosis, Localized] : no localized cyanosis [FreeTextEntry2] : venous status  [Oriented To Time, Place, And Person] : oriented to person, place, and time [Affect] : the affect was normal [Mood] : the mood was normal

## 2019-04-23 NOTE — HISTORY OF PRESENT ILLNESS
[FreeTextEntry1] : Mr. Murry in a 73 year old male, here for follow up evaluation of aortic stenosis following recent hospitalization for acute decompensated CHF and atrial fib/flutter. He underwent aflutter ablation with Dr. Galeana though subsequently converted to atrial fibrillation. He was evaluated at that time and attempted to complete MARIA GUADALUPE, cardiac catheterization, and cardiac CT, however pt. signed out AMA before completing assessment. He was discharged on Lasix 80mg PO BID and reports improvement in shortness of breath since discharge, however has persistent LE edema. They have been limiting his activity outside of the house for fear of exacerbating his symptoms. \par \par PMH includes hyperthyroidism, obesity, active smoker (50 pack year), COPD not on O2, LIMA refusing CPAP, AFib on Eliquis, and T2DM.

## 2019-04-23 NOTE — ASSESSMENT
[FreeTextEntry1] : The patient has probable mild to moderate AS and severe MR with severe CHF. He has severe comorbidities including COPD on steroids, untreated hyperthyroidism, LIMA, diabetes poorly controlled, smoking, atrial fibrillation with a RBBB and LAHB after a failed ablation attempt and obesity which make the idea of surgery extremely high risk. The patient also categorically rejects the idea of open surgery.  While the patient has responded to diuretics his outlook is extremely poor with the MR.  The mitral valve is structurally abnormal with restriction of the mural leaflet suggesting that TAVR will not improve the degree of MR or the CHF.  Whether the valve is "clippable" is also un certain but will require another MARIA GUADALUPE to ascertain the likelihood of success.   Patient should undergo another MARIA GUADALUPE followed by a catheterization to rule out significant CAD then a treatment recommendation can be made with full information.  In the meantime he should stop smoking, get off the prednisone, and start treatment for his hyperthyroidism. Socrates rBannon will see today.

## 2019-04-23 NOTE — DATA REVIEWED
[FreeTextEntry1] : Echocardiogram on 4/11/2019 demonstrated \par mitral annular calcification \par Restricted posterior leaflet, severe eccentric mitral regurgitation\par Calcified trileaflet aortic valve with decreased opening \par pGr 38 mmHg, mGr 23 mmHg, BUSTER 0.8 sqcm, AoV 3.1 m/sec\par \par

## 2019-04-23 NOTE — CONSULT LETTER
[Courtesy Letter:] : I had the pleasure of seeing your patient, [unfilled], in my office today. [Dear  ___] : Dear ~ESTHELA, [Please see my note below.] : Please see my note below. [Consult Closing:] : Thank you very much for allowing me to participate in the care of this patient.  If you have any questions, please do not hesitate to contact me. [Sincerely,] : Sincerely, [FreeTextEntry2] : Marco Campbell M.D.\par [address]\[place]  [address] [FreeTextEntry3] : Denilson Flores MD\par \par Cardiovascular & Thoracic Surgery\par Professor\par [facility]\par \par

## 2019-04-23 NOTE — REASON FOR VISIT
[Follow-Up - From Hospitalization] : follow-up of a recent hospitalization for [Spouse] : spouse [Aortic Stenosis] : aortic stenosis [Mitral Regurgitation] : mitral regurgitation

## 2019-04-23 NOTE — REVIEW OF SYSTEMS
[Eyeglasses] : currently wearing eyeglasses [Feeling Fatigued] : feeling fatigued [Dyspnea on exertion] : dyspnea during exertion [Lower Ext Edema] : lower extremity edema [Negative] : ENT [Chest  Pressure] : no chest pressure [Cough] : no cough [Wheezing] : no wheezing [Abdominal Pain] : no abdominal pain [Change in Appetite] : no change in appetite

## 2019-04-23 NOTE — DISCUSSION/SUMMARY
[FreeTextEntry1] : Ramón has both aortic stenosis and mitral regurgitation on his TTE and MARIA GUADALUPE (done at the time of an RFA for AF). Though his volume status has improved since discharge (on Lasix 80mg BID), he remains significantly volume up.  I am recommending a MARIA GUADALUPE to evaluate the severity of his AS and MR (and if his MV is suitable for a MitraClip), but only after he is better volume optimized--he notes gradual daily improvement on the current regimen.  I asked that he weigh himself daily.  After completion of the MARIA GUADALUPE and determination of a treatment strategy (i.e. TAVR, MitraClip, or both, and in which order), he will be scheduled for his angiogram and cardiac CT.  I have explained my findings, plan, and recommendations to Matthew and his wife in extensive detail and answered all of their questions.

## 2019-04-23 NOTE — HISTORY OF PRESENT ILLNESS
[FreeTextEntry1] : Ed is a 73 year old male with PMH of atrial flutter, CHF, CAD, HTN, HLD, PAD, DMT2, and COPD( f/b Dr. Brand). Echocardiogram on 4/11/2019 demonstrated mitral annular calcification, restricted posterior leaflet, severe eccentric mitral regurgitation calcified trileaflet aortic valve with decreased opening, pGr 38 mmHg, mGr 23 mmHg, BUSTER 0.8 sqcm, AoV 3.1 m/sec. He was referred to valve clinic for consideration for LEONEL.

## 2019-05-06 ENCOUNTER — APPOINTMENT (OUTPATIENT)
Dept: PULMONOLOGY | Facility: CLINIC | Age: 74
End: 2019-05-06
Payer: COMMERCIAL

## 2019-05-06 VITALS
BODY MASS INDEX: 30.83 KG/M2 | RESPIRATION RATE: 17 BRPM | HEART RATE: 89 BPM | DIASTOLIC BLOOD PRESSURE: 65 MMHG | HEIGHT: 63 IN | WEIGHT: 174 LBS | SYSTOLIC BLOOD PRESSURE: 120 MMHG | OXYGEN SATURATION: 93 %

## 2019-05-06 PROCEDURE — 99214 OFFICE O/P EST MOD 30 MIN: CPT

## 2019-05-06 RX ORDER — PREDNISONE 20 MG/1
20 TABLET ORAL DAILY
Refills: 0 | Status: DISCONTINUED | COMMUNITY
Start: 2019-04-23 | End: 2019-05-06

## 2019-05-08 ENCOUNTER — APPOINTMENT (OUTPATIENT)
Dept: PULMONOLOGY | Facility: CLINIC | Age: 74
End: 2019-05-08
Payer: COMMERCIAL

## 2019-05-08 PROCEDURE — ZZZZZ: CPT

## 2019-05-08 NOTE — REASON FOR VISIT
[Spouse] : spouse [Follow-Up] : a follow-up visit [FreeTextEntry1] : abnormal CT, allergies, COPD, HTN, low Vit D, nicotine addiction, obesity, LIMA, SOB, pericardial effusion

## 2019-05-08 NOTE — ASSESSMENT
[FreeTextEntry1] : Mr. Murry has a history of Afib, CAD, COPD, OSAS, obesity, active snoring and is s/p pericardiocentesis, and is dramatically improved. He is now compliant with his medications and smoking cessation. He is awaiting CTS evaluation (Govind/[facility]). He is still smoking.\par \par His shortness of breath is multifactorial is due to: \par -COPD\par -asthma\par -poor breathing mechanics\par -overweight \par -CAD\par -?tracheomalacia\par \par problem 1: asthma/COPD - noncompliant\par -add Prednisone taper, 10mg for 5 days, 5mg every other day \par Information sheet given to the patient to be reviewed, this medication is never to be used without consulting the prescribing physician. Proper dietary restraint is necessary specifically salt containing foods, if any reaction may occur should be reported. \par \par -continue Trelegy, 1 puff QD\par -continue Ventolin, 2 inhalations QD \par -continue Xopenex .63 nebulizer Q6H (gargle and spit after use)\par \par -Inhaler technique reviewed as well as oral hygiene techniques reviewed with patient. Avoidance of cold air, extremes of temperature, rescue inhaler should be used before exercise. Order of medication reviewed with patient. Recommended use of a cool mist humidifier in the bedroom. \par Asthma is believed to be caused by inherited (genetic) and environmental factor, but its exact cause is unknown. Asthma may be triggered by allergens, lung infections, or irritants in the air. Asthma triggers are different for each person \par \par problem 2: r/o TBM\par -complete dynamic chest CT to r/o TBM\par  \par problem 3: allergies and sinuses \par -continue Flonase 1 sniff each nostril BID \par -continue to use Astelin nasal spray 1 sniff each nostril BID (0.15)\par \par problem 4: current smoking (no interest in cessation)\par -re-stressed importance of smoking cessation with patient\par -he is being prescribed NicoDerm \par -Discussed the risks/associations with continued smoking including COPD, emphysema, shortness of breath, renal cancer, bladder cancer, stroke risk, cardiac disease, etc. Smoking cessation was discussed at length and highly encouraged. Various options to aid cessation was discussed including use of Chantix, Nicotrol, nicotine products, laser therapy, hypnosis, Wellbutrin, etc. \par \par problem 5: abnormal chest CT/ lung cancer screening\par -follow up chest CT June 2019\par CAT scans are the only radiological modality to identify abnormalities w/in the lungs with regards to nodules/masses/lymph nodes. Risks, benefits were reviewed in detail. The guidelines for abnormalities include follow up CT scans at various intervals which could range from 6 weeks to 1 year intervals. If there is a change for the worse then consideration for a biopsy will be considered if you are a candidate. Second opinion evaluation with a thoracic surgeon or an interventional radiologist could be offered. \par \par problem 6: questionable sleep apnea (returned)\par -not interested in any sleep study\par -recommended to use Oxy-Aid by [employer] \par Sleep apnea is associated with adverse clinical consequences which an affect most organ systems. Cardiovascular disease risk includes arrhythmias, atrial fibrillation, hypertension, coronary artery disease, and stroke. Metabolic disorders include diabetes type 2, non-alcoholic fatty liver disease. Mood disorder especially depression; and cognitive decline especially in the elderly. Associations with chronic reflux/Parsons’s esophagus some but not all inclusive. \par -Reasons include arousal consistent with hypopnea; respiratory events most prominent in REM sleep or supine position; therefore sleep staging and body position are important for accurate diagnosis and estimation of AHI. \par \par problem 7: overweight\par -recommended to visit the [facility] \par \par Weight loss, exercise, and diet control were discussed and are highly encouraged. Treatment options were given such as, aqua therapy, and contacting a nutritionist. Recommended to use the elliptical, stationary bike, less use of treadmill. Mindful eating was explained to the patient Obesity is associated with worsening asthma, shortness of breath, and potential for cardiac disease, diabetes, and other underlying medical conditions. \par \par problem 8: cardiac\par -recommended to continua following with Dr. Silva al.; [facility] (MARIA GUADALUPE 5/15) - ?TAVR +/- MV clip\par -s/p EPS/cardioversion\par \par \par problem  9: Pre-Op clearance for EPS/cardioversion; MARIA GUADALUPE, TAVR +/- MV clip\par -at this point in time there are no absolute pulmonary contraindications to go forward with the planned procedure \par -at the time of surgery s/he should have optimal pain control, incentive spirometry, early ambulation, DVT and GI prophylaxis.\par \par  Problem 10: health maintenance\par -s/p influenza vaccine - 2018\par -recommended strep pneumonia vaccines: Prevnar-13 vaccine, followed by Pneumo vaccine 23 one year following\par -recommended early intervention for URIs\par -recommended regular osteoporosis evaluations\par -recommended early dermatological evaluations\par -recommended after the age of 50 to the age of 70, colonoscopy every 5 years \par \par F/U in 3-4 months\par He is encouraged to call with any changes, concerns, or questions

## 2019-05-08 NOTE — PHYSICAL EXAM
[General Appearance - Well Developed] : well developed [Normal Appearance] : normal appearance [Well Groomed] : well groomed [General Appearance - Well Nourished] : well nourished [General Appearance - In No Acute Distress] : no acute distress [Normal Conjunctiva] : the conjunctiva exhibited no abnormalities [No Deformities] : no deformities [Eyelids - No Xanthelasma] : the eyelids demonstrated no xanthelasmas [Normal Oropharynx] : normal oropharynx [III] : III [Neck Appearance] : the appearance of the neck was normal [Jugular Venous Distention Increased] : there was no jugular-venous distention [Neck Cervical Mass (___cm)] : no neck mass was observed [Thyroid Nodule] : there were no palpable thyroid nodules [Thyroid Diffuse Enlargement] : the thyroid was not enlarged [Heart Rate And Rhythm] : heart rate and rhythm were normal [Heart Sounds] : normal S1 and S2 [Exaggerated Use Of Accessory Muscles For Inspiration] : no accessory muscle use [Respiration, Rhythm And Depth] : normal respiratory rhythm and effort [Abdomen Tenderness] : non-tender [Abdomen Soft] : soft [Abdomen Mass (___ Cm)] : no abdominal mass palpated [Abnormal Walk] : normal gait [Gait - Sufficient For Exercise Testing] : the gait was sufficient for exercise testing [Nail Clubbing] : no clubbing of the fingernails [Petechial Hemorrhages (___cm)] : no petechial hemorrhages [Cyanosis, Localized] : no localized cyanosis [Skin Color & Pigmentation] : normal skin color and pigmentation [] : no rash [No Venous Stasis] : no venous stasis [Skin Lesions] : no skin lesions [No Skin Ulcers] : no skin ulcer [Deep Tendon Reflexes (DTR)] : deep tendon reflexes were 2+ and symmetric [No Xanthoma] : no  xanthoma was observed [Sensation] : the sensory exam was normal to light touch and pinprick [No Focal Deficits] : no focal deficits [Oriented To Time, Place, And Person] : oriented to person, place, and time [Impaired Insight] : insight and judgment were intact [Affect] : the affect was normal [Auscultation Breath Sounds / Voice Sounds] : lungs were clear to auscultation bilaterally [FreeTextEntry1] : 2/6 systolic murmur [FreeTextEntry2] : 1+ LE edema

## 2019-05-08 NOTE — HISTORY OF PRESENT ILLNESS
[FreeTextEntry1] : Mr. Murry is a 73 year old male with a history of abnormal CT, allergies, COPD, HTN, low Vit D, nicotine addiction, obesity, LIMA, SOB, pericardial effusion, who presents to the office for a follow up visit. His chief complaint is pending MARIA GUADALUPE.\par -he is s/p NSU hospitalization awaiting MARIA GUADALUPE with Dr. Brannon and Govind\par -he notes some constipation that is controlled\par -he reports his bowels are regular\par -he notes that he exercises by walking in his home\par -he states that his senses of taste and smell are good\par -he reports that his legs feel stronger\par -he notes that his balance is good\par -he states that he is still smoking 6 cigarettes per day, using a patch to decrease use\par -he reports that his breathing has improved substantially\par -he notes that his sleep is much improved\par -he denies any headaches, nausea, vomiting, fever, chills, sweats, chest pain, chest pressure, diarrhea, dysphagia, dizziness, leg swelling, leg pain, itchy eyes, itchy ears, heartburn, reflux, or sour taste in the mouth.\par \par \par

## 2019-05-08 NOTE — ADDENDUM
[FreeTextEntry1] : *** Amended by Jossue Shane on 05/08/2019 ***\par \par  Documented by Elton Gifford acting as a scribe for Dr. Frederic Brand on 05/06/2019.\par All medical record entries made by the Scribe were at my, Dr. Frederic Brand's, direction and personally dictated by me on 05/06/2019. I have reviewed the chart and agree that the record accurately reflects my personal performance of the history, physical exam, assessment and plan. I have also personally directed, reviewed, and agree with the discharge instructions. \par \par \par \par

## 2019-05-08 NOTE — PROCEDURE
[FreeTextEntry1] : 6 minute walk test reveals a low saturation of 94% with severe dyspnea; walked 81.5 meters before stopping due to leg pain, dizziness, and SOB.\par \par Full PFT revealed mild to moderate obstructive dysfunction, with a FEV1 of 1.17 L, which is 60% of predicted, and a 11% improvement with use of bronchodilator, , low-normal lung volumes, and a mildly reduced diffusion of 10.7 , which is 61% of predicted, with a normal flow volume loop \par

## 2019-05-15 ENCOUNTER — APPOINTMENT (OUTPATIENT)
Dept: CV DIAGNOSITCS | Facility: HOSPITAL | Age: 74
End: 2019-05-15

## 2019-05-15 ENCOUNTER — OUTPATIENT (OUTPATIENT)
Dept: OUTPATIENT SERVICES | Facility: HOSPITAL | Age: 74
LOS: 1 days | End: 2019-05-15
Payer: COMMERCIAL

## 2019-05-15 DIAGNOSIS — I35.0 NONRHEUMATIC AORTIC (VALVE) STENOSIS: ICD-10-CM

## 2019-05-15 DIAGNOSIS — Z87.442 PERSONAL HISTORY OF URINARY CALCULI: Chronic | ICD-10-CM

## 2019-05-15 DIAGNOSIS — Z90.89 ACQUIRED ABSENCE OF OTHER ORGANS: Chronic | ICD-10-CM

## 2019-05-15 PROCEDURE — 93320 DOPPLER ECHO COMPLETE: CPT | Mod: 26

## 2019-05-15 PROCEDURE — 93312 ECHO TRANSESOPHAGEAL: CPT | Mod: 26

## 2019-05-15 PROCEDURE — 93325 DOPPLER ECHO COLOR FLOW MAPG: CPT

## 2019-05-15 PROCEDURE — 93325 DOPPLER ECHO COLOR FLOW MAPG: CPT | Mod: 26

## 2019-05-15 PROCEDURE — 93312 ECHO TRANSESOPHAGEAL: CPT

## 2019-05-15 PROCEDURE — 93320 DOPPLER ECHO COMPLETE: CPT

## 2019-05-29 ENCOUNTER — OUTPATIENT (OUTPATIENT)
Dept: OUTPATIENT SERVICES | Facility: HOSPITAL | Age: 74
LOS: 1 days | Discharge: HOME | End: 2019-05-29
Payer: COMMERCIAL

## 2019-05-29 VITALS
SYSTOLIC BLOOD PRESSURE: 134 MMHG | RESPIRATION RATE: 16 BRPM | HEART RATE: 89 BPM | TEMPERATURE: 98 F | OXYGEN SATURATION: 98 % | HEIGHT: 63 IN | WEIGHT: 175.05 LBS | DIASTOLIC BLOOD PRESSURE: 80 MMHG

## 2019-05-29 DIAGNOSIS — I35.0 NONRHEUMATIC AORTIC (VALVE) STENOSIS: ICD-10-CM

## 2019-05-29 DIAGNOSIS — Z90.89 ACQUIRED ABSENCE OF OTHER ORGANS: Chronic | ICD-10-CM

## 2019-05-29 DIAGNOSIS — Z87.442 PERSONAL HISTORY OF URINARY CALCULI: Chronic | ICD-10-CM

## 2019-05-29 LAB
ALBUMIN SERPL ELPH-MCNC: 4.2 G/DL — SIGNIFICANT CHANGE UP (ref 3.3–5)
ALP SERPL-CCNC: 120 U/L — SIGNIFICANT CHANGE UP (ref 40–120)
ALT FLD-CCNC: 21 U/L — SIGNIFICANT CHANGE UP (ref 10–45)
ANION GAP SERPL CALC-SCNC: 10 MMOL/L — SIGNIFICANT CHANGE UP (ref 5–17)
AST SERPL-CCNC: 18 U/L — SIGNIFICANT CHANGE UP (ref 10–40)
BILIRUB SERPL-MCNC: 0.3 MG/DL — SIGNIFICANT CHANGE UP (ref 0.2–1.2)
BUN SERPL-MCNC: 30 MG/DL — HIGH (ref 7–23)
CALCIUM SERPL-MCNC: 9.3 MG/DL — SIGNIFICANT CHANGE UP (ref 8.4–10.5)
CHLORIDE SERPL-SCNC: 96 MMOL/L — SIGNIFICANT CHANGE UP (ref 96–108)
CO2 SERPL-SCNC: 29 MMOL/L — SIGNIFICANT CHANGE UP (ref 22–31)
CREAT SERPL-MCNC: 1.07 MG/DL — SIGNIFICANT CHANGE UP (ref 0.5–1.3)
GLUCOSE BLDC GLUCOMTR-MCNC: 185 MG/DL — HIGH (ref 70–99)
GLUCOSE SERPL-MCNC: 198 MG/DL — HIGH (ref 70–99)
HCT VFR BLD CALC: 42.6 % — SIGNIFICANT CHANGE UP (ref 39–50)
HGB BLD-MCNC: 14.6 G/DL — SIGNIFICANT CHANGE UP (ref 13–17)
MCHC RBC-ENTMCNC: 30.4 PG — SIGNIFICANT CHANGE UP (ref 27–34)
MCHC RBC-ENTMCNC: 34.2 GM/DL — SIGNIFICANT CHANGE UP (ref 32–36)
MCV RBC AUTO: 88.9 FL — SIGNIFICANT CHANGE UP (ref 80–100)
PLATELET # BLD AUTO: 235 K/UL — SIGNIFICANT CHANGE UP (ref 150–400)
POTASSIUM SERPL-MCNC: 4.4 MMOL/L — SIGNIFICANT CHANGE UP (ref 3.5–5.3)
POTASSIUM SERPL-SCNC: 4.4 MMOL/L — SIGNIFICANT CHANGE UP (ref 3.5–5.3)
PROT SERPL-MCNC: 7.3 G/DL — SIGNIFICANT CHANGE UP (ref 6–8.3)
RBC # BLD: 4.79 M/UL — SIGNIFICANT CHANGE UP (ref 4.2–5.8)
RBC # FLD: 17 % — HIGH (ref 10.3–14.5)
SODIUM SERPL-SCNC: 135 MMOL/L — SIGNIFICANT CHANGE UP (ref 135–145)
WBC # BLD: 12.6 K/UL — HIGH (ref 3.8–10.5)
WBC # FLD AUTO: 12.6 K/UL — HIGH (ref 3.8–10.5)

## 2019-05-29 PROCEDURE — 99153 MOD SED SAME PHYS/QHP EA: CPT

## 2019-05-29 PROCEDURE — C1769: CPT

## 2019-05-29 PROCEDURE — 93005 ELECTROCARDIOGRAM TRACING: CPT

## 2019-05-29 PROCEDURE — 93567 NJX CAR CTH SPRVLV AORTGRPHY: CPT

## 2019-05-29 PROCEDURE — 93010 ELECTROCARDIOGRAM REPORT: CPT

## 2019-05-29 PROCEDURE — 82962 GLUCOSE BLOOD TEST: CPT

## 2019-05-29 PROCEDURE — 93456 R HRT CORONARY ARTERY ANGIO: CPT

## 2019-05-29 PROCEDURE — 80053 COMPREHEN METABOLIC PANEL: CPT

## 2019-05-29 PROCEDURE — 93458 L HRT ARTERY/VENTRICLE ANGIO: CPT | Mod: 26,GC

## 2019-05-29 PROCEDURE — C1887: CPT

## 2019-05-29 PROCEDURE — ZZZZZ: CPT

## 2019-05-29 PROCEDURE — 99152 MOD SED SAME PHYS/QHP 5/>YRS: CPT | Mod: 59,GC

## 2019-05-29 PROCEDURE — C1894: CPT

## 2019-05-29 PROCEDURE — 99152 MOD SED SAME PHYS/QHP 5/>YRS: CPT

## 2019-05-29 PROCEDURE — 85027 COMPLETE CBC AUTOMATED: CPT

## 2019-05-29 PROCEDURE — 93567 NJX CAR CTH SPRVLV AORTGRPHY: CPT | Mod: GC

## 2019-05-29 RX ORDER — COLCHICINE 0.6 MG
0 TABLET ORAL
Qty: 0 | Refills: 0 | DISCHARGE

## 2019-05-29 NOTE — H&P CARDIOLOGY - HISTORY OF PRESENT ILLNESS
This is a 74 yo, obese, male with PMH of hyperthyroidism (  untreated since ptn refused Endo F/U in the past), HTN, HLD, chronic heavy smoker ( 50 pack years), COPD, LIMA (refuses CPAP) pulmonary nodules, dCHF, CAD, Afib on Eliquis, Pericardial effusion w drainage in 6/18, at which time was placed on Colchicine,  DM type 2 ( last A1C 8.3 in 04/2019. w/o complications, well managed as per patient), and Nephrolithiasis.        pulmonologist Dr. Brand   cardiologist Dr. Campbell today, who sent him to the hospital for treatment.    < from: Transesophageal Echocardiogram w/o TTE (05.15.19 @ 11:26) >  PROCEDURE: Transesophageal (MARIA GUADALUPE) was performed.  Informed  consent was first obtained for MARIA GUADALUPE. The patient was sedated  - see anesthesia record.  Theprocedure was monitored with  automatic blood pressure monitoring, ECG tracings and pulse  oximetry. The transesophageal probe was placed in the  esophagus posterior to the heart without complications.  INDICATION: Nonrheumatic mitral (valve) insufficiency  (I34.0)  ------------------------------------------------------------------------  Observations:  Mitral Valve: Torn chord with relative A2 prolapse. Severe  mitral regurgitation directed posteriorly.  Aortic Valve/Aorta: Calcified aortic valve. 2D-imaging  suggests "moderate" aortic stenosis.  Review of TTE dated 4/11/2019 demonstrates gradients and a  dimensionless index which are more consistent with  "moderate" aortic stenosis.  Left Atrium: Severe left atrial enlargement.  No thrombusim the LA/ESTELITA.  Left Ventricle: Normal left ventricular systolic function.  No segmental wall motion abnormalities. Normal left  ventricular internal dimensions and wall thicknesses.  Right Heart: Severe right atrial enlargement. Moderate  right ventricular enlargement with normal right ventricular  systolic function. Normal appearing tricuspid valve  leaflets. Severe tricuspid regurgitation.  Pericardium/Pleura: Normal pericardium with no pericardial  effusion.  Hemodynamic: No evidence of a patent foramen ovale with  color Doppler.  ------------------------------------------------------------------------  Conclusions:  Normal left ventricular systolic function. No segmental  wall motion abnormalities.  Torn chord with relative A2 prolapse. Severe mitral  regurgitation directed posteriorly.  Calcified aortic valve. 2D-imaging suggests "moderate"  aortic stenosis. Review of TTE dated 4/11/2019 demonstrates  gradients and a dimensionless index which are more  consistent with "moderate" aortic stenosis.  Moderate right ventricular enlargement with normal right  ventricular systolic function.  Echocardiogram  was performed  and interpreted by Dr. Jaime Hassan.  ---------------------------------------    < end of copied text > This is a 74 yo, obese, male with PMH of hyperthyroidism (  with thyroid nodularity on CT are consistent with MNG with borderline hyperthyroidism, on Tapazole, tx started on 4/2019 admission- Amiodarone and Iodine contrast to be avoided.), goiter,  HTN, HLD, chronic heavy smoker ( 50 pack years), COPD, LIMA (refuses CPAP) pulmonary nodules (unchanged 0.4 cm RUL nodules), dCHF on Lasix with , CAD, Afib s/p Ablation on Eliquis, Pericardial effusion w drainage in 6/18, at which time was placed on Colchicine,  DM type 2 ( last A1C 8.3 in 04/2019. w/o complications, poorly managed, pt non fully compliant with diet ), MARIA GUADALUPE c/w RV systolic dysfunction, severe MR, severe AS, nl LV systolic function, and Nephrolithiasis. Presents here today for LHC and RHC for pre op work up for TAVR.  Presently asymptomatic.      pulmonologist Dr. Brand   cardiologist Dr. Campbell today, who sent him to the hospital for treatment.        < from: Transesophageal Echocardiogram w/o TTE (05.15.19 @ 11:26) >  PROCEDURE: Transesophageal (MARIA GUADALUPE) was performed.  Informed  consent was first obtained for MARIA GUADALUPE. The patient was sedated  - see anesthesia record.  Theprocedure was monitored with  automatic blood pressure monitoring, ECG tracings and pulse  oximetry. The transesophageal probe was placed in the  esophagus posterior to the heart without complications.  INDICATION: Nonrheumatic mitral (valve) insufficiency  (I34.0)  ------------------------------------------------------------------------  Observations:  Mitral Valve: Torn chord with relative A2 prolapse. Severe  mitral regurgitation directed posteriorly.  Aortic Valve/Aorta: Calcified aortic valve. 2D-imaging  suggests "moderate" aortic stenosis.  Review of TTE dated 4/11/2019 demonstrates gradients and a  dimensionless index which are more consistent with  "moderate" aortic stenosis.  Left Atrium: Severe left atrial enlargement.  No thrombusim the LA/ESTELITA.  Left Ventricle: Normal left ventricular systolic function.  No segmental wall motion abnormalities. Normal left  ventricular internal dimensions and wall thicknesses.  Right Heart: Severe right atrial enlargement. Moderate  right ventricular enlargement with normal right ventricular  systolic function. Normal appearing tricuspid valve  leaflets. Severe tricuspid regurgitation.  Pericardium/Pleura: Normal pericardium with no pericardial  effusion.  Hemodynamic: No evidence of a patent foramen ovale with  color Doppler.  ------------------------------------------------------------------------  Conclusions:  Normal left ventricular systolic function. No segmental  wall motion abnormalities.  Torn chord with relative A2 prolapse. Severe mitral  regurgitation directed posteriorly.  Calcified aortic valve. 2D-imaging suggests "moderate"  aortic stenosis. Review of TTE dated 4/11/2019 demonstrates  gradients and a dimensionless index which are more  consistent with "moderate" aortic stenosis.  Moderate right ventricular enlargement with normal right  ventricular systolic function.  Echocardiogram  was performed  and interpreted by Dr. Jaime Hassan.  ---------------------------------------    < end of copied text >      < from: Cardiac Cath Lab - Adult (09.28.16 @ 18:14) >  VENTRICLES: Global left ventricular function was borderline normal. EF  estimated was 55 %.  CORONARY VESSELS: The coronary circulation is right dominant.  LM:   --  LM: Normal.  LAD:   --  Distal LAD: There was a 100 % stenosis.  CX:   --  Distal circumflex: There was a 40 % stenosis.  RCA:   --Mid RCA: There was a 30 % stenosis.  --  Distal RCA: There was a 40 % stenosis.  COMPLICATIONS: There were no complications.  DIAGNOSTIC RECOMMENDATIONS: The patient should continue with the present  medications.  Prepared and signed by  Garrick Cisse M.D.    < end of copied text > This is a 74 yo, obese, male with PMH of hyperthyroidism (  with thyroid nodularity on CT are consistent with MNG with borderline hyperthyroidism, on Tapazole, tx started on 4/2019 admission- Amiodarone and Iodine contrast to be avoided.), goiter,  HTN, HLD, chronic heavy smoker ( 50 pack years), COPD, LIMA (refuses CPAP) pulmonary nodules (unchanged 0.4 cm RUL nodules), dCHF on Lasix with , CAD, Afib s/p Ablation on Eliquis ( last dose on Sunday) , Pericardial effusion w drainage in 6/18, at which time was placed on Colchicine,  DM type 2 ( last A1C 8.3 in 04/2019. w/o complications, poorly managed, pt non fully compliant with diet ), MARIA GUADALUPE c/w RV systolic dysfunction, MR,  AS, nl LV systolic function, and Nephrolithiasis. Presents here today for LHC and RHC for pre op work up for TAVR.  Presently asymptomatic, + chronic dyspnea, denies: chest pain, dizziness, palpitations, N&V, HA, Orthopnea, LE edema.     pulmonologist Dr. Brand   cardiologist Dr. Campbell today, who sent him to the hospital for treatment.  endo Dr Cisse     < from: Transthoracic Echocardiogram (04.11.19 @ 08:19) >  EF (Visual Estimate): 55-60 %  Doppler Peak Velocity (m/sec): AoV=3.1  ------------------------------------------------------------------------  Observations:  Mitral Valve: Mitral annular calcification. Restricted  posterior leaflet. Severe eccentric mitral regurgitation.  Aortic Valve/Aorta: Calcified trileaflet aortic valve with  decreased opening. Peak transaortic valve gradient equals  38 mm Hg, mean transaortic valve gradient equals 23 mm Hg,  estimated aortic valve area equals 0.8 sqcm (by continuity  equation), aortic valve velocity time integral equals 63  cm, consistent with severe aortic stenosis. Peak left  ventricular outflow tract gradient equals 3 mm Hg, mean  gradient is equal to 2 mm Hg, LVOT velocity time integral  equals 15 cm.  Aortic Root: 3.2 cm.  Left Atrium: Severely dilated left atrium.  LA volume index  = 60 cc/m2.  Left Ventricle: Endocardium not well visualized; grossly  normal left ventricular systolic function. Septal motion  consistent with right ventricular overload. Mild concentric  left ventricular hypertrophy.  Right Heart: Moderate right atrial enlargement. Right  ventricular enlargement with normal right ventricular  systolic function. Moderate tricuspid regurgitation.  Pulmonic valve not well visualized.  Pericardium/Pleura: Normal pericardium with no pericardial  effusion.  Hemodynamic: Estimated right atrial pressure is 8 mm Hg.  Estimated right ventricular systolic pressure equals 35 mm  Hg, assuming right atrial pressure equals 8 mm Hg,  consistent with borderline pulmonary hypertension.  ------------------------------------------------------------------------  Conclusions:  1. Mitral annular calcification. Restricted posterior  leaflet. Severe eccentric mitral regurgitation.  2. Calcified trileaflet aortic valve with decreased  opening. Peak transaortic valve gradient equals 38 mmHg,  mean transaortic valve gradient equals 23 mm Hg, estimated  aortic valve area equals 0.8 sqcm (by continuity equation),  aortic valve velocity time integral equals 63 cm,  consistent with severe aortic stenosis.  3. Severely dilated left atrium. LA volume index = 60  cc/m2.  4. Mild concentric left ventricular hypertrophy.  5. Endocardium not well visualized; grossly normal left  ventricular systolic function. Septal motion consistent  with right ventricular overload.  6. Moderate right atrial enlargement.  7. Right ventricular enlargement with normal right  ventricular systolic function.  8. Moderate tricuspid regurgitation.  9. Estimated pulmonary artery systolic pressure equals 35  mm Hg, assuming right atrial pressure equals 8 mm Hg,  consistent with borderline pulmonary pressures.  10. Normal pericardium with no pericardial effusion.  11. IVC Collapses with respiration.    < end of copied text >      < from: Transesophageal Echocardiogram w/o TTE (05.15.19 @ 11:26) >  PROCEDURE: Transesophageal (MARIA GUADALUPE) was performed.  Informed  consent was first obtained for MARIA GUADALUPE. The patient was sedated  - see anesthesia record.  Theprocedure was monitored with  automatic blood pressure monitoring, ECG tracings and pulse  oximetry. The transesophageal probe was placed in the  esophagus posterior to the heart without complications.  INDICATION: Nonrheumatic mitral (valve) insufficiency  (I34.0)  ------------------------------------------------------------------------  Observations:  Mitral Valve: Torn chord with relative A2 prolapse. Severe  mitral regurgitation directed posteriorly.  Aortic Valve/Aorta: Calcified aortic valve. 2D-imaging  suggests "moderate" aortic stenosis.  Review of TTE dated 4/11/2019 demonstrates gradients and a  dimensionless index which are more consistent with  "moderate" aortic stenosis.  Left Atrium: Severe left atrial enlargement.  No thrombusim the LA/ESTELITA.  Left Ventricle: Normal left ventricular systolic function.  No segmental wall motion abnormalities. Normal left  ventricular internal dimensions and wall thicknesses.  Right Heart: Severe right atrial enlargement. Moderate  right ventricular enlargement with normal right ventricular  systolic function. Normal appearing tricuspid valve  leaflets. Severe tricuspid regurgitation.  Pericardium/Pleura: Normal pericardium with no pericardial  effusion.  Hemodynamic: No evidence of a patent foramen ovale with  color Doppler.  ------------------------------------------------------------------------  Conclusions:  Normal left ventricular systolic function. No segmental  wall motion abnormalities.  Torn chord with relative A2 prolapse. Severe mitral  regurgitation directed posteriorly.  Calcified aortic valve. 2D-imaging suggests "moderate"  aortic stenosis. Review of TTE dated 4/11/2019 demonstrates  gradients and a dimensionless index which are more  consistent with "moderate" aortic stenosis.  Moderate right ventricular enlargement with normal right  ventricular systolic function.  Echocardiogram  was performed  and interpreted by Dr. Jaime Hassan.  ---------------------------------------    < end of copied text >      < from: Cardiac Cath Lab - Adult (09.28.16 @ 18:14) >  VENTRICLES: Global left ventricular function was borderline normal. EF  estimated was 55 %.  CORONARY VESSELS: The coronary circulation is right dominant.  LM:   --  LM: Normal.  LAD:   --  Distal LAD: There was a 100 % stenosis.  CX:   --  Distal circumflex: There was a 40 % stenosis.  RCA:   --Mid RCA: There was a 30 % stenosis.  --  Distal RCA: There was a 40 % stenosis.  COMPLICATIONS: There were no complications.  DIAGNOSTIC RECOMMENDATIONS: The patient should continue with the present  medications.  Prepared and signed by  Garrick Cisse M.D.    < end of copied text > This is a 74 yo, obese, male with PMH of hyperthyroidism (  with thyroid nodularity on CT are consistent with MNG with borderline hyperthyroidism, on Tapazole, tx started on 4/2019 admission- Amiodarone and Iodine contrast to be avoided.), goiter,  HTN, HLD, chronic heavy smoker ( 50 pack years), COPD, LIMA (refuses CPAP) pulmonary nodules (unchanged 0.4 cm RUL nodules), dCHF on Lasix with , CAD, Afib s/p Ablation on Eliquis ( last dose on Sunday) , Pericardial effusion w drainage in 6/18, at which time was placed on Colchicine,  DM type 2 ( last A1C 8.3 in 04/2019. w/o complications, poorly managed, pt non fully compliant with diet ), MARIA GUADALUPE c/w RV systolic dysfunction, MR,  AS, nl LV systolic function, and Nephrolithiasis. Presents here today for LHC and RHC for pre op work up for mitral clip and or TAVR.  Presently asymptomatic, + chronic dyspnea, denies: chest pain, dizziness, palpitations, N&V, HA, Orthopnea, LE edema.     pulmonologist Dr. Brand   cardiologist Dr. Campbell today, who sent him to the hospital for treatment.  endo Dr Cisse     < from: Transthoracic Echocardiogram (04.11.19 @ 08:19) >  EF (Visual Estimate): 55-60 %  Doppler Peak Velocity (m/sec): AoV=3.1  ------------------------------------------------------------------------  Observations:  Mitral Valve: Mitral annular calcification. Restricted  posterior leaflet. Severe eccentric mitral regurgitation.  Aortic Valve/Aorta: Calcified trileaflet aortic valve with  decreased opening. Peak transaortic valve gradient equals  38 mm Hg, mean transaortic valve gradient equals 23 mm Hg,  estimated aortic valve area equals 0.8 sqcm (by continuity  equation), aortic valve velocity time integral equals 63  cm, consistent with severe aortic stenosis. Peak left  ventricular outflow tract gradient equals 3 mm Hg, mean  gradient is equal to 2 mm Hg, LVOT velocity time integral  equals 15 cm.  Aortic Root: 3.2 cm.  Left Atrium: Severely dilated left atrium.  LA volume index  = 60 cc/m2.  Left Ventricle: Endocardium not well visualized; grossly  normal left ventricular systolic function. Septal motion  consistent with right ventricular overload. Mild concentric  left ventricular hypertrophy.  Right Heart: Moderate right atrial enlargement. Right  ventricular enlargement with normal right ventricular  systolic function. Moderate tricuspid regurgitation.  Pulmonic valve not well visualized.  Pericardium/Pleura: Normal pericardium with no pericardial  effusion.  Hemodynamic: Estimated right atrial pressure is 8 mm Hg.  Estimated right ventricular systolic pressure equals 35 mm  Hg, assuming right atrial pressure equals 8 mm Hg,  consistent with borderline pulmonary hypertension.  ------------------------------------------------------------------------  Conclusions:  1. Mitral annular calcification. Restricted posterior  leaflet. Severe eccentric mitral regurgitation.  2. Calcified trileaflet aortic valve with decreased  opening. Peak transaortic valve gradient equals 38 mmHg,  mean transaortic valve gradient equals 23 mm Hg, estimated  aortic valve area equals 0.8 sqcm (by continuity equation),  aortic valve velocity time integral equals 63 cm,  consistent with severe aortic stenosis.  3. Severely dilated left atrium. LA volume index = 60  cc/m2.  4. Mild concentric left ventricular hypertrophy.  5. Endocardium not well visualized; grossly normal left  ventricular systolic function. Septal motion consistent  with right ventricular overload.  6. Moderate right atrial enlargement.  7. Right ventricular enlargement with normal right  ventricular systolic function.  8. Moderate tricuspid regurgitation.  9. Estimated pulmonary artery systolic pressure equals 35  mm Hg, assuming right atrial pressure equals 8 mm Hg,  consistent with borderline pulmonary pressures.  10. Normal pericardium with no pericardial effusion.  11. IVC Collapses with respiration.    < end of copied text >      < from: Transesophageal Echocardiogram w/o TTE (05.15.19 @ 11:26) >  PROCEDURE: Transesophageal (MARIA GUADALUPE) was performed.  Informed  consent was first obtained for MARIA GUADALUPE. The patient was sedated  - see anesthesia record.  Theprocedure was monitored with  automatic blood pressure monitoring, ECG tracings and pulse  oximetry. The transesophageal probe was placed in the  esophagus posterior to the heart without complications.  INDICATION: Nonrheumatic mitral (valve) insufficiency  (I34.0)  ------------------------------------------------------------------------  Observations:  Mitral Valve: Torn chord with relative A2 prolapse. Severe  mitral regurgitation directed posteriorly.  Aortic Valve/Aorta: Calcified aortic valve. 2D-imaging  suggests "moderate" aortic stenosis.  Review of TTE dated 4/11/2019 demonstrates gradients and a  dimensionless index which are more consistent with  "moderate" aortic stenosis.  Left Atrium: Severe left atrial enlargement.  No thrombusim the LA/ESTELITA.  Left Ventricle: Normal left ventricular systolic function.  No segmental wall motion abnormalities. Normal left  ventricular internal dimensions and wall thicknesses.  Right Heart: Severe right atrial enlargement. Moderate  right ventricular enlargement with normal right ventricular  systolic function. Normal appearing tricuspid valve  leaflets. Severe tricuspid regurgitation.  Pericardium/Pleura: Normal pericardium with no pericardial  effusion.  Hemodynamic: No evidence of a patent foramen ovale with  color Doppler.  ------------------------------------------------------------------------  Conclusions:  Normal left ventricular systolic function. No segmental  wall motion abnormalities.  Torn chord with relative A2 prolapse. Severe mitral  regurgitation directed posteriorly.  Calcified aortic valve. 2D-imaging suggests "moderate"  aortic stenosis. Review of TTE dated 4/11/2019 demonstrates  gradients and a dimensionless index which are more  consistent with "moderate" aortic stenosis.  Moderate right ventricular enlargement with normal right  ventricular systolic function.  Echocardiogram  was performed  and interpreted by Dr. Jaime Hassan.  ---------------------------------------    < end of copied text >      < from: Cardiac Cath Lab - Adult (09.28.16 @ 18:14) >  VENTRICLES: Global left ventricular function was borderline normal. EF  estimated was 55 %.  CORONARY VESSELS: The coronary circulation is right dominant.  LM:   --  LM: Normal.  LAD:   --  Distal LAD: There was a 100 % stenosis.  CX:   --  Distal circumflex: There was a 40 % stenosis.  RCA:   --Mid RCA: There was a 30 % stenosis.  --  Distal RCA: There was a 40 % stenosis.  COMPLICATIONS: There were no complications.  DIAGNOSTIC RECOMMENDATIONS: The patient should continue with the present  medications.  Prepared and signed by  Garrick Cisse M.D.    < end of copied text >

## 2019-05-29 NOTE — H&P CARDIOLOGY - PMH
COPD (chronic obstructive pulmonary disease)    Diabetes mellitus, type 2    Hypertension    Kidney stones    Pulmonary nodules Aortic stenosis    Atrial fibrillation    COPD (chronic obstructive pulmonary disease)    COPD (chronic obstructive pulmonary disease)    Coronary artery disease    Diabetes mellitus, type 2    Goiter    HLD (hyperlipidemia)    Hypertension    Hyperthyroidism    Kidney stones    Mitral regurgitation    LIMA (obstructive sleep apnea)    Pericardial effusion    Pulmonary nodules

## 2019-06-07 PROBLEM — E78.5 HYPERLIPIDEMIA, UNSPECIFIED: Chronic | Status: ACTIVE | Noted: 2019-05-29

## 2019-06-07 PROBLEM — I25.10 ATHEROSCLEROTIC HEART DISEASE OF NATIVE CORONARY ARTERY WITHOUT ANGINA PECTORIS: Chronic | Status: ACTIVE | Noted: 2019-05-29

## 2019-06-07 PROBLEM — E04.9 NONTOXIC GOITER, UNSPECIFIED: Chronic | Status: ACTIVE | Noted: 2019-05-29

## 2019-06-18 ENCOUNTER — APPOINTMENT (OUTPATIENT)
Dept: ULTRASOUND IMAGING | Facility: HOSPITAL | Age: 74
End: 2019-06-18

## 2019-06-18 ENCOUNTER — OUTPATIENT (OUTPATIENT)
Dept: OUTPATIENT SERVICES | Facility: HOSPITAL | Age: 74
LOS: 1 days | End: 2019-06-18
Payer: COMMERCIAL

## 2019-06-18 VITALS
WEIGHT: 177.03 LBS | RESPIRATION RATE: 16 BRPM | SYSTOLIC BLOOD PRESSURE: 108 MMHG | DIASTOLIC BLOOD PRESSURE: 74 MMHG | HEIGHT: 63 IN | OXYGEN SATURATION: 96 % | TEMPERATURE: 97 F | HEART RATE: 80 BPM

## 2019-06-18 DIAGNOSIS — Z87.442 PERSONAL HISTORY OF URINARY CALCULI: Chronic | ICD-10-CM

## 2019-06-18 DIAGNOSIS — Z29.9 ENCOUNTER FOR PROPHYLACTIC MEASURES, UNSPECIFIED: ICD-10-CM

## 2019-06-18 DIAGNOSIS — I34.0 NONRHEUMATIC MITRAL (VALVE) INSUFFICIENCY: ICD-10-CM

## 2019-06-18 DIAGNOSIS — E11.9 TYPE 2 DIABETES MELLITUS WITHOUT COMPLICATIONS: ICD-10-CM

## 2019-06-18 DIAGNOSIS — Z98.890 OTHER SPECIFIED POSTPROCEDURAL STATES: Chronic | ICD-10-CM

## 2019-06-18 DIAGNOSIS — J44.9 CHRONIC OBSTRUCTIVE PULMONARY DISEASE, UNSPECIFIED: ICD-10-CM

## 2019-06-18 DIAGNOSIS — G47.33 OBSTRUCTIVE SLEEP APNEA (ADULT) (PEDIATRIC): ICD-10-CM

## 2019-06-18 DIAGNOSIS — Z90.89 ACQUIRED ABSENCE OF OTHER ORGANS: Chronic | ICD-10-CM

## 2019-06-18 DIAGNOSIS — Z01.818 ENCOUNTER FOR OTHER PREPROCEDURAL EXAMINATION: ICD-10-CM

## 2019-06-18 LAB
ANION GAP SERPL CALC-SCNC: 15 MMOL/L — SIGNIFICANT CHANGE UP (ref 5–17)
BLD GP AB SCN SERPL QL: NEGATIVE — SIGNIFICANT CHANGE UP
BUN SERPL-MCNC: 35 MG/DL — HIGH (ref 7–23)
CALCIUM SERPL-MCNC: 9.9 MG/DL — SIGNIFICANT CHANGE UP (ref 8.4–10.5)
CHLORIDE SERPL-SCNC: 90 MMOL/L — LOW (ref 96–108)
CO2 SERPL-SCNC: 29 MMOL/L — SIGNIFICANT CHANGE UP (ref 22–31)
CREAT SERPL-MCNC: 1.31 MG/DL — HIGH (ref 0.5–1.3)
GLUCOSE SERPL-MCNC: 191 MG/DL — HIGH (ref 70–99)
HBA1C BLD-MCNC: 8.4 % — HIGH (ref 4–5.6)
HCT VFR BLD CALC: 42.2 % — SIGNIFICANT CHANGE UP (ref 39–50)
HGB BLD-MCNC: 13.8 G/DL — SIGNIFICANT CHANGE UP (ref 13–17)
MCHC RBC-ENTMCNC: 29.9 PG — SIGNIFICANT CHANGE UP (ref 27–34)
MCHC RBC-ENTMCNC: 32.7 GM/DL — SIGNIFICANT CHANGE UP (ref 32–36)
MCV RBC AUTO: 91.3 FL — SIGNIFICANT CHANGE UP (ref 80–100)
MRSA PCR RESULT.: SIGNIFICANT CHANGE UP
PLATELET # BLD AUTO: 236 K/UL — SIGNIFICANT CHANGE UP (ref 150–400)
POTASSIUM SERPL-MCNC: 4.3 MMOL/L — SIGNIFICANT CHANGE UP (ref 3.5–5.3)
POTASSIUM SERPL-SCNC: 4.3 MMOL/L — SIGNIFICANT CHANGE UP (ref 3.5–5.3)
RBC # BLD: 4.62 M/UL — SIGNIFICANT CHANGE UP (ref 4.2–5.8)
RBC # FLD: 19 % — HIGH (ref 10.3–14.5)
RH IG SCN BLD-IMP: POSITIVE — SIGNIFICANT CHANGE UP
S AUREUS DNA NOSE QL NAA+PROBE: SIGNIFICANT CHANGE UP
SODIUM SERPL-SCNC: 134 MMOL/L — LOW (ref 135–145)
WBC # BLD: 15.19 K/UL — HIGH (ref 3.8–10.5)
WBC # FLD AUTO: 15.19 K/UL — HIGH (ref 3.8–10.5)

## 2019-06-18 PROCEDURE — 83036 HEMOGLOBIN GLYCOSYLATED A1C: CPT

## 2019-06-18 PROCEDURE — G0463: CPT

## 2019-06-18 PROCEDURE — 86900 BLOOD TYPING SEROLOGIC ABO: CPT

## 2019-06-18 PROCEDURE — 87640 STAPH A DNA AMP PROBE: CPT

## 2019-06-18 PROCEDURE — 86850 RBC ANTIBODY SCREEN: CPT

## 2019-06-18 PROCEDURE — 85027 COMPLETE CBC AUTOMATED: CPT

## 2019-06-18 PROCEDURE — 80048 BASIC METABOLIC PNL TOTAL CA: CPT

## 2019-06-18 PROCEDURE — 86923 COMPATIBILITY TEST ELECTRIC: CPT

## 2019-06-18 PROCEDURE — 87641 MR-STAPH DNA AMP PROBE: CPT

## 2019-06-18 PROCEDURE — 93880 EXTRACRANIAL BILAT STUDY: CPT | Mod: 26

## 2019-06-18 PROCEDURE — 86901 BLOOD TYPING SEROLOGIC RH(D): CPT

## 2019-06-18 PROCEDURE — 93880 EXTRACRANIAL BILAT STUDY: CPT

## 2019-06-18 RX ORDER — VANCOMYCIN HCL 1 G
1250 VIAL (EA) INTRAVENOUS ONCE
Refills: 0 | Status: DISCONTINUED | OUTPATIENT
Start: 2019-06-24 | End: 2019-06-24

## 2019-06-18 RX ORDER — SODIUM CHLORIDE 9 MG/ML
3 INJECTION INTRAMUSCULAR; INTRAVENOUS; SUBCUTANEOUS EVERY 8 HOURS
Refills: 0 | Status: DISCONTINUED | OUTPATIENT
Start: 2019-06-24 | End: 2019-06-24

## 2019-06-18 RX ORDER — LIDOCAINE HCL 20 MG/ML
0.2 VIAL (ML) INJECTION ONCE
Refills: 0 | Status: DISCONTINUED | OUTPATIENT
Start: 2019-06-24 | End: 2019-06-24

## 2019-06-18 NOTE — H&P PST ADULT - NS SC CAGE ALCOHOL EYE OPENER
Use the diazepam sparingly. Follow up in 2 weeks. No alcohol drinking. Check thyroid blood in 6 weeks. no

## 2019-06-18 NOTE — H&P PST ADULT - HISTORY OF PRESENT ILLNESS
72 yo, obese, male with PMH of hyperthyroidism (  with thyroid nodularity on CT are consistent with MNG with borderline hyperthyroidism, on Tapazole, tx started on 4/2019 admission- Amiodarone and Iodine contrast to be avoided.), goiter,  HTN, HLD, chronic heavy smoker ( 50 pack years), COPD, LIMA (refuses CPAP) pulmonary nodules (unchanged 0.4 cm RUL nodules), dCHF on Lasix with , CAD, Afib s/p Ablation on Eliquis ( last dose on Sunday) , Pericardial effusion w drainage in 6/18, at which time was placed on Colchicine,  DM type 2 ( last A1C 8.3 in 04/2019. w/o complications, poorly managed, pt non fully compliant with diet ), MARIA GUADALUPE c/w RV systolic dysfunction, MR,  AS, nl LV systolic function, and Nephrolithiasis. Presents here today for LHC and RHC for pre op work up for mitral clip and or TAVR.  Presently asymptomatic, + chronic dyspnea, denies: chest pain, dizziness, palpitations, N&V, HA, Orthopnea, LE edema.     pulmonologist Dr. Brand   cardiologist Dr. Campbell today, who sent him to the hospital for treatment.  endo Dr Cisse     < from: Transthoracic Echocardiogram (04.11.19 @ 08:19) >  EF (Visual Estimate): 55-60 %  Doppler Peak Velocity (m/sec): AoV=3.1  ------------------------------------------------------------------------  Observations:  Mitral Valve: Mitral annular calcification. Restricted  posterior leaflet. Severe eccentric mitral regurgitation.  Aortic Valve/Aorta: Calcified trileaflet aortic valve with  decreased opening. Peak transaortic valve gradient equals  38 mm Hg, mean transaortic valve gradient equals 23 mm Hg,  estimated aortic valve area equals 0.8 sqcm (by continuity  equation), aortic valve velocity time integral equals 63  cm, consistent with severe aortic stenosis. Peak left  ventricular outflow tract gradient equals 3 mm Hg, mean  gradient is equal to 2 mm Hg, LVOT velocity time integral  equals 15 cm.  Aortic Root: 3.2 cm.  Left Atrium: Severely dilated left atrium.  LA volume index  = 60 cc/m2.  Left Ventricle: Endocardium not well visualized; grossly  normal left ventricular systolic function. Septal motion  consistent with right ventricular overload. Mild concentric  left ventricular hypertrophy.  Right Heart: Moderate right atrial enlargement. Right  ventricular enlargement with normal right ventricular  systolic function. Moderate tricuspid regurgitation.  Pulmonic valve not well visualized.  Pericardium/Pleura: Normal pericardium with no pericardial  effusion.  Hemodynamic: Estimated right atrial pressure is 8 mm Hg.  Estimated right ventricular systolic pressure equals 35 mm  Hg, assuming right atrial pressure equals 8 mm Hg,  consistent with borderline pulmonary hypertension.  ------------------------------------------------------------------------  Conclusions:  1. Mitral annular calcification. Restricted posterior  leaflet. Severe eccentric mitral regurgitation.  2. Calcified trileaflet aortic valve with decreased  opening. Peak transaortic valve gradient equals 38 mmHg,  mean transaortic valve gradient equals 23 mm Hg, estimated  aortic valve area equals 0.8 sqcm (by continuity equation),  aortic valve velocity time integral equals 63 cm,  consistent with severe aortic stenosis.  3. Severely dilated left atrium. LA volume index = 60  cc/m2.  4. Mild concentric left ventricular hypertrophy.  5. Endocardium not well visualized; grossly normal left  ventricular systolic function. Septal motion consistent  with right ventricular overload.  6. Moderate right atrial enlargement.  7. Right ventricular enlargement with normal right  ventricular systolic function.  8. Moderate tricuspid regurgitation.  9. Estimated pulmonary artery systolic pressure equals 35  mm Hg, assuming right atrial pressure equals 8 mm Hg,  consistent with borderline pulmonary pressures.  10. Normal pericardium with no pericardial effusion.  11. IVC Collapses with respiration.    < end of copied text >      < from: Transesophageal Echocardiogram w/o TTE (05.15.19 @ 11:26) >  PROCEDURE: Transesophageal (MARIA GUADALUPE) was performed.  Informed  consent was first obtained for MARIA GUADALUPE. The patient was sedated  - see anesthesia record.  Theprocedure was monitored with  automatic blood pressure monitoring, ECG tracings and pulse  oximetry. The transesophageal probe was placed in the  esophagus posterior to the heart without complications.  INDICATION: Nonrheumatic mitral (valve) insufficiency  (I34.0)  ------------------------------------------------------------------------  Observations:  Mitral Valve: Torn chord with relative A2 prolapse. Severe  mitral regurgitation directed posteriorly.  Aortic Valve/Aorta: Calcified aortic valve. 2D-imaging  suggests "moderate" aortic stenosis.  Review of TTE dated 4/11/2019 demonstrates gradients and a  dimensionless index which are more consistent with  "moderate" aortic stenosis.  Left Atrium: Severe left atrial enlargement.  No thrombusim the LA/ESTELITA.  Left Ventricle: Normal left ventricular systolic function.  No segmental wall motion abnormalities. Normal left  ventricular internal dimensions and wall thicknesses.  Right Heart: Severe right atrial enlargement. Moderate  right ventricular enlargement with normal right ventricular  systolic function. Normal appearing tricuspid valve  leaflets. Severe tricuspid regurgitation.  Pericardium/Pleura: Normal pericardium with no pericardial  effusion.  Hemodynamic: No evidence of a patent foramen ovale with  color Doppler.  ------------------------------------------------------------------------  Conclusions:  Normal left ventricular systolic function. No segmental  wall motion abnormalities.  Torn chord with relative A2 prolapse. Severe mitral  regurgitation directed posteriorly.  Calcified aortic valve. 2D-imaging suggests "moderate"  aortic stenosis. Review of TTE dated 4/11/2019 demonstrates  gradients and a dimensionless index which are more  consistent with "moderate" aortic stenosis.  Moderate right ventricular enlargement with normal right  ventricular systolic function.  Echocardiogram  was performed  and interpreted by Dr. Jaime Hassan.  ---------------------------------------    < end of copied text >      < from: Cardiac Cath Lab - Adult (09.28.16 @ 18:14) >  VENTRICLES: Global left ventricular function was borderline normal. EF  estimated was 55 %.  CORONARY VESSELS: The coronary circulation is right dominant.  LM:   --  LM: Normal.  LAD:   --  Distal LAD: There was a 100 % stenosis.  CX:   --  Distal circumflex: There was a 40 % stenosis.  RCA:   --Mid RCA: There was a 30 % stenosis.  --  Distal RCA: There was a 40 % stenosis.  COMPLICATIONS: There were no complications.  DIAGNOSTIC RECOMMENDATIONS: The patient should continue with the present  medications.  Prepared and signed by  Garrick Cisse M.D.    < end of copied text > 73 year old, obese male reports having recent fluid retention & LE edema for few months, PMH of hyperthyroidism (  with thyroid nodularity on CT are consistent with MNG with borderline hyperthyroidism, on Tapazole, tx started on 4/2019 admission- Amiodarone and Iodine contrast to be avoided), goiter,  HTN, HLD, chronic heavy smoker (> 50 pack years), COPD not on O2, LIMA (refuses CPAP) pulmonary nodules (unchanged 0.4 cm RUL nodules), dCHF on Lasix  , CAD, Afib/atrial flutter ( s/p atrial flutter ablation with Dr Galeana, subsequently to Afib on Eliquis ( last dose 06/21/19 pm) , Pericardial effusion w "drainage of a liter fluid" in 6/18, at which time was placed on Colchicine,  DM type 2  (last A1C 8.3 in 04/2019. w/o complications, poorly managed, pt non fully compliant with diet ), MARIA GUADALUPE c/w RV systolic dysfunction, MR,  AS, nl LV systolic function, and Nephrolithiasis.  S/p LHC and RHC for mitral clip and or TAVR. Presents here today for scheduled mitral clip on 06/24/19.    < from: Transesophageal Echocardiogram w/o TTE (05.15.19 @ 11:26) >  PROCEDURE: Transesophageal (MARIA GUADALUPE) was performed.  Informed  consent was first obtained for MARIA GUADALUPE. The patient was sedated  - see anesthesia record.  The procedure was monitored with  automatic blood pressure monitoring, ECG tracings and pulse  oximetry. The transesophageal probe was placed in the  esophagus posterior to the heart without complications.  INDICATION: Nonrheumatic mitral (valve) insufficiency  (I34.0)  ------------------------------------------------------------------------  Observations:  Mitral Valve: Torn chord with relative A2 prolapse. Severe  mitral regurgitation directed posteriorly.  Aortic Valve/Aorta: Calcified aortic valve. 2D-imaging  suggests "moderate" aortic stenosis.  Review of TTE dated 4/11/2019 demonstrates gradients and a  dimensionless index which are more consistent with  "moderate" aortic stenosis.  Left Atrium: Severe left atrial enlargement.  No thrombus  the LA/RA.  Left Ventricle: Normal left ventricular systolic function.  No segmental wall motion abnormalities. Normal left  ventricular internal dimensions and wall thicknesses.  Right Heart: Severe right atrial enlargement. Moderate  right ventricular enlargement with normal right ventricular  systolic function. Normal appearing tricuspid valve  leaflets. Severe tricuspid regurgitation.  Pericardium/Pleura: Normal pericardium with no pericardial  effusion.  Hemodynamic: No evidence of a patent foramen ovale with  color Doppler.  ------------------------------------------------------------------------  Conclusions:  Normal left ventricular systolic function. No segmental  wall motion abnormalities.  Torn chord with relative A2 prolapse. Severe mitral  regurgitation directed posteriorly.  Calcified aortic valve. 2D-imaging suggests "moderate"  aortic stenosis. Review of TTE dated 4/11/2019 demonstrates  gradients and a dimensionless index which are more  consistent with "moderate" aortic stenosis.  Moderate right ventricular enlargement with normal right  ventricular systolic function.  Echocardiogram  was performed  and interpreted by Dr. Jaime Hassan.  ---------------------------------------    < end of copied text >      < from: Cardiac Cath Lab - Adult (09.28.16 @ 18:14) >  VENTRICLES: Global left ventricular function was borderline normal. EF  estimated was 55 %.  CORONARY VESSELS: The coronary circulation is right dominant.  LM:   --  LM: Normal.  LAD:   --  Distal LAD: There was a 100 % stenosis.  CX:   --  Distal circumflex: There was a 40 % stenosis.  RCA:   --Mid RCA: There was a 30 % stenosis.  --  Distal RCA: There was a 40 % stenosis.  COMPLICATIONS: There were no complications.  DIAGNOSTIC RECOMMENDATIONS: The patient should continue with the present  medications.  Prepared and signed by  Garrick Cisse M.D.    < end of copied text >

## 2019-06-18 NOTE — H&P PST ADULT - NSICDXPROBLEM_GEN_ALL_CORE_FT
PROBLEM DIAGNOSES  Problem: Mitral regurgitation  Assessment and Plan: Mitral clip  Instructed to continue meds &  take with sips of water in AM the day of surgery   Continue ASPIRIN 81 Mgs to OR  Off Eliquis 2 days pre op as per CTU protocol    Problem: Prophylactic measure  Assessment and Plan: The Caprini score indicates that this patient is at high risk for a VTE event (score 6 or greater). Surgical patients in this group will benefit from both pharmacologic prophylaxis and intermittent compression devices.  The surgical team will determine the balance between VTE risk and bleeding risk, and other clinical considerations     Problem: Diabetes mellitus, type 2  Assessment and Plan: Will follow up with labs/ fingerstick.    HgA1c ordered   Instructed to hold Janumet 24 hours pre op- last dose 06/22/19 pm  STAT FS  on the day of surgery ordered     Problem: LIMA (obstructive sleep apnea)  Assessment and Plan: Informed OR booking for precautions.     Problem: COPD (chronic obstructive pulmonary disease)  Assessment and Plan: Instructed to continue meds &  take with sips of water in AM the day of surgery PROBLEM DIAGNOSES  Problem: Mitral regurgitation  Assessment and Plan: Mitral clip  Instructed to continue meds &  take with sips of water in AM the day of surgery   Continue Asprin 81 mgs to OR  Off Eliquis 2 days pre op as per [facility] recommendations(# [phone])    Problem: Prophylactic measure  Assessment and Plan: The Caprini score indicates that this patient is at high risk for a VTE event (score 6 or greater). Surgical patients in this group will benefit from both pharmacologic prophylaxis and intermittent compression devices.  The surgical team will determine the balance between VTE risk and bleeding risk, and other clinical considerations     Problem: Diabetes mellitus, type 2  Assessment and Plan: Will follow up with labs/ fingerstick.    HgA1c ordered   Instructed to hold Janumet 24 hours pre op- last dose 06/22/19 pm  STAT FS  on the day of surgery ordered     Problem: LIMA (obstructive sleep apnea)  Assessment and Plan: Informed OR booking for precautions.     Problem: COPD (chronic obstructive pulmonary disease)  Assessment and Plan: Instructed to continue meds &  take with sips of water in AM the day of surgery

## 2019-06-18 NOTE — H&P PST ADULT - CHARACTER OF SYSTOLIC MURMUR
murmur loudness: II/VI/crescendo-decrescendo/lower lsb/murmur loudness: III/VI/apex murmur loudness: III/VI/apex/upper lsb/crescendo-decrescendo/lower lsb

## 2019-06-18 NOTE — H&P PST ADULT - NSICDXPASTSURGICALHX_GEN_ALL_CORE_FT
PAST SURGICAL HISTORY:  History of kidney stones     History of tonsillectomy PAST SURGICAL HISTORY:  History of kidney stones     History of tonsillectomy     S/P coronary angiogram

## 2019-06-18 NOTE — H&P PST ADULT - ASSESSMENT
KEITHI VTE 2.0 SCORE [CLOT updated 2019]    AGE RELATED RISK FACTORS                                                       MOBILITY RELATED FACTORS  [ ] Age 41-60 years                                            (1 Point)                    [ ] Bed rest                                                        (1 Point)  [ x] Age: 61-74 years                                           (2 Points)                  [ ] Plaster cast                                                   (2 Points)  [ ] Age= 75 years                                              (3 Points)                    [ ] Bed bound for more than 72 hours                 (2 Points)    DISEASE RELATED RISK FACTORS                                               GENDER SPECIFIC FACTORS  [x ] Edema in the lower extremities                       (1 Point)              [ ] Pregnancy                                                     (1 Point)  [ ] Varicose veins                                               (1 Point)                     [ ] Post-partum < 6 weeks                                   (1 Point)             [ x] BMI > 25 Kg/m2                                            (1 Point)                     [ ] Hormonal therapy  or oral contraception          (1 Point)                 [ ] Sepsis (in the previous month)                        (1 Point)               [ ] History of pregnancy complications                 (1 point)  [ ] Pneumonia or serious lung disease                                               [ ] Unexplained or recurrent                     (1 Point)           (in the previous month)                               (1 Point)  [ ] Abnormal pulmonary function test                     (1 Point)                 SURGERY RELATED RISK FACTORS  [ ] Acute myocardial infarction                              (1 Point)               [ ]  Section                                             (1 Point)  [x ] Congestive heart failure (in the previous month)  (1 Point)      [ ] Minor surgery                                                  (1 Point)   [ ] Inflammatory bowel disease                             (1 Point)               [ ] Arthroscopic surgery                                        (2 Points)  [ ] Central venous access                                      (2 Points)                [x ] General surgery lasting more than 45 minutes (2 points)  [ ] Malignancy- Present or previous                   (2 Points)                [ ] Elective arthroplasty                                         (5 points)    [ ] Stroke (in the previous month)                          (5 Points)                                                                                                                                                           HEMATOLOGY RELATED FACTORS                                                 TRAUMA RELATED RISK FACTORS  [ ] Prior episodes of VTE                                     (3 Points)                [ ] Fracture of the hip, pelvis, or leg                       (5 Points)  [ ] Positive family history for VTE                         (3 Points)             [ ] Acute spinal cord injury (in the previous month)  (5 Points)  [ ] Prothrombin 84891 A                                     (3 Points)               [ ] Paralysis  (less than 1 month)                             (5 Points)  [ ] Factor V Leiden                                             (3 Points)                  [ ] Multiple Trauma within 1 month                        (5 Points)  [ ] Lupus anticoagulants                                     (3 Points)                                                           [ ] Anticardiolipin antibodies                               (3 Points)                                                       [ ] High homocysteine in the blood                      (3 Points)                                             [ ] Other congenital or acquired thrombophilia      (3 Points)                                                [ ] Heparin induced thrombocytopenia                  (3 Points)                                     Total Score [        7  ]

## 2019-06-18 NOTE — H&P PST ADULT - HEALTH CARE MAINTENANCE
Flu vaccine in 2018  On yearly Annual physical  Last colonoscopy - 201 Flu vaccine in 2018  On regular follow up

## 2019-06-18 NOTE — H&P PST ADULT - OTHER CARE PROVIDERS
Dr Brannon-cardiol [phone], Dr kelly Brand -pulm , Dr Elton Cisse endocrine [phone], "last visit 2 weeks ago"

## 2019-06-24 ENCOUNTER — APPOINTMENT (OUTPATIENT)
Dept: CARDIOTHORACIC SURGERY | Facility: HOSPITAL | Age: 74
End: 2019-06-24

## 2019-06-24 ENCOUNTER — INPATIENT (INPATIENT)
Facility: HOSPITAL | Age: 74
LOS: 0 days | Discharge: HOME | DRG: 307 | End: 2019-06-24
Attending: STUDENT IN AN ORGANIZED HEALTH CARE EDUCATION/TRAINING PROGRAM | Admitting: STUDENT IN AN ORGANIZED HEALTH CARE EDUCATION/TRAINING PROGRAM
Payer: COMMERCIAL

## 2019-06-24 VITALS
TEMPERATURE: 98 F | OXYGEN SATURATION: 97 % | SYSTOLIC BLOOD PRESSURE: 128 MMHG | HEIGHT: 63 IN | WEIGHT: 176.15 LBS | DIASTOLIC BLOOD PRESSURE: 86 MMHG | RESPIRATION RATE: 16 BRPM | HEART RATE: 88 BPM

## 2019-06-24 DIAGNOSIS — Z90.89 ACQUIRED ABSENCE OF OTHER ORGANS: Chronic | ICD-10-CM

## 2019-06-24 DIAGNOSIS — Z87.442 PERSONAL HISTORY OF URINARY CALCULI: Chronic | ICD-10-CM

## 2019-06-24 DIAGNOSIS — I34.0 NONRHEUMATIC MITRAL (VALVE) INSUFFICIENCY: ICD-10-CM

## 2019-06-24 DIAGNOSIS — Z98.890 OTHER SPECIFIED POSTPROCEDURAL STATES: Chronic | ICD-10-CM

## 2019-06-24 LAB — GLUCOSE BLDC GLUCOMTR-MCNC: 176 MG/DL — HIGH (ref 70–99)

## 2019-06-24 PROCEDURE — G0378: CPT

## 2019-06-24 PROCEDURE — 82962 GLUCOSE BLOOD TEST: CPT

## 2019-06-24 RX ADMIN — SODIUM CHLORIDE 3 MILLILITER(S): 9 INJECTION INTRAMUSCULAR; INTRAVENOUS; SUBCUTANEOUS at 10:14

## 2019-07-08 ENCOUNTER — NON-APPOINTMENT (OUTPATIENT)
Age: 74
End: 2019-07-08

## 2019-07-08 ENCOUNTER — APPOINTMENT (OUTPATIENT)
Dept: PULMONOLOGY | Facility: CLINIC | Age: 74
End: 2019-07-08
Payer: COMMERCIAL

## 2019-07-08 VITALS
BODY MASS INDEX: 33.04 KG/M2 | DIASTOLIC BLOOD PRESSURE: 70 MMHG | HEART RATE: 90 BPM | OXYGEN SATURATION: 95 % | SYSTOLIC BLOOD PRESSURE: 110 MMHG | WEIGHT: 175 LBS | RESPIRATION RATE: 16 BRPM | HEIGHT: 61 IN

## 2019-07-08 PROBLEM — E05.90 THYROTOXICOSIS, UNSPECIFIED WITHOUT THYROTOXIC CRISIS OR STORM: Chronic | Status: ACTIVE | Noted: 2019-05-29

## 2019-07-08 PROBLEM — J44.9 CHRONIC OBSTRUCTIVE PULMONARY DISEASE, UNSPECIFIED: Chronic | Status: ACTIVE | Noted: 2019-05-29

## 2019-07-08 PROBLEM — G47.33 OBSTRUCTIVE SLEEP APNEA (ADULT) (PEDIATRIC): Chronic | Status: ACTIVE | Noted: 2019-05-29

## 2019-07-08 PROBLEM — I34.0 NONRHEUMATIC MITRAL (VALVE) INSUFFICIENCY: Chronic | Status: ACTIVE | Noted: 2019-05-29

## 2019-07-08 PROBLEM — I35.0 NONRHEUMATIC AORTIC (VALVE) STENOSIS: Chronic | Status: ACTIVE | Noted: 2019-05-29

## 2019-07-08 PROCEDURE — 94010 BREATHING CAPACITY TEST: CPT

## 2019-07-08 PROCEDURE — 99214 OFFICE O/P EST MOD 30 MIN: CPT | Mod: 25

## 2019-07-08 NOTE — PROCEDURE
[FreeTextEntry1] : PFT revealed mild-moderate restrictive and moderate obstructive dysfunction, with a FEV1 of 1.09L, which is 54% of predicted, with no inspiratory limb

## 2019-07-08 NOTE — HISTORY OF PRESENT ILLNESS
[FreeTextEntry1] : Mr. Murry is a 73 year old male with a history of abnormal CT, allergies, COPD, HTN, low Vit D, nicotine addiction, obesity, LIMA, SOB, pericardial effusion, who presents to the office for a follow up visit. His chief complaint is \par -he reports he had a Mitraclip scheduled 2 weeks ago, and was ready to go and was on the table, but the doctor noted his insurance didn't approve the procedure.  His wife notes the insurance was denied, but was told he should have open heart surgery instead, but was also told he wasn't a candidate for open heart surgery either.\par -he reports he has lost 30 pounds and is feeling well\par -he reports suffering from constipation.  His wife notes he doesn’t drink enough water, but he notes he believes he drinks enough liquids.\par -he reports his energy level is 8/10\par -he reports he has some difficulty initiating sleep, but sleeps well once he is asleep\par -he notes he is smoking 5-6 cigarettes daily, decreased from the last visit\par -he reports his sinuses are clear\par -he denies any wheezing, normal coughing, chest pain, chest pressure, diarrhea, dysphagia, dizziness, leg swelling, leg pain, itchy eyes, itchy ears, heartburn, reflux, sour taste in the mouth

## 2019-07-08 NOTE — ADDENDUM
[FreeTextEntry1] : Documented by Elton Gifford acting as a scribe for Dr. Frederic Brand on 07/08/2019.\par \par All medical record entries made by the Scribe were at my, Dr. Frederic Brand's, direction and personally dictated by me on 07/08/2019. I have reviewed the chart and agree that the record accurately reflects my personal performance of the history, physical exam, assessment and plan. I have also personally directed, reviewed, and agree with the discharge instructions. \par \par \par

## 2019-07-08 NOTE — REVIEW OF SYSTEMS
[Negative] : Pulmonary Hypertension [Recent Wt Loss (___ Lbs)] : recent [unfilled] ~Ulb weight loss [As Noted in HPI] : as noted in HPI [Constipation] : constipation

## 2019-07-08 NOTE — PHYSICAL EXAM
[General Appearance - Well Developed] : well developed [Normal Appearance] : normal appearance [General Appearance - Well Nourished] : well nourished [Well Groomed] : well groomed [General Appearance - In No Acute Distress] : no acute distress [No Deformities] : no deformities [Normal Conjunctiva] : the conjunctiva exhibited no abnormalities [Eyelids - No Xanthelasma] : the eyelids demonstrated no xanthelasmas [Normal Oropharynx] : normal oropharynx [III] : III [Neck Appearance] : the appearance of the neck was normal [Neck Cervical Mass (___cm)] : no neck mass was observed [Jugular Venous Distention Increased] : there was no jugular-venous distention [Thyroid Nodule] : there were no palpable thyroid nodules [Heart Rate And Rhythm] : heart rate and rhythm were normal [Thyroid Diffuse Enlargement] : the thyroid was not enlarged [Heart Sounds] : normal S1 and S2 [Auscultation Breath Sounds / Voice Sounds] : lungs were clear to auscultation bilaterally [Respiration, Rhythm And Depth] : normal respiratory rhythm and effort [Exaggerated Use Of Accessory Muscles For Inspiration] : no accessory muscle use [Abdomen Soft] : soft [Abdomen Tenderness] : non-tender [Abnormal Walk] : normal gait [Abdomen Mass (___ Cm)] : no abdominal mass palpated [Nail Clubbing] : no clubbing of the fingernails [Gait - Sufficient For Exercise Testing] : the gait was sufficient for exercise testing [Petechial Hemorrhages (___cm)] : no petechial hemorrhages [Cyanosis, Localized] : no localized cyanosis [Skin Color & Pigmentation] : normal skin color and pigmentation [] : no rash [No Skin Ulcers] : no skin ulcer [No Venous Stasis] : no venous stasis [Skin Lesions] : no skin lesions [No Xanthoma] : no  xanthoma was observed [Deep Tendon Reflexes (DTR)] : deep tendon reflexes were 2+ and symmetric [Sensation] : the sensory exam was normal to light touch and pinprick [Oriented To Time, Place, And Person] : oriented to person, place, and time [No Focal Deficits] : no focal deficits [Impaired Insight] : insight and judgment were intact [Affect] : the affect was normal [FreeTextEntry1] : I:E ratio 1:3; clear [FreeTextEntry2] : 1+ LE edema

## 2019-07-08 NOTE — ASSESSMENT
[FreeTextEntry1] : Mr. Murry has a history of Afib, CAD, COPD, OSAS, obesity, active snoring and is s/p pericardiocentesis, and is dramatically improved. He is now compliant with his medications and smoking cessation. He is s/p CTS evaluation ([facility]). He is still smoking.  His Mitraclip procedure is on hold due to insurance issues.\par \par His shortness of breath is multifactorial is due to: \par -COPD\par -asthma\par -poor breathing mechanics\par -overweight \par -CAD\par -?tracheomalacia\par \par problem 1: asthma/COPD - stable\par -continue Trelegy, 1 puff QD\par -continue Ventolin, 2 inhalations QD \par -continue Xopenex .63 nebulizer Q6H (gargle and spit after use)\par \par -Inhaler technique reviewed as well as oral hygiene techniques reviewed with patient. Avoidance of cold air, extremes of temperature, rescue inhaler should be used before exercise. Order of medication reviewed with patient. Recommended use of a cool mist humidifier in the bedroom. \par Asthma is believed to be caused by inherited (genetic) and environmental factor, but its exact cause is unknown. Asthma may be triggered by allergens, lung infections, or irritants in the air. Asthma triggers are different for each person \par \par problem 2: r/o TBM\par -complete dynamic chest CT to r/o TBM (noncompliant)\par  \par problem 3: allergies and sinuses \par -continue Flonase 1 sniff each nostril BID \par -continue to use Astelin nasal spray 1 sniff each nostril BID (0.15)\par \par problem 4: current smoking (no interest in cessation)\par -re-stressed importance of smoking cessation with patient\par -he is being prescribed NicoDerm \par -Discussed the risks/associations with continued smoking including COPD, emphysema, shortness of breath, renal cancer, bladder cancer, stroke risk, cardiac disease, etc. Smoking cessation was discussed at length and highly encouraged. Various options to aid cessation was discussed including use of Chantix, Nicotrol, nicotine products, laser therapy, hypnosis, Wellbutrin, etc. \par \par problem 5: abnormal chest CT/ lung cancer screening\par -follow up chest CT June 2019 (due)\par CAT scans are the only radiological modality to identify abnormalities w/in the lungs with regards to nodules/masses/lymph nodes. Risks, benefits were reviewed in detail. The guidelines for abnormalities include follow up CT scans at various intervals which could range from 6 weeks to 1 year intervals. If there is a change for the worse then consideration for a biopsy will be considered if you are a candidate. Second opinion evaluation with a thoracic surgeon or an interventional radiologist could be offered. \par \par problem 6: questionable sleep apnea (returned)\par -not interested in any sleep study\par -recommended to use Oxy-Aid by [employer] \par Sleep apnea is associated with adverse clinical consequences which an affect most organ systems. Cardiovascular disease risk includes arrhythmias, atrial fibrillation, hypertension, coronary artery disease, and stroke. Metabolic disorders include diabetes type 2, non-alcoholic fatty liver disease. Mood disorder especially depression; and cognitive decline especially in the elderly. Associations with chronic reflux/Parsons’s esophagus some but not all inclusive. \par -Reasons include arousal consistent with hypopnea; respiratory events most prominent in REM sleep or supine position; therefore sleep staging and body position are important for accurate diagnosis and estimation of AHI. \par \par problem 7: overweight\par -recommended to visit the [facility] \par \par Weight loss, exercise, and diet control were discussed and are highly encouraged. Treatment options were given such as, aqua therapy, and contacting a nutritionist. Recommended to use the elliptical, stationary bike, less use of treadmill. Mindful eating was explained to the patient Obesity is associated with worsening asthma, shortness of breath, and potential for cardiac disease, diabetes, and other underlying medical conditions. \par \par problem 8: cardiac\par -recommended to continua following with Dr. Silva al.; [facility] (MARIA GUADALUPE 5/15) - ?TAVR +/- MV clip\par -s/p EPS/cardioversion\par \par \par problem  9: Pre-Op clearance for EPS/cardioversion; [facility]IA GUAD[facility], TAVR +/- MV clip\par -at this point in time there are no absolute pulmonary contraindications to go forward with the planned procedure \par -at the time of surgery s/he should have optimal pain control, incentive spirometry, early ambulation, DVT and GI prophylaxis.\par \par  Problem 10: health maintenance\par -s/p influenza vaccine - 2018\par -recommended strep pneumonia vaccines: Prevnar-13 vaccine, followed by Pneumo vaccine 23 one year following\par -recommended early intervention for URIs\par -recommended regular osteoporosis evaluations\par -recommended early dermatological evaluations\par -recommended after the age of 50 to the age of 70, colonoscopy every 5 years \par \par F/U in 3-4 months\par He is encouraged to call with any changes, concerns, or questions

## 2019-07-25 ENCOUNTER — RX RENEWAL (OUTPATIENT)
Age: 74
End: 2019-07-25

## 2019-07-25 ENCOUNTER — MEDICATION RENEWAL (OUTPATIENT)
Age: 74
End: 2019-07-25

## 2019-08-08 ENCOUNTER — APPOINTMENT (OUTPATIENT)
Dept: PULMONOLOGY | Facility: CLINIC | Age: 74
End: 2019-08-08

## 2019-11-01 ENCOUNTER — APPOINTMENT (OUTPATIENT)
Dept: PULMONOLOGY | Facility: CLINIC | Age: 74
End: 2019-11-01
Payer: COMMERCIAL

## 2019-11-01 VITALS
HEIGHT: 61 IN | WEIGHT: 180 LBS | SYSTOLIC BLOOD PRESSURE: 110 MMHG | OXYGEN SATURATION: 93 % | DIASTOLIC BLOOD PRESSURE: 82 MMHG | HEART RATE: 89 BPM | RESPIRATION RATE: 17 BRPM | BODY MASS INDEX: 33.99 KG/M2

## 2019-11-01 PROCEDURE — 99214 OFFICE O/P EST MOD 30 MIN: CPT | Mod: 25

## 2019-11-01 PROCEDURE — 71046 X-RAY EXAM CHEST 2 VIEWS: CPT

## 2019-11-01 RX ORDER — LEVALBUTEROL HYDROCHLORIDE 0.63 MG/3ML
0.63 SOLUTION RESPIRATORY (INHALATION)
Qty: 120 | Refills: 3 | Status: ACTIVE | COMMUNITY
Start: 2019-11-01 | End: 1900-01-01

## 2019-11-01 NOTE — ADDENDUM
[FreeTextEntry1] : Documented by Fidelia Zimmer acting as a scribe for Dr. Frederic Brand on (11/01/2019).\par \par All medical record entries made by the Scribe were at my, Dr. Frederic Brand's, direction and personally dictated by me on (11/01/2019). I have reviewed the chart and agree that the record accurately reflects my personal performance of the history, physical exam, assessment and plan. I have also personally directed, reviewed, and agree with the discharge instructions. \par \par \par  \par \par

## 2019-11-01 NOTE — PROCEDURE
[FreeTextEntry1] : CT (9.19) revealed  calcified aortic valve, coronary calcifications, small incidental nodules, largest 6 mm  that felt consistent with mucous. Thyroid nodule and some emphysema\par \par CXR reveals mild cardiomegaly;  no evidence of infiltrate or effusion.\par \par \par

## 2019-11-01 NOTE — ASSESSMENT
[FreeTextEntry1] : Mr. Murry has a history of Afib, CAD, COPD, OSAS, obesity, active snoring and is s/p pericardiocentesis, and is dramatically improved. He is now compliant with his medications and smoking cessation. He is s/p CTS evaluation ([facility]). He is still smoking.  He is awaiting Mitraclip procedure on 11/13. In midst of acute bronchitis and COPD exacerbation. \par \par His shortness of breath is multifactorial is due to: \par -COPD\par -asthma\par -poor breathing mechanics\par -overweight \par -CAD\par -?tracheomalacia\par \par Problem 1A: Acute bronchitis\par - Add Zithromax\par - You have a clinical scenario most c/q acute bronchitis the etiology of which is unknown but empiric antibiotics are indicated. Hydration, mucolytics including mucinex, robitussin and the like are indicated. Cough controlling agents will be needed. \par \par problem 1: asthma/COPD - exacerbation. \par -continue Trelegy, 1 puff QD\par -continue Ventolin, 2 inhalations QD \par -continue Xopenex .63 nebulizer Q6H (gargle and spit after use)\par - add Prednisone 20 mg x 7 days, 10 mg x 7 days\par - Information sheet given to the patient to be reviewed, this medication is never to be used without consulting the prescribing physician. Proper dietary restraint is necessary specifically salt containing foods, if any reaction may occur should be reported. \par \par \par -Inhaler technique reviewed as well as oral hygiene techniques reviewed with patient. Avoidance of cold air, extremes of temperature, rescue inhaler should be used before exercise. Order of medication reviewed with patient. Recommended use of a cool mist humidifier in the bedroom. \par Asthma is believed to be caused by inherited (genetic) and environmental factor, but its exact cause is unknown. Asthma may be triggered by allergens, lung infections, or irritants in the air. Asthma triggers are different for each person \par \par problem 2: r/o TBM\par -complete dynamic chest CT to r/o TBM (noncompliant)\par  \par problem 3: allergies and sinuses \par -continue Flonase 1 sniff each nostril BID \par -continue to use Astelin nasal spray 1 sniff each nostril BID (0.15)\par \par problem 4: current smoking (no interest in cessation)\par -re-stressed importance of smoking cessation with patient\par -he is being prescribed NicoDerm \par -Discussed the risks/associations with continued smoking including COPD, emphysema, shortness of breath, renal cancer, bladder cancer, stroke risk, cardiac disease, etc. Smoking cessation was discussed at length and highly encouraged. Various options to aid cessation was discussed including use of Chantix, Nicotrol, nicotine products, laser therapy, hypnosis, Wellbutrin, etc. \par \par problem 5: abnormal chest CT/ lung cancer screening\par -follow up chest CT 9/2020. \par CAT scans are the only radiological modality to identify abnormalities w/in the lungs with regards to nodules/masses/lymph nodes. Risks, benefits were reviewed in detail. The guidelines for abnormalities include follow up CT scans at various intervals which could range from 6 weeks to 1 year intervals. If there is a change for the worse then consideration for a biopsy will be considered if you are a candidate. Second opinion evaluation with a thoracic surgeon or an interventional radiologist could be offered. \par \par problem 6: questionable sleep apnea (returned)\par -not interested in any sleep study\par -recommended to use Oxy-Aid by [facility] \par Sleep apnea is associated with adverse clinical consequences which an affect most organ systems. Cardiovascular disease risk includes arrhythmias, atrial fibrillation, hypertension, coronary artery disease, and stroke. Metabolic disorders include diabetes type 2, non-alcoholic fatty liver disease. Mood disorder especially depression; and cognitive decline especially in the elderly. Associations with chronic reflux/Parsons’s esophagus some but not all inclusive. \par -Reasons include arousal consistent with hypopnea; respiratory events most prominent in REM sleep or supine position; therefore sleep staging and body position are important for accurate diagnosis and estimation of AHI. \par \par problem 7: overweight\par -recommended to visit the [facility] \par \par Weight loss, exercise, and diet control were discussed and are highly encouraged. Treatment options were given such as, aqua therapy, and contacting a nutritionist. Recommended to use the elliptical, stationary bike, less use of treadmill. Mindful eating was explained to the patient Obesity is associated with worsening asthma, shortness of breath, and potential for cardiac disease, diabetes, and other underlying medical conditions. \par \par problem 8: cardiac\par -recommended to continua following with Dr. Silva al.; [facility] ([facility] 5/15) - ?TAVR +/- MV clip\par -s/p EPS/cardioversion\par \par \par problem  9: Pre-Op clearance for EPS/cardioversion; [facility], TAVR +/- MV clip\par -at this point in time there are no absolute pulmonary contraindications to go forward with the planned procedure \par -at the time of surgery s/he should have optimal pain control, incentive spirometry, early ambulation, DVT and GI prophylaxis.\par \par  Problem 10: health maintenance\par -s/p influenza vaccine - 2018\par -recommended strep pneumonia vaccines: Prevnar-13 vaccine, followed by Pneumo vaccine 23 one year following\par -recommended early intervention for URIs\par -recommended regular osteoporosis evaluations\par -recommended early dermatological evaluations\par -recommended after the age of 50 to the age of 70, colonoscopy every 5 years \par \par F/U in 3-4 months\par He is encouraged to call with any changes, concerns, or questions

## 2019-11-01 NOTE — HISTORY OF PRESENT ILLNESS
[FreeTextEntry1] : Mr. Murry is a 73 year old male with a history of abnormal CT, allergies, COPD, HTN, low Vit D, nicotine addiction, obesity, LIMA, SOB, pericardial effusion, who presents to the office for a follow up visit. His chief complaint is \par - He is smoking and does not want to stop\par - preop for mitraclip next Wednesday \par - He was given Zithromax and Prednisone\par - He does not feel well\par - He is concerned about his breathing during surgery \par - He is still experiencing sick symptoms\par - he sleeps well but wakes up a lot to use the bathroom\par - He is coughing and wheezing\par - Before he got sick, he was not coughing or wheezing \par - denies any headaches, nausea, vomiting, fever, chills, sweats, chest pain, chest pressure, diarrhea, constipation, dysphagia, dizziness, leg swelling, leg pain, itchy eyes, itchy ears, heartburn, reflux, or sour taste in the mouth.\par \par \par

## 2019-11-01 NOTE — PHYSICAL EXAM
[General Appearance - Well Developed] : well developed [Normal Appearance] : normal appearance [Well Groomed] : well groomed [General Appearance - Well Nourished] : well nourished [No Deformities] : no deformities [General Appearance - In No Acute Distress] : no acute distress [Normal Conjunctiva] : the conjunctiva exhibited no abnormalities [Eyelids - No Xanthelasma] : the eyelids demonstrated no xanthelasmas [Normal Oropharynx] : normal oropharynx [III] : III [Neck Appearance] : the appearance of the neck was normal [Neck Cervical Mass (___cm)] : no neck mass was observed [Jugular Venous Distention Increased] : there was no jugular-venous distention [Thyroid Diffuse Enlargement] : the thyroid was not enlarged [Thyroid Nodule] : there were no palpable thyroid nodules [Heart Rate And Rhythm] : heart rate and rhythm were normal [Heart Sounds] : normal S1 and S2 [Respiration, Rhythm And Depth] : normal respiratory rhythm and effort [Exaggerated Use Of Accessory Muscles For Inspiration] : no accessory muscle use [Auscultation Breath Sounds / Voice Sounds] : lungs were clear to auscultation bilaterally [Abdomen Soft] : soft [Abdomen Tenderness] : non-tender [Abdomen Mass (___ Cm)] : no abdominal mass palpated [Abnormal Walk] : normal gait [Gait - Sufficient For Exercise Testing] : the gait was sufficient for exercise testing [Nail Clubbing] : no clubbing of the fingernails [Cyanosis, Localized] : no localized cyanosis [Petechial Hemorrhages (___cm)] : no petechial hemorrhages [Skin Color & Pigmentation] : normal skin color and pigmentation [] : no rash [No Venous Stasis] : no venous stasis [Skin Lesions] : no skin lesions [No Skin Ulcers] : no skin ulcer [No Xanthoma] : no  xanthoma was observed [Deep Tendon Reflexes (DTR)] : deep tendon reflexes were 2+ and symmetric [Sensation] : the sensory exam was normal to light touch and pinprick [No Focal Deficits] : no focal deficits [Oriented To Time, Place, And Person] : oriented to person, place, and time [Impaired Insight] : insight and judgment were intact [Affect] : the affect was normal [Kyphosis] : kyphosis [FreeTextEntry1] : I:E ratio 1:3; clear [FreeTextEntry2] : trace edema

## 2019-11-05 ENCOUNTER — FORM ENCOUNTER (OUTPATIENT)
Age: 74
End: 2019-11-05

## 2019-11-06 ENCOUNTER — OUTPATIENT (OUTPATIENT)
Dept: OUTPATIENT SERVICES | Facility: HOSPITAL | Age: 74
LOS: 1 days | End: 2019-11-06
Payer: COMMERCIAL

## 2019-11-06 VITALS
DIASTOLIC BLOOD PRESSURE: 78 MMHG | OXYGEN SATURATION: 95 % | HEIGHT: 63 IN | HEART RATE: 81 BPM | SYSTOLIC BLOOD PRESSURE: 122 MMHG | TEMPERATURE: 98 F | WEIGHT: 182.1 LBS | RESPIRATION RATE: 20 BRPM

## 2019-11-06 DIAGNOSIS — Z87.442 PERSONAL HISTORY OF URINARY CALCULI: Chronic | ICD-10-CM

## 2019-11-06 DIAGNOSIS — G47.33 OBSTRUCTIVE SLEEP APNEA (ADULT) (PEDIATRIC): ICD-10-CM

## 2019-11-06 DIAGNOSIS — Z98.890 OTHER SPECIFIED POSTPROCEDURAL STATES: Chronic | ICD-10-CM

## 2019-11-06 DIAGNOSIS — J44.9 CHRONIC OBSTRUCTIVE PULMONARY DISEASE, UNSPECIFIED: ICD-10-CM

## 2019-11-06 DIAGNOSIS — I48.91 UNSPECIFIED ATRIAL FIBRILLATION: ICD-10-CM

## 2019-11-06 DIAGNOSIS — I34.0 NONRHEUMATIC MITRAL (VALVE) INSUFFICIENCY: ICD-10-CM

## 2019-11-06 DIAGNOSIS — Z29.9 ENCOUNTER FOR PROPHYLACTIC MEASURES, UNSPECIFIED: ICD-10-CM

## 2019-11-06 DIAGNOSIS — Z01.818 ENCOUNTER FOR OTHER PREPROCEDURAL EXAMINATION: ICD-10-CM

## 2019-11-06 DIAGNOSIS — E11.9 TYPE 2 DIABETES MELLITUS WITHOUT COMPLICATIONS: ICD-10-CM

## 2019-11-06 DIAGNOSIS — Z90.89 ACQUIRED ABSENCE OF OTHER ORGANS: Chronic | ICD-10-CM

## 2019-11-06 LAB
ALBUMIN SERPL ELPH-MCNC: 4 G/DL — SIGNIFICANT CHANGE UP (ref 3.3–5)
ALP SERPL-CCNC: 108 U/L — SIGNIFICANT CHANGE UP (ref 40–120)
ALT FLD-CCNC: 22 U/L — SIGNIFICANT CHANGE UP (ref 10–45)
ANION GAP SERPL CALC-SCNC: 14 MMOL/L — SIGNIFICANT CHANGE UP (ref 5–17)
AST SERPL-CCNC: 14 U/L — SIGNIFICANT CHANGE UP (ref 10–40)
BILIRUB SERPL-MCNC: 0.2 MG/DL — SIGNIFICANT CHANGE UP (ref 0.2–1.2)
BLD GP AB SCN SERPL QL: NEGATIVE — SIGNIFICANT CHANGE UP
BUN SERPL-MCNC: 26 MG/DL — HIGH (ref 7–23)
CALCIUM SERPL-MCNC: 9.9 MG/DL — SIGNIFICANT CHANGE UP (ref 8.4–10.5)
CHLORIDE SERPL-SCNC: 90 MMOL/L — LOW (ref 96–108)
CO2 SERPL-SCNC: 32 MMOL/L — HIGH (ref 22–31)
CREAT SERPL-MCNC: 1.26 MG/DL — SIGNIFICANT CHANGE UP (ref 0.5–1.3)
GLUCOSE SERPL-MCNC: 159 MG/DL — HIGH (ref 70–99)
HBA1C BLD-MCNC: 8 % — HIGH (ref 4–5.6)
HCT VFR BLD CALC: 46.1 % — SIGNIFICANT CHANGE UP (ref 39–50)
HGB BLD-MCNC: 15.1 G/DL — SIGNIFICANT CHANGE UP (ref 13–17)
MCHC RBC-ENTMCNC: 30.3 PG — SIGNIFICANT CHANGE UP (ref 27–34)
MCHC RBC-ENTMCNC: 32.8 GM/DL — SIGNIFICANT CHANGE UP (ref 32–36)
MCV RBC AUTO: 92.4 FL — SIGNIFICANT CHANGE UP (ref 80–100)
PLATELET # BLD AUTO: 236 K/UL — SIGNIFICANT CHANGE UP (ref 150–400)
POTASSIUM SERPL-MCNC: 4.4 MMOL/L — SIGNIFICANT CHANGE UP (ref 3.5–5.3)
POTASSIUM SERPL-SCNC: 4.4 MMOL/L — SIGNIFICANT CHANGE UP (ref 3.5–5.3)
PROT SERPL-MCNC: 7.3 G/DL — SIGNIFICANT CHANGE UP (ref 6–8.3)
RBC # BLD: 4.99 M/UL — SIGNIFICANT CHANGE UP (ref 4.2–5.8)
RBC # FLD: 14.7 % — HIGH (ref 10.3–14.5)
RH IG SCN BLD-IMP: POSITIVE — SIGNIFICANT CHANGE UP
SODIUM SERPL-SCNC: 136 MMOL/L — SIGNIFICANT CHANGE UP (ref 135–145)
WBC # BLD: 21.88 K/UL — HIGH (ref 3.8–10.5)
WBC # FLD AUTO: 21.88 K/UL — HIGH (ref 3.8–10.5)

## 2019-11-06 PROCEDURE — 93880 EXTRACRANIAL BILAT STUDY: CPT

## 2019-11-06 PROCEDURE — G0463: CPT

## 2019-11-06 PROCEDURE — 85027 COMPLETE CBC AUTOMATED: CPT

## 2019-11-06 PROCEDURE — 87640 STAPH A DNA AMP PROBE: CPT

## 2019-11-06 PROCEDURE — 86901 BLOOD TYPING SEROLOGIC RH(D): CPT

## 2019-11-06 PROCEDURE — 86850 RBC ANTIBODY SCREEN: CPT

## 2019-11-06 PROCEDURE — 80053 COMPREHEN METABOLIC PANEL: CPT

## 2019-11-06 PROCEDURE — 83036 HEMOGLOBIN GLYCOSYLATED A1C: CPT

## 2019-11-06 PROCEDURE — 93880 EXTRACRANIAL BILAT STUDY: CPT | Mod: 26

## 2019-11-06 PROCEDURE — 86900 BLOOD TYPING SEROLOGIC ABO: CPT

## 2019-11-06 NOTE — H&P PST ADULT - ASSESSMENT
CAPRINI SCORE [CLOT updated 18]    AGE RELATED RISK FACTORS                                                       MOBILITY RELATED FACTORS  [ ] Age 41-60 years                                            (1 Point)                    [ ] Bed rest                                                        (1 Point)  [ ] Age: 61-74 years                                           (2 Points)                  [ ] Plaster cast                                                   (2 Points)  [ ] Age= 75 years                                              (3 Points)                    [ ] Bed bound for more than 72 hours                 (2 Points)    DISEASE RELATED RISK FACTORS                                               GENDER SPECIFIC FACTORS  [ ] Edema in the lower extremities                       (1 Point)              [ ] Pregnancy                                                     (1 Point)  [ ] Varicose veins                                               (1 Point)                     [ ] Post-partum < 6 weeks                                   (1 Point)             [ ] BMI > 25 Kg/m2                                            (1 Point)                     [ ] Hormonal therapy  or oral contraception          (1 Point)                 [ ] Sepsis (in the previous month)                        (1 Point)               [ ] History of pregnancy complications                 (1 point)  [ ] Pneumonia or serious lung disease                                               [ ] Unexplained or recurrent                     (1 Point)           (in the previous month)                               (1 Point)  [ ] Abnormal pulmonary function test                     (1 Point)                 SURGERY RELATED RISK FACTORS  [ ] Acute myocardial infarction                              (1 Point)               [ ]  Section                                             (1 Point)  [ ] Congestive heart failure (in the previous month)  (1 Point)      [ ] Minor surgery                                                  (1 Point)   [ ] Inflammatory bowel disease                             (1 Point)               [ ] Arthroscopic surgery                                        (2 Points)  [ ] Central venous access                                      (2 Points)                [ ] General surgery lasting more than 45 minutes (2 points)  [ ] Present or previous malignancy                     (2 Points)                [ ] Elective arthroplasty                                         (5 points)    [ ] Stroke (in the previous month)                          (5 Points)                                                                                                                                                           HEMATOLOGY RELATED FACTORS                                                 TRAUMA RELATED RISK FACTORS  [ ] Prior episodes of VTE                                     (3 Points)                [ ] Fracture of the hip, pelvis, or leg                       (5 Points)  [ ] Positive family history for VTE                         (3 Points)             [ ] Acute spinal cord injury (in the previous month)  (5 Points)  [ ] Prothrombin 00319 A                                     (3 Points)               [ ] Paralysis  (less than 1 month)                             (5 Points)  [ ] Factor V Leiden                                             (3 Points)                  [ ] Multiple Trauma within 1 month                        (5 Points)  [ ] Lupus anticoagulants                                     (3 Points)                                                           [ ] Anticardiolipin antibodies                               (3 Points)                                                       [ ] High homocysteine in the blood                      (3 Points)                                             [ ] Other congenital or acquired thrombophilia      (3 Points)                                                [ ] Heparin induced thrombocytopenia                  (3 Points)                                     Total Score [ 7  ]

## 2019-11-06 NOTE — H&P PST ADULT - NSICDXPASTMEDICALHX_GEN_ALL_CORE_FT
PAST MEDICAL HISTORY:  Aortic stenosis moderate    Atrial fibrillation s/p Atrial flutter ablation; on Eliquis/ASA    COPD (chronic obstructive pulmonary disease) not on oxygen    Coronary artery disease     Diabetes mellitus, type 2 on Janumet & Farxiga    Goiter     HLD (hyperlipidemia)     Hypertension     Hyperthyroidism on methimazole    Kidney stones     Mitral regurgitation severe    Nonrheumatic mitral valve insufficiency     LIMA (obstructive sleep apnea) refuses C-pap    Pericardial effusion drained  a liter of fluid in 2018    Pulmonary nodules

## 2019-11-06 NOTE — H&P PST ADULT - CENTRAL VENOUS CATHETER
REVIEW OF SYSTEMS:  CONSTITUTIONAL: No weakness, fevers or chills, +NAUSEA  EYES/ENT: No visual changes;  No vertigo or throat pain   NECK: No pain or stiffness  RESPIRATORY: No cough, wheezing, hemoptysis; No shortness of breath, +CHEST CONGESTION  CARDIOVASCULAR: no chest pain or palpitations  GASTROINTESTINAL: No abdominal or epigastric pain. No vomiting, or hematemesis; No constipation. No melena or hematochezia. + DIARRHEA  GENITOURINARY: No dysuria, frequency or hematuria  NEUROLOGICAL: No numbness or weakness  SKIN: No itching, rashes no

## 2019-11-06 NOTE — H&P PST ADULT - NSICDXPASTSURGICALHX_GEN_ALL_CORE_FT
PAST SURGICAL HISTORY:  History of kidney stones 1967 & 1986    History of tonsillectomy As a child    S/P coronary angiogram

## 2019-11-06 NOTE — H&P PST ADULT - NSICDXPROBLEM_GEN_ALL_CORE_FT
PROBLEM DIAGNOSES  Problem: Diabetes mellitus, type 2  Assessment and Plan: HgbA1C sent today. Pt instructed on medication regimen. FS to be checked morning of sx.     Problem: Atrial fibrillation  Assessment and Plan: Last dose of Eliquis to be taken on 11/9/2019 as per Cardiac offices. Pt currently in NSR.     Problem: Nonrheumatic mitral valve insufficiency  Assessment and Plan: Pt scheduled for sx on 11/13/2019. Surgical, chlorhexidine and Incentive Spirometry instructions reviewed w/ pt.     Problem: COPD (chronic obstructive pulmonary disease)  Assessment and Plan: Pt w/ recent Bronchitis. Completed dose of Abx and Prednisone. Followed  Pulm. evaluation note in chart.     Problem: LIMA (obstructive sleep apnea)  Assessment and Plan: Pt refuses CPAP. OR booking made aware.     Problem: Need for prophylactic measure  Assessment and Plan: The Caprini score indicates that this patient is at high risk for a VTE event (score 6 or greater). Surgical patients in this group will benefit from both pharmacologic prophylaxis and intermittent compression devices.  The surgical team will determine the balance between VTE risk and bleeding risk, and other clinical considerations PROBLEM DIAGNOSES  Problem: Diabetes mellitus, type 2  Assessment and Plan: HgbA1C sent today. Pt instructed on medication regimen. FS to be checked morning of sx.     Problem: Atrial fibrillation  Assessment and Plan: Last dose of Eliquis to be taken on 11/9/2019 as per Cardiac offices. Pt currently in NSR. Redcap#[id number]    Problem: Nonrheumatic mitral valve insufficiency  Assessment and Plan: Pt scheduled for sx on 11/13/2019. Surgical, chlorhexidine and Incentive Spirometry instructions reviewed w/ pt.     Problem: COPD (chronic obstructive pulmonary disease)  Assessment and Plan: Pt w/ recent Bronchitis. Completed dose of Abx and Prednisone. Followed bu Pulm. evaluation note in chart.     Problem: LIMA (obstructive sleep apnea)  Assessment and Plan: Pt refuses CPAP. OR booking made aware.     Problem: Need for prophylactic measure  Assessment and Plan: The Caprini score indicates that this patient is at high risk for a VTE event (score 6 or greater). Surgical patients in this group will benefit from both pharmacologic prophylaxis and intermittent compression devices.  The surgical team will determine the balance between VTE risk and bleeding risk, and other clinical considerations

## 2019-11-06 NOTE — H&P PST ADULT - HISTORY OF PRESENT ILLNESS
73 yo, obese, male presents to PST for a Mitral Clip on 11/13/2019. PMH of chronic heavy smoker (50 pack years), COPD, LIMA (refuses CPAP) pulmonary nodules (unchanged 0.4 cm RUL nodules- followed by Pulmonologist; note in chart), hyperthyroidism (with thyroid nodularity on CT consistent with MNG w/ borderline hyperthyroidism, on Tapazole, tx started on 4/2019 admission- Amiodarone and Iodine contrast to be avoided), goiter, HTN, HLD, dCHF on Lasix CAD, Afib s/p Ablation (on ASA & Eliquis last dose on Saturday 11/9/19 as per Cardiac Office), Pericardial effusion w/ drainage in 6/18, DM type 2, Nephrolithiasis and recent MARIA GUADALUPE w/ RV systolic dysfunction, MR, AS and nl LV systolic function. Presently pt asymptomatic, + chronic dyspnea on exertion, denies: chest pain, dizziness, palpitations, N&V, HA, Orthopnea or LE edema and feels well otherwise. 73 yo, obese, male presents to PST for a Mitral Clip on 11/13/2019. PMH of chronic heavy smoker (50 pack years), COPD, LIMA (refuses CPAP) pulmonary nodules (unchanged 0.4 cm RUL nodules- followed by Pulmonologist; note in chart), hyperthyroidism (with thyroid nodularity on CT consistent with MNG w/ borderline hyperthyroidism, on Tapazole, tx started on 4/2019 admission- Amiodarone and Iodine contrast to be avoided), goiter, HTN, HLD, dCHF on Lasix CAD, Afib s/p Ablation (on ASA & Eliquis last dose on Saturday 11/9/19 as per Cardiac Office; Redcap#945), Pericardial effusion w/ drainage in 6/18, DM type 2, Nephrolithiasis and recent MARIA GUADALUPE w/ RV systolic dysfunction, MR, AS and nl LV systolic function. Presently pt asymptomatic, + chronic dyspnea on exertion, denies: chest pain, dizziness, palpitations, N&V, HA, Orthopnea or LE edema and feels well otherwise.

## 2019-11-07 LAB
MRSA PCR RESULT.: SIGNIFICANT CHANGE UP
S AUREUS DNA NOSE QL NAA+PROBE: SIGNIFICANT CHANGE UP

## 2019-11-08 ENCOUNTER — MESSAGE (OUTPATIENT)
Age: 74
End: 2019-11-08

## 2019-11-08 ENCOUNTER — APPOINTMENT (OUTPATIENT)
Dept: INFECTIOUS DISEASE | Facility: CLINIC | Age: 74
End: 2019-11-08
Payer: COMMERCIAL

## 2019-11-08 ENCOUNTER — LABORATORY RESULT (OUTPATIENT)
Age: 74
End: 2019-11-08

## 2019-11-08 VITALS
SYSTOLIC BLOOD PRESSURE: 111 MMHG | BODY MASS INDEX: 31.71 KG/M2 | TEMPERATURE: 96.9 F | HEIGHT: 63 IN | WEIGHT: 179 LBS | DIASTOLIC BLOOD PRESSURE: 68 MMHG | OXYGEN SATURATION: 93 % | HEART RATE: 112 BPM

## 2019-11-08 DIAGNOSIS — I34.0 NONRHEUMATIC MITRAL (VALVE) INSUFFICIENCY: ICD-10-CM

## 2019-11-08 DIAGNOSIS — Z86.39 PERSONAL HISTORY OF OTHER ENDOCRINE, NUTRITIONAL AND METABOLIC DISEASE: ICD-10-CM

## 2019-11-08 DIAGNOSIS — I10 ESSENTIAL (PRIMARY) HYPERTENSION: ICD-10-CM

## 2019-11-08 DIAGNOSIS — I35.0 NONRHEUMATIC AORTIC (VALVE) STENOSIS: ICD-10-CM

## 2019-11-08 PROBLEM — I31.3 PERICARDIAL EFFUSION (NONINFLAMMATORY): Chronic | Status: ACTIVE | Noted: 2019-05-29

## 2019-11-08 PROBLEM — I48.91 UNSPECIFIED ATRIAL FIBRILLATION: Chronic | Status: ACTIVE | Noted: 2019-05-29

## 2019-11-08 PROCEDURE — 99244 OFF/OP CNSLTJ NEW/EST MOD 40: CPT

## 2019-11-08 RX ORDER — APIXABAN 5 MG/1
5 TABLET, FILM COATED ORAL
Qty: 180 | Refills: 0 | Status: ACTIVE | COMMUNITY
Start: 2019-04-04

## 2019-11-08 RX ORDER — DAPAGLIFLOZIN 5 MG/1
5 TABLET, FILM COATED ORAL
Qty: 30 | Refills: 0 | Status: ACTIVE | COMMUNITY
Start: 2019-06-19

## 2019-11-08 RX ORDER — AZITHROMYCIN 250 MG/1
250 TABLET, FILM COATED ORAL
Qty: 6 | Refills: 0 | Status: COMPLETED | COMMUNITY
Start: 2019-10-29

## 2019-11-08 RX ORDER — PANTOPRAZOLE 40 MG/1
40 TABLET, DELAYED RELEASE ORAL
Qty: 30 | Refills: 0 | Status: DISCONTINUED | COMMUNITY
Start: 2019-04-14 | End: 2019-11-08

## 2019-11-09 ENCOUNTER — INPATIENT (INPATIENT)
Facility: HOSPITAL | Age: 74
LOS: 1 days | Discharge: HOME | DRG: 872 | End: 2019-11-11
Attending: INTERNAL MEDICINE | Admitting: INTERNAL MEDICINE
Payer: COMMERCIAL

## 2019-11-09 VITALS
TEMPERATURE: 98 F | HEART RATE: 89 BPM | DIASTOLIC BLOOD PRESSURE: 80 MMHG | HEIGHT: 63 IN | WEIGHT: 175.93 LBS | OXYGEN SATURATION: 95 % | SYSTOLIC BLOOD PRESSURE: 129 MMHG | RESPIRATION RATE: 18 BRPM

## 2019-11-09 DIAGNOSIS — I25.10 ATHEROSCLEROTIC HEART DISEASE OF NATIVE CORONARY ARTERY WITHOUT ANGINA PECTORIS: ICD-10-CM

## 2019-11-09 DIAGNOSIS — J44.9 CHRONIC OBSTRUCTIVE PULMONARY DISEASE, UNSPECIFIED: ICD-10-CM

## 2019-11-09 DIAGNOSIS — Z29.9 ENCOUNTER FOR PROPHYLACTIC MEASURES, UNSPECIFIED: ICD-10-CM

## 2019-11-09 DIAGNOSIS — E11.9 TYPE 2 DIABETES MELLITUS WITHOUT COMPLICATIONS: ICD-10-CM

## 2019-11-09 DIAGNOSIS — I48.91 UNSPECIFIED ATRIAL FIBRILLATION: ICD-10-CM

## 2019-11-09 DIAGNOSIS — I35.0 NONRHEUMATIC AORTIC (VALVE) STENOSIS: ICD-10-CM

## 2019-11-09 DIAGNOSIS — R78.81 BACTEREMIA: ICD-10-CM

## 2019-11-09 DIAGNOSIS — A41.9 SEPSIS, UNSPECIFIED ORGANISM: ICD-10-CM

## 2019-11-09 DIAGNOSIS — I10 ESSENTIAL (PRIMARY) HYPERTENSION: ICD-10-CM

## 2019-11-09 DIAGNOSIS — Z98.890 OTHER SPECIFIED POSTPROCEDURAL STATES: Chronic | ICD-10-CM

## 2019-11-09 DIAGNOSIS — I34.0 NONRHEUMATIC MITRAL (VALVE) INSUFFICIENCY: ICD-10-CM

## 2019-11-09 DIAGNOSIS — E05.90 THYROTOXICOSIS, UNSPECIFIED WITHOUT THYROTOXIC CRISIS OR STORM: ICD-10-CM

## 2019-11-09 DIAGNOSIS — Z90.89 ACQUIRED ABSENCE OF OTHER ORGANS: Chronic | ICD-10-CM

## 2019-11-09 DIAGNOSIS — Z02.9 ENCOUNTER FOR ADMINISTRATIVE EXAMINATIONS, UNSPECIFIED: ICD-10-CM

## 2019-11-09 DIAGNOSIS — Z87.442 PERSONAL HISTORY OF URINARY CALCULI: Chronic | ICD-10-CM

## 2019-11-09 LAB
ALBUMIN SERPL ELPH-MCNC: 4.2 G/DL — SIGNIFICANT CHANGE UP (ref 3.3–5)
ALP SERPL-CCNC: 104 U/L — SIGNIFICANT CHANGE UP (ref 40–120)
ALT FLD-CCNC: 28 U/L — SIGNIFICANT CHANGE UP (ref 10–45)
ANION GAP SERPL CALC-SCNC: 17 MMOL/L — SIGNIFICANT CHANGE UP (ref 5–17)
ANION GAP SERPL CALC-SCNC: 18 MMOL/L — HIGH (ref 5–17)
APTT BLD: 32.4 SEC — SIGNIFICANT CHANGE UP (ref 27.5–36.3)
AST SERPL-CCNC: 52 U/L — HIGH (ref 10–40)
BASOPHILS # BLD AUTO: 0 K/UL — SIGNIFICANT CHANGE UP (ref 0–0.2)
BASOPHILS # BLD AUTO: 0.14 K/UL
BASOPHILS NFR BLD AUTO: 0 % — SIGNIFICANT CHANGE UP (ref 0–2)
BASOPHILS NFR BLD AUTO: 0.6 %
BILIRUB SERPL-MCNC: 0.3 MG/DL — SIGNIFICANT CHANGE UP (ref 0.2–1.2)
BUN SERPL-MCNC: 31 MG/DL — HIGH (ref 7–23)
BUN SERPL-MCNC: 32 MG/DL — HIGH (ref 7–23)
CALCIUM SERPL-MCNC: 10.3 MG/DL — SIGNIFICANT CHANGE UP (ref 8.4–10.5)
CALCIUM SERPL-MCNC: 10.3 MG/DL — SIGNIFICANT CHANGE UP (ref 8.4–10.5)
CHLORIDE SERPL-SCNC: 90 MMOL/L — LOW (ref 96–108)
CHLORIDE SERPL-SCNC: 90 MMOL/L — LOW (ref 96–108)
CO2 SERPL-SCNC: 29 MMOL/L — SIGNIFICANT CHANGE UP (ref 22–31)
CO2 SERPL-SCNC: 29 MMOL/L — SIGNIFICANT CHANGE UP (ref 22–31)
CREAT SERPL-MCNC: 1.13 MG/DL — SIGNIFICANT CHANGE UP (ref 0.5–1.3)
CREAT SERPL-MCNC: 1.27 MG/DL — SIGNIFICANT CHANGE UP (ref 0.5–1.3)
CRP SERPL-MCNC: 0.19 MG/DL
EOSINOPHIL # BLD AUTO: 0.02 K/UL
EOSINOPHIL # BLD AUTO: 0.25 K/UL — SIGNIFICANT CHANGE UP (ref 0–0.5)
EOSINOPHIL NFR BLD AUTO: 0.1 %
EOSINOPHIL NFR BLD AUTO: 0.9 % — SIGNIFICANT CHANGE UP (ref 0–6)
ERYTHROCYTE [SEDIMENTATION RATE] IN BLOOD BY WESTERGREN METHOD: 32 MM/HR
GIANT PLATELETS BLD QL SMEAR: PRESENT — SIGNIFICANT CHANGE UP
GLUCOSE BLDC GLUCOMTR-MCNC: 217 MG/DL — HIGH (ref 70–99)
GLUCOSE BLDC GLUCOMTR-MCNC: 267 MG/DL — HIGH (ref 70–99)
GLUCOSE SERPL-MCNC: 185 MG/DL — HIGH (ref 70–99)
GLUCOSE SERPL-MCNC: 239 MG/DL — HIGH (ref 70–99)
HCT VFR BLD CALC: 45.8 % — SIGNIFICANT CHANGE UP (ref 39–50)
HCT VFR BLD CALC: 49.9 %
HGB BLD-MCNC: 15.4 G/DL — SIGNIFICANT CHANGE UP (ref 13–17)
HGB BLD-MCNC: 16.2 G/DL
IMM GRANULOCYTES NFR BLD AUTO: 3.3 %
INR BLD: 1.36 RATIO — HIGH (ref 0.88–1.16)
LYMPHOCYTES # BLD AUTO: 0.95 K/UL — LOW (ref 1–3.3)
LYMPHOCYTES # BLD AUTO: 1.1 K/UL
LYMPHOCYTES # BLD AUTO: 3.5 % — LOW (ref 13–44)
LYMPHOCYTES NFR BLD AUTO: 4.4 %
MAN DIFF?: NORMAL
MANUAL SMEAR VERIFICATION: SIGNIFICANT CHANGE UP
MCHC RBC-ENTMCNC: 30.1 PG — SIGNIFICANT CHANGE UP (ref 27–34)
MCHC RBC-ENTMCNC: 30.7 PG
MCHC RBC-ENTMCNC: 32.5 GM/DL
MCHC RBC-ENTMCNC: 33.6 GM/DL — SIGNIFICANT CHANGE UP (ref 32–36)
MCV RBC AUTO: 89.5 FL — SIGNIFICANT CHANGE UP (ref 80–100)
MCV RBC AUTO: 94.5 FL
MONOCYTES # BLD AUTO: 0.64 K/UL
MONOCYTES # BLD AUTO: 0.71 K/UL — SIGNIFICANT CHANGE UP (ref 0–0.9)
MONOCYTES NFR BLD AUTO: 2.6 %
MONOCYTES NFR BLD AUTO: 2.6 % — SIGNIFICANT CHANGE UP (ref 2–14)
MYELOCYTES NFR BLD: 1.7 % — HIGH (ref 0–0)
NEUTROPHILS # BLD AUTO: 22.15 K/UL
NEUTROPHILS # BLD AUTO: 24.9 K/UL — HIGH (ref 1.8–7.4)
NEUTROPHILS NFR BLD AUTO: 89 %
NEUTROPHILS NFR BLD AUTO: 90.4 % — HIGH (ref 43–77)
NEUTS BAND # BLD: 0.9 % — SIGNIFICANT CHANGE UP (ref 0–8)
NRBC # BLD: 1 /100 — HIGH (ref 0–0)
PLAT MORPH BLD: ABNORMAL
PLATELET # BLD AUTO: 244 K/UL — SIGNIFICANT CHANGE UP (ref 150–400)
PLATELET # BLD AUTO: 256 K/UL
POTASSIUM SERPL-MCNC: 4.5 MMOL/L — SIGNIFICANT CHANGE UP (ref 3.5–5.3)
POTASSIUM SERPL-MCNC: 4.5 MMOL/L — SIGNIFICANT CHANGE UP (ref 3.5–5.3)
POTASSIUM SERPL-MCNC: 6.4 MMOL/L — CRITICAL HIGH (ref 3.5–5.3)
POTASSIUM SERPL-SCNC: 4.5 MMOL/L — SIGNIFICANT CHANGE UP (ref 3.5–5.3)
POTASSIUM SERPL-SCNC: 4.5 MMOL/L — SIGNIFICANT CHANGE UP (ref 3.5–5.3)
POTASSIUM SERPL-SCNC: 6.4 MMOL/L — CRITICAL HIGH (ref 3.5–5.3)
PROCALCITONIN SERPL-MCNC: 0.16 NG/ML
PROT SERPL-MCNC: 8 G/DL — SIGNIFICANT CHANGE UP (ref 6–8.3)
PROTHROM AB SERPL-ACNC: 15.6 SEC — HIGH (ref 10–12.9)
RBC # BLD: 5.12 M/UL — SIGNIFICANT CHANGE UP (ref 4.2–5.8)
RBC # BLD: 5.28 M/UL
RBC # FLD: 14.6 % — HIGH (ref 10.3–14.5)
RBC # FLD: 14.8 %
RBC BLD AUTO: SIGNIFICANT CHANGE UP
SODIUM SERPL-SCNC: 136 MMOL/L — SIGNIFICANT CHANGE UP (ref 135–145)
SODIUM SERPL-SCNC: 137 MMOL/L — SIGNIFICANT CHANGE UP (ref 135–145)
TSH SERPL-ACNC: 0.32 UIU/ML
WBC # BLD: 27.27 K/UL — HIGH (ref 3.8–10.5)
WBC # FLD AUTO: 24.87 K/UL
WBC # FLD AUTO: 27.27 K/UL — HIGH (ref 3.8–10.5)

## 2019-11-09 PROCEDURE — 99223 1ST HOSP IP/OBS HIGH 75: CPT | Mod: GC

## 2019-11-09 PROCEDURE — 99223 1ST HOSP IP/OBS HIGH 75: CPT | Mod: AI,GC

## 2019-11-09 PROCEDURE — 99285 EMERGENCY DEPT VISIT HI MDM: CPT

## 2019-11-09 PROCEDURE — 71046 X-RAY EXAM CHEST 2 VIEWS: CPT | Mod: 26

## 2019-11-09 PROCEDURE — 93010 ELECTROCARDIOGRAM REPORT: CPT | Mod: NC

## 2019-11-09 PROCEDURE — 74177 CT ABD & PELVIS W/CONTRAST: CPT | Mod: 26

## 2019-11-09 RX ORDER — GLUCAGON INJECTION, SOLUTION 0.5 MG/.1ML
1 INJECTION, SOLUTION SUBCUTANEOUS ONCE
Refills: 0 | Status: DISCONTINUED | OUTPATIENT
Start: 2019-11-09 | End: 2019-11-11

## 2019-11-09 RX ORDER — METOPROLOL TARTRATE 50 MG
25 TABLET ORAL DAILY
Refills: 0 | Status: DISCONTINUED | OUTPATIENT
Start: 2019-11-09 | End: 2019-11-11

## 2019-11-09 RX ORDER — ACETAMINOPHEN 500 MG
650 TABLET ORAL EVERY 6 HOURS
Refills: 0 | Status: DISCONTINUED | OUTPATIENT
Start: 2019-11-09 | End: 2019-11-11

## 2019-11-09 RX ORDER — ENOXAPARIN SODIUM 100 MG/ML
40 INJECTION SUBCUTANEOUS DAILY
Refills: 0 | Status: DISCONTINUED | OUTPATIENT
Start: 2019-11-09 | End: 2019-11-09

## 2019-11-09 RX ORDER — NICOTINE POLACRILEX 2 MG
1 GUM BUCCAL
Qty: 0 | Refills: 0 | DISCHARGE

## 2019-11-09 RX ORDER — BUDESONIDE AND FORMOTEROL FUMARATE DIHYDRATE 160; 4.5 UG/1; UG/1
2 AEROSOL RESPIRATORY (INHALATION)
Refills: 0 | Status: DISCONTINUED | OUTPATIENT
Start: 2019-11-09 | End: 2019-11-11

## 2019-11-09 RX ORDER — SODIUM CHLORIDE 9 MG/ML
1000 INJECTION, SOLUTION INTRAVENOUS
Refills: 0 | Status: DISCONTINUED | OUTPATIENT
Start: 2019-11-09 | End: 2019-11-09

## 2019-11-09 RX ORDER — ASPIRIN/CALCIUM CARB/MAGNESIUM 324 MG
81 TABLET ORAL DAILY
Refills: 0 | Status: DISCONTINUED | OUTPATIENT
Start: 2019-11-09 | End: 2019-11-11

## 2019-11-09 RX ORDER — INSULIN GLARGINE 100 [IU]/ML
12 INJECTION, SOLUTION SUBCUTANEOUS AT BEDTIME
Refills: 0 | Status: DISCONTINUED | OUTPATIENT
Start: 2019-11-09 | End: 2019-11-11

## 2019-11-09 RX ORDER — DAPAGLIFLOZIN 10 MG/1
1 TABLET, FILM COATED ORAL
Qty: 0 | Refills: 0 | DISCHARGE

## 2019-11-09 RX ORDER — DEXTROSE 50 % IN WATER 50 %
15 SYRINGE (ML) INTRAVENOUS ONCE
Refills: 0 | Status: DISCONTINUED | OUTPATIENT
Start: 2019-11-09 | End: 2019-11-09

## 2019-11-09 RX ORDER — INSULIN LISPRO 100/ML
VIAL (ML) SUBCUTANEOUS
Refills: 0 | Status: DISCONTINUED | OUTPATIENT
Start: 2019-11-09 | End: 2019-11-09

## 2019-11-09 RX ORDER — METHIMAZOLE 10 MG/1
1 TABLET ORAL
Qty: 0 | Refills: 0 | DISCHARGE

## 2019-11-09 RX ORDER — DILTIAZEM HCL 120 MG
180 CAPSULE, EXT RELEASE 24 HR ORAL DAILY
Refills: 0 | Status: DISCONTINUED | OUTPATIENT
Start: 2019-11-09 | End: 2019-11-11

## 2019-11-09 RX ORDER — SODIUM CHLORIDE 9 MG/ML
1000 INJECTION, SOLUTION INTRAVENOUS
Refills: 0 | Status: DISCONTINUED | OUTPATIENT
Start: 2019-11-09 | End: 2019-11-11

## 2019-11-09 RX ORDER — NICOTINE POLACRILEX 2 MG
1 GUM BUCCAL DAILY
Refills: 0 | Status: DISCONTINUED | OUTPATIENT
Start: 2019-11-09 | End: 2019-11-11

## 2019-11-09 RX ORDER — DEXTROSE 50 % IN WATER 50 %
15 SYRINGE (ML) INTRAVENOUS ONCE
Refills: 0 | Status: DISCONTINUED | OUTPATIENT
Start: 2019-11-09 | End: 2019-11-11

## 2019-11-09 RX ORDER — FUROSEMIDE 40 MG
80 TABLET ORAL DAILY
Refills: 0 | Status: DISCONTINUED | OUTPATIENT
Start: 2019-11-09 | End: 2019-11-11

## 2019-11-09 RX ORDER — APIXABAN 2.5 MG/1
5 TABLET, FILM COATED ORAL EVERY 12 HOURS
Refills: 0 | Status: DISCONTINUED | OUTPATIENT
Start: 2019-11-09 | End: 2019-11-11

## 2019-11-09 RX ORDER — INSULIN LISPRO 100/ML
VIAL (ML) SUBCUTANEOUS
Refills: 0 | Status: DISCONTINUED | OUTPATIENT
Start: 2019-11-09 | End: 2019-11-10

## 2019-11-09 RX ORDER — INSULIN LISPRO 100/ML
4 VIAL (ML) SUBCUTANEOUS
Refills: 0 | Status: DISCONTINUED | OUTPATIENT
Start: 2019-11-09 | End: 2019-11-11

## 2019-11-09 RX ORDER — PITAVASTATIN CALCIUM 1.04 MG/1
1 TABLET, FILM COATED ORAL
Qty: 0 | Refills: 0 | DISCHARGE

## 2019-11-09 RX ORDER — DEXTROSE 50 % IN WATER 50 %
25 SYRINGE (ML) INTRAVENOUS ONCE
Refills: 0 | Status: DISCONTINUED | OUTPATIENT
Start: 2019-11-09 | End: 2019-11-11

## 2019-11-09 RX ORDER — DEXTROSE 50 % IN WATER 50 %
25 SYRINGE (ML) INTRAVENOUS ONCE
Refills: 0 | Status: DISCONTINUED | OUTPATIENT
Start: 2019-11-09 | End: 2019-11-09

## 2019-11-09 RX ORDER — SPIRONOLACTONE 25 MG/1
25 TABLET, FILM COATED ORAL DAILY
Refills: 0 | Status: DISCONTINUED | OUTPATIENT
Start: 2019-11-09 | End: 2019-11-11

## 2019-11-09 RX ORDER — FLUTICASONE FUROATE, UMECLIDINIUM BROMIDE AND VILANTEROL TRIFENATATE 200; 62.5; 25 UG/1; UG/1; UG/1
1 POWDER RESPIRATORY (INHALATION)
Qty: 0 | Refills: 0 | DISCHARGE

## 2019-11-09 RX ORDER — ATORVASTATIN CALCIUM 80 MG/1
10 TABLET, FILM COATED ORAL AT BEDTIME
Refills: 0 | Status: DISCONTINUED | OUTPATIENT
Start: 2019-11-09 | End: 2019-11-11

## 2019-11-09 RX ORDER — VANCOMYCIN HCL 1 G
1000 VIAL (EA) INTRAVENOUS EVERY 12 HOURS
Refills: 0 | Status: DISCONTINUED | OUTPATIENT
Start: 2019-11-09 | End: 2019-11-11

## 2019-11-09 RX ORDER — INSULIN LISPRO 100/ML
VIAL (ML) SUBCUTANEOUS AT BEDTIME
Refills: 0 | Status: DISCONTINUED | OUTPATIENT
Start: 2019-11-09 | End: 2019-11-11

## 2019-11-09 RX ORDER — DEXTROSE 50 % IN WATER 50 %
12.5 SYRINGE (ML) INTRAVENOUS ONCE
Refills: 0 | Status: DISCONTINUED | OUTPATIENT
Start: 2019-11-09 | End: 2019-11-11

## 2019-11-09 RX ORDER — TIOTROPIUM BROMIDE 18 UG/1
1 CAPSULE ORAL; RESPIRATORY (INHALATION) DAILY
Refills: 0 | Status: DISCONTINUED | OUTPATIENT
Start: 2019-11-09 | End: 2019-11-11

## 2019-11-09 RX ORDER — VANCOMYCIN HCL 1 G
1000 VIAL (EA) INTRAVENOUS ONCE
Refills: 0 | Status: COMPLETED | OUTPATIENT
Start: 2019-11-09 | End: 2019-11-09

## 2019-11-09 RX ORDER — FLUTICASONE PROPIONATE 50 MCG
1 SPRAY, SUSPENSION NASAL DAILY
Refills: 0 | Status: DISCONTINUED | OUTPATIENT
Start: 2019-11-09 | End: 2019-11-11

## 2019-11-09 RX ORDER — APIXABAN 2.5 MG/1
1 TABLET, FILM COATED ORAL
Qty: 0 | Refills: 0 | DISCHARGE

## 2019-11-09 RX ORDER — DEXTROSE 50 % IN WATER 50 %
12.5 SYRINGE (ML) INTRAVENOUS ONCE
Refills: 0 | Status: DISCONTINUED | OUTPATIENT
Start: 2019-11-09 | End: 2019-11-09

## 2019-11-09 RX ADMIN — Medication 250 MILLIGRAM(S): at 18:33

## 2019-11-09 RX ADMIN — INSULIN GLARGINE 12 UNIT(S): 100 INJECTION, SOLUTION SUBCUTANEOUS at 22:31

## 2019-11-09 RX ADMIN — Medication 250 MILLIGRAM(S): at 14:10

## 2019-11-09 RX ADMIN — Medication 1 PATCH: at 21:08

## 2019-11-09 RX ADMIN — APIXABAN 5 MILLIGRAM(S): 2.5 TABLET, FILM COATED ORAL at 21:09

## 2019-11-09 RX ADMIN — BUDESONIDE AND FORMOTEROL FUMARATE DIHYDRATE 2 PUFF(S): 160; 4.5 AEROSOL RESPIRATORY (INHALATION) at 21:09

## 2019-11-09 NOTE — H&P ADULT - NSHPSOCIALHISTORY_GEN_ALL_CORE
Lives with: (  ) alone  (  ) children   (  ) spouse   (  ) parents  (  ) other  Substance Use (street drugs): (  ) never used  (  ) other:  Tobacco Usage:  (   ) never smoked   (   ) former smoker   (   ) current smoker  (     ) pack year  (        ) last cigarette date  Alcohol Usage: Lives with: (  ) alone  (  ) children   ( x ) spouse   (  ) parents  (  ) other  Substance Use (street drugs): (  ) never used  (  ) other:  Tobacco Usage:   ( x  ) current smoker (60 years, 0.75 ppd, 45 pack-year)    Alcohol Usage: positive

## 2019-11-09 NOTE — H&P ADULT - ASSESSMENT
73 yo M with hx HTN, DMT2, atrial fibrillation (on apixaban), aortic stenosis, severe mitral regurgitation, HFpEF (LVEF 55-60% 4/'19) , hx pericardial effusion, LIMA (not on cpap), COPD (not on home O2, current smoker (50 pack-year)) and hyperthyroidism, with recent outpatient infectious diseases visit for elevated WBC found on presurgical testing for a MitraClip thought to be from prednisone (recently started by pulmonologist-14 day course for COPD), now admitted due to a positive blood culture (11.08.19; gram positive cocci in pairs and chains). 73 yo M with hx HTN, DMT2, atrial fibrillation (on apixaban), aortic stenosis, severe mitral regurgitation, HFpEF (LVEF 55-60% 4/'19) , hx pericardial effusion, LIMA (not on cpap), COPD (not on home O2, current smoker (50 pack-year)) and hyperthyroidism, with recent outpatient infectious diseases visit for elevated WBC found on presurgical testing for a MitraClip thought to be from prednisone (recently started by pulmonologist-14 day course for COPD), now admitted due to a positive blood culture (11.08.19; gram positive cocci in pairs and chains, 1 of 2 bottles enterococcus).

## 2019-11-09 NOTE — CONSULT NOTE ADULT - ATTENDING COMMENTS
The patient  is on steroids for a pulm condition.  Seen by Dr. Galeana yesterday in office.  No localizing symptoms (except ongoing pulm).  Blood cultures collected--- 1 set....1/2 bottles with enterococcus.  Pt non-toxic.  Repeat blood cultures have been collected.   Patient is on iv vanco now--- he reports hives with pcn 10 years ago.    would like to see a urine culture in view of this organism.  Additionally, as this is a bowel pathogen, would like to see ct of the abd and pelvis.    continue iv vanco--check trough prior to dose 4.    significance of the bacteremia not yet certain--let's see what additional cultures show us.

## 2019-11-09 NOTE — H&P ADULT - ATTENDING COMMENTS
agree w plan as above  + enterococcus ? source unclear ? abdominal ?  get u/a ucx  ct abd pelvis IV con  ID f/u  can make structural heart team, Dr. Campbell aware of admission

## 2019-11-09 NOTE — CONSULT NOTE ADULT - SUBJECTIVE AND OBJECTIVE BOX
Patient is a 74y old  Male who presents with a chief complaint of   HPI:     prior hospital charts reviewed [  ]  primary team notes reviewed [  ]  other consultant notes reviewed [  ]  PAST MEDICAL & SURGICAL HISTORY:  Nonrheumatic mitral valve insufficiency  COPD (chronic obstructive pulmonary disease): not on oxygen  Mitral regurgitation: severe  Pericardial effusion: drained  a liter of fluid in 2018  Atrial fibrillation: s/p Atrial flutter ablation; on Eliquis/ASA  Goiter  Hyperthyroidism: on methimazole  LIMA (obstructive sleep apnea): refuses C-pap  Aortic stenosis: moderate  Coronary artery disease  HLD (hyperlipidemia)  Pulmonary nodules  Diabetes mellitus, type 2: on janumet &amp; farxiga  Kidney stones  Hypertension  S/P coronary angiogram  History of tonsillectomy: As a child  History of kidney stones: 1967 &amp; 1986    Allergies  penicillins (Anaphylaxis)    ANTIMICROBIALS (past 90 days)  MEDICATIONS  (STANDING):  vancomycin  IVPB   250 mL/Hr IV Intermittent (11-09-19 @ 14:10)      ANTIMICROBIALS:      OTHER MEDS: MEDICATIONS  (STANDING):    SOCIAL HISTORY:   hx smoking  non-smoker    FAMILY HISTORY:  No pertinent family history in first degree relatives    REVIEW OF SYSTEMS  [  ] ROS unobtainable because:    [  ] All other systems negative except as noted below:	    Constitutional:  [ ] fever [ ] chills  [ ] weight loss  [ ] weakness  Skin:  [ ] rash [ ] phlebitis	  Eyes: [ ] icterus [ ] pain  [ ] discharge	  ENMT: [ ] sore throat  [ ] thrush [ ] ulcers [ ] exudates  Respiratory: [ ] dyspnea [ ] hemoptysis [ ] cough [ ] sputum	  Cardiovascular:  [ ] chest pain [ ] palpitations [ ] edema	  Gastrointestinal:  [ ] nausea [ ] vomiting [ ] diarrhea [ ] constipation [ ] pain	  Genitourinary:  [ ] dysuria [ ] frequency [ ] hematuria [ ] discharge [ ] flank pain  [ ] incontinence  Musculoskeletal:  [ ] myalgias [ ] arthralgias [ ] arthritis  [ ] back pain  Neurological:  [ ] headache [ ] seizures  [ ] confusion/altered mental status  Psychiatric:  [ ] anxiety [ ] depression	  Hematology/Lymphatics:  [ ] lymphadenopathy  Endocrine:  [ ] adrenal [ ] thyroid  Allergic/Immunologic:	 [ ] transplant [ ] seasonal    Vital Signs Last 24 Hrs  T(F): 97.8 (11-09-19 @ 13:37), Max: 98.4 (11-09-19 @ 12:35)  Vital Signs Last 24 Hrs  HR: 77 (11-09-19 @ 13:37) (77 - 89)  BP: 99/68 (11-09-19 @ 13:37) (99/68 - 129/80)  RR: 18 (11-09-19 @ 13:37)  SpO2: 95% (11-09-19 @ 13:37) (95% - 95%)  Wt(kg): --    EXAM:  Constitutional: Not in acute distress  Eyes: pupils bilaterally reactive to light. No icterus.  Oral cavity: Clear, no lesions  Neck: No neck vein distension noted  RS: Chest clear to auscultation bilaterally. No wheeze/rhonchi/crepitations.  CVS: S1, S2 heard. Regular rate and rhythm. No murmurs/rubs/gallops.  Abdomen: Soft. No guarding/rigidity/tenderness.  : No acute abnormalities  Extremities: Warm. No pedal edema  Skin: No lesions noted  Vascular: No evidence of phlebitis  Neuro: Alert, oriented to time/place/person                          15.4   27.27 )-----------( 244      ( 09 Nov 2019 13:40 )             45.8           MICROBIOLOGY:                RADIOLOGY:  imaging below personally reviewed    OTHER TESTS:  < from: Transesophageal Echocardiogram w/o TTE (05.15.19 @ 11:26) >  Normal left ventricular systolic function. No segmental  wall motion abnormalities.  Torn chord with relative A2 prolapse. Severe mitral  regurgitation directed posteriorly.  Calcified aortic valve. 2D-imaging suggests "moderate"  aortic stenosis. Review of TTE dated 4/11/2019 demonstrates  gradients and a dimensionless index which are more  consistent with "moderate" aortic stenosis.  Moderate right ventricular enlargement with normal right  ventricular systolic function.  Echocardiogram  was performed  and interpreted by Dr. Jaime Hassan.    < end of copied text >    ASSESSMENT:    RECOMMENDATIONS:    JITENDRA Boateng MD  Fellow, Infectious Diseases  Pager: [phone]  After 5pm and on Weekends: Call [phone] Patient is a 74y old  Male who presents with a chief complaint of   HPI:  - 74/M with PMH Moderate AS, Severe MR, Afib/Aflutter on eliquis s/p ablation, Acute decompensated diastolic CHF, CAD, HTN, HLD, COPD not on home O2, Hyperthyroid, Pulmonary nodules, LIMA, DM Type 2  - reports episode of acute bronchitis 10 days back, received 5 days of Azithromycin. Started on Prednisone taper 7 days back  - was planned for Mitral clip this coming week. Referred to ID clinic Dr Galeana for persistent leucocytosis evaluation  - Dr Galeana saw patient in clinic 11/8. Bloodwork including blood cx ordered  - lab called on-call ID team stating that blood cx positive for GPC in pairs and chains. ID team contacted patient and family and asked them to come to ER  - in ER, afebrile and hemodynamically stable. Reports improvement in cough, with resolution of fevers  - Denied fevers, chills, night sweats, rash, coryza, dysuria, loose stools, recent travel, sick contacts    prior hospital charts reviewed [x]  primary team notes reviewed [x]    PAST MEDICAL & SURGICAL HISTORY:  Nonrheumatic mitral valve insufficiency  COPD (chronic obstructive pulmonary disease): not on oxygen  Mitral regurgitation: severe  Pericardial effusion: drained  a liter of fluid in 2018  Atrial fibrillation: s/p Atrial flutter ablation; on Eliquis/ASA  Goiter  Hyperthyroidism: on methimazole  LIMA (obstructive sleep apnea): refuses C-pap  Aortic stenosis: moderate  Coronary artery disease  HLD (hyperlipidemia)  Pulmonary nodules  Diabetes mellitus, type 2: on janumet &amp; farxiga  Kidney stones  Hypertension  S/P coronary angiogram  History of tonsillectomy: As a child  History of kidney stones: 1967 &amp; 1986    Allergies  penicillins (Anaphylaxis)    ANTIMICROBIALS (past 90 days)  MEDICATIONS  (STANDING):  vancomycin  IVPB   250 mL/Hr IV Intermittent (11-09-19 @ 14:10)      ANTIMICROBIALS:      OTHER MEDS: MEDICATIONS  (STANDING):    SOCIAL HISTORY: Lives with wife    FAMILY HISTORY:  No pertinent family history in first degree relatives    REVIEW OF SYSTEMS  [  ] ROS unobtainable because:    [x] All other systems negative except as noted below:	  Constitutional:  [ ] fever [ ] chills  [ ] weight loss  [ ] weakness  Skin:  [ ] rash [ ] phlebitis	  Eyes: [ ] icterus [ ] pain  [ ] discharge	  ENMT: [ ] sore throat  [ ] thrush [ ] ulcers [ ] exudates  Respiratory: [ ] dyspnea [ ] hemoptysis [x] cough [ ] sputum	  Cardiovascular:  [ ] chest pain [ ] palpitations [ ] edema	  Gastrointestinal:  [ ] nausea [ ] vomiting [ ] diarrhea [ ] constipation [ ] pain	  Genitourinary:  [ ] dysuria [ ] frequency [ ] hematuria [ ] discharge [ ] flank pain  [ ] incontinence  Musculoskeletal:  [ ] myalgias [ ] arthralgias [ ] arthritis  [ ] back pain  Neurological:  [ ] headache [ ] seizures  [ ] confusion/altered mental status  Psychiatric:  [ ] anxiety [ ] depression	  Hematology/Lymphatics:  [ ] lymphadenopathy  Endocrine:  [ ] adrenal [ ] thyroid  Allergic/Immunologic:	 [ ] transplant [ ] seasonal    Vital Signs Last 24 Hrs  T(F): 97.8 (11-09-19 @ 13:37), Max: 98.4 (11-09-19 @ 12:35)  Vital Signs Last 24 Hrs  HR: 77 (11-09-19 @ 13:37) (77 - 89)  BP: 99/68 (11-09-19 @ 13:37) (99/68 - 129/80)  RR: 18 (11-09-19 @ 13:37)  SpO2: 95% (11-09-19 @ 13:37) (95% - 95%)  Wt(kg): --    EXAM:  Constitutional: Not in acute distress  Eyes: pupils bilaterally reactive to light. No icterus.  Oral cavity: Clear, no lesions  Neck: No neck vein distension noted  RS: Coarse breath sounds bilaterally. No wheeze/rhonchi.  CVS: S1, S2 heard. Regular rate and rhythm. No murmurs/rubs/gallops.  Abdomen: Soft. No guarding/rigidity/tenderness.  : No acute abnormalities  Extremities: Warm. No pedal edema  Skin: No lesions noted  Vascular: No evidence of phlebitis  Neuro: Alert, oriented to time/place/person                          15.4   27.27 )-----------( 244      ( 09 Nov 2019 13:40 )             45.8           RADIOLOGY:  imaging below personally reviewed  < from: Xray Chest 2 Views PA/Lat (11.09.19 @ 14:39) >  Clear lungs.    < end of copied text >    OTHER TESTS:  < from: Transesophageal Echocardiogram w/o TTE (05.15.19 @ 11:26) >  Normal left ventricular systolic function. No segmental  wall motion abnormalities.  Torn chord with relative A2 prolapse. Severe mitral  regurgitation directed posteriorly.  Calcified aortic valve. 2D-imaging suggests "moderate"  aortic stenosis. Review of TTE dated 4/11/2019 demonstrates  gradients and a dimensionless index which are more  consistent with "moderate" aortic stenosis.  Moderate right ventricular enlargement with normal right  ventricular systolic function.  Echocardiogram  was performed  and interpreted by Dr. Jaime Hassan.    < end of copied text >

## 2019-11-09 NOTE — ED PROVIDER NOTE - PMH
Aortic stenosis  moderate  Atrial fibrillation  s/p Atrial flutter ablation; on Eliquis/ASA  COPD (chronic obstructive pulmonary disease)  not on oxygen  Coronary artery disease    Diabetes mellitus, type 2  on janumet & farxiga  Goiter    HLD (hyperlipidemia)    Hypertension    Hyperthyroidism  on methimazole  Kidney stones    Mitral regurgitation  severe  Nonrheumatic mitral valve insufficiency    LIMA (obstructive sleep apnea)  refuses C-pap  Pericardial effusion  drained  a liter of fluid in 2018  Pulmonary nodules

## 2019-11-09 NOTE — H&P ADULT - NSHPREVIEWOFSYSTEMS_GEN_ALL_CORE
CONSTITUTIONAL: No weakness, fevers or chills  EYES/ENT: No visual changes;  No vertigo or throat pain   NECK: No pain or stiffness  RESPIRATORY: No cough, wheezing, hemoptysis; No shortness of breath  CARDIOVASCULAR: No chest pain or palpitations  GASTROINTESTINAL: No abdominal or epigastric pain. No nausea, vomiting, or hematemesis; No diarrhea or constipation. No melena or hematochezia.  GENITOURINARY: No dysuria, frequency or hematuria  NEUROLOGICAL: No numbness or weakness  SKIN: No itching, burning, rashes, or lesions CONSTITUTIONAL: No weakness, fevers or chills. No wt change or night sweats.  EYES/ENT: No visual changes;  No vertigo or throat pain   NECK: No pain or stiffness  RESPIRATORY: No wheezing, hemoptysis; No shortness of breath.; + chronic cough productive, at baseline  CARDIOVASCULAR: No chest pain or palpitations  GASTROINTESTINAL: No abdominal or epigastric pain. No nausea, vomiting, or hematemesis; No diarrhea or constipation. No melena or hematochezia.  GENITOURINARY: No dysuria, frequency or hematuria  NEUROLOGICAL: No numbness or weakness or HA or dizziness.  SKIN: No itching, burning, rashes, or lesions    No recent travel or sick contacts.

## 2019-11-09 NOTE — H&P ADULT - NSHPLABSRESULTS_GEN_ALL_CORE
11-09    136  |  90<L>  |  31<H>  ----------------------------<  185<H>  6.4<HH>   |  29  |  1.13    Ca    10.3      09 Nov 2019 13:40    TPro  8.0  /  Alb  4.2  /  TBili  0.3  /  DBili  x   /  AST  52<H>  /  ALT  28  /  AlkPhos  104  11-09      PT/INR - ( 09 Nov 2019 13:40 )   PT: 15.6 sec;   INR: 1.36 ratio         PTT - ( 09 Nov 2019 13:40 )  PTT:32.4 sec                                        15.4   27.27 )-----------( 244      ( 09 Nov 2019 13:40 )             45.8     CAPILLARY BLOOD GLUCOSE 11-09    137  |  90<L>  |  32<H>  ----------------------------<  239<H>  4.5   |  29  |  1.27  11-09    136  |  90<L>  |  31<H>  ----------------------------<  185<H>  6.4<HH>   |  29  |  1.13    Ca    10.3      09 Nov 2019 17:08  Ca    10.3      09 Nov 2019 13:40    TPro  8.0  /  Alb  4.2  /  TBili  0.3  /  DBili  x   /  AST  52<H>  /  ALT  28  /  AlkPhos  104  11-09      PT/INR - ( 09 Nov 2019 13:40 )   PT: 15.6 sec;   INR: 1.36 ratio         PTT - ( 09 Nov 2019 13:40 )  PTT:32.4 sec                                        15.4   27.27 )-----------( 244      ( 09 Nov 2019 13:40 )             45.8     CAPILLARY BLOOD GLUCOSE    < from: Xray Chest 2 Views PA/Lat (11.09.19 @ 14:39) >    FINDINGS:   Clear lungs.    IMPRESSION:  Clear lungs.    < end of copied text >    EKG 11.9.18:P NSR, rate 80.

## 2019-11-09 NOTE — ED ADULT NURSE NOTE - OBJECTIVE STATEMENT
74year old male walk in from triage w/ call back for positive blood cultures. Patient states he was getting presurgical testing for Mitral valve regurgitation. Patient had blood cultures drawn at doctors office yesterday and received phone call today to come in and be seen for positive blood cultures.  PMH COPD, A-Fib, CAD, HTN, DM, Pericardial effusion. A&Ox3. Patient denies fever/chills, nausea/vomiting, chest pain, SOB, dizziness, numbness/tingling, and burning/pain w/ urination. Patient has non productive dry cough present. Patient states he has had cough for two weeks. 74year old male walk in from triage w/ call back for positive blood cultures. Patient states he was getting presurgical testing for Mitral valve regurgitation. Patient had blood cultures drawn at doctors office yesterday and received phone call today to come in and be seen for positive blood cultures.  PMH COPD, A-Fib, CAD, HTN, DM, Pericardial effusion. A&Ox3. Patient denies fever/chills, nausea/vomiting, chest pain, SOB, dizziness, numbness/tingling, and burning/pain w/ urination. Patient has non productive dry cough present. Patient states he has had cough for two weeks. Equal chest rise, no use of accessory muscles, rales auscultated in lower bases b/t.

## 2019-11-09 NOTE — ED PROVIDER NOTE - OBJECTIVE STATEMENT
History taken from old chart: 73 yo, obese, male presents to ED for positive blood cultures that were found by ID during pre operative visit for a Mitral Clip on 11/13/2019. PMH of chronic heavy smoker (50 pack years), COPD, LIMA (refuses CPAP) pulmonary nodules (unchanged 0.4 cm RUL nodules- followed by Pulmonologist; note in chart), hyperthyroidism (with thyroid nodularity on CT consistent with MNG w/ borderline hyperthyroidism, goiter, HTN, HLD, dCHF on Lasix CAD, Afib s/p Ablation (on ASA & Eliquis last dose on Saturday 11/9/19 as per Cardiac Office; Redcap#876), Pericardial effusion w/ drainage in 6/18, DM type 2. On wednesday had pre operative visit for mitral clip procedure next week and called by Dr. Tracee Galeana ID for positive blood culture.

## 2019-11-09 NOTE — ED ADULT NURSE NOTE - CHPI ED NUR SYMPTOMS NEG
no decreased eating/drinking/no chills/no fever/no diarrhea/no headache/no abdominal pain/no shortness of breath/no rash/no vomiting

## 2019-11-09 NOTE — H&P ADULT - PROBLEM SELECTOR PLAN 4
severe, with HFpEF, planned MitraClip for next week, on hold. severe, with HFpEF, planned MitraClip for next week 11/13, on hold

## 2019-11-09 NOTE — H&P ADULT - PROBLEM SELECTOR PLAN 10
PCP Marco Campbell Transitions of Care Status:  1.  Name of PCP: Marco Campbell  2.  PCP Contacted on Admission: [ ] Y    [ ] N    3.  PCP contacted at Discharge: [ ] Y    [ ] N    [ ] N/A  4.  Post-Discharge Appointment Date and Location:  5.  Summary of Handoff given to PCP:

## 2019-11-09 NOTE — H&P ADULT - PROBLEM SELECTOR PLAN 2
- C/w albuterol PRN (home med) and Trelegy Ellipta (home med)  - Hold off prednisone give leukocytosis - Corry Ellipta (home med) in compatible with hospital formulary  - Hold off prednisone give leukocytosis and low c/f COPD exacerbation

## 2019-11-09 NOTE — H&P ADULT - PROBLEM SELECTOR PLAN 9
- C/w home med Flonase for nasal congestion - C/w home med Flonase for nasal congestion  - c/w Nicotine patch for tobacco dependence  - Hx aortic stenosis: no active issues. - C/w home med Flonase for nasal congestion  - c/w Nicotine patch for tobacco dependence  - Hx aortic stenosis: no active issues.  - DVT ppx: apixaban 5 mg bid  - Diet: DASH TLC  - Dispo: pending w/u

## 2019-11-09 NOTE — ED ADULT NURSE REASSESSMENT NOTE - NS ED NURSE REASSESS COMMENT FT1
Patient is aware he is being admitted and is awaiting chest X-ray at this time. Patient has no questions at this time.

## 2019-11-09 NOTE — H&P ADULT - PROBLEM SELECTOR PLAN 3
- c/w apixaban 5 mg qd - c/w apixaban 5 mg qd  - c/w Cardizem  mg qd and metoprolol succinate 25 mg qd - c/w apixaban 5 mg bid  - c/w Cardizem  mg qd and metoprolol succinate 25 mg qd

## 2019-11-09 NOTE — H&P ADULT - PROBLEM SELECTOR PLAN 8
c/w ASA 81 mg qd and atorvastatin (takes pitavastatin at home) c/w ASA 81 mg qd and atorvastatin 10 mg (takes pitavastatin at home)

## 2019-11-09 NOTE — H&P ADULT - PROBLEM SELECTOR PLAN 7
- Hold home metformin-sitagliptin and dapagliflozin  - insulin lispro sliding scale  - monitor FSGlc and add basal if needed. - Hold home metformin-sitagliptin and dapagliflozin  - insulin lispro sliding scale  - monitor FSGlc and add basal if needed  -check a1c - Hold home metformin-sitagliptin and dapagliflozin  - insulin glargine basal   - insulin lispro sliding scale

## 2019-11-09 NOTE — H&P ADULT - PROBLEM SELECTOR PLAN 1
#Gram positive cocci in pairs and chains, 1 of 2 bottles with Enterococcus  - Non-toxic appearance, no clear source of infection.  - F/u repeat blood cx x 2 colelcted 11.09.19.  - C/w Vancomycin IV, get trough pre-4th dose.  - F/u Urine Cx  - Get CT A/P #Gram positive cocci in pairs and chains, 1 of 2 bottles with Enterococcus  - Non-toxic appearance, no clear source of infection.  - F/u repeat blood cx x 2 collected 11.09.19.  - C/w Vancomycin IV, get trough pre-4th dose.  - F/u Urine Cx   - Get CT A/P #Gram positive cocci in pairs and chains, 1 of 2 bottles with Enterococcus  - Non-toxic appearance, no clear source of infection, ROS negative.  - F/u repeat blood cx x 2 collected 11.09.19.  - C/w Vancomycin IV, get trough pre-4th dose.  - F/u Urine Cx sent 11.9.19  - Get CT A/P with oral and IV contrast, ordered 11.9.19

## 2019-11-09 NOTE — H&P ADULT - NSHPPHYSICALEXAM_GEN_ALL_CORE
Vital Signs Last 24 Hrs  T(C): 36.3 (09 Nov 2019 14:55), Max: 36.9 (09 Nov 2019 12:35)  T(F): 97.4 (09 Nov 2019 14:55), Max: 98.4 (09 Nov 2019 12:35)  HR: 82 (09 Nov 2019 14:55) (77 - 89)  BP: 112/77 (09 Nov 2019 14:55) (99/68 - 129/80)  BP(mean): --  RR: 20 (09 Nov 2019 14:55) (18 - 20)  SpO2: 94% (09 Nov 2019 14:55) (94% - 95%)    PHYSICAL EXAM:  GENERAL: NAD, conversant  CHEST/LUNG: Clear to auscultation bilaterally; No crackles, rhonchi, wheezing, or rubs  HEART: Regular rate and rhythm; No murmurs, rubs, or gallops  ABDOMEN: Soft, Nontender, Nondistended; Bowel sounds present  EXTREMITIES:  2+ Peripheral Pulses, No clubbing, cyanosis, or edema  SKIN: No rashes or lesions  NERVOUS SYSTEM:  Alert & Oriented, Good concentration Vital Signs Last 24 Hrs  T(C): 36.3 (09 Nov 2019 14:55), Max: 36.9 (09 Nov 2019 12:35)  T(F): 97.4 (09 Nov 2019 14:55), Max: 98.4 (09 Nov 2019 12:35)  HR: 82 (09 Nov 2019 14:55) (77 - 89)  BP: 112/77 (09 Nov 2019 14:55) (99/68 - 129/80)  BP(mean): --  RR: 20 (09 Nov 2019 14:55) (18 - 20)  SpO2: 94% (09 Nov 2019 14:55) (94% - 95%)    PHYSICAL EXAM:  GENERAL: NAD, conversant  OP: moist mucus membranes, no lesions.  No lymphadenopathy. Pupils PERRLA.  CHEST/LUNG: diffuse expiratory rhonchi with prolonged expiratory phase; No crackles, wheezing, or rubs.  HEART: Regular rate and rhythm; No rubs or gallops. Grade 3/6 systolic murmur at RUSB, LUSB, LLSB. No murmur at apex or axilla.  ABDOMEN: Soft, Nontender, Nondistended; Bowel sounds present  EXTREMITIES:  2+ Peripheral Pulses, No clubbing, cyanosis, or edema  SKIN: No rashes or lesions  NERVOUS SYSTEM:  Alert & Oriented, Good concentration

## 2019-11-09 NOTE — H&P ADULT - HISTORY OF PRESENT ILLNESS
75 yo M with hx HTN, DMT2, atrial fibrillation (on apixaban), aortic stenosis, severe mitral regurgitation, HFpEF (LVEF 55-60% 4/'19) , hx pericardial effusion, LIMA (not on cpap), COPD (not on home O2, current smoker (50 pack-year)) and hyperthyroidism, with recent outpatient infectious diseases visit for elevated WBC found on presurgical testing for a MitraClip thought to be from prednisone (recently started by pulmonologist-14 day course for COPD), now admitted due to a positive blood culture (gram positive cocci in pairs and chains, 1 of 2 bottles with enterococcus).    Patient has been following with cardiothoracic surgery and with structural cardiology regarding his severe MR, with plans set to do MitraClip on 11/13/19. However, patient was found to have leukocytosis 21.88 on presurgical testing, so was sent to ID clinic for evaluation, where repeat WBC 24.87. ID specialist noted that there were no focal findings which would explain the leukocytosis. Rales in L lung base - ?LLL PNA. Of note, patient started on course of prednisone on 11.01.19 (14 d, 20 mg for 7d, 10 mg for 7 d). Blood cultures collected on 11.08.19 and resulted on 11.09.19 with gram positive cocci in pairs and chains. Thus, patient was admitted for further workup. ROS negative for    ED course: ID consulted, pt placed on vancomycin (pt has hx of PCN allergy with reaction of hives) 73 yo M with hx HTN, DMT2, atrial fibrillation (on apixaban), aortic stenosis, severe mitral regurgitation, HFpEF (LVEF 55-60% 4/'19) , hx pericardial effusion, LIMA (not on cpap), COPD (not on home O2, current smoker (50 pack-year)) and hyperthyroidism, with recent outpatient infectious diseases visit for elevated WBC found on presurgical testing for a MitraClip thought to be from prednisone (recently started by pulmonologist-14 day course for COPD), now admitted due to a positive blood culture (gram positive cocci in pairs and chains, 1 of 2 bottles with enterococcus).    Patient has been following with cardiothoracic surgery and with structural cardiology regarding his severe MR, with plans set to do MitraClip on 11/13/19. However, patient was found to have leukocytosis 21.88 on presurgical testing, so was sent to ID clinic for evaluation, where repeat WBC 24.87. ID specialist noted that there were no focal findings which would explain the leukocytosis. Rales in L lung base - ?LLL PNA. Of note, patient started on course of prednisone on 11.01.19 (14 d, 20 mg for 7d, 10 mg for 7 d). Blood cultures collected on 11.08.19 and resulted on 11.09.19 with gram positive cocci in pairs and chains. Thus, patient was admitted for further workup. ROS negative except for chronic cough, which is at baseline.    ED course: ID consulted, pt placed on vancomycin (pt has hx of PCN allergy with reaction of hives).

## 2019-11-09 NOTE — ED PROVIDER NOTE - PROGRESS NOTE DETAILS
Microbiology called patient has positive anaerobic culture for positive cocci in chains. PCR positive for enterococcal species. ZR discussed with id discussed with id will see pt in ER Cardio thoracic called

## 2019-11-09 NOTE — ED PROVIDER NOTE - CLINICAL SUMMARY MEDICAL DECISION MAKING FREE TEXT BOX
74 year old morbidly obese male with multiple medical issues having mitral clip procedure next week. Had pre operative surgical testing on Wednesday and was found to have a positive blood culture. Patient denies fever, chills, feels fine. Was called by ID to be admitted to the hospital. Will repeat blood culture, contact ID, and re evaluate. ZR

## 2019-11-10 DIAGNOSIS — R78.81 BACTEREMIA: ICD-10-CM

## 2019-11-10 LAB
ALBUMIN SERPL ELPH-MCNC: 4.1 G/DL — SIGNIFICANT CHANGE UP (ref 3.3–5)
ALP SERPL-CCNC: 108 U/L — SIGNIFICANT CHANGE UP (ref 40–120)
ALT FLD-CCNC: 21 U/L — SIGNIFICANT CHANGE UP (ref 10–45)
ANION GAP SERPL CALC-SCNC: 15 MMOL/L — SIGNIFICANT CHANGE UP (ref 5–17)
APPEARANCE UR: CLEAR — SIGNIFICANT CHANGE UP
AST SERPL-CCNC: 15 U/L — SIGNIFICANT CHANGE UP (ref 10–40)
BASOPHILS # BLD AUTO: 0.12 K/UL — SIGNIFICANT CHANGE UP (ref 0–0.2)
BASOPHILS NFR BLD AUTO: 0.5 % — SIGNIFICANT CHANGE UP (ref 0–2)
BILIRUB SERPL-MCNC: 0.2 MG/DL — SIGNIFICANT CHANGE UP (ref 0.2–1.2)
BILIRUB UR-MCNC: NEGATIVE — SIGNIFICANT CHANGE UP
BUN SERPL-MCNC: 35 MG/DL — HIGH (ref 7–23)
CALCIUM SERPL-MCNC: 9.4 MG/DL — SIGNIFICANT CHANGE UP (ref 8.4–10.5)
CHLORIDE SERPL-SCNC: 88 MMOL/L — LOW (ref 96–108)
CO2 SERPL-SCNC: 31 MMOL/L — SIGNIFICANT CHANGE UP (ref 22–31)
COLOR SPEC: SIGNIFICANT CHANGE UP
CREAT SERPL-MCNC: 1.27 MG/DL — SIGNIFICANT CHANGE UP (ref 0.5–1.3)
CULTURE RESULTS: NO GROWTH — SIGNIFICANT CHANGE UP
DIFF PNL FLD: NEGATIVE — SIGNIFICANT CHANGE UP
EOSINOPHIL # BLD AUTO: 0.22 K/UL — SIGNIFICANT CHANGE UP (ref 0–0.5)
EOSINOPHIL NFR BLD AUTO: 1 % — SIGNIFICANT CHANGE UP (ref 0–6)
GLUCOSE BLDC GLUCOMTR-MCNC: 147 MG/DL — HIGH (ref 70–99)
GLUCOSE BLDC GLUCOMTR-MCNC: 153 MG/DL — HIGH (ref 70–99)
GLUCOSE BLDC GLUCOMTR-MCNC: 163 MG/DL — HIGH (ref 70–99)
GLUCOSE BLDC GLUCOMTR-MCNC: 192 MG/DL — HIGH (ref 70–99)
GLUCOSE SERPL-MCNC: 188 MG/DL — HIGH (ref 70–99)
GLUCOSE UR QL: ABNORMAL
HCT VFR BLD CALC: 45.7 % — SIGNIFICANT CHANGE UP (ref 39–50)
HGB BLD-MCNC: 14.9 G/DL — SIGNIFICANT CHANGE UP (ref 13–17)
IMM GRANULOCYTES NFR BLD AUTO: 3.2 % — HIGH (ref 0–1.5)
KETONES UR-MCNC: NEGATIVE — SIGNIFICANT CHANGE UP
LEUKOCYTE ESTERASE UR-ACNC: NEGATIVE — SIGNIFICANT CHANGE UP
LYMPHOCYTES # BLD AUTO: 13.3 % — SIGNIFICANT CHANGE UP (ref 13–44)
LYMPHOCYTES # BLD AUTO: 2.98 K/UL — SIGNIFICANT CHANGE UP (ref 1–3.3)
MAGNESIUM SERPL-MCNC: 2.2 MG/DL — SIGNIFICANT CHANGE UP (ref 1.6–2.6)
MANUAL SMEAR VERIFICATION: SIGNIFICANT CHANGE UP
MCHC RBC-ENTMCNC: 30.1 PG — SIGNIFICANT CHANGE UP (ref 27–34)
MCHC RBC-ENTMCNC: 32.6 GM/DL — SIGNIFICANT CHANGE UP (ref 32–36)
MCV RBC AUTO: 92.3 FL — SIGNIFICANT CHANGE UP (ref 80–100)
MONOCYTES # BLD AUTO: 1.64 K/UL — HIGH (ref 0–0.9)
MONOCYTES NFR BLD AUTO: 7.3 % — SIGNIFICANT CHANGE UP (ref 2–14)
NEUTROPHILS # BLD AUTO: 16.76 K/UL — HIGH (ref 1.8–7.4)
NEUTROPHILS NFR BLD AUTO: 74.7 % — SIGNIFICANT CHANGE UP (ref 43–77)
NITRITE UR-MCNC: NEGATIVE — SIGNIFICANT CHANGE UP
PH UR: 5.5 — SIGNIFICANT CHANGE UP (ref 5–8)
PHOSPHATE SERPL-MCNC: 3.1 MG/DL — SIGNIFICANT CHANGE UP (ref 2.5–4.5)
PLAT MORPH BLD: NORMAL — SIGNIFICANT CHANGE UP
PLATELET # BLD AUTO: 235 K/UL — SIGNIFICANT CHANGE UP (ref 150–400)
POTASSIUM SERPL-MCNC: 3.9 MMOL/L — SIGNIFICANT CHANGE UP (ref 3.5–5.3)
POTASSIUM SERPL-SCNC: 3.9 MMOL/L — SIGNIFICANT CHANGE UP (ref 3.5–5.3)
PROT SERPL-MCNC: 7.1 G/DL — SIGNIFICANT CHANGE UP (ref 6–8.3)
PROT UR-MCNC: NEGATIVE — SIGNIFICANT CHANGE UP
RBC # BLD: 4.95 M/UL — SIGNIFICANT CHANGE UP (ref 4.2–5.8)
RBC # FLD: 14.6 % — HIGH (ref 10.3–14.5)
RBC BLD AUTO: NORMAL — SIGNIFICANT CHANGE UP
SODIUM SERPL-SCNC: 134 MMOL/L — LOW (ref 135–145)
SP GR SPEC: 1.01 — SIGNIFICANT CHANGE UP (ref 1.01–1.02)
SPECIMEN SOURCE: SIGNIFICANT CHANGE UP
UROBILINOGEN FLD QL: SIGNIFICANT CHANGE UP
VANCOMYCIN TROUGH SERPL-MCNC: 20.4 UG/ML — HIGH (ref 10–20)
WBC # BLD: 22.43 K/UL — HIGH (ref 3.8–10.5)
WBC # FLD AUTO: 22.43 K/UL — HIGH (ref 3.8–10.5)

## 2019-11-10 PROCEDURE — 99233 SBSQ HOSP IP/OBS HIGH 50: CPT | Mod: GC

## 2019-11-10 PROCEDURE — 99232 SBSQ HOSP IP/OBS MODERATE 35: CPT

## 2019-11-10 RX ORDER — INSULIN LISPRO 100/ML
VIAL (ML) SUBCUTANEOUS
Refills: 0 | Status: DISCONTINUED | OUTPATIENT
Start: 2019-11-10 | End: 2019-11-11

## 2019-11-10 RX ORDER — POLYETHYLENE GLYCOL 3350 17 G/17G
17 POWDER, FOR SOLUTION ORAL DAILY
Refills: 0 | Status: DISCONTINUED | OUTPATIENT
Start: 2019-11-10 | End: 2019-11-11

## 2019-11-10 RX ORDER — SENNA PLUS 8.6 MG/1
2 TABLET ORAL AT BEDTIME
Refills: 0 | Status: DISCONTINUED | OUTPATIENT
Start: 2019-11-10 | End: 2019-11-11

## 2019-11-10 RX ADMIN — Medication 25 MILLIGRAM(S): at 05:27

## 2019-11-10 RX ADMIN — Medication 1 PATCH: at 12:53

## 2019-11-10 RX ADMIN — Medication 1: at 08:27

## 2019-11-10 RX ADMIN — Medication 250 MILLIGRAM(S): at 17:26

## 2019-11-10 RX ADMIN — BUDESONIDE AND FORMOTEROL FUMARATE DIHYDRATE 2 PUFF(S): 160; 4.5 AEROSOL RESPIRATORY (INHALATION) at 05:27

## 2019-11-10 RX ADMIN — Medication 1 PATCH: at 08:28

## 2019-11-10 RX ADMIN — SENNA PLUS 2 TABLET(S): 8.6 TABLET ORAL at 21:18

## 2019-11-10 RX ADMIN — INSULIN GLARGINE 12 UNIT(S): 100 INJECTION, SOLUTION SUBCUTANEOUS at 21:19

## 2019-11-10 RX ADMIN — Medication 180 MILLIGRAM(S): at 05:27

## 2019-11-10 RX ADMIN — Medication 4 UNIT(S): at 17:25

## 2019-11-10 RX ADMIN — Medication 1 PATCH: at 12:52

## 2019-11-10 RX ADMIN — Medication 4 UNIT(S): at 08:27

## 2019-11-10 RX ADMIN — Medication 81 MILLIGRAM(S): at 12:53

## 2019-11-10 RX ADMIN — APIXABAN 5 MILLIGRAM(S): 2.5 TABLET, FILM COATED ORAL at 05:26

## 2019-11-10 RX ADMIN — SPIRONOLACTONE 25 MILLIGRAM(S): 25 TABLET, FILM COATED ORAL at 05:27

## 2019-11-10 RX ADMIN — POLYETHYLENE GLYCOL 3350 17 GRAM(S): 17 POWDER, FOR SOLUTION ORAL at 13:19

## 2019-11-10 RX ADMIN — Medication 4 UNIT(S): at 12:55

## 2019-11-10 RX ADMIN — Medication 1 SPRAY(S): at 12:54

## 2019-11-10 RX ADMIN — BUDESONIDE AND FORMOTEROL FUMARATE DIHYDRATE 2 PUFF(S): 160; 4.5 AEROSOL RESPIRATORY (INHALATION) at 17:26

## 2019-11-10 RX ADMIN — APIXABAN 5 MILLIGRAM(S): 2.5 TABLET, FILM COATED ORAL at 17:26

## 2019-11-10 RX ADMIN — TIOTROPIUM BROMIDE 1 CAPSULE(S): 18 CAPSULE ORAL; RESPIRATORY (INHALATION) at 12:52

## 2019-11-10 RX ADMIN — Medication 2: at 17:26

## 2019-11-10 RX ADMIN — ATORVASTATIN CALCIUM 10 MILLIGRAM(S): 80 TABLET, FILM COATED ORAL at 05:27

## 2019-11-10 RX ADMIN — Medication 1 PATCH: at 19:44

## 2019-11-10 RX ADMIN — Medication 80 MILLIGRAM(S): at 05:27

## 2019-11-10 RX ADMIN — Medication 250 MILLIGRAM(S): at 05:26

## 2019-11-10 NOTE — PROGRESS NOTE ADULT - ASSESSMENT
75 yo M with hx HTN, DMT2, atrial fibrillation (on apixaban), aortic stenosis, severe mitral regurgitation, HFpEF (LVEF 55-60% 4/'19) , hx pericardial effusion, LIMA (not on cpap), COPD (not on home O2, current smoker (50 pack-year)) and hyperthyroidism, with recent outpatient infectious diseases visit for elevated WBC found on presurgical testing for a MitraClip thought to be from prednisone (recently started by pulmonologist-14 day course for COPD), now admitted due to a positive blood culture (11.08.19; gram positive cocci in pairs and chains, 1 of 2 bottles enterococcus).

## 2019-11-10 NOTE — PROGRESS NOTE ADULT - PROBLEM SELECTOR PLAN 1
#Gram positive cocci in pairs and chains, 1 of 2 bottles with Enterococcus  - Non-toxic appearance, no clear source of infection, ROS negative.  - F/u repeat blood cx x 2 collected 11.09.19.  - C/w Vancomycin IV, get trough pre-4th dose.  - F/u Urine Cx sent 11.9.19  - Get CT A/P with oral and IV contrast, ordered 11.9.19 #Gram positive cocci in pairs and chains, 1 of 2 bottles with Enterococcus (outpt blood cx 11.8.19 unchanged)  - Non-toxic appearance, no clear source of infection, ROS negative.  - F/u repeat blood cx x 2 collected 11.09.19.  - C/w Vancomycin IV, get trough pre-4th dose.  - F/u Urine Cx sent 11.9.19  - F/u CT A/P with oral and IV contrast, performed #Gram positive cocci in pairs and chains, 1 of 2 bottles with Enterococcus (outpt blood cx 11.8.19 unchanged)  - Non-toxic appearance, no clear source of infection, ROS negative.  - F/u repeat blood cx x 2 collected 11.09.19.  - C/w Vancomycin IV, get trough pre-4th dose.  - F/u Urine Cx sent 11.9.19  - CT A/P with oral and IV contrast, result with no acute pathology, b/l kidney stones. #Gram positive cocci in pairs and chains, 1 of 2 bottles with Enterococcus (outpt blood cx 11.8.19 unchanged)  - Non-toxic appearance, no clear source of infection, ROS negative. Leukocytosis downtrending but still > 20k (11.10.19)  - F/u repeat blood cx x 2 collected 11.09.19.  - C/w Vancomycin IV, get trough pre-4th dose today 11.10.19  - F/u Urine Cx sent 11.9.19  - CT A/P with oral and IV contrast, result with no acute pathology, b/l kidney stones. #Gram positive cocci in pairs and chains, 1 of 2 bottles with Enterococcus (outpt blood cx 11.8.19 unchanged)  - Non-toxic appearance, no clear source of infection, ROS negative. Leukocytosis downtrending but still > 20k (11.10.19)  - F/u repeat blood cx x 2 collected 11.09.19.  - C/w Vancomycin IV, get trough pre-4th dose today 11.10.19  - F/u Urine Cx sent 11.9.19  - CT A/P with oral and IV contrast, result with no acute pathology, b/l kidney stones. needs MRI opt for renal lesion in R kidney as opt

## 2019-11-10 NOTE — PROGRESS NOTE ADULT - PROBLEM SELECTOR PLAN 2
- Corry Ellipta (home med) in compatible with hospital formulary  - Hold off prednisone give leukocytosis and low c/f COPD exacerbation - Trelegy Ellipta (home med)--> Symbicort and Spiriva  - Hold off prednisone give leukocytosis and low c/f COPD exacerbation

## 2019-11-10 NOTE — PROGRESS NOTE ADULT - ASSESSMENT
imp/rx:  MV disease.  + BC-- 1 bottle but with a pathogen that causes inf endo.  Awaiting more bc, to see if this is a sustained bsi.  for now vanco (severe pcn all), but if plans for prolonged Rx, we may want to desensitize to pcn.  His pulm/card MDs notified of his admission.

## 2019-11-10 NOTE — CONSULT NOTE ADULT - SUBJECTIVE AND OBJECTIVE BOX
CARDIOLOGY CONSULT NOTE - DR. JAIN    HPI:  Patient is a 74 year old man with history of HTN, DMT2, atrial fibrillation (on apixaban), aortic stenosis, severe mitral regurgitation, HFpEF (LVEF 55-60% 4/'19) ,hx pericardial effusion, LIMA (not on cpap), COPD (not on home O2, current smoker (50 pack-year)) and hyperthyroidism, with recent outpatient infectious diseases visit for elevated WBC found on presurgical testing for a MitraClip thought to be from prednisone (recently started by pulmonologist-14 day course for COPD), now admitted due to a positive blood culture (gram positive cocci in pairs and chains, 1 of 2 bottles with enterococcus).    Patient denies any chest pain, dyspnea, palpitations, cough, syncope, edema, exertional symptoms, nausea, abdominal pain, fever, chills,  or rash.  [  ] History of Atrial Fibrillation      PAST MEDICAL & SURGICAL HISTORY:  Nonrheumatic mitral valve insufficiency  COPD (chronic obstructive pulmonary disease): not on oxygen  Mitral regurgitation: severe  Pericardial effusion: drained  a liter of fluid in 2018  Atrial fibrillation: s/p Atrial flutter ablation; on Eliquis/ASA  Goiter  Hyperthyroidism: on methimazole  LIMA (obstructive sleep apnea): refuses C-pap  Aortic stenosis: moderate  Coronary artery disease  HLD (hyperlipidemia)  Pulmonary nodules  Diabetes mellitus, type 2: on janumet &amp; farxiga  Kidney stones  Hypertension  S/P coronary angiogram  History of tonsillectomy: As a child  History of kidney stones: 1967 &amp; 1986        PREVIOUS DIAGNOSTIC TESTING:    [ ] Echocardiogram:  [ ]  Catheterization:  [ ] Stress Test:  	    MEDICATIONS:    Home Medications:  aspirin 81 mg oral tablet: 1 tab(s) orally once a day (09 Nov 2019 17:46)  Eliquis 5 mg oral tablet: 1 tab(s) orally 2 times a day (09 Nov 2019 17:46)  Farxiga 5 mg oral tablet: 1 tab(s) orally once a day (09 Nov 2019 17:46)  fluticasone 50 mcg/inh nasal spray: 1 spray(s) nasal once a day (09 Nov 2019 17:46)  Janumet 50 mg-500 mg oral tablet: 1 tab(s) orally once a day (09 Nov 2019 17:46)  Lasix 80 mg oral tablet: 1 tab(s) orally once a day (09 Nov 2019 17:46)  Livalo 1 mg oral tablet: 1 tab(s) orally once a day (09 Nov 2019 17:46)  methIMAzole 5 mg oral tablet: 1 tab(s) orally once a day (09 Nov 2019 17:46)  nicotine 21 mg/24 hr transdermal film, extended release: 1 patch transdermal once a day, ADVISED NO PATCH TO OR (09 Nov 2019 17:46)  Trelegy Ellipta inhalation powder: 1 puff(s) inhaled once a day (09 Nov 2019 17:46)      MEDICATIONS  (STANDING):  apixaban 5 milliGRAM(s) Oral every 12 hours  aspirin enteric coated 81 milliGRAM(s) Oral daily  atorvastatin 10 milliGRAM(s) Oral at bedtime  budesonide 160 MICROgram(s)/formoterol 4.5 MICROgram(s) Inhaler 2 Puff(s) Inhalation two times a day  dextrose 5%. 1000 milliLiter(s) (50 mL/Hr) IV Continuous <Continuous>  dextrose 50% Injectable 12.5 Gram(s) IV Push once  dextrose 50% Injectable 25 Gram(s) IV Push once  dextrose 50% Injectable 25 Gram(s) IV Push once  diltiazem    milliGRAM(s) Oral daily  fluticasone propionate 50 MICROgram(s)/spray Nasal Spray 1 Spray(s) Both Nostrils daily  furosemide    Tablet 80 milliGRAM(s) Oral daily  insulin glargine Injectable (LANTUS) 12 Unit(s) SubCutaneous at bedtime  insulin lispro (HumaLOG) corrective regimen sliding scale   SubCutaneous three times a day before meals  insulin lispro (HumaLOG) corrective regimen sliding scale   SubCutaneous at bedtime  insulin lispro Injectable (HumaLOG) 4 Unit(s) SubCutaneous three times a day before meals  methimazole 5 milliGRAM(s) Oral daily  metoprolol succinate ER 25 milliGRAM(s) Oral daily  nicotine - 21 mG/24Hr(s) Patch 1 patch Transdermal daily  senna 2 Tablet(s) Oral at bedtime  spironolactone 25 milliGRAM(s) Oral daily  tiotropium 18 MICROgram(s) Capsule 1 Capsule(s) Inhalation daily  vancomycin  IVPB 1000 milliGRAM(s) IV Intermittent every 12 hours      FAMILY HISTORY:  No pertinent family history in first degree relatives      SOCIAL HISTORY:    [x ] Non-smoker  [ ] Smoker  [ ] Alcohol    Allergies    penicillins (Anaphylaxis)    Intolerances    	    REVIEW OF SYSTEMS:  CONSTITUTIONAL: No fever, weight loss, or fatigue  EYES: No eye pain, visual disturbances, or discharge  ENMT:  No difficulty hearing, tinnitus, vertigo; No sinus or throat pain  NECK: No pain or stiffness  RESPIRATORY: No cough, wheezing, chills or hemoptysis; No Shortness of Breath  CARDIOVASCULAR: as HPI  GASTROINTESTINAL: No abdominal or epigastric pain. No nausea, vomiting, or hematemesis; No diarrhea or constipation. No melena or hematochezia.  GENITOURINARY: No dysuria, frequency, hematuria, or incontinence  NEUROLOGICAL: No headaches, memory loss, loss of strength, numbness, or tremors  SKIN: No itching, burning, rashes, or lesions   	  [ ] All others negative	  [ ] Unable to obtain    PHYSICAL EXAM:    T(C): 36.9 (11-10-19 @ 08:44), Max: 36.9 (11-09-19 @ 12:35)  HR: 75 (11-10-19 @ 08:44) (72 - 89)  BP: 108/70 (11-10-19 @ 08:44) (99/68 - 129/80)  RR: 18 (11-10-19 @ 08:44) (18 - 20)  SpO2: 96% (11-10-19 @ 08:44) (94% - 96%)  Wt(kg): --  I&O's Summary    10 Nov 2019 07:01  -  10 Nov 2019 11:41  --------------------------------------------------------  IN: 200 mL / OUT: 0 mL / NET: 200 mL      Daily Height in cm: 160.02 (09 Nov 2019 12:35)    Daily     Appearance: Normal	  Psychiatry: A & O x 3, Mood & affect appropriate  HEENT:   Normal oral mucosa, PERRL, EOMI	  Lymphatic: No lymphadenopathy  Cardiovascular: Normal S1 S2,RRR, sm  Respiratory: Lungs clear to auscultation	  Gastrointestinal:  Soft, Non-tender, + BS	  Skin: No rashes, No ecchymoses, No cyanosis	  Neurologic: Non-focal  Extremities: Normal range of motion, No clubbing, cyanosis or edema  Vascular: Peripheral pulses palpable 2+ bilaterally    TELEMETRY: 	    ECG:  	  RADIOLOGY:  OTHER: 	  	  LABS:	 	    CARDIAC MARKERS:        proBNP:     Lipid Profile:   HgA1c: Hemoglobin A1C, Whole Blood: 8.0 % (11-06-19 @ 16:09)    TSH:                           15.4   27.27 )-----------( 244      ( 09 Nov 2019 13:40 )             45.8     11-10    134<L>  |  88<L>  |  35<H>  ----------------------------<  188<H>  3.9   |  31  |  1.27    Ca    9.4      10 Nov 2019 08:46  Phos  3.1     11-10  Mg     2.2     11-10    TPro  7.1  /  Alb  4.1  /  TBili  0.2  /  DBili  x   /  AST  15  /  ALT  21  /  AlkPhos  108  11-10    PT/INR - ( 09 Nov 2019 13:40 )   PT: 15.6 sec;   INR: 1.36 ratio         PTT - ( 09 Nov 2019 13:40 )  PTT:32.4 sec    Creatinine, Serum: 1.27 mg/dL (11-10-19 @ 08:46)  Creatinine, Serum: 1.27 mg/dL (11-09-19 @ 17:08)  Creatinine, Serum: 1.13 mg/dL (11-09-19 @ 13:40)        ASSESSMENT/PLAN:

## 2019-11-10 NOTE — PROGRESS NOTE ADULT - SUBJECTIVE AND OBJECTIVE BOX
INTERNAL MEDICINE PROGRESS NOTE:   Princess Yin M.D.  Beeper: [phone] ([facility])/ [id number] ([facility])     Patient is a 74y old  Male who presents with a chief complaint of Positive blood cultures (2019 16:19)      OVERNIGHT EVENTS:    SUBJECTIVE:    MEDICATIONS  (STANDING):  apixaban 5 milliGRAM(s) Oral every 12 hours  aspirin enteric coated 81 milliGRAM(s) Oral daily  atorvastatin 10 milliGRAM(s) Oral at bedtime  budesonide 160 MICROgram(s)/formoterol 4.5 MICROgram(s) Inhaler 2 Puff(s) Inhalation two times a day  dextrose 5%. 1000 milliLiter(s) (50 mL/Hr) IV Continuous <Continuous>  dextrose 50% Injectable 12.5 Gram(s) IV Push once  dextrose 50% Injectable 25 Gram(s) IV Push once  dextrose 50% Injectable 25 Gram(s) IV Push once  diltiazem    milliGRAM(s) Oral daily  fluticasone propionate 50 MICROgram(s)/spray Nasal Spray 1 Spray(s) Both Nostrils daily  furosemide    Tablet 80 milliGRAM(s) Oral daily  insulin glargine Injectable (LANTUS) 12 Unit(s) SubCutaneous at bedtime  insulin lispro (HumaLOG) corrective regimen sliding scale   SubCutaneous three times a day before meals  insulin lispro (HumaLOG) corrective regimen sliding scale   SubCutaneous at bedtime  insulin lispro Injectable (HumaLOG) 4 Unit(s) SubCutaneous three times a day before meals  methimazole 5 milliGRAM(s) Oral daily  metoprolol succinate ER 25 milliGRAM(s) Oral daily  nicotine - 21 mG/24Hr(s) Patch 1 patch Transdermal daily  spironolactone 25 milliGRAM(s) Oral daily  tiotropium 18 MICROgram(s) Capsule 1 Capsule(s) Inhalation daily  vancomycin  IVPB 1000 milliGRAM(s) IV Intermittent every 12 hours    MEDICATIONS  (PRN):  acetaminophen   Tablet .. 650 milliGRAM(s) Oral every 6 hours PRN Temp greater or equal to 38C (100.4F), Mild Pain (1 - 3), Moderate Pain (4 - 6)  dextrose 40% Gel 15 Gram(s) Oral once PRN Blood Glucose LESS THAN 70 milliGRAM(s)/deciliter  glucagon  Injectable 1 milliGRAM(s) IntraMuscular once PRN Glucose LESS THAN 70 milligrams/deciliter  glucagon  Injectable 1 milliGRAM(s) IntraMuscular once PRN Glucose LESS THAN 70 milligrams/deciliter      Vital Signs Last 24 Hrs  T(C): 36.3 (10 Nov 2019 05:01), Max: 36.9 (2019 12:35)  T(F): 97.3 (10 Nov 2019 05:01), Max: 98.4 (2019 12:35)  HR: 72 (10 Nov 2019 05:) (72 - 89)  BP: 119/78 (10 Nov 2019 05:) (99/68 - 129/80)  BP(mean): --  RR: 18 (10 Nov 2019 05:) (18 - 20)  SpO2: 94% (10 Nov 2019 05:) (94% - 95%)  I&O's Summary      CONSTITUTIONAL: NAD, well-developed  RESPIRATORY: Normal respiratory effort; lungs are clear to auscultation bilaterally  CARDIOVASCULAR: Regular rate and rhythm, normal S1 and S2, no murmur/rub/gallop; No lower extremity edema; Peripheral pulses are 2+ bilaterally  ABDOMEN: Nontender to palpation, normoactive bowel sounds, no rebound/guarding; No hepatosplenomegaly  MUSCLOSKELETAL: no clubbing or cyanosis of digits; no joint swelling or tenderness to palpation  PSYCH: A+O to person, place, and time; affect appropriate    LABS:      137  |  90<L>  |  32<H>  ----------------------------<  239<H>  4.5   |  29  |  1.27      136  |  90<L>  |  31<H>  ----------------------------<  185<H>  6.4<HH>   |  29  |  1.13    Ca    10.3      2019 17:08  Ca    10.3      2019 13:40    TPro  8.0  /  Alb  4.2  /  TBili  0.3  /  DBili  x   /  AST  52<H>  /  ALT  28  /  AlkPhos  104        PT/INR - ( 2019 13:40 )   PT: 15.6 sec;   INR: 1.36 ratio         PTT - ( 2019 13:40 )  PTT:32.4 sec                Urinalysis Basic - ( 2019 23:10 )    Color: Light Yellow / Appearance: Clear / S.014 / pH: x  Gluc: x / Ketone: Negative  / Bili: Negative / Urobili: <2 mg/dL   Blood: x / Protein: Negative / Nitrite: Negative   Leuk Esterase: Negative / RBC: x / WBC x   Sq Epi: x / Non Sq Epi: x / Bacteria: x                                  15.4   27.27 )-----------( 244      ( 2019 13:40 )             45.8     CAPILLARY BLOOD GLUCOSE      POCT Blood Glucose.: 217 mg/dL (2019 21:54)  POCT Blood Glucose.: 267 mg/dL (2019 17:53)    RECENT CULTURES: pending  RADIOLOGY & ADDITIONAL TESTS:   CT AP performed  COORDINATION OF CARE:  Care Discussed with Consultants/Other Providers [Y/N]: Y, ID INTERNAL MEDICINE PROGRESS NOTE:   Princess Yin M.D.  Beeper: [phone] ([facility])/ [phone] ([facility])     Patient is a 74y old  Male who presents with a chief complaint of Positive blood cultures (2019 16:19)    OVERNIGHT EVENTS: none    SUBJECTIVE: no acute complaints    MEDICATIONS  (STANDING):  apixaban 5 milliGRAM(s) Oral every 12 hours  aspirin enteric coated 81 milliGRAM(s) Oral daily  atorvastatin 10 milliGRAM(s) Oral at bedtime  budesonide 160 MICROgram(s)/formoterol 4.5 MICROgram(s) Inhaler 2 Puff(s) Inhalation two times a day  dextrose 5%. 1000 milliLiter(s) (50 mL/Hr) IV Continuous <Continuous>  dextrose 50% Injectable 12.5 Gram(s) IV Push once  dextrose 50% Injectable 25 Gram(s) IV Push once  dextrose 50% Injectable 25 Gram(s) IV Push once  diltiazem    milliGRAM(s) Oral daily  fluticasone propionate 50 MICROgram(s)/spray Nasal Spray 1 Spray(s) Both Nostrils daily  furosemide    Tablet 80 milliGRAM(s) Oral daily  insulin glargine Injectable (LANTUS) 12 Unit(s) SubCutaneous at bedtime  insulin lispro (HumaLOG) corrective regimen sliding scale   SubCutaneous three times a day before meals  insulin lispro (HumaLOG) corrective regimen sliding scale   SubCutaneous at bedtime  insulin lispro Injectable (HumaLOG) 4 Unit(s) SubCutaneous three times a day before meals  methimazole 5 milliGRAM(s) Oral daily  metoprolol succinate ER 25 milliGRAM(s) Oral daily  nicotine - 21 mG/24Hr(s) Patch 1 patch Transdermal daily  spironolactone 25 milliGRAM(s) Oral daily  tiotropium 18 MICROgram(s) Capsule 1 Capsule(s) Inhalation daily  vancomycin  IVPB 1000 milliGRAM(s) IV Intermittent every 12 hours    MEDICATIONS  (PRN):  acetaminophen   Tablet .. 650 milliGRAM(s) Oral every 6 hours PRN Temp greater or equal to 38C (100.4F), Mild Pain (1 - 3), Moderate Pain (4 - 6)  dextrose 40% Gel 15 Gram(s) Oral once PRN Blood Glucose LESS THAN 70 milliGRAM(s)/deciliter  glucagon  Injectable 1 milliGRAM(s) IntraMuscular once PRN Glucose LESS THAN 70 milligrams/deciliter  glucagon  Injectable 1 milliGRAM(s) IntraMuscular once PRN Glucose LESS THAN 70 milligrams/deciliter      Vital Signs Last 24 Hrs  T(C): 36.3 (10 Nov 2019 05:01), Max: 36.9 (2019 12:35)  T(F): 97.3 (10 Nov 2019 05:01), Max: 98.4 (2019 12:35)  HR: 72 (10 Nov 2019 05:) (72 - 89)  BP: 119/78 (10 Nov 2019 05:) (99/68 - 129/80)  BP(mean): --  RR: 18 (10 Nov 2019 05:) (18 - 20)  SpO2: 94% (10 Nov 2019 05:) (94% - 95%)    PHYSICAL EXAM:  GENERAL: NAD, conversant  CHEST/LUNG: CTAB with prolonged expiratory phase; No crackles, wheezing, or rubs.  HEART: Regular rate and rhythm; No rubs or gallops. Grade 3/6 systolic murmur at RUSB, LUSB, LLSB. No murmur at apex or axilla.  ABDOMEN: Soft, Nontender, mildly distended as prior; Bowel sounds present  EXTREMITIES:  2+ Peripheral Pulses, No clubbing, cyanosis, or edema  SKIN: No rashes or lesions  NERVOUS SYSTEM:  Alert & Oriented, Good concentration    LABS:      137  |  90<L>  |  32<H>  ----------------------------<  239<H>  4.5   |  29  |  1.27      136  |  90<L>  |  31<H>  ----------------------------<  185<H>  6.4<HH>   |  29  |  1.13    Ca    10.3      2019 17:08  Ca    10.3      2019 13:40    TPro  8.0  /  Alb  4.2  /  TBili  0.3  /  DBili  x   /  AST  52<H>  /  ALT  28  /  AlkPhos  104        PT/INR - ( 2019 13:40 )   PT: 15.6 sec;   INR: 1.36 ratio         PTT - ( 2019 13:40 )  PTT:32.4 sec              Urinalysis Basic - ( 2019 23:10 )    Color: Light Yellow / Appearance: Clear / S.014 / pH: x  Gluc: x / Ketone: Negative  / Bili: Negative / Urobili: <2 mg/dL   Blood: x / Protein: Negative / Nitrite: Negative   Leuk Esterase: Negative / RBC: x / WBC x   Sq Epi: x / Non Sq Epi: x / Bacteria: x                        15.4   27.27 )-----------( 244      ( 2019 13:40 )             45.8     CAPILLARY BLOOD GLUCOSE      POCT Blood Glucose.: 192 mg/dL (10 Nov 2019 08:11)  POCT Blood Glucose.: 217 mg/dL (2019 21:54)  POCT Blood Glucose.: 267 mg/dL (2019 17:53)    RECENT CULTURES: pending 11.10.19  Outpatient blood cx x 2 11.8.19 unchanged, 1 of 2 bottle Enteroccoccus    RADIOLOGY & ADDITIONAL TESTS:   CT AP performed     COORDINATION OF CARE: Care Discussed with Consultants/Other Providers [Y/N]: Y, ID INTERNAL MEDICINE PROGRESS NOTE:   Princess Yin M.D.  Beeper: [phone] ([facility])/ [id number] ([facility])     Patient is a 74y old  Male who presents with a chief complaint of Positive blood cultures (2019 16:19)    OVERNIGHT EVENTS: none    SUBJECTIVE: no acute complaints    MEDICATIONS  (STANDING):  apixaban 5 milliGRAM(s) Oral every 12 hours  aspirin enteric coated 81 milliGRAM(s) Oral daily  atorvastatin 10 milliGRAM(s) Oral at bedtime  budesonide 160 MICROgram(s)/formoterol 4.5 MICROgram(s) Inhaler 2 Puff(s) Inhalation two times a day  dextrose 5%. 1000 milliLiter(s) (50 mL/Hr) IV Continuous <Continuous>  dextrose 50% Injectable 12.5 Gram(s) IV Push once  dextrose 50% Injectable 25 Gram(s) IV Push once  dextrose 50% Injectable 25 Gram(s) IV Push once  diltiazem    milliGRAM(s) Oral daily  fluticasone propionate 50 MICROgram(s)/spray Nasal Spray 1 Spray(s) Both Nostrils daily  furosemide    Tablet 80 milliGRAM(s) Oral daily  insulin glargine Injectable (LANTUS) 12 Unit(s) SubCutaneous at bedtime  insulin lispro (HumaLOG) corrective regimen sliding scale   SubCutaneous three times a day before meals  insulin lispro (HumaLOG) corrective regimen sliding scale   SubCutaneous at bedtime  insulin lispro Injectable (HumaLOG) 4 Unit(s) SubCutaneous three times a day before meals  methimazole 5 milliGRAM(s) Oral daily  metoprolol succinate ER 25 milliGRAM(s) Oral daily  nicotine - 21 mG/24Hr(s) Patch 1 patch Transdermal daily  spironolactone 25 milliGRAM(s) Oral daily  tiotropium 18 MICROgram(s) Capsule 1 Capsule(s) Inhalation daily  vancomycin  IVPB 1000 milliGRAM(s) IV Intermittent every 12 hours    MEDICATIONS  (PRN):  acetaminophen   Tablet .. 650 milliGRAM(s) Oral every 6 hours PRN Temp greater or equal to 38C (100.4F), Mild Pain (1 - 3), Moderate Pain (4 - 6)  dextrose 40% Gel 15 Gram(s) Oral once PRN Blood Glucose LESS THAN 70 milliGRAM(s)/deciliter  glucagon  Injectable 1 milliGRAM(s) IntraMuscular once PRN Glucose LESS THAN 70 milligrams/deciliter  glucagon  Injectable 1 milliGRAM(s) IntraMuscular once PRN Glucose LESS THAN 70 milligrams/deciliter      Vital Signs Last 24 Hrs  T(C): 36.3 (10 Nov 2019 05:01), Max: 36.9 (2019 12:35)  T(F): 97.3 (10 Nov 2019 05:01), Max: 98.4 (2019 12:35)  HR: 72 (10 Nov 2019 05:) (72 - 89)  BP: 119/78 (10 Nov 2019 05:) (99/68 - 129/80)  BP(mean): --  RR: 18 (10 Nov 2019 05:) (18 - 20)  SpO2: 94% (10 Nov 2019 05:) (94% - 95%)    PHYSICAL EXAM:  GENERAL: NAD, conversant  CHEST/LUNG: CTAB with prolonged expiratory phase; No crackles, wheezing, or rubs.  HEART: Regular rate and rhythm; No rubs or gallops. Grade 3/6 systolic murmur at RUSB, LUSB, LLSB. No murmur at apex or axilla.  ABDOMEN: Soft, Nontender, mildly distended as prior; Bowel sounds present  EXTREMITIES:  2+ Peripheral Pulses, No clubbing, cyanosis, or edema  SKIN: No rashes or lesions  NERVOUS SYSTEM:  Alert & Oriented, Good concentration    LABS:  11-10    134<L>  |  88<L>  |  35<H>  ----------------------------<  188<H>  3.9   |  31  |  1.27      137  |  90<L>  |  32<H>  ----------------------------<  239<H>  4.5   |  29  |  1.27      136  |  90<L>  |  31<H>  ----------------------------<  185<H>  6.4<HH>   |  29  |  1.13    Ca    9.4      10 Nov 2019 08:46  Ca    10.3      2019 17:08  Ca    10.3      2019 13:40  Phos  3.1     11-10  Mg     2.2     11-10    TPro  7.1  /  Alb  4.1  /  TBili  0.2  /  DBili  x   /  AST  15  /  ALT  21  /  AlkPhos  108  11-10  TPro  8.0  /  Alb  4.2  /  TBili  0.3  /  DBili  x   /  AST  52<H>  /  ALT  28  /  AlkPhos  104  11-09      PT/INR - ( 2019 13:40 )   PT: 15.6 sec;   INR: 1.36 ratio         PTT - ( 2019 13:40 )  PTT:32.4 sec              Urinalysis Basic - ( 2019 23:10 )    Color: Light Yellow / Appearance: Clear / S.014 / pH: x  Gluc: x / Ketone: Negative  / Bili: Negative / Urobili: <2 mg/dL   Blood: x / Protein: Negative / Nitrite: Negative   Leuk Esterase: Negative / RBC: x / WBC x   Sq Epi: x / Non Sq Epi: x / Bacteria: x                              15.4   27.27 )-----------( 244      ( 2019 13:40 )             45.8     CAPILLARY BLOOD GLUCOSE      POCT Blood Glucose.: 192 mg/dL (10 Nov 2019 08:11)  POCT Blood Glucose.: 217 mg/dL (2019 21:54)  POCT Blood Glucose.: 267 mg/dL (2019 17:53)    RECENT CULTURES: pending 11.10.19  Outpatient blood cx x 2 11.8.19 unchanged, 1 of 2 bottle Enteroccoccus    RADIOLOGY & ADDITIONAL TESTS:   CT AP performed     COORDINATION OF CARE: Care Discussed with Consultants/Other Providers [Y/N]: Y, ID INTERNAL MEDICINE PROGRESS NOTE:   Princess Yin M.D.  Beeper: [phone] ([facility])/ [id number] ([facility])     Patient is a 74y old  Male who presents with a chief complaint of Positive blood cultures (2019 16:19)    OVERNIGHT EVENTS: none    SUBJECTIVE: no acute complaints    MEDICATIONS  (STANDING):  apixaban 5 milliGRAM(s) Oral every 12 hours  aspirin enteric coated 81 milliGRAM(s) Oral daily  atorvastatin 10 milliGRAM(s) Oral at bedtime  budesonide 160 MICROgram(s)/formoterol 4.5 MICROgram(s) Inhaler 2 Puff(s) Inhalation two times a day  dextrose 5%. 1000 milliLiter(s) (50 mL/Hr) IV Continuous <Continuous>  dextrose 50% Injectable 12.5 Gram(s) IV Push once  dextrose 50% Injectable 25 Gram(s) IV Push once  dextrose 50% Injectable 25 Gram(s) IV Push once  diltiazem    milliGRAM(s) Oral daily  fluticasone propionate 50 MICROgram(s)/spray Nasal Spray 1 Spray(s) Both Nostrils daily  furosemide    Tablet 80 milliGRAM(s) Oral daily  insulin glargine Injectable (LANTUS) 12 Unit(s) SubCutaneous at bedtime  insulin lispro (HumaLOG) corrective regimen sliding scale   SubCutaneous three times a day before meals  insulin lispro (HumaLOG) corrective regimen sliding scale   SubCutaneous at bedtime  insulin lispro Injectable (HumaLOG) 4 Unit(s) SubCutaneous three times a day before meals  methimazole 5 milliGRAM(s) Oral daily  metoprolol succinate ER 25 milliGRAM(s) Oral daily  nicotine - 21 mG/24Hr(s) Patch 1 patch Transdermal daily  spironolactone 25 milliGRAM(s) Oral daily  tiotropium 18 MICROgram(s) Capsule 1 Capsule(s) Inhalation daily  vancomycin  IVPB 1000 milliGRAM(s) IV Intermittent every 12 hours    MEDICATIONS  (PRN):  acetaminophen   Tablet .. 650 milliGRAM(s) Oral every 6 hours PRN Temp greater or equal to 38C (100.4F), Mild Pain (1 - 3), Moderate Pain (4 - 6)  dextrose 40% Gel 15 Gram(s) Oral once PRN Blood Glucose LESS THAN 70 milliGRAM(s)/deciliter  glucagon  Injectable 1 milliGRAM(s) IntraMuscular once PRN Glucose LESS THAN 70 milligrams/deciliter  glucagon  Injectable 1 milliGRAM(s) IntraMuscular once PRN Glucose LESS THAN 70 milligrams/deciliter      Vital Signs Last 24 Hrs  T(C): 36.3 (10 Nov 2019 05:01), Max: 36.9 (2019 12:35)  T(F): 97.3 (10 Nov 2019 05:01), Max: 98.4 (2019 12:35)  HR: 72 (10 Nov 2019 05:) (72 - 89)  BP: 119/78 (10 Nov 2019 05:) (99/68 - 129/80)  BP(mean): --  RR: 18 (10 Nov 2019 05:) (18 - 20)  SpO2: 94% (10 Nov 2019 05:) (94% - 95%)    PHYSICAL EXAM:  GENERAL: NAD, conversant  CHEST/LUNG: CTAB with prolonged expiratory phase; No crackles, wheezing, or rubs.  HEART: Regular rate and rhythm; No rubs or gallops. Grade 3/6 systolic murmur at RUSB, LUSB, LLSB. No murmur at apex or axilla.  ABDOMEN: Soft, Nontender, mildly distended as prior; Bowel sounds present  EXTREMITIES:  2+ Peripheral Pulses, No clubbing, cyanosis, or edema  SKIN: No rashes or lesions  NERVOUS SYSTEM:  Alert & Oriented, Good concentration    LABS:  11-10    134<L>  |  88<L>  |  35<H>  ----------------------------<  188<H>  3.9   |  31  |  1.27      137  |  90<L>  |  32<H>  ----------------------------<  239<H>  4.5   |  29  |  1.27      136  |  90<L>  |  31<H>  ----------------------------<  185<H>  6.4<HH>   |  29  |  1.13    Ca    9.4      10 Nov 2019 08:46  Ca    10.3      2019 17:08  Ca    10.3      2019 13:40  Phos  3.1     11-10  Mg     2.2     11-10    TPro  7.1  /  Alb  4.1  /  TBili  0.2  /  DBili  x   /  AST  15  /  ALT  21  /  AlkPhos  108  11-10  TPro  8.0  /  Alb  4.2  /  TBili  0.3  /  DBili  x   /  AST  52<H>  /  ALT  28  /  AlkPhos  104  11-09      PT/INR - ( 2019 13:40 )   PT: 15.6 sec;   INR: 1.36 ratio         PTT - ( 2019 13:40 )  PTT:32.4 sec              Urinalysis Basic - ( 2019 23:10 )    Color: Light Yellow / Appearance: Clear / S.014 / pH: x  Gluc: x / Ketone: Negative  / Bili: Negative / Urobili: <2 mg/dL   Blood: x / Protein: Negative / Nitrite: Negative   Leuk Esterase: Negative / RBC: x / WBC x   Sq Epi: x / Non Sq Epi: x / Bacteria: x                              15.4   27.27 )-----------( 244      ( 2019 13:40 )             45.8     CAPILLARY BLOOD GLUCOSE      POCT Blood Glucose.: 192 mg/dL (10 Nov 2019 08:11)  POCT Blood Glucose.: 217 mg/dL (2019 21:54)  POCT Blood Glucose.: 267 mg/dL (2019 17:53)    RECENT CULTURES: pending 11.10.19  Outpatient blood cx x 2 19 unchanged, 1 of 2 bottle Enteroccoccus    RADIOLOGY & ADDITIONAL TESTS:   < from: CT Abdomen and Pelvis w/ Oral Cont and w/ IV Cont (19 @ 21:45) >  FINDINGS:    LOWER CHEST: Bibasilarsubsegmental atelectasis. Aortic valvular   calcifications. Multiple bibasilar nodules are unchanged since 2016.    LIVER: Within normal limits.  BILE DUCTS: Normal caliber.  GALLBLADDER: Cholelithiasis.  SPLEEN: Within normal limits.  PANCREAS:Within normal limits.  ADRENALS: Within normal limits.  KIDNEYS/URETERS: Bilateral hypodense renal lesions measuring above fluid   attenuation. Specifically a heterogeneous lesion arising from the lower   pole of right kidney is indeterminate. Bilateral nonobstructive renal   calculi with the largest measuring 2.1 cm within the left kidney.    BLADDER: Within normal limits.  REPRODUCTIVE ORGANS: Prostatic calcifications.    BOWEL: No bowel obstruction. Appendix is normal.  PERITONEUM: No ascites.  VESSELS: Atheromatous disease of the aorta.  RETROPERITONEUM/LYMPH NODES: No lymphadenopathy.    ABDOMINAL WALL: Within normal limits.  BONES: Degenerative changes of the spine.    IMPRESSION:    No acute abnormality in the abdomen and pelvis. Bilateral nonobstructive   renal calculi.  Multiple indeterminate low-attenuation lesions in the kidneys,   specifically a heterogeneous lesion arising from the lower pole of right   kidney for which further evaluation with MRI is advised on outpatient   basis.    < end of copied text >      COORDINATION OF CARE: Care Discussed with Consultants/Other Providers [Y/N]: Y, ID INTERNAL MEDICINE PROGRESS NOTE:   Princess Yin M.D.  Beeper: [phone] ([facility])/ [phone] ([facility])     Patient is a 74y old  Male who presents with a chief complaint of Positive blood cultures (2019 16:19)    OVERNIGHT EVENTS: none    SUBJECTIVE: no acute complaints    MEDICATIONS  (STANDING):  apixaban 5 milliGRAM(s) Oral every 12 hours  aspirin enteric coated 81 milliGRAM(s) Oral daily  atorvastatin 10 milliGRAM(s) Oral at bedtime  budesonide 160 MICROgram(s)/formoterol 4.5 MICROgram(s) Inhaler 2 Puff(s) Inhalation two times a day  dextrose 5%. 1000 milliLiter(s) (50 mL/Hr) IV Continuous <Continuous>  dextrose 50% Injectable 12.5 Gram(s) IV Push once  dextrose 50% Injectable 25 Gram(s) IV Push once  dextrose 50% Injectable 25 Gram(s) IV Push once  diltiazem    milliGRAM(s) Oral daily  fluticasone propionate 50 MICROgram(s)/spray Nasal Spray 1 Spray(s) Both Nostrils daily  furosemide    Tablet 80 milliGRAM(s) Oral daily  insulin glargine Injectable (LANTUS) 12 Unit(s) SubCutaneous at bedtime  insulin lispro (HumaLOG) corrective regimen sliding scale   SubCutaneous three times a day before meals  insulin lispro (HumaLOG) corrective regimen sliding scale   SubCutaneous at bedtime  insulin lispro Injectable (HumaLOG) 4 Unit(s) SubCutaneous three times a day before meals  methimazole 5 milliGRAM(s) Oral daily  metoprolol succinate ER 25 milliGRAM(s) Oral daily  nicotine - 21 mG/24Hr(s) Patch 1 patch Transdermal daily  spironolactone 25 milliGRAM(s) Oral daily  tiotropium 18 MICROgram(s) Capsule 1 Capsule(s) Inhalation daily  vancomycin  IVPB 1000 milliGRAM(s) IV Intermittent every 12 hours    MEDICATIONS  (PRN):  acetaminophen   Tablet .. 650 milliGRAM(s) Oral every 6 hours PRN Temp greater or equal to 38C (100.4F), Mild Pain (1 - 3), Moderate Pain (4 - 6)  dextrose 40% Gel 15 Gram(s) Oral once PRN Blood Glucose LESS THAN 70 milliGRAM(s)/deciliter  glucagon  Injectable 1 milliGRAM(s) IntraMuscular once PRN Glucose LESS THAN 70 milligrams/deciliter  glucagon  Injectable 1 milliGRAM(s) IntraMuscular once PRN Glucose LESS THAN 70 milligrams/deciliter      Vital Signs Last 24 Hrs  T(C): 36.3 (10 Nov 2019 05:01), Max: 36.9 (2019 12:35)  T(F): 97.3 (10 Nov 2019 05:01), Max: 98.4 (2019 12:35)  HR: 72 (10 Nov 2019 05:) (72 - 89)  BP: 119/78 (10 Nov 2019 05:) (99/68 - 129/80)  BP(mean): --  RR: 18 (10 Nov 2019 05:) (18 - 20)  SpO2: 94% (10 Nov 2019 05:) (94% - 95%)    PHYSICAL EXAM:  GENERAL: NAD, conversant  CHEST/LUNG: CTAB with prolonged expiratory phase; No crackles, wheezing, or rubs.  HEART: Regular rate and rhythm; No rubs or gallops. Grade 3/6 systolic murmur at RUSB, LUSB, LLSB. No murmur at apex or axilla.  ABDOMEN: Soft, Nontender, mildly distended as prior; Bowel sounds present  EXTREMITIES:  2+ Peripheral Pulses, No clubbing, cyanosis, or edema  SKIN: No rashes or lesions  NERVOUS SYSTEM:  Alert & Oriented, Good concentration    LABS:  11-10    134<L>  |  88<L>  |  35<H>  ----------------------------<  188<H>  3.9   |  31  |  1.27      137  |  90<L>  |  32<H>  ----------------------------<  239<H>  4.5   |  29  |  1.27      136  |  90<L>  |  31<H>  ----------------------------<  185<H>  6.4<HH>   |  29  |  1.13    Ca    9.4      10 Nov 2019 08:46  Ca    10.3      2019 17:08  Ca    10.3      2019 13:40  Phos  3.1     11-10  Mg     2.2     11-10    TPro  7.1  /  Alb  4.1  /  TBili  0.2  /  DBili  x   /  AST  15  /  ALT  21  /  AlkPhos  108  11-10  TPro  8.0  /  Alb  4.2  /  TBili  0.3  /  DBili  x   /  AST  52<H>  /  ALT  28  /  AlkPhos  104  11-09      PT/INR - ( 2019 13:40 )   PT: 15.6 sec;   INR: 1.36 ratio         PTT - ( 2019 13:40 )  PTT:32.4 sec              Urinalysis Basic - ( 2019 23:10 )    Color: Light Yellow / Appearance: Clear / S.014 / pH: x  Gluc: x / Ketone: Negative  / Bili: Negative / Urobili: <2 mg/dL   Blood: x / Protein: Negative / Nitrite: Negative   Leuk Esterase: Negative / RBC: x / WBC x   Sq Epi: x / Non Sq Epi: x / Bacteria: x                              14.9   22.43 )-----------( 235      ( 10 Nov 2019 11:46 )             45.7                         15.4   27.27 )-----------( 244      ( 2019 13:40 )             45.8     CAPILLARY BLOOD GLUCOSE  POCT Blood Glucose.: 192 mg/dL (10 Nov 2019 08:11)  POCT Blood Glucose.: 217 mg/dL (2019 21:54)  POCT Blood Glucose.: 267 mg/dL (2019 17:53)    RECENT CULTURES: pending 11.10.19  Outpatient blood cx x 2 19 unchanged, 1 of 2 bottle Enteroccoccus    RADIOLOGY & ADDITIONAL TESTS:   < from: CT Abdomen and Pelvis w/ Oral Cont and w/ IV Cont (19 @ 21:45) >  FINDINGS:    LOWER CHEST: Bibasilarsubsegmental atelectasis. Aortic valvular   calcifications. Multiple bibasilar nodules are unchanged since 2016.    LIVER: Within normal limits.  BILE DUCTS: Normal caliber.  GALLBLADDER: Cholelithiasis.  SPLEEN: Within normal limits.  PANCREAS:Within normal limits.  ADRENALS: Within normal limits.  KIDNEYS/URETERS: Bilateral hypodense renal lesions measuring above fluid   attenuation. Specifically a heterogeneous lesion arising from the lower   pole of right kidney is indeterminate. Bilateral nonobstructive renal   calculi with the largest measuring 2.1 cm within the left kidney.    BLADDER: Within normal limits.  REPRODUCTIVE ORGANS: Prostatic calcifications.    BOWEL: No bowel obstruction. Appendix is normal.  PERITONEUM: No ascites.  VESSELS: Atheromatous disease of the aorta.  RETROPERITONEUM/LYMPH NODES: No lymphadenopathy.    ABDOMINAL WALL: Within normal limits.  BONES: Degenerative changes of the spine.    IMPRESSION:    No acute abnormality in the abdomen and pelvis. Bilateral nonobstructive   renal calculi.  Multiple indeterminate low-attenuation lesions in the kidneys,   specifically a heterogeneous lesion arising from the lower pole of right   kidney for which further evaluation with MRI is advised on outpatient   basis.    < end of copied text >      COORDINATION OF CARE: Care Discussed with Consultants/Other Providers [Y/N]: Y, ID

## 2019-11-10 NOTE — PROGRESS NOTE ADULT - PROBLEM SELECTOR PLAN 9
- C/w home med Flonase for nasal congestion  - c/w Nicotine patch for tobacco dependence  - Hx aortic stenosis: no active issues.  - DVT ppx: apixaban 5 mg bid  - Diet: DASH TLC  - Dispo: pending w/u

## 2019-11-10 NOTE — PROGRESS NOTE ADULT - SUBJECTIVE AND OBJECTIVE BOX
INFECTIOUS DISEASES FOLLOW UP--Roberto Delcid MD  Pager [phone]    This is a follow up note for this  74y Male with  enterococcus in blood in one bottle.  Repeat BC pending.    Further ROS:  CONSTITUTIONAL:  No fever, good appetite  CARDIOVASCULAR:  No chest pain or palpitations  RESPIRATORY:  No dyspnea---but sleeping in chair  GASTROINTESTINAL:  No nausea, vomiting, diarrhea, or abdominal pain  GENITOURINARY:  No dysuria  NEUROLOGIC:  No headache,     Allergies  penicillins (Anaphylaxis)    ANTIBIOTICS/RELEVANT:  antimicrobials  vancomycin  IVPB 1000 milliGRAM(s) IV Intermittent every 12 hours    immunologic:    OTHER:  acetaminophen   Tablet .. 650 milliGRAM(s) Oral every 6 hours PRN  apixaban 5 milliGRAM(s) Oral every 12 hours  aspirin enteric coated 81 milliGRAM(s) Oral daily  atorvastatin 10 milliGRAM(s) Oral at bedtime  budesonide 160 MICROgram(s)/formoterol 4.5 MICROgram(s) Inhaler 2 Puff(s) Inhalation two times a day  dextrose 40% Gel 15 Gram(s) Oral once PRN  dextrose 5%. 1000 milliLiter(s) IV Continuous <Continuous>  dextrose 50% Injectable 12.5 Gram(s) IV Push once  dextrose 50% Injectable 25 Gram(s) IV Push once  dextrose 50% Injectable 25 Gram(s) IV Push once  diltiazem    milliGRAM(s) Oral daily  fluticasone propionate 50 MICROgram(s)/spray Nasal Spray 1 Spray(s) Both Nostrils daily  furosemide    Tablet 80 milliGRAM(s) Oral daily  glucagon  Injectable 1 milliGRAM(s) IntraMuscular once PRN  glucagon  Injectable 1 milliGRAM(s) IntraMuscular once PRN  insulin glargine Injectable (LANTUS) 12 Unit(s) SubCutaneous at bedtime  insulin lispro (HumaLOG) corrective regimen sliding scale   SubCutaneous three times a day before meals  insulin lispro (HumaLOG) corrective regimen sliding scale   SubCutaneous at bedtime  insulin lispro Injectable (HumaLOG) 4 Unit(s) SubCutaneous three times a day before meals  methimazole 5 milliGRAM(s) Oral daily  metoprolol succinate ER 25 milliGRAM(s) Oral daily  nicotine - 21 mG/24Hr(s) Patch 1 patch Transdermal daily  spironolactone 25 milliGRAM(s) Oral daily  tiotropium 18 MICROgram(s) Capsule 1 Capsule(s) Inhalation daily    Objective:  Vital Signs Last 24 Hrs  T(C): 36.3 (10 Nov 2019 05:01), Max: 36.9 (2019 12:35)  T(F): 97.3 (10 Nov 2019 05:01), Max: 98.4 (2019 12:35)  HR: 72 (10 Nov 2019 05:01) (72 - 89)  BP: 119/78 (10 Nov 2019 05:01) (99/68 - 129/80)  BP(mean): --  RR: 18 (10 Nov 2019 05:01) (18 - 20)  SpO2: 94% (10 Nov 2019 05:01) (94% - 95%)    PHYSICAL EXAM:  Constitutional:no acute distress  no stigmata of inf endo  no spinal tenderness  joints ok  Ear/Nose/Throat: no oral lesions, 	  Respiratory: scattered wheezes  Cardiovascular: S1S2  Gastrointestinal:soft, (+) BS, no tenderness  Extremities:no e/e/c  No Lymphadenopathy  IV sites not inflammed.    LABS:                        15.4   27.27 )-----------( 244      ( 2019 13:40 )             45.8         137  |  90<L>  |  32<H>  ----------------------------<  239<H>  4.5   |  29  |  1.27    Ca    10.3      2019 17:08    TPro  8.0  /  Alb  4.2  /  TBili  0.3  /  DBili  x   /  AST  52<H>  /  ALT  28  /  AlkPhos  104      PT/INR - ( 2019 13:40 )   PT: 15.6 sec;   INR: 1.36 ratio         PTT - ( 2019 13:40 )  PTT:32.4 sec  Urinalysis Basic - ( 2019 23:10 )    Color: Light Yellow / Appearance: Clear / S.014 / pH: x  Gluc: x / Ketone: Negative  / Bili: Negative / Urobili: <2 mg/dL   Blood: x / Protein: Negative / Nitrite: Negative   Leuk Esterase: Negative / RBC: x / WBC x   Sq Epi: x / Non Sq Epi: x / Bacteria: x    MICROBIOLOGY: repeat bc pending    RADIOLOGY & ADDITIONAL STUDIES:    < from: Xray Chest 2 Views PA/Lat (19 @ 14:39) >    IMPRESSION:  Clear lungs.      < end of copied text >

## 2019-11-10 NOTE — PROGRESS NOTE ADULT - PROBLEM SELECTOR PLAN 10
Transitions of Care Status:  1.  Name of PCP: Marco Campbell  2.  PCP Contacted on Admission: [ ] Y    [ ] N    3.  PCP contacted at Discharge: [ ] Y    [ ] N    [ ] N/A  4.  Post-Discharge Appointment Date and Location:  5.  Summary of Handoff given to PCP: Transitions of Care Status:  1.  Name of PCP: Marco Campbell  2.  PCP Contacted on Admission: [x ] Y    [ ] N    3.  PCP contacted at Discharge: [ ] Y    [ ] N    [ ] N/A  4.  Post-Discharge Appointment Date and Location:  5.  Summary of Handoff given to PCP:

## 2019-11-10 NOTE — CONSULT NOTE ADULT - ASSESSMENT
Patient is a 74 year old man with history of HTN, DMT2, atrial fibrillation (on apixaban), aortic stenosis, severe mitral regurgitation, HFpEF (LVEF 55-60% 4/'19) ,hx pericardial effusion, LIMA (not on cpap), COPD (not on home O2, current smoker (50 pack-year)) and hyperthyroidism, with recent outpatient infectious diseases visit for elevated WBC found on presurgical testing for a MitraClip thought to be from prednisone (recently started by pulmonologist-14 day course for COPD), now admitted due to a positive blood culture (gram positive cocci in pairs and chains, 1 of 2 bottles with enterococcus).      1. Bacteremia, leukocytosis  -f/u bc  -abx per ID  -if BCx remain positive, check echo to r/o any obvious signs of endocarditis    2. Mitral Regurgitation  -severe, await mitraclip    3. Atrial Fibrillation  -stable, cont rate control meds  -cont a.c    4. Diastolic Heart Failure, chronic  -stable, cont home meds
ASSESSMENT:  74/M with PMH Moderate AS, Severe MR, Afib/Aflutter on eliquis s/p ablation, Acute decompensated diastolic CHF, CAD, HTN, HLD, COPD not on home O2, Hyperthyroid, Pulmonary nodules, LIMA, DM Type 2. H/o episode of acute bronchitis 10 days back, received 5 days of Azithromycin. Started on Prednisone taper 7 days back  Was planned for Mitral clip this coming week. Referred to ID clinic Dr Galeana 11/8 for persistent leucocytosis evaluation. Bloodwork including blood cx ordered  Blood cx positive for GPC in pairs and chains. ID team contacted patient and family and asked them to come to ER  -------  Enterococcal bacteremia  - unclear source - no urinary or GI symptoms  - patient reports receiving penicillin-based abx 10 years back, developed rash. Not taken Amoxicillin or Cephalexin in past    RECOMMENDATIONS:  - continue Vancomycin 1g IV q12h  - Please check Vancomycin trough before 4th sequential dose. Target trough levels 15-20  - f/u repeat blood cx  - check CT A/P (ideally with contrast) to evaluate for source  - consider f/u CTS and/or Cardiology (patient known to CTS service)  Recs conveyed to primary team    JITENDRA Boateng, MD  Fellow, Infectious Diseases  Pager: [phone]  After 5pm and on Weekends: Call [phone]

## 2019-11-11 ENCOUNTER — TRANSCRIPTION ENCOUNTER (OUTPATIENT)
Age: 74
End: 2019-11-11

## 2019-11-11 VITALS
OXYGEN SATURATION: 98 % | TEMPERATURE: 98 F | RESPIRATION RATE: 18 BRPM | DIASTOLIC BLOOD PRESSURE: 60 MMHG | SYSTOLIC BLOOD PRESSURE: 101 MMHG | HEART RATE: 60 BPM

## 2019-11-11 DIAGNOSIS — R06.02 SHORTNESS OF BREATH: ICD-10-CM

## 2019-11-11 DIAGNOSIS — F17.200 NICOTINE DEPENDENCE, UNSPECIFIED, UNCOMPLICATED: ICD-10-CM

## 2019-11-11 DIAGNOSIS — G47.33 OBSTRUCTIVE SLEEP APNEA (ADULT) (PEDIATRIC): ICD-10-CM

## 2019-11-11 LAB
ALBUMIN SERPL ELPH-MCNC: 4.4 G/DL — SIGNIFICANT CHANGE UP (ref 3.3–5)
ALP SERPL-CCNC: 120 U/L — SIGNIFICANT CHANGE UP (ref 40–120)
ALT FLD-CCNC: 24 U/L — SIGNIFICANT CHANGE UP (ref 10–45)
ANION GAP SERPL CALC-SCNC: 13 MMOL/L — SIGNIFICANT CHANGE UP (ref 5–17)
AST SERPL-CCNC: 17 U/L — SIGNIFICANT CHANGE UP (ref 10–40)
BASOPHILS # BLD AUTO: 0.09 K/UL — SIGNIFICANT CHANGE UP (ref 0–0.2)
BASOPHILS NFR BLD AUTO: 0.4 % — SIGNIFICANT CHANGE UP (ref 0–2)
BILIRUB SERPL-MCNC: 0.3 MG/DL — SIGNIFICANT CHANGE UP (ref 0.2–1.2)
BUN SERPL-MCNC: 33 MG/DL — HIGH (ref 7–23)
CALCIUM SERPL-MCNC: 9.5 MG/DL — SIGNIFICANT CHANGE UP (ref 8.4–10.5)
CHLORIDE SERPL-SCNC: 88 MMOL/L — LOW (ref 96–108)
CO2 SERPL-SCNC: 32 MMOL/L — HIGH (ref 22–31)
CREAT SERPL-MCNC: 1.41 MG/DL — HIGH (ref 0.5–1.3)
EOSINOPHIL # BLD AUTO: 0.34 K/UL — SIGNIFICANT CHANGE UP (ref 0–0.5)
EOSINOPHIL NFR BLD AUTO: 1.7 % — SIGNIFICANT CHANGE UP (ref 0–6)
GLUCOSE BLDC GLUCOMTR-MCNC: 123 MG/DL — HIGH (ref 70–99)
GLUCOSE BLDC GLUCOMTR-MCNC: 183 MG/DL — HIGH (ref 70–99)
GLUCOSE SERPL-MCNC: 193 MG/DL — HIGH (ref 70–99)
HCT VFR BLD CALC: 44.8 % — SIGNIFICANT CHANGE UP (ref 39–50)
HGB BLD-MCNC: 15.2 G/DL — SIGNIFICANT CHANGE UP (ref 13–17)
IMM GRANULOCYTES NFR BLD AUTO: 2.2 % — HIGH (ref 0–1.5)
LYMPHOCYTES # BLD AUTO: 10.4 % — LOW (ref 13–44)
LYMPHOCYTES # BLD AUTO: 2.11 K/UL — SIGNIFICANT CHANGE UP (ref 1–3.3)
MAGNESIUM SERPL-MCNC: 2.4 MG/DL — SIGNIFICANT CHANGE UP (ref 1.6–2.6)
MCHC RBC-ENTMCNC: 30.4 PG — SIGNIFICANT CHANGE UP (ref 27–34)
MCHC RBC-ENTMCNC: 33.9 GM/DL — SIGNIFICANT CHANGE UP (ref 32–36)
MCV RBC AUTO: 89.6 FL — SIGNIFICANT CHANGE UP (ref 80–100)
MONOCYTES # BLD AUTO: 1.81 K/UL — HIGH (ref 0–0.9)
MONOCYTES NFR BLD AUTO: 8.9 % — SIGNIFICANT CHANGE UP (ref 2–14)
NEUTROPHILS # BLD AUTO: 15.55 K/UL — HIGH (ref 1.8–7.4)
NEUTROPHILS NFR BLD AUTO: 76.4 % — SIGNIFICANT CHANGE UP (ref 43–77)
NRBC # BLD: 0 /100 WBCS — SIGNIFICANT CHANGE UP (ref 0–0)
PHOSPHATE SERPL-MCNC: 3 MG/DL — SIGNIFICANT CHANGE UP (ref 2.5–4.5)
PLATELET # BLD AUTO: 242 K/UL — SIGNIFICANT CHANGE UP (ref 150–400)
POTASSIUM SERPL-MCNC: 3.8 MMOL/L — SIGNIFICANT CHANGE UP (ref 3.5–5.3)
POTASSIUM SERPL-SCNC: 3.8 MMOL/L — SIGNIFICANT CHANGE UP (ref 3.5–5.3)
PROT SERPL-MCNC: 7.4 G/DL — SIGNIFICANT CHANGE UP (ref 6–8.3)
RBC # BLD: 5 M/UL — SIGNIFICANT CHANGE UP (ref 4.2–5.8)
RBC # FLD: 14.9 % — HIGH (ref 10.3–14.5)
SODIUM SERPL-SCNC: 133 MMOL/L — LOW (ref 135–145)
WBC # BLD: 20.35 K/UL — HIGH (ref 3.8–10.5)
WBC # FLD AUTO: 20.35 K/UL — HIGH (ref 3.8–10.5)

## 2019-11-11 PROCEDURE — 85610 PROTHROMBIN TIME: CPT

## 2019-11-11 PROCEDURE — 80202 ASSAY OF VANCOMYCIN: CPT

## 2019-11-11 PROCEDURE — 81003 URINALYSIS AUTO W/O SCOPE: CPT

## 2019-11-11 PROCEDURE — 94640 AIRWAY INHALATION TREATMENT: CPT

## 2019-11-11 PROCEDURE — 74177 CT ABD & PELVIS W/CONTRAST: CPT

## 2019-11-11 PROCEDURE — 80053 COMPREHEN METABOLIC PANEL: CPT

## 2019-11-11 PROCEDURE — 71046 X-RAY EXAM CHEST 2 VIEWS: CPT

## 2019-11-11 PROCEDURE — 93005 ELECTROCARDIOGRAM TRACING: CPT

## 2019-11-11 PROCEDURE — 99233 SBSQ HOSP IP/OBS HIGH 50: CPT

## 2019-11-11 PROCEDURE — 82962 GLUCOSE BLOOD TEST: CPT

## 2019-11-11 PROCEDURE — 85730 THROMBOPLASTIN TIME PARTIAL: CPT

## 2019-11-11 PROCEDURE — 87086 URINE CULTURE/COLONY COUNT: CPT

## 2019-11-11 PROCEDURE — 99285 EMERGENCY DEPT VISIT HI MDM: CPT

## 2019-11-11 PROCEDURE — 99239 HOSP IP/OBS DSCHRG MGMT >30: CPT | Mod: GC

## 2019-11-11 PROCEDURE — 80048 BASIC METABOLIC PNL TOTAL CA: CPT

## 2019-11-11 PROCEDURE — 84132 ASSAY OF SERUM POTASSIUM: CPT

## 2019-11-11 PROCEDURE — 85027 COMPLETE CBC AUTOMATED: CPT

## 2019-11-11 PROCEDURE — 99232 SBSQ HOSP IP/OBS MODERATE 35: CPT

## 2019-11-11 PROCEDURE — 83735 ASSAY OF MAGNESIUM: CPT

## 2019-11-11 PROCEDURE — 84100 ASSAY OF PHOSPHORUS: CPT

## 2019-11-11 PROCEDURE — 87040 BLOOD CULTURE FOR BACTERIA: CPT

## 2019-11-11 RX ORDER — VANCOMYCIN HCL 1 G
750 VIAL (EA) INTRAVENOUS EVERY 12 HOURS
Refills: 0 | Status: DISCONTINUED | OUTPATIENT
Start: 2019-11-11 | End: 2019-11-11

## 2019-11-11 RX ADMIN — Medication 1 SPRAY(S): at 12:26

## 2019-11-11 RX ADMIN — TIOTROPIUM BROMIDE 1 CAPSULE(S): 18 CAPSULE ORAL; RESPIRATORY (INHALATION) at 12:25

## 2019-11-11 RX ADMIN — Medication 80 MILLIGRAM(S): at 05:21

## 2019-11-11 RX ADMIN — Medication 1 PATCH: at 07:22

## 2019-11-11 RX ADMIN — BUDESONIDE AND FORMOTEROL FUMARATE DIHYDRATE 2 PUFF(S): 160; 4.5 AEROSOL RESPIRATORY (INHALATION) at 05:22

## 2019-11-11 RX ADMIN — Medication 2: at 08:27

## 2019-11-11 RX ADMIN — Medication 4 UNIT(S): at 08:27

## 2019-11-11 RX ADMIN — Medication 250 MILLIGRAM(S): at 05:23

## 2019-11-11 RX ADMIN — Medication 1 PATCH: at 12:25

## 2019-11-11 RX ADMIN — APIXABAN 5 MILLIGRAM(S): 2.5 TABLET, FILM COATED ORAL at 05:21

## 2019-11-11 RX ADMIN — Medication 81 MILLIGRAM(S): at 12:26

## 2019-11-11 RX ADMIN — Medication 180 MILLIGRAM(S): at 05:21

## 2019-11-11 RX ADMIN — SPIRONOLACTONE 25 MILLIGRAM(S): 25 TABLET, FILM COATED ORAL at 05:21

## 2019-11-11 RX ADMIN — ATORVASTATIN CALCIUM 10 MILLIGRAM(S): 80 TABLET, FILM COATED ORAL at 05:21

## 2019-11-11 RX ADMIN — Medication 4 UNIT(S): at 12:26

## 2019-11-11 RX ADMIN — Medication 25 MILLIGRAM(S): at 05:21

## 2019-11-11 NOTE — DISCHARGE NOTE NURSING/CASE MANAGEMENT/SOCIAL WORK - NSDCPEWEB_GEN_ALL_CORE
[place] website --- [email or web address] website --- [email or web address]

## 2019-11-11 NOTE — DISCHARGE NOTE NURSING/CASE MANAGEMENT/SOCIAL WORK - NSDCPEELIQUIS_GEN_ALL_CORE
Apixaban/Eliquis - Dietary Advice/Apixaban/Eliquis - Follow up monitoring/Apixaban/Eliquis - Compliance/Apixaban/Eliquis - Potential for adverse drug reactions and interactions

## 2019-11-11 NOTE — PROGRESS NOTE ADULT - ASSESSMENT
75 yo M with hx HTN, DMT2, atrial fibrillation (on apixaban), aortic stenosis, severe mitral regurgitation, HFpEF (LVEF 55-60% 4/'19) , hx pericardial effusion, LIMA (not on cpap), COPD (not on home O2, current smoker (50 pack-year)) and hyperthyroidism, with recent outpatient infectious diseases visit for elevated WBC found on presurgical testing for a MitraClip thought to be from prednisone (recently started by pulmonologist-14 day course for COPD), now admitted due to a positive blood culture (11.08.19; gram positive cocci in pairs and chains, 1 of 2 bottles enterococcus).    F/u blood cx x 2 11.09.19 on vancomycin NGTD    Patient is adamant about discharge  Of concern , patients creat has increased    If patient refuses to stay, then would hold vancomycin and check level tomight and arrange for a cardiac echo and further imaging of renal lesion  U/A is negative    If patient insists on leaving then I will have him follow up with me later this week for further blood cultures and repeat craetinine and follow up with Dr Campbell for echo,etc...    He will need furhter evaluation of his renal "lesion"    I would hold on the mitral clip this week.

## 2019-11-11 NOTE — PROGRESS NOTE ADULT - SUBJECTIVE AND OBJECTIVE BOX
INTERNAL MEDICINE PROGRESS NOTE:   Princess Yin M.D.  Beeper: [phone] ([facility])/ [id number] ([facility])     Patient is a 74y old  Male who presents with a chief complaint of Positive blood cultures (2019 06:11)    OVERNIGHT EVENTS: vanc trough 20, vancomycin dose reduced to 750 mg     SUBJECTIVE:    MEDICATIONS  (STANDING):  apixaban 5 milliGRAM(s) Oral every 12 hours  aspirin enteric coated 81 milliGRAM(s) Oral daily  atorvastatin 10 milliGRAM(s) Oral at bedtime  budesonide 160 MICROgram(s)/formoterol 4.5 MICROgram(s) Inhaler 2 Puff(s) Inhalation two times a day  dextrose 5%. 1000 milliLiter(s) (50 mL/Hr) IV Continuous <Continuous>  dextrose 50% Injectable 12.5 Gram(s) IV Push once  dextrose 50% Injectable 25 Gram(s) IV Push once  dextrose 50% Injectable 25 Gram(s) IV Push once  diltiazem    milliGRAM(s) Oral daily  fluticasone propionate 50 MICROgram(s)/spray Nasal Spray 1 Spray(s) Both Nostrils daily  furosemide    Tablet 80 milliGRAM(s) Oral daily  insulin glargine Injectable (LANTUS) 12 Unit(s) SubCutaneous at bedtime  insulin lispro (HumaLOG) corrective regimen sliding scale   SubCutaneous three times a day before meals  insulin lispro (HumaLOG) corrective regimen sliding scale   SubCutaneous at bedtime  insulin lispro Injectable (HumaLOG) 4 Unit(s) SubCutaneous three times a day before meals  methimazole 5 milliGRAM(s) Oral daily  metoprolol succinate ER 25 milliGRAM(s) Oral daily  nicotine - 21 mG/24Hr(s) Patch 1 patch Transdermal daily  senna 2 Tablet(s) Oral at bedtime  spironolactone 25 milliGRAM(s) Oral daily  tiotropium 18 MICROgram(s) Capsule 1 Capsule(s) Inhalation daily  vancomycin  IVPB 750 milliGRAM(s) IV Intermittent every 12 hours    MEDICATIONS  (PRN):  acetaminophen   Tablet .. 650 milliGRAM(s) Oral every 6 hours PRN Temp greater or equal to 38C (100.4F), Mild Pain (1 - 3), Moderate Pain (4 - 6)  dextrose 40% Gel 15 Gram(s) Oral once PRN Blood Glucose LESS THAN 70 milliGRAM(s)/deciliter  glucagon  Injectable 1 milliGRAM(s) IntraMuscular once PRN Glucose LESS THAN 70 milligrams/deciliter  glucagon  Injectable 1 milliGRAM(s) IntraMuscular once PRN Glucose LESS THAN 70 milligrams/deciliter  polyethylene glycol 3350 17 Gram(s) Oral daily PRN Constipation      Vital Signs Last 24 Hrs  T(C): 36.3 (2019 00:55), Max: 36.9 (10 Nov 2019 08:44)  T(F): 97.3 (2019 00:55), Max: 98.4 (10 Nov 2019 08:44)  HR: 77 (2019 05:19) (67 - 77)  BP: 115/75 (2019 05:19) (108/70 - 117/76)  BP(mean): --  RR: 18 (2019 00:55) (18 - 18)  SpO2: 96% (2019 00:55) (95% - 96%)  I&O's Summary    10 Nov 2019 07:01  -  2019 07:00  --------------------------------------------------------  IN: 400 mL / OUT: 0 mL / NET: 400 mL    PHYSICAL EXAM:  GENERAL: NAD, conversant  CHEST/LUNG: CTAB with prolonged expiratory phase; No crackles, wheezing, or rubs.  HEART: Regular rate and rhythm; No rubs or gallops. Grade 3/6 systolic murmur at RUSB, LUSB, LLSB. No murmur at apex or axilla.  ABDOMEN: Soft, Nontender, mildly distended as prior; Bowel sounds present  EXTREMITIES:  2+ Peripheral Pulses, No clubbing, cyanosis, or edema  SKIN: No rashes or lesions  NERVOUS SYSTEM:  Alert & Oriented, Good concentration    LABS:  11-10    134<L>  |  88<L>  |  35<H>  ----------------------------<  188<H>  3.9   |  31  |  1.27      137  |  90<L>  |  32<H>  ----------------------------<  239<H>  4.5   |  29  |  1.27      136  |  90<L>  |  31<H>  ----------------------------<  185<H>  6.4<HH>   |  29  |  1.13    Ca    9.4      10 Nov 2019 08:46  Ca    10.3      2019 17:08  Ca    10.3      2019 13:40  Phos  3.1     11-10  Mg     2.2     11-10    TPro  7.1  /  Alb  4.1  /  TBili  0.2  /  DBili  x   /  AST  15  /  ALT  21  /  AlkPhos  108  11-10  TPro  8.0  /  Alb  4.2  /  TBili  0.3  /  DBili  x   /  AST  52<H>  /  ALT  28  /  AlkPhos  104        PT/INR - ( 2019 13:40 )   PT: 15.6 sec;   INR: 1.36 ratio         PTT - ( 2019 13:40 )  PTT:32.4 sec                Urinalysis Basic - ( 2019 23:10 )    Color: Light Yellow / Appearance: Clear / S.014 / pH: x  Gluc: x / Ketone: Negative  / Bili: Negative / Urobili: <2 mg/dL   Blood: x / Protein: Negative / Nitrite: Negative   Leuk Esterase: Negative / RBC: x / WBC x   Sq Epi: x / Non Sq Epi: x / Bacteria: x                 14.9   22.43 )-----------( 235      ( 10 Nov 2019 11:46 )             45.7                         15.4   27.27 )-----------( 244      ( 2019 13:40 )             45.8     CAPILLARY BLOOD GLUCOSE  POCT Blood Glucose.: 153 mg/dL (10 Nov 2019 21:13)  POCT Blood Glucose.: 163 mg/dL (10 Nov 2019 17:25)  POCT Blood Glucose.: 147 mg/dL (10 Nov 2019 12:06)  POCT Blood Glucose.: 192 mg/dL (10 Nov 2019 08:11)    RECENT CULTURES:   @ 23:04  .Urine Clean Catch (Midstream)  --  --  --    No growth  --   @ 17:40  .Blood Blood-Peripheral  --  --  --    No growth to date.  --    RADIOLOGY & ADDITIONAL TESTS: none new    COORDINATION OF CARE:  Care Discussed with Consultants/Other Providers [Y/N]: Y INTERNAL MEDICINE PROGRESS NOTE:   Princess Yin M.D.  Beeper: [phone] ([facility])/ [id number] ([facility])     Patient is a 74y old  Male who presents with a chief complaint of Positive blood cultures (2019 06:11)    OVERNIGHT EVENTS: vanc trough 20, vancomycin dose reduced to 750 mg q12h    SUBJECTIVE: no acute complaints    MEDICATIONS  (STANDING):  apixaban 5 milliGRAM(s) Oral every 12 hours  aspirin enteric coated 81 milliGRAM(s) Oral daily  atorvastatin 10 milliGRAM(s) Oral at bedtime  budesonide 160 MICROgram(s)/formoterol 4.5 MICROgram(s) Inhaler 2 Puff(s) Inhalation two times a day  dextrose 5%. 1000 milliLiter(s) (50 mL/Hr) IV Continuous <Continuous>  dextrose 50% Injectable 12.5 Gram(s) IV Push once  dextrose 50% Injectable 25 Gram(s) IV Push once  dextrose 50% Injectable 25 Gram(s) IV Push once  diltiazem    milliGRAM(s) Oral daily  fluticasone propionate 50 MICROgram(s)/spray Nasal Spray 1 Spray(s) Both Nostrils daily  furosemide    Tablet 80 milliGRAM(s) Oral daily  insulin glargine Injectable (LANTUS) 12 Unit(s) SubCutaneous at bedtime  insulin lispro (HumaLOG) corrective regimen sliding scale   SubCutaneous three times a day before meals  insulin lispro (HumaLOG) corrective regimen sliding scale   SubCutaneous at bedtime  insulin lispro Injectable (HumaLOG) 4 Unit(s) SubCutaneous three times a day before meals  methimazole 5 milliGRAM(s) Oral daily  metoprolol succinate ER 25 milliGRAM(s) Oral daily  nicotine - 21 mG/24Hr(s) Patch 1 patch Transdermal daily  senna 2 Tablet(s) Oral at bedtime  spironolactone 25 milliGRAM(s) Oral daily  tiotropium 18 MICROgram(s) Capsule 1 Capsule(s) Inhalation daily  vancomycin  IVPB 750 milliGRAM(s) IV Intermittent every 12 hours    MEDICATIONS  (PRN):  acetaminophen   Tablet .. 650 milliGRAM(s) Oral every 6 hours PRN Temp greater or equal to 38C (100.4F), Mild Pain (1 - 3), Moderate Pain (4 - 6)  dextrose 40% Gel 15 Gram(s) Oral once PRN Blood Glucose LESS THAN 70 milliGRAM(s)/deciliter  glucagon  Injectable 1 milliGRAM(s) IntraMuscular once PRN Glucose LESS THAN 70 milligrams/deciliter  glucagon  Injectable 1 milliGRAM(s) IntraMuscular once PRN Glucose LESS THAN 70 milligrams/deciliter  polyethylene glycol 3350 17 Gram(s) Oral daily PRN Constipation      Vital Signs Last 24 Hrs  T(C): 36.3 (2019 00:55), Max: 36.9 (10 Nov 2019 08:44)  T(F): 97.3 (2019 00:55), Max: 98.4 (10 Nov 2019 08:44)  HR: 77 (2019 05:19) (67 - 77)  BP: 115/75 (2019 05:19) (108/70 - 117/76)  BP(mean): --  RR: 18 (2019 00:55) (18 - 18)  SpO2: 96% (2019 00:55) (95% - 96%)    I&O's Summary  10 Nov 2019 07:01  -  2019 07:00  --------------------------------------------------------  IN: 400 mL / OUT: 0 mL / NET: 400 mL    PHYSICAL EXAM:  GENERAL: NAD, conversant  CHEST/LUNG: CTAB with prolonged expiratory phase; No crackles, wheezing, or rubs.  HEART: Regular rate and rhythm; No rubs or gallops. Grade 3/6 systolic murmur at RUSB, LUSB, LLSB. No murmur at apex or axilla.  ABDOMEN: Soft, Nontender, mildly distended as prior; Bowel sounds present  EXTREMITIES:  2+ Peripheral Pulses, No clubbing, cyanosis, or edema  SKIN: No rashes or lesions  NERVOUS SYSTEM:  Alert & Oriented, Good concentration    LABS:  11-10    134<L>  |  88<L>  |  35<H>  ----------------------------<  188<H>  3.9   |  31  |  1.27      137  |  90<L>  |  32<H>  ----------------------------<  239<H>  4.5   |  29  |  1.27      136  |  90<L>  |  31<H>  ----------------------------<  185<H>  6.4<HH>   |  29  |  1.13    Ca    9.4      10 Nov 2019 08:46  Ca    10.3      2019 17:08  Ca    10.3      2019 13:40  Phos  3.1     11-10  Mg     2.2     11-10    TPro  7.1  /  Alb  4.1  /  TBili  0.2  /  DBili  x   /  AST  15  /  ALT  21  /  AlkPhos  108  11-10  TPro  8.0  /  Alb  4.2  /  TBili  0.3  /  DBili  x   /  AST  52<H>  /  ALT  28  /  AlkPhos  104        PT/INR - ( 2019 13:40 )   PT: 15.6 sec;   INR: 1.36 ratio         PTT - ( 2019 13:40 )  PTT:32.4 sec                Urinalysis Basic - ( 2019 23:10 )    Color: Light Yellow / Appearance: Clear / S.014 / pH: x  Gluc: x / Ketone: Negative  / Bili: Negative / Urobili: <2 mg/dL   Blood: x / Protein: Negative / Nitrite: Negative   Leuk Esterase: Negative / RBC: x / WBC x   Sq Epi: x / Non Sq Epi: x / Bacteria: x                 14.9   22.43 )-----------( 235      ( 10 Nov 2019 11:46 )             45.7                         15.4   27.27 )-----------( 244      ( 2019 13:40 )             45.8     CAPILLARY BLOOD GLUCOSE  POCT Blood Glucose.: 153 mg/dL (10 Nov 2019 21:13)  POCT Blood Glucose.: 163 mg/dL (10 Nov 2019 17:25)  POCT Blood Glucose.: 147 mg/dL (10 Nov 2019 12:06)  POCT Blood Glucose.: 192 mg/dL (10 Nov 2019 08:11)    RECENT CULTURES:  11-09 @ 23:04  .Urine Clean Catch (Midstream)  --  --  --    No growth  --   @ 17:40 x2  .Blood Blood-Peripheral  --  --  --    No growth to date.  --    . Outpatient Blood cx x 2: still 1 of 2 bottles with Enterococcus  RADIOLOGY & ADDITIONAL TESTS: none new    COORDINATION OF CARE:  Care Discussed with Consultants/Other Providers [Y/N]: Y INTERNAL MEDICINE PROGRESS NOTE:   Princess Yin M.D.  Beeper: [phone] ([facility])/ [id number] ([facility])     Patient is a 74y old  Male who presents with a chief complaint of Positive blood cultures (2019 06:11)    OVERNIGHT EVENTS: vanc trough 20, vancomycin dose reduced to 750 mg q12h    SUBJECTIVE: no acute complaints    MEDICATIONS  (STANDING):  apixaban 5 milliGRAM(s) Oral every 12 hours  aspirin enteric coated 81 milliGRAM(s) Oral daily  atorvastatin 10 milliGRAM(s) Oral at bedtime  budesonide 160 MICROgram(s)/formoterol 4.5 MICROgram(s) Inhaler 2 Puff(s) Inhalation two times a day  dextrose 5%. 1000 milliLiter(s) (50 mL/Hr) IV Continuous <Continuous>  dextrose 50% Injectable 12.5 Gram(s) IV Push once  dextrose 50% Injectable 25 Gram(s) IV Push once  dextrose 50% Injectable 25 Gram(s) IV Push once  diltiazem    milliGRAM(s) Oral daily  fluticasone propionate 50 MICROgram(s)/spray Nasal Spray 1 Spray(s) Both Nostrils daily  furosemide    Tablet 80 milliGRAM(s) Oral daily  insulin glargine Injectable (LANTUS) 12 Unit(s) SubCutaneous at bedtime  insulin lispro (HumaLOG) corrective regimen sliding scale   SubCutaneous three times a day before meals  insulin lispro (HumaLOG) corrective regimen sliding scale   SubCutaneous at bedtime  insulin lispro Injectable (HumaLOG) 4 Unit(s) SubCutaneous three times a day before meals  methimazole 5 milliGRAM(s) Oral daily  metoprolol succinate ER 25 milliGRAM(s) Oral daily  nicotine - 21 mG/24Hr(s) Patch 1 patch Transdermal daily  senna 2 Tablet(s) Oral at bedtime  spironolactone 25 milliGRAM(s) Oral daily  tiotropium 18 MICROgram(s) Capsule 1 Capsule(s) Inhalation daily  vancomycin  IVPB 750 milliGRAM(s) IV Intermittent every 12 hours    MEDICATIONS  (PRN):  acetaminophen   Tablet .. 650 milliGRAM(s) Oral every 6 hours PRN Temp greater or equal to 38C (100.4F), Mild Pain (1 - 3), Moderate Pain (4 - 6)  dextrose 40% Gel 15 Gram(s) Oral once PRN Blood Glucose LESS THAN 70 milliGRAM(s)/deciliter  glucagon  Injectable 1 milliGRAM(s) IntraMuscular once PRN Glucose LESS THAN 70 milligrams/deciliter  glucagon  Injectable 1 milliGRAM(s) IntraMuscular once PRN Glucose LESS THAN 70 milligrams/deciliter  polyethylene glycol 3350 17 Gram(s) Oral daily PRN Constipation      Vital Signs Last 24 Hrs  T(C): 36.3 (2019 00:55), Max: 36.9 (10 Nov 2019 08:44)  T(F): 97.3 (2019 00:55), Max: 98.4 (10 Nov 2019 08:44)  HR: 77 (2019 05:19) (67 - 77)  BP: 115/75 (2019 05:19) (108/70 - 117/76)  BP(mean): --  RR: 18 (2019 00:55) (18 - 18)  SpO2: 96% (2019 00:55) (95% - 96%)    I&O's Summary  10 Nov 2019 07:01  -  2019 07:00  --------------------------------------------------------  IN: 400 mL / OUT: 0 mL / NET: 400 mL    PHYSICAL EXAM:  GENERAL: NAD, conversant  CHEST/LUNG: CTAB with prolonged expiratory phase; No crackles, wheezing, or rubs.  HEART: Regular rate and rhythm; No rubs or gallops. Grade 3/6 systolic murmur at RUSB, LUSB, LLSB. No murmur at apex or axilla.  ABDOMEN: Soft, Nontender, mildly distended as prior; Bowel sounds present  EXTREMITIES:  2+ Peripheral Pulses, No clubbing, cyanosis, or edema  SKIN: No rashes or lesions  NERVOUS SYSTEM:  Alert & Oriented, Good concentration    LABS:      133<L>  |  88<L>  |  33<H>  ----------------------------<  193<H>  3.8   |  32<H>  |  1.41<H>  11-10    134<L>  |  88<L>  |  35<H>  ----------------------------<  188<H>  3.9   |  31  |  1.27      137  |  90<L>  |  32<H>  ----------------------------<  239<H>  4.5   |  29  |  1.27    Ca    9.5      2019 08:57  Ca    9.4      10 Nov 2019 08:46  Ca    10.3      2019 17:08  Phos  3.0     11  Mg     2.4     11    TPro  7.4  /  Alb  4.4  /  TBili  0.3  /  DBili  x   /  AST  17  /  ALT  24  /  AlkPhos  120  11  TPro  7.1  /  Alb  4.1  /  TBili  0.2  /  DBili  x   /  AST  15  /  ALT  21  /  AlkPhos  108  1110  TPro  8.0  /  Alb  4.2  /  TBili  0.3  /  DBili  x   /  AST  52<H>  /  ALT  28  /  AlkPhos  104  1109      PT/INR - ( 2019 13:40 )   PT: 15.6 sec;   INR: 1.36 ratio         PTT - ( 2019 13:40 )  PTT:32.4 sec              Urinalysis Basic - ( 2019 23:10 )    Color: Light Yellow / Appearance: Clear / S.014 / pH: x  Gluc: x / Ketone: Negative  / Bili: Negative / Urobili: <2 mg/dL   Blood: x / Protein: Negative / Nitrite: Negative   Leuk Esterase: Negative / RBC: x / WBC x   Sq Epi: x / Non Sq Epi: x / Bacteria: x                              15.2   20.35 )-----------( 242      ( 2019 09:06 )             44.8                         14.9   22.43 )-----------( 235      ( 10 Nov 2019 11:46 )             45.7                         15.4   27.27 )-----------( 244      ( 2019 13:40 )             45.8     CAPILLARY BLOOD GLUCOSE  POCT Blood Glucose.: 183 mg/dL (2019 08:15)  POCT Blood Glucose.: 153 mg/dL (10 Nov 2019 21:13)  POCT Blood Glucose.: 163 mg/dL (10 Nov 2019 17:25)  POCT Blood Glucose.: 147 mg/dL (10 Nov 2019 12:06)          RECENT CULTURES:   @ 23:04  .Urine Clean Catch (Midstream)  --  --  --    No growth  --   @ 17:40 x2  .Blood Blood-Peripheral  --  --  --    No growth to date.  --    19 Outpatient Blood cx x 2: still 1 of 2 bottles with Enterococcus  RADIOLOGY & ADDITIONAL TESTS: none new    COORDINATION OF CARE:  Care Discussed with Consultants/Other Providers [Y/N]: RITCHIE

## 2019-11-11 NOTE — CHART NOTE - NSCHARTNOTEFT_GEN_A_CORE
Patient eloped from [address] [place] [address] without a safe discharge by protocol: no discharge paperwork. Patient repeatedly adamant to leave, despite being explained that team was waiting for information from consulting team to ensure a safe discharge.   Patient will be mailed discharge paperwork.

## 2019-11-11 NOTE — DISCHARGE NOTE NURSING/CASE MANAGEMENT/SOCIAL WORK - PATIENT PORTAL LINK FT
You can access the FollowMyHealth Patient Portal offered by [facility] by registering at the following website: [email or web address]. By joining [facility]’s FollowMyHealth portal, you will also be able to view your health information using other applications (apps) compatible with our system.

## 2019-11-11 NOTE — PROGRESS NOTE ADULT - SUBJECTIVE AND OBJECTIVE BOX
Patient is a 74y old  Male who presents with a chief complaint of Positive blood cultures (2019 07:23)    Being followed by ID for        Interval history:  No other acute events      ROS:  No cough,SOB,CP  No N/V/D  No abd pain  No urinary complaints  No HA  No joint or limb pain  No other complaints    PAST MEDICAL & SURGICAL HISTORY:  Nonrheumatic mitral valve insufficiency  COPD (chronic obstructive pulmonary disease): not on oxygen  Mitral regurgitation: severe  Pericardial effusion: drained  a liter of fluid in 2018  Atrial fibrillation: s/p Atrial flutter ablation; on Eliquis/ASA  Goiter  Hyperthyroidism: on methimazole  LIMA (obstructive sleep apnea): refuses C-pap  Aortic stenosis: moderate  Coronary artery disease  HLD (hyperlipidemia)  Pulmonary nodules  Diabetes mellitus, type 2: on janumet &amp; farxiga  Kidney stones  Hypertension  S/P coronary angiogram  History of tonsillectomy: As a child  History of kidney stones:  &amp;     Allergies    penicillins (Anaphylaxis)    Intolerances      Antimicrobials:    vancomycin  IVPB 750 milliGRAM(s) IV Intermittent every 12 hours    MEDICATIONS  (STANDING):  apixaban 5 milliGRAM(s) Oral every 12 hours  aspirin enteric coated 81 milliGRAM(s) Oral daily  atorvastatin 10 milliGRAM(s) Oral at bedtime  budesonide 160 MICROgram(s)/formoterol 4.5 MICROgram(s) Inhaler 2 Puff(s) Inhalation two times a day  dextrose 5%. 1000 milliLiter(s) (50 mL/Hr) IV Continuous <Continuous>  dextrose 50% Injectable 12.5 Gram(s) IV Push once  dextrose 50% Injectable 25 Gram(s) IV Push once  dextrose 50% Injectable 25 Gram(s) IV Push once  diltiazem    milliGRAM(s) Oral daily  fluticasone propionate 50 MICROgram(s)/spray Nasal Spray 1 Spray(s) Both Nostrils daily  furosemide    Tablet 80 milliGRAM(s) Oral daily  insulin glargine Injectable (LANTUS) 12 Unit(s) SubCutaneous at bedtime  insulin lispro (HumaLOG) corrective regimen sliding scale   SubCutaneous three times a day before meals  insulin lispro (HumaLOG) corrective regimen sliding scale   SubCutaneous at bedtime  insulin lispro Injectable (HumaLOG) 4 Unit(s) SubCutaneous three times a day before meals  methimazole 5 milliGRAM(s) Oral daily  metoprolol succinate ER 25 milliGRAM(s) Oral daily  nicotine - 21 mG/24Hr(s) Patch 1 patch Transdermal daily  senna 2 Tablet(s) Oral at bedtime  spironolactone 25 milliGRAM(s) Oral daily  tiotropium 18 MICROgram(s) Capsule 1 Capsule(s) Inhalation daily  vancomycin  IVPB 750 milliGRAM(s) IV Intermittent every 12 hours      Vital Signs Last 24 Hrs  T(C): 36.3 (19 @ 08:13), Max: 36.8 (11-10-19 @ 15:32)  T(F): 97.4 (19 @ 08:13), Max: 98.3 (11-10-19 @ 15:32)  HR: 74 (19 @ 08:13) (67 - 77)  BP: 113/72 (19 @ 08:13) (113/72 - 117/76)  BP(mean): --  RR: 18 (19 @ 08:13) (18 - 18)  SpO2: 95% (19 @ 08:13) (95% - 96%)    Physical Exam:    Constitutional well preserved,comfortable,pleasant    HEENT PERRLA EOMI,No pallor or icterus    No oral exudate or erythema    Neck supple no JVD or LN    Chest Good AE,CTA    CVS RRR S1 S2 WNl No murmur or rub or gallop    Abd soft BS normal No tenderness no masses    Ext No cyanosis clubbing or edema    IV site no erythema tenderness or discharge    Joints no swelling or LOM    CNS AAO X 3 no focal    Lab Data:                          15.2   20.35 )-----------( 242      ( 2019 09:06 )             44.8           133<L>  |  88<L>  |  33<H>  ----------------------------<  193<H>  3.8   |  32<H>  |  1.41<H>    Ca    9.5      2019 08:57  Phos  3.0       Mg     2.4         TPro  7.4  /  Alb  4.4  /  TBili  0.3  /  DBili  x   /  AST  17  /  ALT  24  /  AlkPhos  120        Urinalysis Basic - ( 2019 23:10 )    Color: Light Yellow / Appearance: Clear / S.014 / pH: x  Gluc: x / Ketone: Negative  / Bili: Negative / Urobili: <2 mg/dL   Blood: x / Protein: Negative / Nitrite: Negative   Leuk Esterase: Negative / RBC: x / WBC x   Sq Epi: x / Non Sq Epi: x / Bacteria: x        .Urine Clean Catch (Midstream)  19   No growth  --  --      .Blood Blood-Peripheral  19   No growth to date.  --  --        Vancomycin Level, Trough: 20.4 ug/mL (11-10-19 @ 17:44)      WBC Count: 20.35 (19 @ 09:06)  WBC Count: 22.43 (11-10-19 @ 11:46)  WBC Count: 27.27 (19 @ 13:40)  WBC Count: 21.88 (19 @ 16:09)        CRP= 0.19  ESR= 32      < from: CT Abdomen and Pelvis w/ Oral Cont and w/ IV Cont (19 @ 21:45) >  EXAM:  CT ABDOMEN AND PELVIS OC IC                            PROCEDURE DATE:  2019            INTERPRETATION:  CLINICAL INFORMATION: Leukocytosis. Increased white   count.    COMPARISON: CT chest 2019.    PROCEDURE:   CT of the Abdomen and Pelvis was performed with intravenous contrast.   Intravenous contrast: 90 ml Omnipaque 350. 10 ml discarded.  Oral contrast: positive contrast was administered.  Sagittal and coronal reformats were performed.    FINDINGS:    LOWER CHEST: Bibasilarsubsegmental atelectasis. Aortic valvular   calcifications. Multiple bibasilar nodules are unchanged since 2016.    LIVER: Within normal limits.  BILE DUCTS: Normal caliber.  GALLBLADDER: Cholelithiasis.  SPLEEN: Within normal limits.  PANCREAS:Within normal limits.  ADRENALS: Within normal limits.  KIDNEYS/URETERS: Bilateral hypodense renal lesions measuring above fluid   attenuation. Specifically a heterogeneous lesion arising from the lower   pole of right kidney is indeterminate. Bilateral nonobstructive renal   calculi with the largest measuring 2.1 cm within the left kidney.    BLADDER: Within normal limits.  REPRODUCTIVE ORGANS: Prostatic calcifications.    BOWEL: No bowel obstruction. Appendix is normal.  PERITONEUM: No ascites.  VESSELS: Atheromatous disease of the aorta.  RETROPERITONEUM/LYMPH NODES: No lymphadenopathy.    ABDOMINAL WALL: Within normal limits.  BONES: Degenerative changes of the spine.    IMPRESSION:    No acute abnormality in the abdomen and pelvis. Bilateral nonobstructive   renal calculi.  Multiple indeterminate low-attenuation lesions in the kidneys,   specifically a heterogeneous lesion arising from the lower pole of right   kidney for which further evaluation with MRI is advised on outpatient   basis.        ANI ARELLANO M.D., RADIOLOGY RESIDENT  This document has been electronically signed.  JIMI WASHINGTON M.D. ATTENDING RADIOLOGIST  This document has been electronically signed. Nov 10 2019 10:22AM    < end of copied text >          < from: [facility] Duplex Carotid, Bilat (19 @ 11:06) >  EXAM:  CAROTID DUPLEX BILATERAL                            PROCEDURE DATE:  2019            INTERPRETATION:  Technique: Grayscale, color and spectral Doppler   examination of both carotid arteries was performed.     HISTORY:  Preop mitral valve repair    A prior examination, dated 2019, showed no carotid artery stenoses.    The previously described nodule in the right lobe of the thyroid gland is   again identified.    The tortuosity of the internal carotid arteries is again imaged.    Atheromatous plaque is again identified along the course of the right and   left carotid arteries in the neck.    Blood flow velocities are as follows:    RIGHT:    PROX CCA = 52 ;  DIST CCA = 23 ;  PROX ICA = 68 ;  DIST ICA =   56 ;  ECA = 79    LEFT   :    PROX CCA = 51 ;  DIST CCA = 37 ;  PROX ICA = 61 ;  DIST ICA =   32 ;  ECA = 76    There is antegrade flow through both vertebral arteries.    IMPRESSION: No hemodynamically significant carotid artery stenoses.    Measurement of carotid stenosis is based on velocity parameters that   correlate the residual internal carotid diameter with that of the more   distal vessel in accordance with a method such as the [place] American   Symptomatic Carotid Endarterectomy Trial (NASCET).      < end of copied text > Patient is a 74y old  Male who presents with a chief complaint of Positive blood cultures (2019 07:23)    Being followed by ID for        Interval history:  pt is only interested in " getting out"  reviewed CT results with patient and concerns for endocarditis  discussed desire for echo  steroids are on hold    PAST MEDICAL & SURGICAL HISTORY:  Nonrheumatic mitral valve insufficiency  COPD (chronic obstructive pulmonary disease): not on oxygen  Mitral regurgitation: severe  Pericardial effusion: drained  a liter of fluid in 2018  Atrial fibrillation: s/p Atrial flutter ablation; on Eliquis/ASA  Goiter  Hyperthyroidism: on methimazole  LIMA (obstructive sleep apnea): refuses C-pap  Aortic stenosis: moderate  Coronary artery disease  HLD (hyperlipidemia)  Pulmonary nodules  Diabetes mellitus, type 2: on umet &amp; farxiga  Kidney stones  Hypertension  S/P coronary angiogram  History of tonsillectomy: As a child  History of kidney stones:  &amp;     Allergies    penicillins (Anaphylaxis)    Intolerances      Antimicrobials:    vancomycin  IVPB 750 milliGRAM(s) IV Intermittent every 12 hours    MEDICATIONS  (STANDING):  apixaban 5 milliGRAM(s) Oral every 12 hours  aspirin enteric coated 81 milliGRAM(s) Oral daily  atorvastatin 10 milliGRAM(s) Oral at bedtime  budesonide 160 MICROgram(s)/formoterol 4.5 MICROgram(s) Inhaler 2 Puff(s) Inhalation two times a day  dextrose 5%. 1000 milliLiter(s) (50 mL/Hr) IV Continuous <Continuous>  dextrose 50% Injectable 12.5 Gram(s) IV Push once  dextrose 50% Injectable 25 Gram(s) IV Push once  dextrose 50% Injectable 25 Gram(s) IV Push once  diltiazem    milliGRAM(s) Oral daily  fluticasone propionate 50 MICROgram(s)/spray Nasal Spray 1 Spray(s) Both Nostrils daily  furosemide    Tablet 80 milliGRAM(s) Oral daily  insulin glargine Injectable (LANTUS) 12 Unit(s) SubCutaneous at bedtime  insulin lispro (HumaLOG) corrective regimen sliding scale   SubCutaneous three times a day before meals  insulin lispro (HumaLOG) corrective regimen sliding scale   SubCutaneous at bedtime  insulin lispro Injectable (HumaLOG) 4 Unit(s) SubCutaneous three times a day before meals  methimazole 5 milliGRAM(s) Oral daily  metoprolol succinate ER 25 milliGRAM(s) Oral daily  nicotine - 21 mG/24Hr(s) Patch 1 patch Transdermal daily  senna 2 Tablet(s) Oral at bedtime  spironolactone 25 milliGRAM(s) Oral daily  tiotropium 18 MICROgram(s) Capsule 1 Capsule(s) Inhalation daily  vancomycin  IVPB 750 milliGRAM(s) IV Intermittent every 12 hours      Vital Signs Last 24 Hrs  T(C): 36.3 (19 @ 08:13), Max: 36.8 (11-10-19 @ 15:32)  T(F): 97.4 (19 @ 08:13), Max: 98.3 (11-10-19 @ 15:32)  HR: 74 (19 @ 08:13) (67 - 77)  BP: 113/72 (19 @ 08:13) (113/72 - 117/76)  BP(mean): --  RR: 18 (19 @ 08:13) (18 - 18)  SpO2: 95% (19 @ 08:13) (95% - 96%)    Physical Exam:    Constitutional well preserved,comfortable,pleasant    HEENT PERRLA EOMI,No pallor or icterus    No oral exudate or erythema    Neck supple no JVD or LN    Chest Good AE,CTA    CVS RRR S1 S2 WNl No murmur or rub or gallop    Abd soft BS normal No tenderness no masses    Ext No cyanosis clubbing or edema    IV site no erythema tenderness or discharge    Joints no swelling or LOM    CNS AAO X 3 no focal    Lab Data:                          15.2   20.35 )-----------( 242      ( 2019 09:06 )             44.8           133<L>  |  88<L>  |  33<H>  ----------------------------<  193<H>  3.8   |  32<H>  |  1.41<H>    Ca    9.5      2019 08:57  Phos  3.0       Mg     2.4         TPro  7.4  /  Alb  4.4  /  TBili  0.3  /  DBili  x   /  AST  17  /  ALT  24  /  AlkPhos  120        Urinalysis Basic - ( 2019 23:10 )    Color: Light Yellow / Appearance: Clear / S.014 / pH: x  Gluc: x / Ketone: Negative  / Bili: Negative / Urobili: <2 mg/dL   Blood: x / Protein: Negative / Nitrite: Negative   Leuk Esterase: Negative / RBC: x / WBC x   Sq Epi: x / Non Sq Epi: x / Bacteria: x        .Urine Clean Catch (Midstream)  19   No growth  --  --      .Blood Blood-Peripheral  19   No growth to date.  --  --        Vancomycin Level, Trough: 20.4 ug/mL (11-10-19 @ 17:44)      WBC Count: 20.35 (19 @ 09:06)  WBC Count: 22.43 (11-10-19 @ 11:46)  WBC Count: 27.27 (19 @ 13:40)  WBC Count: 21.88 (19 @ 16:09)        CRP= 0.19  ESR= 32      < from: CT Abdomen and Pelvis w/ Oral Cont and w/ IV Cont (19 @ 21:45) >  EXAM:  CT ABDOMEN AND PELVIS OC IC                            PROCEDURE DATE:  2019            INTERPRETATION:  CLINICAL INFORMATION: Leukocytosis. Increased white   count.    COMPARISON: CT chest 2019.    PROCEDURE:   CT of the Abdomen and Pelvis was performed with intravenous contrast.   Intravenous contrast: 90 ml Omnipaque 350. 10 ml discarded.  Oral contrast: positive contrast was administered.  Sagittal and coronal reformats were performed.    FINDINGS:    LOWER CHEST: Bibasilarsubsegmental atelectasis. Aortic valvular   calcifications. Multiple bibasilar nodules are unchanged since 2016.    LIVER: Within normal limits.  BILE DUCTS: Normal caliber.  GALLBLADDER: Cholelithiasis.  SPLEEN: Within normal limits.  PANCREAS:Within normal limits.  ADRENALS: Within normal limits.  KIDNEYS/URETERS: Bilateral hypodense renal lesions measuring above fluid   attenuation. Specifically a heterogeneous lesion arising from the lower   pole of right kidney is indeterminate. Bilateral nonobstructive renal   calculi with the largest measuring 2.1 cm within the left kidney.    BLADDER: Within normal limits.  REPRODUCTIVE ORGANS: Prostatic calcifications.    BOWEL: No bowel obstruction. Appendix is normal.  PERITONEUM: No ascites.  VESSELS: Atheromatous disease of the aorta.  RETROPERITONEUM/LYMPH NODES: No lymphadenopathy.    ABDOMINAL WALL: Within normal limits.  BONES: Degenerative changes of the spine.    IMPRESSION:    No acute abnormality in the abdomen and pelvis. Bilateral nonobstructive   renal calculi.  Multiple indeterminate low-attenuation lesions in the kidneys,   specifically a heterogeneous lesion arising from the lower pole of right   kidney for which further evaluation with MRI is advised on outpatient   basis.        ANI ARELLANO M.D., RADIOLOGY RESIDENT  This document has been electronically signed.  JIMI WASHINGTON M.D. ATTENDING RADIOLOGIST  This document has been electronically signed. Nov 10 2019 10:22AM    < end of copied text >          < from: [facility] Duplex Carotid, Bilat (11..19 @ 11:06) >  EXAM:  CAROTID DUPLEX BILATERAL                            PROCEDURE DATE:  2019            INTERPRETATION:  Technique: Grayscale, color and spectral Doppler   examination of both carotid arteries was performed.     HISTORY:  Preop mitral valve repair    A prior examination, dated 2019, showed no carotid artery stenoses.    The previously described nodule in the right lobe of the thyroid gland is   again identified.    The tortuosity of the internal carotid arteries is again imaged.    Atheromatous plaque is again identified along the course of the right and   left carotid arteries in the neck.    Blood flow velocities are as follows:    RIGHT:    PROX CCA = 52 ;  DIST CCA = 23 ;  PROX ICA = 68 ;  DIST ICA =   56 ;  ECA = 79    LEFT   :    PROX CCA = 51 ;  DIST CCA = 37 ;  PROX ICA = 61 ;  DIST ICA =   32 ;  ECA = 76    There is antegrade flow through both vertebral arteries.    IMPRESSION: No hemodynamically significant carotid artery stenoses.    Measurement of carotid stenosis is based on velocity parameters that   correlate the residual internal carotid diameter with that of the more   distal vessel in accordance with a method such as the [place] American   Symptomatic Carotid Endarterectomy Trial (NASCET).      < end of copied text > Patient is a 74y old  Male who presents with a chief complaint of Positive blood cultures (2019 07:23)    Being followed by ID for        Interval history:  pt is only interested in " getting out"  reviewed CT results with patient and concerns for endocarditis  discussed desire for echo  steroids are on hold    PAST MEDICAL & SURGICAL HISTORY:  Nonrheumatic mitral valve insufficiency  COPD (chronic obstructive pulmonary disease): not on oxygen  Mitral regurgitation: severe  Pericardial effusion: drained  a liter of fluid in 2018  Atrial fibrillation: s/p Atrial flutter ablation; on Eliquis/ASA  Goiter  Hyperthyroidism: on methimazole  LIMA (obstructive sleep apnea): refuses C-pap  Aortic stenosis: moderate  Coronary artery disease  HLD (hyperlipidemia)  Pulmonary nodules  Diabetes mellitus, type 2: on umet &amp; farxiga  Kidney stones  Hypertension  S/P coronary angiogram  History of tonsillectomy: As a child  History of kidney stones:  &amp;     Allergies    penicillins (Anaphylaxis)    Intolerances      Antimicrobials:    vancomycin  IVPB 750 milliGRAM(s) IV Intermittent every 12 hours    MEDICATIONS  (STANDING):  apixaban 5 milliGRAM(s) Oral every 12 hours  aspirin enteric coated 81 milliGRAM(s) Oral daily  atorvastatin 10 milliGRAM(s) Oral at bedtime  budesonide 160 MICROgram(s)/formoterol 4.5 MICROgram(s) Inhaler 2 Puff(s) Inhalation two times a day  dextrose 5%. 1000 milliLiter(s) (50 mL/Hr) IV Continuous <Continuous>  dextrose 50% Injectable 12.5 Gram(s) IV Push once  dextrose 50% Injectable 25 Gram(s) IV Push once  dextrose 50% Injectable 25 Gram(s) IV Push once  diltiazem    milliGRAM(s) Oral daily  fluticasone propionate 50 MICROgram(s)/spray Nasal Spray 1 Spray(s) Both Nostrils daily  furosemide    Tablet 80 milliGRAM(s) Oral daily  insulin glargine Injectable (LANTUS) 12 Unit(s) SubCutaneous at bedtime  insulin lispro (HumaLOG) corrective regimen sliding scale   SubCutaneous three times a day before meals  insulin lispro (HumaLOG) corrective regimen sliding scale   SubCutaneous at bedtime  insulin lispro Injectable (HumaLOG) 4 Unit(s) SubCutaneous three times a day before meals  methimazole 5 milliGRAM(s) Oral daily  metoprolol succinate ER 25 milliGRAM(s) Oral daily  nicotine - 21 mG/24Hr(s) Patch 1 patch Transdermal daily  senna 2 Tablet(s) Oral at bedtime  spironolactone 25 milliGRAM(s) Oral daily  tiotropium 18 MICROgram(s) Capsule 1 Capsule(s) Inhalation daily  vancomycin  IVPB 750 milliGRAM(s) IV Intermittent every 12 hours      Vital Signs Last 24 Hrs  T(C): 36.3 (19 @ 08:13), Max: 36.8 (11-10-19 @ 15:32)  T(F): 97.4 (19 @ 08:13), Max: 98.3 (11-10-19 @ 15:32)  HR: 74 (19 @ 08:13) (67 - 77)  BP: 113/72 (19 @ 08:13) (113/72 - 117/76)  BP(mean): --  RR: 18 (19 @ 08:13) (18 - 18)  SpO2: 95% (19 @ 08:13) (95% - 96%)    Physical Exam:    Constitutional well preserved,comfortable,pleasant    HEENT PERRLA EOMI,No pallor or icterus    No oral exudate or erythema    Neck supple no JVD or LN    Chest rare basilar crackles    CVS RRR S1 S2 WNl  murmur    Abd soft BS normal No tenderness     Ext No cyanosis clubbing or edema    IV site no erythema tenderness or discharge    Joints no swelling or LOM    CNS AAO X 3 no focal    Lab Data:                          15.2   20.35 )-----------( 242      ( 2019 09:06 )             44.8           133<L>  |  88<L>  |  33<H>  ----------------------------<  193<H>  3.8   |  32<H>  |  1.41<H>    Ca    9.5      2019 08:57  Phos  3.0       Mg     2.4         TPro  7.4  /  Alb  4.4  /  TBili  0.3  /  DBili  x   /  AST  17  /  ALT  24  /  AlkPhos  120        Urinalysis Basic - ( 2019 23:10 )    Color: Light Yellow / Appearance: Clear / S.014 / pH: x  Gluc: x / Ketone: Negative  / Bili: Negative / Urobili: <2 mg/dL   Blood: x / Protein: Negative / Nitrite: Negative   Leuk Esterase: Negative / RBC: x / WBC x   Sq Epi: x / Non Sq Epi: x / Bacteria: x        .Urine Clean Catch (Midstream)  19   No growth  --  --      .Blood Blood-Peripheral  19   No growth to date.  --  --        Vancomycin Level, Trough: 20.4 ug/mL (11-10-19 @ 17:44)      WBC Count: 20.35 (19 @ 09:06)  WBC Count: 22.43 (11-10-19 @ 11:46)  WBC Count: 27.27 (19 @ 13:40)  WBC Count: 21.88 (19 @ 16:09)        CRP= 0.19  ESR= 32      < from: CT Abdomen and Pelvis w/ Oral Cont and w/ IV Cont (19 @ 21:45) >  EXAM:  CT ABDOMEN AND PELVIS OC IC                            PROCEDURE DATE:  2019            INTERPRETATION:  CLINICAL INFORMATION: Leukocytosis. Increased white   count.    COMPARISON: CT chest 2019.    PROCEDURE:   CT of the Abdomen and Pelvis was performed with intravenous contrast.   Intravenous contrast: 90 ml Omnipaque 350. 10 ml discarded.  Oral contrast: positive contrast was administered.  Sagittal and coronal reformats were performed.    FINDINGS:    LOWER CHEST: Bibasilarsubsegmental atelectasis. Aortic valvular   calcifications. Multiple bibasilar nodules are unchanged since 2016.    LIVER: Within normal limits.  BILE DUCTS: Normal caliber.  GALLBLADDER: Cholelithiasis.  SPLEEN: Within normal limits.  PANCREAS:Within normal limits.  ADRENALS: Within normal limits.  KIDNEYS/URETERS: Bilateral hypodense renal lesions measuring above fluid   attenuation. Specifically a heterogeneous lesion arising from the lower   pole of right kidney is indeterminate. Bilateral nonobstructive renal   calculi with the largest measuring 2.1 cm within the left kidney.    BLADDER: Within normal limits.  REPRODUCTIVE ORGANS: Prostatic calcifications.    BOWEL: No bowel obstruction. Appendix is normal.  PERITONEUM: No ascites.  VESSELS: Atheromatous disease of the aorta.  RETROPERITONEUM/LYMPH NODES: No lymphadenopathy.    ABDOMINAL WALL: Within normal limits.  BONES: Degenerative changes of the spine.    IMPRESSION:    No acute abnormality in the abdomen and pelvis. Bilateral nonobstructive   renal calculi.  Multiple indeterminate low-attenuation lesions in the kidneys,   specifically a heterogeneous lesion arising from the lower pole of right   kidney for which further evaluation with MRI is advised on outpatient   basis.        ANI ARELLANO M.D., RADIOLOGY RESIDENT  This document has been electronically signed.  JIMI WASHINGTON M.D. ATTENDING RADIOLOGIST  This document has been electronically signed. Nov 10 2019 10:22AM    < end of copied text >          < from: [facility] Duplex Carotid, Bilat (. @ 11:06) >  EXAM:  CAROTID DUPLEX BILATERAL                            PROCEDURE DATE:  2019            INTERPRETATION:  Technique: Grayscale, color and spectral Doppler   examination of both carotid arteries was performed.     HISTORY:  Preop mitral valve repair    A prior examination, dated 2019, showed no carotid artery stenoses.    The previously described nodule in the right lobe of the thyroid gland is   again identified.    The tortuosity of the internal carotid arteries is again imaged.    Atheromatous plaque is again identified along the course of the right and   left carotid arteries in the neck.    Blood flow velocities are as follows:    RIGHT:    PROX CCA = 52 ;  DIST CCA = 23 ;  PROX ICA = 68 ;  DIST ICA =   56 ;  ECA = 79    LEFT   :    PROX CCA = 51 ;  DIST CCA = 37 ;  PROX ICA = 61 ;  DIST ICA =   32 ;  ECA = 76    There is antegrade flow through both vertebral arteries.    IMPRESSION: No hemodynamically significant carotid artery stenoses.    Measurement of carotid stenosis is based on velocity parameters that   correlate the residual internal carotid diameter with that of the more   distal vessel in accordance with a method such as the [place] American   Symptomatic Carotid Endarterectomy Trial (NASCET).      < end of copied text > Patient is a 74y old  Male who presents with a chief complaint of Positive blood cultures (2019 07:23)    Being followed by ID for        Interval history:  pt is only interested in " getting out"  reviewed CT results with patient and concerns for endocarditis  discussed desire for echo  steroids are on hold    PAST MEDICAL & SURGICAL HISTORY:  Nonrheumatic mitral valve insufficiency  COPD (chronic obstructive pulmonary disease): not on oxygen  Mitral regurgitation: severe  Pericardial effusion: drained  a liter of fluid in 2018  Atrial fibrillation: s/p Atrial flutter ablation; on Eliquis/ASA  Goiter  Hyperthyroidism: on methimazole  LIMA (obstructive sleep apnea): refuses C-pap  Aortic stenosis: moderate  Coronary artery disease  HLD (hyperlipidemia)  Pulmonary nodules  Diabetes mellitus, type 2: on umet &amp; farxiga  Kidney stones  Hypertension  S/P coronary angiogram  History of tonsillectomy: As a child  History of kidney stones:  &amp;     Allergies    penicillins (Anaphylaxis)    Intolerances      Antimicrobials:    vancomycin  IVPB 750 milliGRAM(s) IV Intermittent every 12 hours    MEDICATIONS  (STANDING):  apixaban 5 milliGRAM(s) Oral every 12 hours  aspirin enteric coated 81 milliGRAM(s) Oral daily  atorvastatin 10 milliGRAM(s) Oral at bedtime  budesonide 160 MICROgram(s)/formoterol 4.5 MICROgram(s) Inhaler 2 Puff(s) Inhalation two times a day  dextrose 5%. 1000 milliLiter(s) (50 mL/Hr) IV Continuous <Continuous>  dextrose 50% Injectable 12.5 Gram(s) IV Push once  dextrose 50% Injectable 25 Gram(s) IV Push once  dextrose 50% Injectable 25 Gram(s) IV Push once  diltiazem    milliGRAM(s) Oral daily  fluticasone propionate 50 MICROgram(s)/spray Nasal Spray 1 Spray(s) Both Nostrils daily  furosemide    Tablet 80 milliGRAM(s) Oral daily  insulin glargine Injectable (LANTUS) 12 Unit(s) SubCutaneous at bedtime  insulin lispro (HumaLOG) corrective regimen sliding scale   SubCutaneous three times a day before meals  insulin lispro (HumaLOG) corrective regimen sliding scale   SubCutaneous at bedtime  insulin lispro Injectable (HumaLOG) 4 Unit(s) SubCutaneous three times a day before meals  methimazole 5 milliGRAM(s) Oral daily  metoprolol succinate ER 25 milliGRAM(s) Oral daily  nicotine - 21 mG/24Hr(s) Patch 1 patch Transdermal daily  senna 2 Tablet(s) Oral at bedtime  spironolactone 25 milliGRAM(s) Oral daily  tiotropium 18 MICROgram(s) Capsule 1 Capsule(s) Inhalation daily  vancomycin  IVPB 750 milliGRAM(s) IV Intermittent every 12 hours      Vital Signs Last 24 Hrs  T(C): 36.3 (19 @ 08:13), Max: 36.8 (11-10-19 @ 15:32)  T(F): 97.4 (19 @ 08:13), Max: 98.3 (11-10-19 @ 15:32)  HR: 74 (19 @ 08:13) (67 - 77)  BP: 113/72 (19 @ 08:13) (113/72 - 117/76)  BP(mean): --  RR: 18 (19 @ 08:13) (18 - 18)  SpO2: 95% (19 @ 08:13) (95% - 96%)    Physical Exam:    Constitutional well preserved,comfortable,    HEENT PERRLA EOMI,No pallor or icterus    No oral exudate or erythema    Neck supple no JVD or LN    Chest rare basilar crackles    CVS RRR S1 S2 WNl  murmur    Abd soft BS normal No tenderness     Ext No cyanosis clubbing or edema    IV site no erythema tenderness or discharge    Joints no swelling or LOM    CNS AAO X 3 no focal    Lab Data:                          15.2   20.35 )-----------( 242      ( 2019 09:06 )             44.8           133<L>  |  88<L>  |  33<H>  ----------------------------<  193<H>  3.8   |  32<H>  |  1.41<H>    Ca    9.5      2019 08:57  Phos  3.0       Mg     2.4         TPro  7.4  /  Alb  4.4  /  TBili  0.3  /  DBili  x   /  AST  17  /  ALT  24  /  AlkPhos  120        Urinalysis Basic - ( 2019 23:10 )    Color: Light Yellow / Appearance: Clear / S.014 / pH: x  Gluc: x / Ketone: Negative  / Bili: Negative / Urobili: <2 mg/dL   Blood: x / Protein: Negative / Nitrite: Negative   Leuk Esterase: Negative / RBC: x / WBC x   Sq Epi: x / Non Sq Epi: x / Bacteria: x        .Urine Clean Catch (Midstream)  19   No growth  --  --      .Blood Blood-Peripheral  19   No growth to date.  --  --        Vancomycin Level, Trough: 20.4 ug/mL (11-10-19 @ 17:44)      WBC Count: 20.35 (19 @ 09:06)  WBC Count: 22.43 (11-10-19 @ 11:46)  WBC Count: 27.27 (19 @ 13:40)  WBC Count: 21.88 (19 @ 16:09)        CRP= 0.19  ESR= 32      < from: CT Abdomen and Pelvis w/ Oral Cont and w/ IV Cont (19 @ 21:45) >  EXAM:  CT ABDOMEN AND PELVIS OC IC                            PROCEDURE DATE:  2019            INTERPRETATION:  CLINICAL INFORMATION: Leukocytosis. Increased white   count.    COMPARISON: CT chest 2019.    PROCEDURE:   CT of the Abdomen and Pelvis was performed with intravenous contrast.   Intravenous contrast: 90 ml Omnipaque 350. 10 ml discarded.  Oral contrast: positive contrast was administered.  Sagittal and coronal reformats were performed.    FINDINGS:    LOWER CHEST: Bibasilarsubsegmental atelectasis. Aortic valvular   calcifications. Multiple bibasilar nodules are unchanged since 2016.    LIVER: Within normal limits.  BILE DUCTS: Normal caliber.  GALLBLADDER: Cholelithiasis.  SPLEEN: Within normal limits.  PANCREAS:Within normal limits.  ADRENALS: Within normal limits.  KIDNEYS/URETERS: Bilateral hypodense renal lesions measuring above fluid   attenuation. Specifically a heterogeneous lesion arising from the lower   pole of right kidney is indeterminate. Bilateral nonobstructive renal   calculi with the largest measuring 2.1 cm within the left kidney.    BLADDER: Within normal limits.  REPRODUCTIVE ORGANS: Prostatic calcifications.    BOWEL: No bowel obstruction. Appendix is normal.  PERITONEUM: No ascites.  VESSELS: Atheromatous disease of the aorta.  RETROPERITONEUM/LYMPH NODES: No lymphadenopathy.    ABDOMINAL WALL: Within normal limits.  BONES: Degenerative changes of the spine.    IMPRESSION:    No acute abnormality in the abdomen and pelvis. Bilateral nonobstructive   renal calculi.  Multiple indeterminate low-attenuation lesions in the kidneys,   specifically a heterogeneous lesion arising from the lower pole of right   kidney for which further evaluation with MRI is advised on outpatient   basis.        ANI ARELLANO M.D., RADIOLOGY RESIDENT  This document has been electronically signed.  JIMI WASHINGTON M.D. ATTENDING RADIOLOGIST  This document has been electronically signed. Nov 10 2019 10:22AM    < end of copied text >          < from: [facility] Duplex Carotid, Bilat (. @ 11:06) >  EXAM:  CAROTID DUPLEX BILATERAL                            PROCEDURE DATE:  2019            INTERPRETATION:  Technique: Grayscale, color and spectral Doppler   examination of both carotid arteries was performed.     HISTORY:  Preop mitral valve repair    A prior examination, dated 2019, showed no carotid artery stenoses.    The previously described nodule in the right lobe of the thyroid gland is   again identified.    The tortuosity of the internal carotid arteries is again imaged.    Atheromatous plaque is again identified along the course of the right and   left carotid arteries in the neck.    Blood flow velocities are as follows:    RIGHT:    PROX CCA = 52 ;  DIST CCA = 23 ;  PROX ICA = 68 ;  DIST ICA =   56 ;  ECA = 79    LEFT   :    PROX CCA = 51 ;  DIST CCA = 37 ;  PROX ICA = 61 ;  DIST ICA =   32 ;  ECA = 76    There is antegrade flow through both vertebral arteries.    IMPRESSION: No hemodynamically significant carotid artery stenoses.    Measurement of carotid stenosis is based on velocity parameters that   correlate the residual internal carotid diameter with that of the more   distal vessel in accordance with a method such as the [place] American   Symptomatic Carotid Endarterectomy Trial (NASCET).      < end of copied text >

## 2019-11-11 NOTE — DISCHARGE NOTE PROVIDER - CARE PROVIDER_API CALL
Monica Galeana)  Infectious Disease; Internal Medicine  [address] [place] [address]  Phone: [phone]  Fax: [phone]  Follow Up Time: 1-3 days

## 2019-11-11 NOTE — PROGRESS NOTE ADULT - PROBLEM SELECTOR PLAN 8
c/w ASA 81 mg qd and atorvastatin 10 mg (takes pitavastatin at home)
c/w ASA 81 mg qd and atorvastatin 10 mg (takes pitavastatin at home)
gi proph, OOB

## 2019-11-11 NOTE — PROGRESS NOTE ADULT - ASSESSMENT
Patient is a 74 year old man with history of HTN, DMT2, atrial fibrillation (on apixaban), aortic stenosis, severe mitral regurgitation, HFpEF (LVEF 55-60% 4/'19) ,hx pericardial effusion, LIMA (not on cpap), COPD (not on home O2, current smoker (50 pack-year)) and hyperthyroidism, with recent outpatient infectious diseases visit for elevated WBC found on presurgical testing for a MitraClip thought to be from prednisone (recently started by pulmonologist-14 day course for COPD), now admitted due to a positive blood culture (gram positive cocci in pairs and chains, 1 of 2 bottles with enterococcus).      1. Bacteremia, leukocytosis  -f/u bc rsults. prelim 11/9 negative   -abx per ID  -pt being dc today, outpt f/u with ID.  outpt f/u with Dr Morgan ; repeat echo as outpt to r/o any obvious signs of endocarditis    2. Mitral Regurgitation  -severe, await mitraclip once cleared by ID    3. Atrial Fibrillation  -stable, cont rate control meds  -cont a.c    4. Diastolic Heart Failure, chronic  -stable, cont home meds  - creat elevated today, abx dc. Can decrease lasix to 40 mg po Daily   -will need to follow up with outpt pmd for repeat BMP   -outpt f/u with dr morgan for further management of diuretic

## 2019-11-11 NOTE — DISCHARGE NOTE PROVIDER - NSDCCPCAREPLAN_GEN_ALL_CORE_FT
PRINCIPAL DISCHARGE DIAGNOSIS  Diagnosis: Bacteremia  Assessment and Plan of Treatment: PRINCIPAL DISCHARGE DIAGNOSIS  Diagnosis: Bacteremia  Assessment and Plan of Treatment: Because your white blood cell count was found to be high, blood cultures were collected in the outpatient clinic. The blood cultures showed growth of a bacteria called enterococcus. You were treated with antibiotic vancomycin, and subsequent blood cultures collected were negative. Your white blood cell count also decreased to about [id number], which is still high. Your CT scan of the abdomen and pelvis was unrevealing regarding a potential source of infection (it did show abnormalities with the kidneys, which you should follow-up on as outpatient with an MRI). It was recommended to get a heart ultrasound (echocardiogram) while in the hospital. Please follow-up with the cardiologist as soon as possible to plan for this. Please follow-up with the infectious diseases clinic this week for further evaluation. If you develop fevers, see your primary care physician or the closest emergency room as soon as possible.

## 2019-11-11 NOTE — PROGRESS NOTE ADULT - PROBLEM SELECTOR PLAN 3
- c/w apixaban 5 mg bid  - c/w Cardizem  mg qd and metoprolol succinate 25 mg qd
- c/w apixaban 5 mg bid  - c/w Cardizem  mg qd and metoprolol succinate 25 mg qd
nicoderm etc.

## 2019-11-11 NOTE — PROGRESS NOTE ADULT - PROBLEM SELECTOR PLAN 6
-  c/w spironolactone 25 mg qd and Lasix 80 mg qd
-  c/w spironolactone 25 mg qd and Lasix 80 mg qd
Mitraclip planned-Radhakin to f/up

## 2019-11-11 NOTE — PROGRESS NOTE ADULT - PROBLEM SELECTOR PLAN 10
Transitions of Care Status:  1.  Name of PCP: Marco Campbell  2.  PCP Contacted on Admission: [x ] Y    [ ] N    3.  PCP contacted at Discharge: [ ] Y    [ ] N    [ ] N/A  4.  Post-Discharge Appointment Date and Location:  5.  Summary of Handoff given to PCP:

## 2019-11-11 NOTE — PROGRESS NOTE ADULT - ASSESSMENT
- 74/M with PMH Moderate AS, Severe MR, Afib/Aflutter on eliquis s/p ablation, Acute decompensated diastolic CHF, CAD, HTN, HLD, COPD not on home O2, Hyperthyroid, Pulmonary nodules, LIMA, DM Type 2--admit for pos blood cx.  Had been in midst of copd exacerbation and is now at baseline.

## 2019-11-11 NOTE — DISCHARGE NOTE PROVIDER - HOSPITAL COURSE
75 yo M with hx HTN, DMT2, atrial fibrillation (on apixaban), aortic stenosis, severe mitral regurgitation, HFpEF (LVEF 55-60% 4/'19) , hx pericardial effusion, LIMA (not on cpap), COPD (not on home O2, current smoker (50 pack-year)) and hyperthyroidism, with recent outpatient infectious diseases visit for elevated WBC found on presurgical testing for a MitraClip thought to be from prednisone (recently started by pulmonologist-14 day course for COPD), now admitted due to a positive blood culture (11.08.19; gram positive cocci in pairs and chains, 1 of 2 bottles enterococcus). 73 yo M with hx HTN, DMT2, atrial fibrillation (on apixaban), aortic stenosis, severe mitral regurgitation, HFpEF (LVEF 55-60% 4/'19) , hx pericardial effusion, LIMA (not on cpap), COPD (not on home O2, current smoker (50 pack-year)) and hyperthyroidism, with recent outpatient infectious diseases visit for elevated WBC found on presurgical testing for a MitraClip thought to be from prednisone (recently started by pulmonologist-14 day course for COPD), now admitted due to a positive blood culture (11.08.19; gram positive cocci in pairs and chains, 1 of 2 bottles enterococcus). Patient started on vancomycin (no beta-lactam given allergic to penicillin). Leukocytosis downtrended to 20.35k, blood cx x 2 on vancomycin resulted as no growth. Urine culture also with no growth. CT A/P with no acute pathology, no source of infection. Renal lesion noted which would have to be f/u outpt with MRI. Pt remained without any symptoms and afebrile. ID consult team following, recommended inpatient echo, but patient refused. Patient demanded to go home. ID consult team ok with discharge and follow up later this week in ID clinic for repeat cultures.        Patient stable for discharge with close f/u with infectious diseases later this week. 75 yo M with hx HTN, DMT2, atrial fibrillation (on apixaban), aortic stenosis, severe mitral regurgitation, HFpEF (LVEF 55-60% 4/'19) , hx pericardial effusion, LIMA (not on cpap), COPD (not on home O2, current smoker (50 pack-year)) and hyperthyroidism, with recent outpatient infectious diseases visit for elevated WBC found on presurgical testing for a MitraClip thought to be from prednisone (recently started by pulmonologist-14 day course for COPD), now admitted due to a positive blood culture (11.08.19; gram positive cocci in pairs and chains, 1 of 2 bottles enterococcus). Patient started on vancomycin (no beta-lactam given allergic to penicillin). Leukocytosis downtrended to 20.35k, blood cx x 2 on vancomycin resulted as no growth. Urine culture also with no growth. CT A/P with no acute pathology, no source of infection. Renal lesion noted which would have to be f/u outpt with MRI. Pt remained without any symptoms and afebrile. ID consult team following, recommended inpatient echo, but patient refused. Patient demanded to go home. ID consult team ok with discharge and follow up later this week in ID clinic for repeat cultures.        Patient stable for discharge with close f/u with infectious diseases later this week.        sepsis ruled out

## 2019-11-11 NOTE — DISCHARGE NOTE NURSING/CASE MANAGEMENT/SOCIAL WORK - NSDCPEEMAIL_GEN_ALL_CORE
[facility] for Tobacco Control email [email or web address]

## 2019-11-11 NOTE — PROGRESS NOTE ADULT - PROBLEM SELECTOR PLAN 1
#Gram positive cocci in pairs and chains, 1 of 2 bottles with Enterococcus (outpt blood cx 11.8.19 unchanged)  - Non-toxic appearance, no clear source of infection, ROS negative. Leukocytosis downtrending but still > 20k (11.10.19)  - F/u repeat blood cx x 2 collected 11.09.19.  - C/w Vancomycin IV, get trough pre-4th dose today 11.10.19  - F/u Urine Cx sent 11.9.19  - CT A/P with oral and IV contrast, result with no acute pathology, b/l kidney stones. needs MRI opt for renal lesion in R kidney as opt #Gram positive cocci in pairs and chains, 1 of 2 bottles with Enterococcus (outpt blood cx 11.8.19 unchanged)  - Non-toxic appearance, no clear source of infection, ROS negative. Leukocytosis downtrending but still > 20k (11.10.19)  - Repeat blood cx x 2 collected 11.09.19 --> NEG, cleared on vancomycin  - C/w Vancomycin IV now 750 mg q12h given vanc trough 20 on 11.10.19  - Urine Cx sent 11.9.19 --> NEG  - CT A/P with oral and IV contrast, result with no acute pathology, b/l kidney stones. needs MRI opt for renal lesion in R kidney as outpatient #Gram positive cocci in pairs and chains, 1 of 2 bottles with Enterococcus (outpt blood cx 11.8.19 unchanged)  - Non-toxic appearance, no clear source of infection, ROS negative. Leukocytosis downtrending but still > 20k (11.11.19)  - Repeat blood cx x 2 collected 11.09.19 --> NEG, cleared on vancomycin  - C/w Vancomycin IV now 750 mg q12h (given vanc trough 20 on 11.10.19)  - Urine Cx sent 11.9.19 --> NEG  - CT A/P with oral and IV contrast, result with no acute pathology, b/l kidney stones. needs MRI opt for renal lesion in R kidney as outpatient  - F/u ID consult note

## 2019-11-11 NOTE — PROGRESS NOTE ADULT - SUBJECTIVE AND OBJECTIVE BOX
CARDIOLOGY FOLLOW UP - Dr. Martínez    CC awaiting discharge   no cp or sob       PHYSICAL EXAM:  T(C): 36.3 (11-11-19 @ 08:13), Max: 36.8 (11-10-19 @ 15:32)  HR: 74 (11-11-19 @ 08:13) (67 - 77)  BP: 113/72 (11-11-19 @ 08:13) (113/72 - 117/76)  RR: 18 (11-11-19 @ 08:13) (18 - 18)  SpO2: 95% (11-11-19 @ 08:13) (95% - 96%)  Wt(kg): --  I&O's Summary    10 Nov 2019 07:01  -  11 Nov 2019 07:00  --------------------------------------------------------  IN: 400 mL / OUT: 0 mL / NET: 400 mL    11 Nov 2019 07:01  -  11 Nov 2019 13:05  --------------------------------------------------------  IN: 240 mL / OUT: 0 mL / NET: 240 mL        Appearance: Normal	  Cardiovascular: Normal S1 S2,RRR, + murmurs  Respiratory: Lungs clear to auscultation	  Gastrointestinal:  Soft, Non-tender, + BS	  Extremities: Normal range of motion, No clubbing, cyanosis or edema        MEDICATIONS  (STANDING):  apixaban 5 milliGRAM(s) Oral every 12 hours  aspirin enteric coated 81 milliGRAM(s) Oral daily  atorvastatin 10 milliGRAM(s) Oral at bedtime  budesonide 160 MICROgram(s)/formoterol 4.5 MICROgram(s) Inhaler 2 Puff(s) Inhalation two times a day  dextrose 5%. 1000 milliLiter(s) (50 mL/Hr) IV Continuous <Continuous>  dextrose 50% Injectable 12.5 Gram(s) IV Push once  dextrose 50% Injectable 25 Gram(s) IV Push once  dextrose 50% Injectable 25 Gram(s) IV Push once  diltiazem    milliGRAM(s) Oral daily  fluticasone propionate 50 MICROgram(s)/spray Nasal Spray 1 Spray(s) Both Nostrils daily  furosemide    Tablet 80 milliGRAM(s) Oral daily  insulin glargine Injectable (LANTUS) 12 Unit(s) SubCutaneous at bedtime  insulin lispro (HumaLOG) corrective regimen sliding scale   SubCutaneous three times a day before meals  insulin lispro (HumaLOG) corrective regimen sliding scale   SubCutaneous at bedtime  insulin lispro Injectable (HumaLOG) 4 Unit(s) SubCutaneous three times a day before meals  methimazole 5 milliGRAM(s) Oral daily  metoprolol succinate ER 25 milliGRAM(s) Oral daily  nicotine - 21 mG/24Hr(s) Patch 1 patch Transdermal daily  senna 2 Tablet(s) Oral at bedtime  spironolactone 25 milliGRAM(s) Oral daily  tiotropium 18 MICROgram(s) Capsule 1 Capsule(s) Inhalation daily      TELEMETRY: 	    ECG:  	  RADIOLOGY:   DIAGNOSTIC TESTING:  [ ] Echocardiogram:  [ ]  Catheterization:  [ ] Stress Test:    OTHER: 	    LABS:	 	                            15.2   20.35 )-----------( 242      ( 11 Nov 2019 09:06 )             44.8     11-11    133<L>  |  88<L>  |  33<H>  ----------------------------<  193<H>  3.8   |  32<H>  |  1.41<H>    Ca    9.5      11 Nov 2019 08:57  Phos  3.0     11-11  Mg     2.4     11-11    TPro  7.4  /  Alb  4.4  /  TBili  0.3  /  DBili  x   /  AST  17  /  ALT  24  /  AlkPhos  120  11-11    PT/INR - ( 09 Nov 2019 13:40 )   PT: 15.6 sec;   INR: 1.36 ratio         PTT - ( 09 Nov 2019 13:40 )  PTT:32.4 sec

## 2019-11-11 NOTE — PROGRESS NOTE ADULT - PROBLEM SELECTOR PLAN 2
- Trelegy Ellipta (home med)--> Symbicort and Spiriva  - Hold off prednisone give leukocytosis and low c/f COPD exacerbation

## 2019-11-11 NOTE — DISCHARGE NOTE PROVIDER - NSDCFUSCHEDAPPT_GEN_ALL_CORE_FT
MILAGROS VIERA ; 11/13/2019 ; [facility] MILAGROS VIERA ; 11/13/2019 ; [facility]

## 2019-11-11 NOTE — PROGRESS NOTE ADULT - PROBLEM SELECTOR PLAN 4
severe, with HFpEF, planned MitraClip for next week 11/13, on hold severe, with HFpEF, planned MitraClip for 11/13, on hold severe, with HFpEF, planned MitraClip for 11/13, on hold given leukocytosis

## 2019-11-11 NOTE — PROGRESS NOTE ADULT - REASON FOR ADMISSION
Positive blood cultures

## 2019-11-11 NOTE — DISCHARGE NOTE PROVIDER - NSDCFUADDAPPT_GEN_ALL_CORE_FT
Follow-up with Dr. Galeana, infectious diseases, later this week.  Follow-up with cardiologist as soon as possible to plan for echocardiogram

## 2019-11-11 NOTE — DISCHARGE NOTE PROVIDER - NSDCMRMEDTOKEN_GEN_ALL_CORE_FT
aspirin 81 mg oral tablet: 1 tab(s) orally once a day  Cardizem  mg/24 hours oral capsule, extended release: 1 cap(s) orally once a day   Eliquis 5 mg oral tablet: 1 tab(s) orally 2 times a day  Farxiga 5 mg oral tablet: 1 tab(s) orally once a day  fluticasone 50 mcg/inh nasal spray: 1 spray(s) nasal once a day  Janumet 50 mg-500 mg oral tablet: 1 tab(s) orally once a day  Lasix 80 mg oral tablet: 1 tab(s) orally once a day  Livalo 1 mg oral tablet: 1 tab(s) orally once a day  methIMAzole 5 mg oral tablet: 1 tab(s) orally once a day  nicotine 21 mg/24 hr transdermal film, extended release: 1 patch transdermal once a day, ADVISED NO PATCH TO OR  spironolactone 25 mg oral tablet: 1 tab(s) orally once a day  Toprol-XL 25 mg oral tablet, extended release: 1 tab(s) orally once a day   Trelegy Ellipta inhalation powder: 1 puff(s) inhaled once a day

## 2019-11-11 NOTE — PROGRESS NOTE ADULT - SUBJECTIVE AND OBJECTIVE BOX
CHIEF COMPLAINT:    Interval Events:    REVIEW OF SYSTEMS:  Constitutional: No fevers or chills. No weight loss. No fatigue or generalized malaise.  Eyes: No itching or discharge from the eyes  ENT: No ear pain. No ear discharge. No nasal congestion. No post nasal drip. No epistaxis. No throat pain. No sore throat. No difficulty swallowing.   CV: No chest pain. No palpitations. No lightheadedness or dizziness.   Resp: No dyspnea at rest. No dyspnea on exertion. No orthopnea. No wheezing. No cough. No stridor. No sputum production. No chest pain with respiration.  GI: No nausea. No vomiting. No diarrhea.  MSK: No joint pain or pain in any extremities  Integumentary: No skin lesions. No pedal edema.  Neurological: No gross motor weakness. No sensory changes.  [ ] All other systems negative  [ ] Unable to assess ROS because ________    OBJECTIVE:  ICU Vital Signs Last 24 Hrs  T(C): 36.3 (2019 00:55), Max: 36.9 (10 Nov 2019 08:44)  T(F): 97.3 (2019 00:55), Max: 98.4 (10 Nov 2019 08:44)  HR: 77 (2019 05:19) (67 - 77)  BP: 115/75 (2019 05:19) (108/70 - 117/76)  BP(mean): --  ABP: --  ABP(mean): --  RR: 18 (2019 00:55) (18 - 18)  SpO2: 96% (2019 00:55) (95% - 96%)        11-10 @ 07:01  -   @ 06:11  --------------------------------------------------------  IN: 400 mL / OUT: 0 mL / NET: 400 mL      CAPILLARY BLOOD GLUCOSE      POCT Blood Glucose.: 153 mg/dL (10 Nov 2019 21:13)      PHYSICAL EXAM:  General: Awake, alert, oriented X 3.   HEENT: Atraumatic, normocephalic.                 Mallampatti Grade                 No nasal congestion.                No tonsillar or pharyngeal exudates.  Lymph Nodes: No palpable lymphadenopathy  Neck: No JVD. No carotid bruit.   Respiratory: Normal chest expansion                         Normal percussion                         Normal and equal air entry                         No wheeze, rhonchi or rales.  Cardiovascular: S1 S2 normal. No murmurs, rubs or gallops.   Abdomen: Soft, non-tender, non-distended. No organomegaly. Normoactive bowel sounds.  Extremities: Warm to touch. Peripheral pulse palpable. No pedal edema.   Skin: No rashes or skin lesions  Neurological: Motor and sensory examination equal and normal in all four extremities.  Psychiatry: Appropriate mood and affect.    HOSPITAL MEDICATIONS:  MEDICATIONS  (STANDING):  apixaban 5 milliGRAM(s) Oral every 12 hours  aspirin enteric coated 81 milliGRAM(s) Oral daily  atorvastatin 10 milliGRAM(s) Oral at bedtime  budesonide 160 MICROgram(s)/formoterol 4.5 MICROgram(s) Inhaler 2 Puff(s) Inhalation two times a day  dextrose 5%. 1000 milliLiter(s) (50 mL/Hr) IV Continuous <Continuous>  dextrose 50% Injectable 12.5 Gram(s) IV Push once  dextrose 50% Injectable 25 Gram(s) IV Push once  dextrose 50% Injectable 25 Gram(s) IV Push once  diltiazem    milliGRAM(s) Oral daily  fluticasone propionate 50 MICROgram(s)/spray Nasal Spray 1 Spray(s) Both Nostrils daily  furosemide    Tablet 80 milliGRAM(s) Oral daily  insulin glargine Injectable (LANTUS) 12 Unit(s) SubCutaneous at bedtime  insulin lispro (HumaLOG) corrective regimen sliding scale   SubCutaneous three times a day before meals  insulin lispro (HumaLOG) corrective regimen sliding scale   SubCutaneous at bedtime  insulin lispro Injectable (HumaLOG) 4 Unit(s) SubCutaneous three times a day before meals  methimazole 5 milliGRAM(s) Oral daily  metoprolol succinate ER 25 milliGRAM(s) Oral daily  nicotine - 21 mG/24Hr(s) Patch 1 patch Transdermal daily  senna 2 Tablet(s) Oral at bedtime  spironolactone 25 milliGRAM(s) Oral daily  tiotropium 18 MICROgram(s) Capsule 1 Capsule(s) Inhalation daily  vancomycin  IVPB 750 milliGRAM(s) IV Intermittent every 12 hours    MEDICATIONS  (PRN):  acetaminophen   Tablet .. 650 milliGRAM(s) Oral every 6 hours PRN Temp greater or equal to 38C (100.4F), Mild Pain (1 - 3), Moderate Pain (4 - 6)  dextrose 40% Gel 15 Gram(s) Oral once PRN Blood Glucose LESS THAN 70 milliGRAM(s)/deciliter  glucagon  Injectable 1 milliGRAM(s) IntraMuscular once PRN Glucose LESS THAN 70 milligrams/deciliter  glucagon  Injectable 1 milliGRAM(s) IntraMuscular once PRN Glucose LESS THAN 70 milligrams/deciliter  polyethylene glycol 3350 17 Gram(s) Oral daily PRN Constipation      LABS:                        14.9   22.43 )-----------( 235      ( 10 Nov 2019 11:46 )             45.7     11-10    134<L>  |  88<L>  |  35<H>  ----------------------------<  188<H>  3.9   |  31  |  1.27    Ca    9.4      10 Nov 2019 08:46  Phos  3.1     11-10  Mg     2.2     -10    TPro  7.1  /  Alb  4.1  /  TBili  0.2  /  DBili  x   /  AST  15  /  ALT  21  /  AlkPhos  108  11-10    PT/INR - ( 2019 13:40 )   PT: 15.6 sec;   INR: 1.36 ratio         PTT - ( 2019 13:40 )  PTT:32.4 sec  Urinalysis Basic - ( 2019 23:10 )    Color: Light Yellow / Appearance: Clear / S.014 / pH: x  Gluc: x / Ketone: Negative  / Bili: Negative / Urobili: <2 mg/dL   Blood: x / Protein: Negative / Nitrite: Negative   Leuk Esterase: Negative / RBC: x / WBC x   Sq Epi: x / Non Sq Epi: x / Bacteria: x            MICROBIOLOGY:     RADIOLOGY:  [ ] Reviewed and interpreted by me    Point of Care Ultrasound Findings:    PFT:    EKG: CHIEF COMPLAINT: f/up sob, asthma/copd, osas, abnormal CT, nicotine addiction--feels well-no signif sob or cough    Interval Events: ID and cards f/up    REVIEW OF SYSTEMS:  Constitutional: No fevers or chills. No weight loss. + fatigue or generalized malaise.  Eyes: No itching or discharge from the eyes  ENT: No ear pain. No ear discharge. No nasal congestion. No post nasal drip. No epistaxis. No throat pain. No sore throat. No difficulty swallowing.   CV: No chest pain. No palpitations. No lightheadedness or dizziness.   Resp: No dyspnea at rest. + dyspnea on exertion. No orthopnea. No wheezing. No cough. No stridor. No sputum production. No chest pain with respiration.  GI: No nausea. No vomiting. No diarrhea.  MSK: No joint pain or pain in any extremities  Integumentary: No skin lesions. + pedal edema.  Neurological: No gross motor weakness. No sensory changes.  [+ ] All other systems negative  [ ] Unable to assess ROS because ________    OBJECTIVE:  ICU Vital Signs Last 24 Hrs  T(C): 36.3 (2019 00:55), Max: 36.9 (10 Nov 2019 08:44)  T(F): 97.3 (2019 00:55), Max: 98.4 (10 Nov 2019 08:44)  HR: 77 (2019 05:19) (67 - 77)  BP: 115/75 (2019 05:19) (108/70 - 117/76)  BP(mean): --  ABP: --  ABP(mean): --  RR: 18 (2019 00:55) (18 - 18)  SpO2: 96% (2019 00:55) (95% - 96%)        11-10 @ 07:01  -  - @ 06:11  --------------------------------------------------------  IN: 400 mL / OUT: 0 mL / NET: 400 mL      CAPILLARY BLOOD GLUCOSE      POCT Blood Glucose.: 153 mg/dL (10 Nov 2019 21:13)      PHYSICAL EXAM: NAD in chair on RA  General: Awake, alert, oriented X 3.   HEENT: Atraumatic, normocephalic.                 Mallampatti Grade 3                No nasal congestion.                No tonsillar or pharyngeal exudates.  Lymph Nodes: No palpable lymphadenopathy  Neck: No JVD. No carotid bruit.   Respiratory: Normal chest expansion                         Normal percussion                         Normal and equal air entry                         No wheeze, rhonchi or rales.  Cardiovascular: S1 S2 normal. + murmurs,no rubs or gallops.   Abdomen: Soft, non-tender, non-distended. No organomegaly. Normoactive bowel sounds.  Extremities: Warm to touch. Peripheral pulse palpable. + pedal edema.   Skin: No rashes or skin lesions  Neurological: Motor and sensory examination equal and normal in all four extremities.  Psychiatry: Appropriate mood and affect.    HOSPITAL MEDICATIONS:  MEDICATIONS  (STANDING):  apixaban 5 milliGRAM(s) Oral every 12 hours  aspirin enteric coated 81 milliGRAM(s) Oral daily  atorvastatin 10 milliGRAM(s) Oral at bedtime  budesonide 160 MICROgram(s)/formoterol 4.5 MICROgram(s) Inhaler 2 Puff(s) Inhalation two times a day  dextrose 5%. 1000 milliLiter(s) (50 mL/Hr) IV Continuous <Continuous>  dextrose 50% Injectable 12.5 Gram(s) IV Push once  dextrose 50% Injectable 25 Gram(s) IV Push once  dextrose 50% Injectable 25 Gram(s) IV Push once  diltiazem    milliGRAM(s) Oral daily  fluticasone propionate 50 MICROgram(s)/spray Nasal Spray 1 Spray(s) Both Nostrils daily  furosemide    Tablet 80 milliGRAM(s) Oral daily  insulin glargine Injectable (LANTUS) 12 Unit(s) SubCutaneous at bedtime  insulin lispro (HumaLOG) corrective regimen sliding scale   SubCutaneous three times a day before meals  insulin lispro (HumaLOG) corrective regimen sliding scale   SubCutaneous at bedtime  insulin lispro Injectable (HumaLOG) 4 Unit(s) SubCutaneous three times a day before meals  methimazole 5 milliGRAM(s) Oral daily  metoprolol succinate ER 25 milliGRAM(s) Oral daily  nicotine - 21 mG/24Hr(s) Patch 1 patch Transdermal daily  senna 2 Tablet(s) Oral at bedtime  spironolactone 25 milliGRAM(s) Oral daily  tiotropium 18 MICROgram(s) Capsule 1 Capsule(s) Inhalation daily  vancomycin  IVPB 750 milliGRAM(s) IV Intermittent every 12 hours    MEDICATIONS  (PRN):  acetaminophen   Tablet .. 650 milliGRAM(s) Oral every 6 hours PRN Temp greater or equal to 38C (100.4F), Mild Pain (1 - 3), Moderate Pain (4 - 6)  dextrose 40% Gel 15 Gram(s) Oral once PRN Blood Glucose LESS THAN 70 milliGRAM(s)/deciliter  glucagon  Injectable 1 milliGRAM(s) IntraMuscular once PRN Glucose LESS THAN 70 milligrams/deciliter  glucagon  Injectable 1 milliGRAM(s) IntraMuscular once PRN Glucose LESS THAN 70 milligrams/deciliter  polyethylene glycol 3350 17 Gram(s) Oral daily PRN Constipation      LABS:                        14.9   22.43 )-----------( 235      ( 10 Nov 2019 11:46 )             45.7     11-10    134<L>  |  88<L>  |  35<H>  ----------------------------<  188<H>  3.9   |  31  |  1.27    Ca    9.4      10 Nov 2019 08:46  Phos  3.1     11-10  Mg     2.2     11-10    TPro  7.1  /  Alb  4.1  /  TBili  0.2  /  DBili  x   /  AST  15  /  ALT  21  /  AlkPhos  108  11-10    PT/INR - ( 2019 13:40 )   PT: 15.6 sec;   INR: 1.36 ratio         PTT - ( 2019 13:40 )  PTT:32.4 sec  Urinalysis Basic - ( 2019 23:10 )    Color: Light Yellow / Appearance: Clear / S.014 / pH: x  Gluc: x / Ketone: Negative  / Bili: Negative / Urobili: <2 mg/dL   Blood: x / Protein: Negative / Nitrite: Negative   Leuk Esterase: Negative / RBC: x / WBC x   Sq Epi: x / Non Sq Epi: x / Bacteria: x            MICROBIOLOGY:     RADIOLOGY:  < from: Xray Chest 2 Views PA/Lat (19 @ 14:39) >  EXAM:  XR CHEST PA LAT 2V                            PROCEDURE DATE:  2019            INTERPRETATION:  CLINICAL INFORMATION: Sepsis.    TECHNIQUE: PA and lateral radiograph of the chest.    COMPARISON: Chest x-ray 2019.    FINDINGS:   Clear lungs.    IMPRESSION:  Clear lungs.      < end of copied text >    [ ] Reviewed and interpreted by me    Point of Care Ultrasound Findings:    PFT:    EKG:

## 2019-11-11 NOTE — PROGRESS NOTE ADULT - ASSESSMENT
75 yo M with hx HTN, DMT2, atrial fibrillation (on apixaban), aortic stenosis, severe mitral regurgitation, HFpEF (LVEF 55-60% 4/'19) , hx pericardial effusion, LIMA (not on cpap), COPD (not on home O2, current smoker (50 pack-year)) and hyperthyroidism, with recent outpatient infectious diseases visit for elevated WBC found on presurgical testing for a MitraClip thought to be from prednisone (recently started by pulmonologist-14 day course for COPD), now admitted due to a positive blood culture (11.08.19; gram positive cocci in pairs and chains, 1 of 2 bottles enterococcus). 75 yo M with hx HTN, DMT2, atrial fibrillation (on apixaban), aortic stenosis, severe mitral regurgitation, HFpEF (LVEF 55-60% 4/'19) , hx pericardial effusion, LIMA (not on cpap), COPD (not on home O2, current smoker (50 pack-year)) and hyperthyroidism, with recent outpatient infectious diseases visit for elevated WBC found on presurgical testing for a MitraClip thought to be from prednisone (recently started by pulmonologist-14 day course for COPD), now admitted due to a positive blood culture (11.08.19; gram positive cocci in pairs and chains, 1 of 2 bottles enterococcus).    F/u blood cx x 2 11.09.19 on vancomycin NEG

## 2019-11-11 NOTE — PROGRESS NOTE ADULT - ATTENDING COMMENTS
agree with above  cv stable  cont oral lasix
as above-  multifactorial dyspnea-copd, CAD, MV dz. debility/obesity--RA sat above 90%  copd-s/p exacerbation---off steroids--symbicort 160 2 bid, spiriva 1 q day, duoneb q 6  CAD-Adrian iglesias al                              MV dz-Clovis jean-baptiste--pt "not ready as of yet"  ID-blood cx positive in face of steroids--as per Lissett iglesias al  Nicotine addiction--refused to stop       OSAS-refuses rx  DVT/GI prophylaxis                       DC pending  Frederic Brand MD-Pulmonary   [phone]
d/w Dr. caldwell cleared for d/c  no abx  can see her on Thursday for repeat blood cultures  time spent coordinating d/c 45 min

## 2019-11-11 NOTE — PROGRESS NOTE ADULT - PROBLEM SELECTOR PLAN 7
- Hold home metformin-sitagliptin and dapagliflozin  - insulin glargine basal   - insulin lispro sliding scale
- Hold home metformin-sitagliptin and dapagliflozin  - insulin glargine basal   - insulin lispro sliding scale
refused rx

## 2019-11-12 DIAGNOSIS — Z00.00 ENCOUNTER FOR GENERAL ADULT MEDICAL EXAMINATION W/OUT ABNORMAL FINDINGS: ICD-10-CM

## 2019-11-12 LAB — BACTERIA BLD CULT: ABNORMAL

## 2019-11-13 LAB — BACTERIA BLD CULT: NORMAL

## 2019-11-18 ENCOUNTER — LABORATORY RESULT (OUTPATIENT)
Age: 74
End: 2019-11-18

## 2019-11-18 ENCOUNTER — APPOINTMENT (OUTPATIENT)
Dept: INFECTIOUS DISEASE | Facility: CLINIC | Age: 74
End: 2019-11-18

## 2019-11-19 LAB
ALBUMIN SERPL ELPH-MCNC: 4.3 G/DL
ALP BLD-CCNC: 127 U/L
ALT SERPL-CCNC: 16 U/L
ANION GAP SERPL CALC-SCNC: 20 MMOL/L
AST SERPL-CCNC: 15 U/L
BASOPHILS # BLD AUTO: 0.09 K/UL
BASOPHILS NFR BLD AUTO: 0.6 %
BILIRUB SERPL-MCNC: 0.2 MG/DL
BUN SERPL-MCNC: 22 MG/DL
CALCIUM SERPL-MCNC: 9.7 MG/DL
CHLORIDE SERPL-SCNC: 94 MMOL/L
CO2 SERPL-SCNC: 28 MMOL/L
CREAT SERPL-MCNC: 1.39 MG/DL
CRP SERPL-MCNC: 1.78 MG/DL
EOSINOPHIL # BLD AUTO: 0.21 K/UL
EOSINOPHIL NFR BLD AUTO: 1.4 %
ERYTHROCYTE [SEDIMENTATION RATE] IN BLOOD BY WESTERGREN METHOD: 25 MM/HR
GLUCOSE SERPL-MCNC: 234 MG/DL
HCT VFR BLD CALC: 46.5 %
HGB BLD-MCNC: 14.6 G/DL
IMM GRANULOCYTES NFR BLD AUTO: 1.3 %
LYMPHOCYTES # BLD AUTO: 1.61 K/UL
LYMPHOCYTES NFR BLD AUTO: 10.7 %
MAN DIFF?: NORMAL
MCHC RBC-ENTMCNC: 30.3 PG
MCHC RBC-ENTMCNC: 31.4 GM/DL
MCV RBC AUTO: 96.5 FL
MONOCYTES # BLD AUTO: 1.17 K/UL
MONOCYTES NFR BLD AUTO: 7.8 %
NEUTROPHILS # BLD AUTO: 11.75 K/UL
NEUTROPHILS NFR BLD AUTO: 78.2 %
PLATELET # BLD AUTO: 233 K/UL
POTASSIUM SERPL-SCNC: 5.2 MMOL/L
PROT SERPL-MCNC: 7.1 G/DL
RBC # BLD: 4.82 M/UL
RBC # FLD: 15.2 %
SODIUM SERPL-SCNC: 142 MMOL/L
WBC # FLD AUTO: 15.02 K/UL

## 2019-11-24 LAB
BACTERIA BLD CULT: NORMAL
BACTERIA BLD CULT: NORMAL

## 2020-03-20 NOTE — PATIENT PROFILE ADULT - PRIMARY ROLES/RESPONSIBILITIES
Neurology Established Patient Telephone Visit Encounter  Converted in-clinic appointment to telephone call during COVID-19 Outbreak                       Maximilian Quick is a 59 year old male is spoken with via telephone in neurologic follow up for   Chief Complaint   Patient presents with   • Neurologic Problem     tremors, weakness, mental status change    .    Date of visit: 3/20/2020  YOB: 1960  PCP:  Jelani Dennis MD, Ref. Physician, Dr. Anitha Berry M.D.    Patient states he/she is Maximilian Qucik and that he/she is speaking to me from his home location.  Patient consented to telephone visit.    HPI:  I spoke with the patient and his wife by phone today.  We all understand that this is a phone consultation because of the coronavirus pandemic.  He agrees to being interviewed at home along with his wife.  He also agreed to an apple phone face time call so that I could see what sort of movement problems he was having.  He understands that apple face time is not HIPAA compliant, but that it would be a useful tool to proceed with his medical care.  He and his wife were in full agreement with using face time, and we agreed to be careful not to show anything embarrassing to him.    He was initially interviewed by phone by me on March 17, 2020.  Describes a sensation in his right shoulder for the last 2 years where it has felt cold.  He then began noticing several months ago a tingling sensation in his left shoulder.  His major concern however is that his posture has changed.  He is leaning more and more to the right and to the front.  He cannot stand up straight like he used to.  He notices some muscle stiffness in his hands when he plays the guitar.  He has trouble quickly moving his hands and fingers when playing cords.  Overall his strength is been okay.      He was referred to Dr. Berry, neurosurgeon for evaluation of scoliosis.  X-rays did not apparently correlate with this.  He does have a  longstanding history of a thoracic kyphosis, all over.  There are no associated falls, dizziness, headache, visual disturbance.  With the change in posture he has noted some trouble getting up from a chair with the pain from a \"iliotibial band\".  Prior imaging includes MRIs of the cervical spine done at [facility] as well as the thoracic and lumbar spine done at Dr. Berry's office.      His wife notices that his motion overall has gotten slower over the months.  He does not take shorter steps nor freeze when he walks.    The patient and wife have noted that he has quivering of his lips and tongue.  He generally swallows okay except for warm coffee.  She also notes that his memory and thinking have slowed down.  He drinks 2-3 mixed drinks per evening and states he is not having any problems with alcohol use.  He occasionally gets a \"shivering sensation\".  No fevers chills or sweats.  No cough or shortness of breath.    His wife notices that his thinking has slowed somewhat.  There is some forgetfulness on occasion.  He has been able to function independently.      He saw a rheumatologist, Dr. Montano, in [place] to rule out ankylosing spondylitis last September.  Apparently this diagnosis was not established.  His tests were \"okay\" and no other rheumatologic disease was diagnosed.    He follows with Dr. Eddie Real, sleep disorders subspecialist for obstructive sleep apnea described as \"severe\" with 50 episodes of apnea per hour.  He is using CPAP.  Periodic limb movement disorder is being treated with pramipexole.  He had been switched to gabapentin but went back to pramipexole on his own.  He is to see Dr. Real in follow-up.    No history of meningitis.  He had a skull fracture at age 11.  He does not remember anything about it.  His only toxin exposure is to some spray paint.    PMH: No history of cancer, diabetes, heart disease.  He does have a borderline hypertension.  Patient found to have a 4.7 cm benign lung  lesion that is being followed.  Allergies: He has seasonal allergies.  No other medication allergies.  Medications: Viagra.  Sonata as needed for insomnia.  Other medications from his list reviewed    ROS: General: No fevers chills sweats weight loss.  HEENT: No headache, visual disturbance, hearing loss, nasal drainage, sore throat.  Chest: No chest pains, shortness of breath, cough, hemoptysis  Abdomen: No nausea, vomiting, diarrhea, hematochezia, melena.  : No dysuria, hematuria, incontinence.  Musculoskeletal, no current joint pains, muscle aches, extremity swelling, joint swelling, stiffness.  Neurological: No loss of awareness, change in mood, numbness tingling weakness clumsiness in the extremities, no diplopia, partial loss of vision, slurred speech, Lhermitte's phenomenon  Endocrine: No polydipsia, polyuria, fatigue.  Psychiatric: No changes in mood, no irritability, no hallucinations  Skin: No rashes, bruising, hives    Family history: Father  of Lewy body dementia.  No other family history of neurological disease.    Current medications and allergies have been reviewed with patient.  Current Outpatient Medications   Medication Sig Dispense Refill   • zaleplon (SONATA) 5 MG capsule Take 5 mg by mouth nightly.     • Theophylline (ROOSEVELT-24 PO) Take by mouth daily.     • gabapentin (NEURONTIN) 300 MG capsule Take 1 capsule 2 hours prior to bedtime. 30 capsule 5   • cyclobenzaprine (FLEXERIL) 5 MG tablet Take 1 tablet by mouth daily as needed.     • testosterone cypionate (DEPO-TESTOSTERONE) 100 MG/ML injectable solution Inject 150 mg into the muscle every 14 days.      • pramipexole (MIRAPEX) 0.25 MG tablet Take 1 tablet by mouth. TAKE 1 TABLET DURING THE DAY AND 2 TABLETS AT BEDTIME PRN     • sildenafil (VIAGRA) 100 MG tablet Take 100 mg by mouth as needed.        He takes OTC Saw Palmetto, vitamin B and ED and an allergy medication and an antacid.     ALLERGIES:   Allergen Reactions   • Mold    (Environmental) Other (See Comments)   • Pollen Other (See Comments)     Examination: Limited examination performed by Apple phone face time.  Patient and wife present in his living room at home.  Wife held the phone    Mental status: Fully alert and attentive with fluent speech and normal comprehension.  Good.  Thought content is normal.  No hallucinations.  No memory difficulty noted.    Cranial nerves: Pupils appear equal.  EOMs appear full without nystagmus.  Visual fields grossly intact.  No facial weakness.  Some mild decrease in blinking but no other clear signs of facial masking.  Hearing grossly intact.  Tongue protrudes in the midline.  No fasciculations are seen.  There is some mild quivering of the protruded tongue.  No tongue deviation noted.    Motor: No fasciculations.  No limb atrophy.  No truncal muscle atrophy noted either.  He moves all extremities equally well.  No arm drift noted.  No tremor of the outstretched fingers.  No resting tremor noted.  No bradykinesia appreciated.  No resting tremor and no postural tremor seen.    Gait: Patient stands leaning to the right.  From a side view he does have a kyphoscoliosis.  Of note is that the right shoulder is drooped compared to the left.  With ambulation he swings both arms.  The elbows are slightly flexed.  No resting tremor noted while ambulating.  Tandem walk normal    Coordination: Finger-to-nose and heel-to-shin maneuvers normal.  Finger tapping normal.  Foot tapping normal.          ASSESSMENT AND ORDERS:  1.  Truncal imbalance, lip and tongue quivering, cognitive issues, etiology uncertain.  2.  History of alcohol use, 3 mixed drinks per evening  3.  Severe KELSIE with PLMD, under treatment.    PLAN:  1.  MRI of the brain with and without contrast enhancement  2.  B12, sed rate, obtain previous blood work from Dr. Dennis his PCP  3.  Obtain reports of spine MRIs from both [facility] and Dr. Berry's office  4.  Patient instructed to call me for the  MRI and blood test results.      45 minutes were spent speaking with patient for this visit.    Follow up in clinic in approximately 3mo.  Call office sooner with any questions or concerns.    Dr. Denny Han  [facility]   none

## 2020-06-26 NOTE — PATIENT PROFILE ADULT - FUNCTIONAL SCREEN CURRENT LEVEL: DRESSING, MLM
Go for blood tests as directed. Your doctor will do lab tests at regular visits to monitor the effects of this medicine. Please follow up with your doctor and keep your health care provider appointments
2 = assistive person

## 2020-07-02 ENCOUNTER — RX RENEWAL (OUTPATIENT)
Age: 75
End: 2020-07-02

## 2020-08-04 NOTE — PROGRESS NOTE ADULT - PROBLEM SELECTOR PROBLEM 9
Preop pulmonary/respiratory exam
88.5

## 2020-09-09 ENCOUNTER — APPOINTMENT (OUTPATIENT)
Dept: PULMONOLOGY | Facility: CLINIC | Age: 75
End: 2020-09-09
Payer: MEDICARE

## 2020-09-09 VITALS
WEIGHT: 194 LBS | HEIGHT: 63 IN | SYSTOLIC BLOOD PRESSURE: 122 MMHG | BODY MASS INDEX: 34.38 KG/M2 | HEART RATE: 86 BPM | OXYGEN SATURATION: 96 % | RESPIRATION RATE: 17 BRPM | TEMPERATURE: 96.9 F | DIASTOLIC BLOOD PRESSURE: 64 MMHG

## 2020-09-09 DIAGNOSIS — J44.1 CHRONIC OBSTRUCTIVE PULMONARY DISEASE WITH (ACUTE) EXACERBATION: ICD-10-CM

## 2020-09-09 DIAGNOSIS — Z23 ENCOUNTER FOR IMMUNIZATION: ICD-10-CM

## 2020-09-09 PROCEDURE — G0008: CPT

## 2020-09-09 PROCEDURE — 90662 IIV NO PRSV INCREASED AG IM: CPT

## 2020-09-09 PROCEDURE — 99406 BEHAV CHNG SMOKING 3-10 MIN: CPT

## 2020-09-09 PROCEDURE — 99214 OFFICE O/P EST MOD 30 MIN: CPT | Mod: 25

## 2020-09-09 PROCEDURE — 71046 X-RAY EXAM CHEST 2 VIEWS: CPT

## 2020-09-09 RX ORDER — PREDNISONE 10 MG/1
10 TABLET ORAL
Qty: 50 | Refills: 0 | Status: DISCONTINUED | COMMUNITY
Start: 2019-11-01 | End: 2020-09-09

## 2020-09-09 NOTE — PROCEDURE
[FreeTextEntry1] :  - CXR reveals  mild cardiomegaly,  hyperinflation; there was no evidence of infiltrate or effusion\par \par  \par Walk test: Pt refused \par \par FENO: pt refused \par

## 2020-09-09 NOTE — ADDENDUM
[FreeTextEntry1] : Documented by Ale Chandler acting as a scribe for Dr. Frederic Brand on 09/09/2020 \par \par All medical record entries made by the Scribe were at my, Dr. Frederic Brand's, direction and personally dictated by me on 09/09/2020 . I have reviewed the chart and agree that the record accurately reflects my personal performance of the history, physical exam, assessment and plan. I have also personally directed, reviewed, and agree with the discharge instructions. \par \par  \par \par

## 2020-09-09 NOTE — ASSESSMENT
[FreeTextEntry1] : Mr. Murry is 74 y.o M who has a history of Afib, CAD, COPD, OSAS, obesity, active snoring and is s/p pericardiocentesis, and is dramatically improved. He is now compliant with his medications and smoking cessation. He is s/p CTS evaluation ([facility]). He is still smoking. - currently stable  \par \par His shortness of breath is multifactorial is due to: \par -COPD\par -asthma\par -poor breathing mechanics\par -overweight \par -CAD\par -?tracheomalacia\par \par \par \par problem 1: asthma/COPD - \par -continue Trelegy, 1 puff QD\par -continue Ventolin, 2 inhalations QD \par -continue Xopenex .63 nebulizer Q6H (gargle and spit after use)\par \par -Inhaler technique reviewed as well as oral hygiene techniques reviewed with patient. Avoidance of cold air, extremes of temperature, rescue inhaler should be used before exercise. Order of medication reviewed with patient. Recommended use of a cool mist humidifier in the bedroom. \par Asthma is believed to be caused by inherited (genetic) and environmental factor, but its exact cause is unknown. Asthma may be triggered by allergens, lung infections, or irritants in the air. Asthma triggers are different for each person \par \par problem 2: r/o TBM\par -complete dynamic chest CT to r/o TBM (noncompliant)\par  \par problem 3: allergies and sinuses \par -continue Flonase 1 sniff each nostril BID \par -continue to use Astelin nasal spray 1 sniff each nostril BID (0.15)\par \par problem 4: current smoking (no interest in cessation) (discussed: 9/9/2020) \par -re-stressed importance of smoking cessation with patient\par -he is being prescribed NicoDerm \par -Discussed the risks/associations with continued smoking including COPD, emphysema, shortness of breath, renal cancer, bladder cancer, stroke risk, cardiac disease, etc. Smoking cessation was discussed at length and highly encouraged. Various options to aid cessation was discussed including use of Chantix, Nicotrol, nicotine products, laser therapy, hypnosis, Wellbutrin, etc. \par \par problem 5: abnormal chest CT/ lung cancer screening\par -follow up chest CT 9/2020. (Due)\par CAT scans are the only radiological modality to identify abnormalities w/in the lungs with regards to nodules/masses/lymph nodes. Risks, benefits were reviewed in detail. The guidelines for abnormalities include follow up CT scans at various intervals which could range from 6 weeks to 1 year intervals. If there is a change for the worse then consideration for a biopsy will be considered if you are a candidate. Second opinion evaluation with a thoracic surgeon or an interventional radiologist could be offered. \par \par problem 6: questionable sleep apnea (returned)\par -not interested in any sleep study\par -recommended to use Oxy-Aid by [facility] \par Sleep apnea is associated with adverse clinical consequences which an affect most organ systems. Cardiovascular disease risk includes arrhythmias, atrial fibrillation, hypertension, coronary artery disease, and stroke. Metabolic disorders include diabetes type 2, non-alcoholic fatty liver disease. Mood disorder especially depression; and cognitive decline especially in the elderly. Associations with chronic reflux/Parsons’s esophagus some but not all inclusive. \par -Reasons include arousal consistent with hypopnea; respiratory events most prominent in REM sleep or supine position; therefore sleep staging and body position are important for accurate diagnosis and estimation of AHI. \par \par problem 7: overweight\par -recommended to visit the [facility] \par \par Weight loss, exercise, and diet control were discussed and are highly encouraged. Treatment options were given such as, aqua therapy, and contacting a nutritionist. Recommended to use the elliptical, stationary bike, less use of treadmill. Mindful eating was explained to the patient Obesity is associated with worsening asthma, shortness of breath, and potential for cardiac disease, diabetes, and other underlying medical conditions. \par \par problem 8: cardiac\par -recommended to continua following with Dr. Silva al.; José/[facility] (MARIA GUADALUPE 5/15) - ?TAVR +/- MV clip\par -s/p EPS/cardioversion\par \par \par problem  9: Pre-Op clearance for EPS/cardioversion; MARIA GUADALUPE, TAVR +/- MV clip\par -at this point in time there are no absolute pulmonary contraindications to go forward with the planned procedure \par -at the time of surgery s/he should have optimal pain control, incentive spirometry, early ambulation, DVT and GI prophylaxis.\par \par Problem 10: Health Maintenance/COVID19 Precautions:\par - Clean your hands often. Wash your hands often with soap and water for at least 20 seconds, especially after blowing your nose, coughing, or sneezing, or having been in a public place.\par - If soap and water are not available, use a hand  that contains at least 60% alcohol.\par - To the extent possible, avoid touching high-touch surfaces in public places - elevator buttons, door handles, handrails, handshaking with people, etc. Use a tissue or your sleeve to cover your hand or finger if you must touch something.\par - Wash your hands after touching surfaces in public places.\par Immune Support Recommendations:\par -OTC Vitamin C 500mg BID \par -OTC Quercetin 250-500mg BID \par -OTC Zinc 75-100mg per day \par -OTC Melatonin 1or 2mg a night \par -OTC Vitamin D 1-4000mg per day \par -OTC Tonic Water 8oz per day\par \par \par  Problem 11: health maintenance\par -s/p influenza vaccine - 2018\par -recommended strep pneumonia vaccines: Prevnar-13 vaccine, followed by Pneumo vaccine 23 one year following\par -recommended early intervention for URIs\par -recommended regular osteoporosis evaluations\par -recommended early dermatological evaluations\par -recommended after the age of 50 to the age of 70, colonoscopy every 5 years \par \par F/U in 3-4 months\par He is encouraged to call with any changes, concerns, or questions

## 2020-09-09 NOTE — HISTORY OF PRESENT ILLNESS
[FreeTextEntry1] : Mr. Murry is a 74 year old male with a history of abnormal CT, allergies, COPD, HTN, low Vit D, nicotine addiction, obesity, LIMA, SOB, pericardial effusion, who presents to the office for a follow up visit. His chief complaint is \par - he notes he has been doing well \par - he has been sleeping well, about 6-8 hours\par - no coughing / wheezing \par - he notes he has gained about 9 lbs \par - denies taking any new medications, vitamins, or supplements. \par - he notes he has still been smoking \par - he notes he does not like wearing a mask\par - He  denies any visual issues, headaches, nausea, vomiting, fever, chills, sweats, chest pains, chest pressure, diarrhea, constipation, dysphagia, myalgia, dizziness, leg swelling, leg pain, itchy eyes, itchy ears, heartburn, reflux, or sour taste in the mouth.

## 2020-09-09 NOTE — REASON FOR VISIT
[Follow-Up] : a follow-up visit [FreeTextEntry1] : abnormal CT, allergies, COPD, low Vit D, nicotine addiction, obesity, LIMA, SOB, pericardial effusion

## 2020-09-09 NOTE — PHYSICAL EXAM
[General Appearance - Well Developed] : well developed [Well Groomed] : well groomed [Normal Appearance] : normal appearance [General Appearance - Well Nourished] : well nourished [No Deformities] : no deformities [General Appearance - In No Acute Distress] : no acute distress [Normal Conjunctiva] : the conjunctiva exhibited no abnormalities [Eyelids - No Xanthelasma] : the eyelids demonstrated no xanthelasmas [Normal Oropharynx] : normal oropharynx [Neck Cervical Mass (___cm)] : no neck mass was observed [III] : III [Neck Appearance] : the appearance of the neck was normal [Thyroid Diffuse Enlargement] : the thyroid was not enlarged [Jugular Venous Distention Increased] : there was no jugular-venous distention [Thyroid Nodule] : there were no palpable thyroid nodules [Heart Rate And Rhythm] : heart rate and rhythm were normal [Heart Sounds] : normal S1 and S2 [Respiration, Rhythm And Depth] : normal respiratory rhythm and effort [Auscultation Breath Sounds / Voice Sounds] : lungs were clear to auscultation bilaterally [Exaggerated Use Of Accessory Muscles For Inspiration] : no accessory muscle use [Abdomen Tenderness] : non-tender [Kyphosis] : kyphosis [Abdomen Soft] : soft [Abnormal Walk] : normal gait [Abdomen Mass (___ Cm)] : no abdominal mass palpated [Nail Clubbing] : no clubbing of the fingernails [Gait - Sufficient For Exercise Testing] : the gait was sufficient for exercise testing [Cyanosis, Localized] : no localized cyanosis [Petechial Hemorrhages (___cm)] : no petechial hemorrhages [Skin Color & Pigmentation] : normal skin color and pigmentation [] : no rash [No Venous Stasis] : no venous stasis [Skin Lesions] : no skin lesions [No Skin Ulcers] : no skin ulcer [No Xanthoma] : no  xanthoma was observed [Deep Tendon Reflexes (DTR)] : deep tendon reflexes were 2+ and symmetric [Sensation] : the sensory exam was normal to light touch and pinprick [Impaired Insight] : insight and judgment were intact [Oriented To Time, Place, And Person] : oriented to person, place, and time [No Focal Deficits] : no focal deficits [Affect] : the affect was normal [FreeTextEntry2] : trace edema [FreeTextEntry1] : 2/6 systolic murmur

## 2020-11-10 NOTE — PATIENT PROFILE ADULT. - AS [facility] BRADEN MOISTURE
PHYSICAL THERAPY DAILY TREATMENT NOTE  Precautions: standard/universal  Date of Evaluation: 10/21/20  Diagnosis: rupture of anterior cruciate ligament, right knee    Insurance Type (# Auth): [employer] [facility]  Visit #: 4       Subjective: Roxana Munoz strenuous daily tasks/demands. Shanta Addison will continue to benefit from skilled physical therapy interventions to improve function and achieve all established physical therapy goals.       Physical Therapy Goals  Goals: (to be met in 8-12 weeks) (4) rarely moist

## 2021-01-11 ENCOUNTER — APPOINTMENT (OUTPATIENT)
Dept: PULMONOLOGY | Facility: CLINIC | Age: 76
End: 2021-01-11
Payer: COMMERCIAL

## 2021-01-11 VITALS
SYSTOLIC BLOOD PRESSURE: 120 MMHG | OXYGEN SATURATION: 94 % | HEART RATE: 90 BPM | TEMPERATURE: 97 F | WEIGHT: 197 LBS | BODY MASS INDEX: 34.91 KG/M2 | RESPIRATION RATE: 17 BRPM | DIASTOLIC BLOOD PRESSURE: 74 MMHG | HEIGHT: 63 IN

## 2021-01-11 PROCEDURE — 99214 OFFICE O/P EST MOD 30 MIN: CPT

## 2021-01-11 NOTE — HISTORY OF PRESENT ILLNESS
[FreeTextEntry1] : Mr. Murry is a 75 year old male with a history of abnormal CT, allergies, COPD, HTN, low Vit D, nicotine addiction, obesity, LIMA, SOB, pericardial effusion, who presents to the office for a follow up visit. His chief complaint is \par - he has been feel good \par - he notes he has gained a few pounds \par - he notes him and his wife are both waiting for the COVID vaccine \par - no chest pain / pressure \par - he notes he is getting enough sleep \par - no heart burn / reflux \par - he coughs occasionally \par - he notes he's not bringing up anything with the cough except for a little bit of phlegm \par - he notes the last he heard from  is there is nothing to do at this point, just observation. \par - he has been smoking a little less than a pack a day \par - he notes he has not been going out much except for the super market \par - He  denies any visual issues, headaches, nausea, vomiting, fever, chills, sweats, chest pains, chest pressure, diarrhea, constipation, dysphagia, myalgia, dizziness, leg swelling, leg pain, itchy eyes, itchy ears.

## 2021-01-11 NOTE — PHYSICAL EXAM

## 2021-01-11 NOTE — ASSESSMENT
[FreeTextEntry1] : Mr. Murry is 75 y.o M who has a history of Afib, CAD, COPD, OSAS, obesity, active snoring and is s/p pericardiocentesis, and is dramatically improved. He is now compliant with his medications and smoking cessation. He is s/p CTS evaluation ([facility]). He is still smoking. - currently stable  - awaiting COVID-19 vaccine \par \par His shortness of breath is multifactorial is due to: \par -COPD\par -asthma\par -poor breathing mechanics\par -overweight \par -CAD\par -?tracheomalacia\par \par \par \par problem 1: asthma/COPD - \par -continue Trelegy, 1 puff QD\par -continue Ventolin, 2 inhalations QD \par -continue Xopenex .63 nebulizer Q6H (gargle and spit after use)\par \par -Inhaler technique reviewed as well as oral hygiene techniques reviewed with patient. Avoidance of cold air, extremes of temperature, rescue inhaler should be used before exercise. Order of medication reviewed with patient. Recommended use of a cool mist humidifier in the bedroom. \par Asthma is believed to be caused by inherited (genetic) and environmental factor, but its exact cause is unknown. Asthma may be triggered by allergens, lung infections, or irritants in the air. Asthma triggers are different for each person \par \par problem 2: r/o TBM\par -complete dynamic chest CT to r/o TBM (noncompliant)\par  \par problem 3: allergies and sinuses \par -continue Flonase 1 sniff each nostril BID \par -continue to use Astelin nasal spray 1 sniff each nostril BID (0.15)\par \par problem 4: current smoking (no interest in cessation) (discussed: 9/9/2020) \par -re-stressed importance of smoking cessation with patient\par -he is being prescribed NicoDerm \par -Discussed the risks/associations with continued smoking including COPD, emphysema, shortness of breath, renal cancer, bladder cancer, stroke risk, cardiac disease, etc. Smoking cessation was discussed at length and highly encouraged. Various options to aid cessation was discussed including use of Chantix, Nicotrol, nicotine products, laser therapy, hypnosis, Wellbutrin, etc. \par \par problem 5: abnormal chest CT/ lung cancer screening\par -follow up chest CT 9/2020. (Due) - over due \par CAT scans are the only radiological modality to identify abnormalities w/in the lungs with regards to nodules/masses/lymph nodes. Risks, benefits were reviewed in detail. The guidelines for abnormalities include follow up CT scans at various intervals which could range from 6 weeks to 1 year intervals. If there is a change for the worse then consideration for a biopsy will be considered if you are a candidate. Second opinion evaluation with a thoracic surgeon or an interventional radiologist could be offered. \par \par problem 6: questionable sleep apnea (returned)\par -not interested in any sleep study\par -recommended to use Oxy-Aid by [facility] \par Sleep apnea is associated with adverse clinical consequences which an affect most organ systems. Cardiovascular disease risk includes arrhythmias, atrial fibrillation, hypertension, coronary artery disease, and stroke. Metabolic disorders include diabetes type 2, non-alcoholic fatty liver disease. Mood disorder especially depression; and cognitive decline especially in the elderly. Associations with chronic reflux/Parsons’s esophagus some but not all inclusive. \par -Reasons include arousal consistent with hypopnea; respiratory events most prominent in REM sleep or supine position; therefore sleep staging and body position are important for accurate diagnosis and estimation of AHI. \par \par problem 7: overweight\par -recommended to visit the [facility] \par \par Weight loss, exercise, and diet control were discussed and are highly encouraged. Treatment options were given such as, aqua therapy, and contacting a nutritionist. Recommended to use the elliptical, stationary bike, less use of treadmill. Mindful eating was explained to the patient Obesity is associated with worsening asthma, shortness of breath, and potential for cardiac disease, diabetes, and other underlying medical conditions. \par \par problem 8: cardiac\par -recommended to continua following with Dr. Silva al.; José/[place] (MARIA GUADALUPE 5/15) - ?TAVR +/- MV clip\par -s/p EPS/cardioversion\par \par \par problem  9: Pre-Op clearance for EPS/cardioversion; MARIA GUADALUPE, TAVR +/- MV clip\par -at this point in time there are no absolute pulmonary contraindications to go forward with the planned procedure \par -at the time of surgery s/he should have optimal pain control, incentive spirometry, early ambulation, DVT and GI prophylaxis.\par \par Problem 10: Health Maintenance/COVID19 Precautions:\par - Clean your hands often. Wash your hands often with soap and water for at least 20 seconds, especially after blowing your nose, coughing, or sneezing, or having been in a public place.\par - If soap and water are not available, use a hand  that contains at least 60% alcohol.\par - To the extent possible, avoid touching high-touch surfaces in public places - elevator buttons, door handles, handrails, handshaking with people, etc. Use a tissue or your sleeve to cover your hand or finger if you must touch something.\par - Wash your hands after touching surfaces in public places.\par Immune Support Recommendations:\par -OTC Vitamin C 500mg BID \par -OTC Quercetin 250-500mg BID \par -OTC Zinc 75-100mg per day \par -OTC Melatonin 1or 2mg a night \par -OTC Vitamin D 1-4000mg per day \par -OTC Tonic Water 8oz per day\par \par \par  Problem 11: health maintenance\par -s/p influenza vaccine - 2018\par -recommended strep pneumonia vaccines: Prevnar-13 vaccine, followed by Pneumo vaccine 23 one year following\par -recommended early intervention for URIs\par -recommended regular osteoporosis evaluations\par -recommended early dermatological evaluations\par -recommended after the age of 50 to the age of 70, colonoscopy every 5 years \par \par F/U in 3-4 months\par He is encouraged to call with any changes, concerns, or questions

## 2021-01-11 NOTE — ADDENDUM
[FreeTextEntry1] : Documented by Ale Chandler acting as a scribe for Dr. Frederic Brand on 01/11/2021 \par \par All medical record entries made by the Scribe were at my, Dr. Frederic Brand's, direction and personally dictated by me on 01/11/2021 . I have reviewed the chart and agree that the record accurately reflects my personal performance of the history, physical exam, assessment and plan. I have also personally directed, reviewed, and agree with the discharge instructions. \par

## 2021-02-03 NOTE — H&P PST ADULT - DENTITION
NURSE HAND-OFF REPORT: 





Latest Vital Signs: Temperature 97.0 , Pulse 92 , B/P 113 /68 , Respiratory Rate 21 , O2 SAT 
86 , Mechanical Ventilator, O2 Flow Rate  .  

Vital Sign Comment: [variable O2sat, ]



EKG Rhythm: Sinus Rhythm

Rhythm change?: N 

MD Notified?: Y -Dr Ariel GUTIERREZ Response: No New Orders Received



Latest Singh Fall Score: 50  

Fall Risk: High Risk 

Safety Measures: Call light Within Reach, Bed Alarm Zone 1, Side Rails Side Rails x3, Bed 
position Low and Locked.

Fall Precautions: 

Yellow Socks

Yellow Gown

Door Sign

Patient Fall Education



Report given to [Miyeon RN]. full dentures

## 2021-02-10 NOTE — PRE-OP CHECKLIST - ORDERS/MEDICATION ADMINISTRATION RECORD ON CHART
Ongoing SW/CM Assessment/Plan of Care Note     See SW/CM flowsheets for goals and other objective data.    Progress note:  New referral sent to [facility] today - per request from family, discussed with unit R.N. - see SW notation. Update also provided to attending MD (Dr. MEHRAN Vega).  Tx: sacral osteomyelitis - plan IV Vancomycin for 6 weeks estimated stop date 3/19/21. PICC line placed by IR today.       Barriers to discharge:   Pending placement for SNF with IV antibiotics.     Discharge plan:  Pending     CM/SW team to continue to follow for discharge needs.     done

## 2021-04-15 DIAGNOSIS — Z01.812 ENCOUNTER FOR PREPROCEDURAL LABORATORY EXAMINATION: ICD-10-CM

## 2021-05-06 ENCOUNTER — APPOINTMENT (OUTPATIENT)
Dept: PULMONOLOGY | Facility: CLINIC | Age: 76
End: 2021-05-06

## 2021-09-04 ENCOUNTER — RX RENEWAL (OUTPATIENT)
Age: 76
End: 2021-09-04

## 2021-09-07 ENCOUNTER — RX RENEWAL (OUTPATIENT)
Age: 76
End: 2021-09-07

## 2021-09-22 NOTE — PROGRESS NOTE ADULT - PROBLEM SELECTOR PROBLEM 8
Coronary artery disease
Coronary artery disease
Prophylactic measure
This document is complete and the patient is ready for discharge.

## 2021-11-10 ENCOUNTER — APPOINTMENT (OUTPATIENT)
Dept: PULMONOLOGY | Facility: CLINIC | Age: 76
End: 2021-11-10
Payer: COMMERCIAL

## 2021-11-10 VITALS
WEIGHT: 195 LBS | TEMPERATURE: 97.3 F | DIASTOLIC BLOOD PRESSURE: 80 MMHG | OXYGEN SATURATION: 95 % | SYSTOLIC BLOOD PRESSURE: 140 MMHG | BODY MASS INDEX: 34.55 KG/M2 | RESPIRATION RATE: 17 BRPM | HEIGHT: 63 IN | HEART RATE: 93 BPM

## 2021-11-10 PROCEDURE — G0296 VISIT TO DETERM LDCT ELIG: CPT

## 2021-11-10 PROCEDURE — 99214 OFFICE O/P EST MOD 30 MIN: CPT | Mod: 25

## 2021-11-10 PROCEDURE — 99406 BEHAV CHNG SMOKING 3-10 MIN: CPT

## 2021-11-10 PROCEDURE — 94618 PULMONARY STRESS TESTING: CPT

## 2021-11-10 NOTE — ASSESSMENT
[FreeTextEntry1] : Mr. Murry is 76 y.o M who has a history of Afib, CAD, COPD, OSAS, obesity, active snoring and is s/p pericardiocentesis, and is dramatically improved. He is now compliant with his medications and smoking cessation. He is s/p CTS evaluation (Espazito/Tanin). He is still smoking. - currently stable  - s/p COVID-19 vaccine Pfizer x3\par \par His shortness of breath is multifactorial is due to: \par -COPD\par -asthma\par -poor breathing mechanics\par -overweight \par -CAD\par -?tracheomalacia\par \par \par \par problem 1: asthma/COPD - \par -continue Trelegy, 1 puff QD\par -continue Ventolin, 2 inhalations QD \par -continue Xopenex .63 nebulizer Q6H (gargle and spit after use)\par \par -Inhaler technique reviewed as well as oral hygiene techniques reviewed with patient. Avoidance of cold air, extremes of temperature, rescue inhaler should be used before exercise. Order of medication reviewed with patient. Recommended use of a cool mist humidifier in the bedroom. \par Asthma is believed to be caused by inherited (genetic) and environmental factor, but its exact cause is unknown. Asthma may be triggered by allergens, lung infections, or irritants in the air. Asthma triggers are different for each person \par \par problem 2: r/o TBM\par -complete dynamic chest CT to r/o TBM (noncompliant)\par  \par problem 3: allergies and sinuses \par -continue Flonase 1 sniff each nostril BID \par -continue to use Astelin nasal spray 1 sniff each nostril BID (0.15)\par \par problem 4: current smoking (no interest in cessation) (discussed: 11/10/2021) \par -re-stressed importance of smoking cessation with patient\par -he is being prescribed NicoDerm \par -Discussed the risks/associations with continued smoking including COPD, emphysema, shortness of breath, renal cancer, bladder cancer, stroke risk, cardiac disease, etc. Smoking cessation was discussed at length and highly encouraged. Various options to aid cessation was discussed including use of Chantix, Nicotrol, nicotine products, laser therapy, hypnosis, Wellbutrin, etc. \par \par problem 5: abnormal chest CT/ lung cancer screening\par -follow up chest CT 9/2020. (Due) - over due (rescripted 11/10/2021)\par CAT scans are the only radiological modality to identify abnormalities w/in the lungs with regards to nodules/masses/lymph nodes. Risks, benefits were reviewed in detail. The guidelines for abnormalities include follow up CT scans at various intervals which could range from 6 weeks to 1 year intervals. If there is a change for the worse then consideration for a biopsy will be considered if you are a candidate. Second opinion evaluation with a thoracic surgeon or an interventional radiologist could be offered. \par \par problem 6: questionable sleep apnea (returned)\par -not interested in any sleep study\par -recommended to use Oxy-Aid by [employer] \par Sleep apnea is associated with adverse clinical consequences which an affect most organ systems. Cardiovascular disease risk includes arrhythmias, atrial fibrillation, hypertension, coronary artery disease, and stroke. Metabolic disorders include diabetes type 2, non-alcoholic fatty liver disease. Mood disorder especially depression; and cognitive decline especially in the elderly. Associations with chronic reflux/Parsons’s esophagus some but not all inclusive. \par -Reasons include arousal consistent with hypopnea; respiratory events most prominent in REM sleep or supine position; therefore sleep staging and body position are important for accurate diagnosis and estimation of AHI. \par \par problem 7: overweight\par -recommended to visit the [facility] \par \par Weight loss, exercise, and diet control were discussed and are highly encouraged. Treatment options were given such as, aqua therapy, and contacting a nutritionist. Recommended to use the elliptical, stationary bike, less use of treadmill. Mindful eating was explained to the patient Obesity is associated with worsening asthma, shortness of breath, and potential for cardiac disease, diabetes, and other underlying medical conditions. \par \par problem 8: cardiac\par -recommended to continua following with Dr. George; José/[facility] (MARIA GUADALUPE 5/15) - ?TAVR +/- MV clip\par -s/p EPS/cardioversion\par \par Problem 9: Health Maintenance/COVID19 Precautions:\par - S/p Covid 19 vaccine (Pfizer) x3\par - Clean your hands often. Wash your hands often with soap and water for at least 20 seconds, especially after blowing your nose, coughing, or sneezing, or having been in a public place.\par - If soap and water are not available, use a hand  that contains at least 60% alcohol.\par - To the extent possible, avoid touching high-touch surfaces in public places - elevator buttons, door handles, handrails, handshaking with people, etc. Use a tissue or your sleeve to cover your hand or finger if you must touch something.\par - Wash your hands after touching surfaces in public places.\par Immune Support Recommendations:\par -OTC Vitamin C 500mg BID \par -OTC Quercetin 250-500mg BID \par -OTC Zinc 75-100mg per day \par -OTC Melatonin 1or 2mg a night \par -OTC Vitamin D 1-4000mg per day \par -OTC Tonic Water 8oz per day\par \par \par  Problem 11: health maintenance\par -s/p influenza vaccine - 2021\par -recommended strep pneumonia vaccines: Prevnar-13 vaccine, followed by Pneumo vaccine 23 one year following (completed)\par -recommended early intervention for URIs\par -recommended regular osteoporosis evaluations\par -recommended early dermatological evaluations\par -recommended after the age of 50 to the age of 70, colonoscopy every 5 years \par \par F/U in 3-4 months\par He is encouraged to call with any changes, concerns, or questions

## 2021-11-10 NOTE — HISTORY OF PRESENT ILLNESS
[FreeTextEntry1] : Mr. Murry is a 76 year old male with a history of abnormal CT, allergies, COPD, HTN, low Vit D, nicotine addiction, obesity, LIMA, SOB, pericardial effusion, who presents to the office for a follow up visit. His chief complaint is \par -he notes he has been feeling good but hasn't been doing much because he hasn't gone to the office much due to being semi-retired\par -he denies any visual issues\par -he notes getting enough sleep (9 hours per night)\par -he notes his wife does not say he snores\par -he notes his bowels are regular \par -he notes losing a little weight recently\par -he notes he does not exercise more in [place] than here\par -he notes walking a little\par -he notes he is still smoking\par -he notes his balance is good\par -he notes his appetite is good\par -s/p COVID-19 vaccine x3\par -s/p flu shot\par -he notes smoking a pack a day\par \par patient denies any headaches, nausea, vomiting, fever, chills, sweats, chest pain, chest pressure, palpitations, coughing, wheezing, fatigue, diarrhea, constipation, dysphagia, myalgias, dizziness, leg swelling, leg pain, itchy eyes, itchy ears, heartburn, reflux or sour taste in the mouth

## 2021-11-10 NOTE — REASON FOR VISIT
Patient [Follow-Up] : a follow-up visit [FreeTextEntry1] : abnormal CT, allergies, COPD, low Vit D, nicotine addiction, obesity, LIMA, SOB, pericardial effusion

## 2021-11-10 NOTE — PROCEDURE
[FreeTextEntry1] : 6 minute walk test reveals a low saturation of 90% with moderate dyspnea or fatigue; walked 48.9 meters. Patient stopped before 6 minutes because legs were hurting\par \par -Images and procedures reviewed in detail and discussed with patient. \par

## 2021-11-10 NOTE — ADDENDUM
[FreeTextEntry1] : Documented by Elton English acting as a scribe for Dr. Frederic Brand on (11/10/2021).\par \par All medical record entries made by the Scribe were at my, Dr. Frederic Brand's, direction and personally dictated by me on (11/10/2021). I have reviewed the chart and agree that the record accurately reflects my personal performance of the history, physical exam, assessment and plan. I have also personally directed, reviewed, and agree with the discharge instructions.\par

## 2021-11-10 NOTE — PHYSICAL EXAM
[No Acute Distress] : no acute distress [Normal Oropharynx] : normal oropharynx [III] : Mallampati Class: III [Normal Appearance] : normal appearance [No Neck Mass] : no neck mass [Normal Rate/Rhythm] : normal rate/rhythm [Normal S1, S2] : normal s1, s2 [No Resp Distress] : no resp distress [Clear to Auscultation Bilaterally] : clear to auscultation bilaterally [No Abnormalities] : no abnormalities [Benign] : benign [Normal Gait] : normal gait [No Clubbing] : no clubbing [No Cyanosis] : no cyanosis [FROM] : FROM [Normal Color/ Pigmentation] : normal color/ pigmentation [No Focal Deficits] : no focal deficits [Oriented x3] : oriented x3 [Normal Affect] : normal affect [TextBox_2] : OW [TextBox_54] : 2/6 systolic murmur [TextBox_68] : I:E 1:3; Clear  [TextBox_105] : trace edema

## 2022-02-17 NOTE — H&P PST ADULT - SOURCE OF INFORMATION, PROFILE
RECEIVING UNIT ED HANDOFF REVIEW    ED Nurse Handoff Report was reviewed by: Katelyn Sullivan RN on February 17, 2022 at 1:07 AM                                  patient

## 2022-03-17 ENCOUNTER — APPOINTMENT (OUTPATIENT)
Dept: PULMONOLOGY | Facility: CLINIC | Age: 77
End: 2022-03-17
Payer: MEDICARE

## 2022-03-17 ENCOUNTER — NON-APPOINTMENT (OUTPATIENT)
Age: 77
End: 2022-03-17

## 2022-03-17 VITALS
TEMPERATURE: 97 F | DIASTOLIC BLOOD PRESSURE: 72 MMHG | WEIGHT: 190 LBS | HEART RATE: 92 BPM | BODY MASS INDEX: 33.66 KG/M2 | HEIGHT: 63 IN | SYSTOLIC BLOOD PRESSURE: 124 MMHG | OXYGEN SATURATION: 95 % | RESPIRATION RATE: 18 BRPM

## 2022-03-17 PROCEDURE — 95012 NITRIC OXIDE EXP GAS DETER: CPT

## 2022-03-17 PROCEDURE — 99214 OFFICE O/P EST MOD 30 MIN: CPT | Mod: 25

## 2022-03-17 PROCEDURE — 94010 BREATHING CAPACITY TEST: CPT

## 2022-03-17 NOTE — HISTORY OF PRESENT ILLNESS
[FreeTextEntry1] : Mr. Murry is a 76 year old male with a history of abnormal CT, allergies, COPD, HTN, low Vit D, nicotine addiction, obesity, LIMA, SOB, pericardial effusion, who presents to the office for a follow up visit. His chief complaint is \par -he notes some days he has energy and some days he doesn't \par -he denies dysphagia \par -he notes sleeping well with abt 9 hrs/night \par -he notes his energy levels a 6/10 \par -he notes losing weight and skipping lunch \par -he denies swelling \par -he denies visual issues \par -he notes still smoking \par -he notes needing a covid test soon \par -he denies breathing issues \par -he denies muscle aches and pains \par -he notes going to Dr. Campbell tmrrw \par patient denies any headaches, nausea, vomiting, fever, chills, sweats, chest pain, chest pressure, palpitations, coughing, wheezing, fatigue, diarrhea, constipation, dysphagia, myalgias, dizziness, leg swelling, leg pain, itchy eyes, itchy ears, heartburn, reflux or sour taste in the mouth

## 2022-03-17 NOTE — ASSESSMENT
[FreeTextEntry1] : Mr. Murry is 76 y.o M who has a history of Afib, CAD, COPD, OSAS, obesity, active snoring and is s/p pericardiocentesis, and is dramatically improved. He is now compliant with his medications and smoking cessation. He is s/p CTS evaluation (Espazito/Tanin). He is still smoking. - currently stable  - s/p COVID-19 vaccine Pfizer x3- stable and still smoking pack day \par \par His shortness of breath is multifactorial is due to: \par -COPD\par -asthma\par -poor breathing mechanics\par -overweight \par -CAD\par -?tracheomalacia\par \par \par \par problem 1: asthma/COPD - \par -continue Trelegy, 100 1 puff QD \par -continue Ventolin, 2 inhalations QD \par -continue Xopenex .63 nebulizer Q6H (gargle and spit after use)\par \par -Inhaler technique reviewed as well as oral hygiene techniques reviewed with patient. Avoidance of cold air, extremes of temperature, rescue inhaler should be used before exercise. Order of medication reviewed with patient. Recommended use of a cool mist humidifier in the bedroom. \par Asthma is believed to be caused by inherited (genetic) and environmental factor, but its exact cause is unknown. Asthma may be triggered by allergens, lung infections, or irritants in the air. Asthma triggers are different for each person \par \par problem 2: r/o TBM\par -complete dynamic chest CT to r/o TBM (noncompliant)\par  \par problem 3: allergies and sinuses \par -continue Flonase 1 sniff each nostril BID \par -continue to use Astelin nasal spray 1 sniff each nostril BID (0.15)\par \par problem 4: current smoking (no interest in cessation) (discussed: 3/17/2022) \par -re-stressed importance of smoking cessation with patient\par -he is being prescribed NicoDerm \par -Discussed the risks/associations with continued smoking including COPD, emphysema, shortness of breath, renal cancer, bladder cancer, stroke risk, cardiac disease, etc. Smoking cessation was discussed at length and highly encouraged. Various options to aid cessation was discussed including use of Chantix, Nicotrol, nicotine products, laser therapy, hypnosis, Wellbutrin, etc. \par \par problem 5: abnormal chest CT/ lung cancer screening\par -follow up chest CT 9/2020. (Due) - over due (rescripted 3/17/2022)\par CAT scans are the only radiological modality to identify abnormalities w/in the lungs with regards to nodules/masses/lymph nodes. Risks, benefits were reviewed in detail. The guidelines for abnormalities include follow up CT scans at various intervals which could range from 6 weeks to 1 year intervals. If there is a change for the worse then consideration for a biopsy will be considered if you are a candidate. Second opinion evaluation with a thoracic surgeon or an interventional radiologist could be offered. \par \par problem 6: questionable sleep apnea (returned)\par -not interested in any sleep study\par -recommended to use Oxy-Aid by [facility] \par Sleep apnea is associated with adverse clinical consequences which an affect most organ systems. Cardiovascular disease risk includes arrhythmias, atrial fibrillation, hypertension, coronary artery disease, and stroke. Metabolic disorders include diabetes type 2, non-alcoholic fatty liver disease. Mood disorder especially depression; and cognitive decline especially in the elderly. Associations with chronic reflux/Parsons’s esophagus some but not all inclusive. \par -Reasons include arousal consistent with hypopnea; respiratory events most prominent in REM sleep or supine position; therefore sleep staging and body position are important for accurate diagnosis and estimation of AHI. \par \par problem 7: overweight\par -recommended to visit the [facility] \par \par Weight loss, exercise, and diet control were discussed and are highly encouraged. Treatment options were given such as, aqua therapy, and contacting a nutritionist. Recommended to use the elliptical, stationary bike, less use of treadmill. Mindful eating was explained to the patient Obesity is associated with worsening asthma, shortness of breath, and potential for cardiac disease, diabetes, and other underlying medical conditions. \par \par problem 8: cardiac\par -recommended to continua following with Dr. Silva al.; José/[facility] (MARIA GUADALUPE 5/15) - ?TAVR +/- MV clip\par -s/p EPS/cardioversion\par \par Problem 9: Health Maintenance/COVID19 Precautions:\par - S/p Covid 19 vaccine (Pfizer) x3\par - Clean your hands often. Wash your hands often with soap and water for at least 20 seconds, especially after blowing your nose, coughing, or sneezing, or having been in a public place.\par - If soap and water are not available, use a hand  that contains at least 60% alcohol.\par - To the extent possible, avoid touching high-touch surfaces in public places - elevator buttons, door handles, handrails, handshaking with people, etc. Use a tissue or your sleeve to cover your hand or finger if you must touch something.\par - Wash your hands after touching surfaces in public places.\par Immune Support Recommendations:\par -OTC Vitamin C 500mg BID \par -OTC Quercetin 250-500mg BID \par -OTC Zinc 75-100mg per day \par -OTC Melatonin 1or 2mg a night \par -OTC Vitamin D 1-4000mg per day \par -OTC Tonic Water 8oz per day\par \par \par  Problem 11: health maintenance\par -s/p influenza vaccine - 2021\par -recommended strep pneumonia vaccines: Prevnar-13 vaccine, followed by Pneumo vaccine 23 one year following (completed)\par -recommended early intervention for URIs\par -recommended regular osteoporosis evaluations\par -recommended early dermatological evaluations\par -recommended after the age of 50 to the age of 70, colonoscopy every 5 years \par \par F/U in 3-4 months\par He is encouraged to call with any changes, concerns, or questions

## 2022-03-17 NOTE — ADDENDUM
[FreeTextEntry1] : Documented by Lisa Gonzalez acting as a scribe for Dr. Frederic Brand on 03/17/2022 .\par \par All medical record entries made by the Scribe were at my, Dr. Frederic Brand's, direction and personally dictated by me on. I have reviewed the chart and agree that the record accurately reflects my personal performance of the history, physical exam, assessment and plan. I have also personally directed, reviewed, and agree with the discharge instructions.

## 2022-03-17 NOTE — PROCEDURE
[FreeTextEntry1] : PFT - spi reveals moderate restrictive and mild obstructive flows; FEV1 is 0.90 which is 42% of predicted, normal flow volume loop \par \par FENO was    <5   ; a normal value being less than 25\par Fractional exhaled nitric oxide (FENO) is regarded as a simple, noninvasive method for assessing eosinophilic airway inflammation. Produced by a variety of cells within the lung, nitric oxide (NO) concentrations are generally low in healthy individuals. However, high concentrations of NO appear to be involved in nonspecific host defense mechanisms and chronic inflammatory diseases such as asthma. The American Thoracic Society (ATS) therefore has recommended using FENO to aid in the diagnosis and monitoring of eosinophilic airway inflammation and asthma, and for identifying steroid responsive individuals whose chronic respiratory symptoms may be caused by airway inflammation.

## 2022-07-21 ENCOUNTER — APPOINTMENT (OUTPATIENT)
Dept: PULMONOLOGY | Facility: CLINIC | Age: 77
End: 2022-07-21

## 2023-01-04 NOTE — DIETITIAN INITIAL EVALUATION ADULT. - +GENDER
PRE-SEDATION ASSESSMENT    CONSENT  Risks, benefits, and alternatives have been discussed with the patient/patient representative, and patient/patient representative agrees to proceed: Yes    MEDICAL HISTORY       PHYSICAL EXAM  History and Physical Reviewed: Patient has valid H&P within 30 days. I have reviewed and there are no changes.  Airway Risk History: No previous complications  Airway Anatomy : Class II  Heart : Normal  Lungs : Normal  LOC/Mental Status : Normal    OTHER FINDINGS  Reviewed current medications and allergies: Yes  Pertinent lab/diagnostic test reviewed: Yes    SEDATION RISK ASSESSMENT  Risk Status ASA: Class III - Severe systemic disease, limits activity, is not incapacitated  Plan for Sedation: Moderate Sedation  Indications for Procedure/Pre-Procedure Diagnosis and Planned Procedure: urinary obstruction, routine nephrostomy exchange, right  EKG Monitoring: Yes    NARRATIVE FINDINGS     
Statement Selected

## 2023-03-01 ENCOUNTER — RX RENEWAL (OUTPATIENT)
Age: 78
End: 2023-03-01

## 2023-03-13 ENCOUNTER — APPOINTMENT (OUTPATIENT)
Dept: PULMONOLOGY | Facility: CLINIC | Age: 78
End: 2023-03-13
Payer: MEDICARE

## 2023-03-15 ENCOUNTER — APPOINTMENT (OUTPATIENT)
Dept: PULMONOLOGY | Facility: CLINIC | Age: 78
End: 2023-03-15
Payer: MEDICARE

## 2023-03-15 PROCEDURE — 99447 NTRPROF PH1/NTRNET/EHR 11-20: CPT

## 2023-03-21 NOTE — H&P PST ADULT - NS PRO FEM  PAP SMEARS 3YRS
[FreeTextEntry1] : Follow-up [de-identified] : 75 years old male with end-stage renal disease on hemodialysis.  Patient had renal transplant 2008–it failed 2004–patient started on hemodialysis since 2014.  Patient had diabetes mellitus, hypertension's in the past. history of temporal arteritis.  Patient has had pulmonary hypertension, history of paroxysmal atrial fibrillation in  the past not applicable (Male)

## 2023-03-21 NOTE — ED PROCEDURE NOTE - PROCEDURE NAME, MLM
General STEROID INJECTION, CARE AFTER                                                                                    NO HEAVY LIFTING FOR ONE WEEK                                                                                  NO HEAT FOR 24 HOURS                                                                                  APPLY ICE PACK FOR COMFORT TO SITES                                                                                  REMOVE BAND-AIDS TOMORROW AND THEN YOU MAY SHOWER                                                                                  NO TUB SOAKING FOR 3-5 DAYS      These instructions give you information on caring for yourself after your procedure. Your doctor may also give you specific instructions. Call your doctor if you have any problems or questions after your procedure.     HOME CARE  Do not drive or use any machinery for the next 24 hours.  Do not do any hard physical activity for the next 24 hours  Do not use a heating pad in area of injection  You may go back to eating as usual    SIDE EFFECTS THAT COULD HAPPEN UP TO 4 HOURS AFTER THE INJECTION  If you have leg weakness or numbness, have someone help you walk. If the weakness or numbness does not go away, or if it gets worse go to the emergency department.  If you have dizziness, lie down right away. This usually helps. Sit up slowly and then when you have been sitting for a few minutes then stand up.  If you have a mild headache, drink fluids (especially drinks with caffeine) Call your doctor if the headache gets worse or persists.  When the numbing medicine wears off you may feel some discomfort where you had the shot. It usually only lasts for a few days. You may put ice over the injection site. Leave ice on for 20 minutes at a time and protect your skin during use.   You may feel minor back pain and stiffness at the site of the shot. Call your doctor if this pain gets worse or does not improve. You may feel nauseous or  vomit several hours after your procedure. If this happens, try drinking small amounts of clear liquids until you feel better. If you continue to feel nauseated or continue vomiting, get help right away.    Steroids may take several days to start working. The shot usually helps leg pain more than back pain. The shot will not fix what is causing the pain but may take away some of the pain. This pain relief may last from 2 weeks to 6 months.     GET HELP RIGHT AWAY IF:  You have very bad pain, headache or neck pain or stiffness  There is a change in your vision (double or blurry vision)  You have a fever over 101 or chills  You have swelling or redness at the injection site  You get weaker  You are not able to control your bladder or bowels  You are not able to urinate

## 2023-05-25 NOTE — PATIENT PROFILE ADULT - ABILITY TO HEAR (WITH HEARING AID OR HEARING APPLIANCE IF NORMALLY USED):
Message left for patient stating his requested medication was sent to his preferred pharmacy on file. If he has any questions or concerns to please contact office.   
Patient called stating he is out of his BP medication losartan-hydrochlorothiazide (HYZAAR) 50-12.5 MG per tablet. Stated if Dr. Mark could please put in a refill for 90 days. Last prescribing provider was Dr. Walsh.   
This patient was seen by Dr. Mark in FEB 2023 established with him and this is noncontrolled substance. Appears PCP out of office. Will refill,  
duplicate  
Adequate: hears normal conversation without difficulty

## 2023-08-28 RX ORDER — FLUTICASONE FUROATE, UMECLIDINIUM BROMIDE AND VILANTEROL TRIFENATATE 100; 62.5; 25 UG/1; UG/1; UG/1
100-62.5-25 POWDER RESPIRATORY (INHALATION)
Qty: 6 | Refills: 3 | Status: ACTIVE | COMMUNITY
Start: 2019-04-04 | End: 1900-01-01

## 2023-09-14 ENCOUNTER — APPOINTMENT (OUTPATIENT)
Dept: PULMONOLOGY | Facility: CLINIC | Age: 78
End: 2023-09-14

## 2023-09-21 ENCOUNTER — APPOINTMENT (OUTPATIENT)
Dept: PULMONOLOGY | Facility: CLINIC | Age: 78
End: 2023-09-21
Payer: MEDICARE

## 2023-09-21 VITALS
TEMPERATURE: 97.2 F | WEIGHT: 190 LBS | SYSTOLIC BLOOD PRESSURE: 130 MMHG | BODY MASS INDEX: 33.66 KG/M2 | OXYGEN SATURATION: 92 % | RESPIRATION RATE: 17 BRPM | HEIGHT: 63 IN | HEART RATE: 79 BPM | DIASTOLIC BLOOD PRESSURE: 72 MMHG

## 2023-09-21 PROCEDURE — 94010 BREATHING CAPACITY TEST: CPT

## 2023-09-21 PROCEDURE — 94729 DIFFUSING CAPACITY: CPT

## 2023-09-21 PROCEDURE — 94727 GAS DIL/WSHOT DETER LNG VOL: CPT

## 2023-09-21 PROCEDURE — 99214 OFFICE O/P EST MOD 30 MIN: CPT | Mod: 25

## 2023-09-21 PROCEDURE — 99406 BEHAV CHNG SMOKING 3-10 MIN: CPT | Mod: 25

## 2023-09-21 PROCEDURE — ZZZZZ: CPT

## 2023-10-07 ENCOUNTER — APPOINTMENT (OUTPATIENT)
Dept: CT IMAGING | Facility: CLINIC | Age: 78
End: 2023-10-07

## 2024-02-15 NOTE — PATIENT PROFILE ADULT - NSPROGENARRIVEDFROM_GEN_A_NUR
home On home medications of labetalol 100mg BID and  valsartan-HCTZ 160-12.5mg QD.   - Continue with home meds On home medications of labetalol 200mg BID and  valsartan-HCTZ 160-12.5mg QD.   - Start labetalol at lower dose   - Hold remaining anti-hypertensives as patient is NPO

## 2024-03-01 ENCOUNTER — RX RENEWAL (OUTPATIENT)
Age: 79
End: 2024-03-01

## 2024-03-01 RX ORDER — ROFLUMILAST 500 UG/1
500 TABLET ORAL DAILY
Qty: 90 | Refills: 1 | Status: ACTIVE | COMMUNITY
Start: 2023-09-21 | End: 1900-01-01

## 2024-03-14 ENCOUNTER — APPOINTMENT (OUTPATIENT)
Dept: PULMONOLOGY | Facility: CLINIC | Age: 79
End: 2024-03-14
Payer: MEDICARE

## 2024-03-14 VITALS
HEART RATE: 87 BPM | HEIGHT: 63 IN | WEIGHT: 163.9 LBS | BODY MASS INDEX: 29.04 KG/M2 | RESPIRATION RATE: 18 BRPM | SYSTOLIC BLOOD PRESSURE: 128 MMHG | DIASTOLIC BLOOD PRESSURE: 76 MMHG | TEMPERATURE: 97.3 F | OXYGEN SATURATION: 93 %

## 2024-03-14 DIAGNOSIS — F17.200 NICOTINE DEPENDENCE, UNSPECIFIED, UNCOMPLICATED: ICD-10-CM

## 2024-03-14 DIAGNOSIS — G47.33 OBSTRUCTIVE SLEEP APNEA (ADULT) (PEDIATRIC): ICD-10-CM

## 2024-03-14 DIAGNOSIS — J30.9 ALLERGIC RHINITIS, UNSPECIFIED: ICD-10-CM

## 2024-03-14 DIAGNOSIS — J44.9 CHRONIC OBSTRUCTIVE PULMONARY DISEASE, UNSPECIFIED: ICD-10-CM

## 2024-03-14 DIAGNOSIS — R06.02 SHORTNESS OF BREATH: ICD-10-CM

## 2024-03-14 DIAGNOSIS — R79.89 OTHER SPECIFIED ABNORMAL FINDINGS OF BLOOD CHEMISTRY: ICD-10-CM

## 2024-03-14 DIAGNOSIS — E66.9 OBESITY, UNSPECIFIED: ICD-10-CM

## 2024-03-14 DIAGNOSIS — R93.89 ABNORMAL FINDINGS ON DIAGNOSTIC IMAGING OF OTHER SPECIFIED BODY STRUCTURES: ICD-10-CM

## 2024-03-14 PROCEDURE — 94010 BREATHING CAPACITY TEST: CPT

## 2024-03-14 PROCEDURE — 99406 BEHAV CHNG SMOKING 3-10 MIN: CPT | Mod: 25

## 2024-03-14 PROCEDURE — 99214 OFFICE O/P EST MOD 30 MIN: CPT | Mod: 25

## 2024-03-14 RX ORDER — IPRATROPIUM BROMIDE AND ALBUTEROL 20; 100 UG/1; UG/1
20-100 SPRAY, METERED RESPIRATORY (INHALATION)
Qty: 3 | Refills: 1 | Status: ACTIVE | COMMUNITY
Start: 2024-03-14 | End: 1900-01-01

## 2024-03-14 NOTE — HISTORY OF PRESENT ILLNESS
[FreeTextEntry1] : Mr. Murry is a 78 year old male with a history of abnormal CT, allergies, COPD, HTN, low Vit D, nicotine addiction, obesity, LIMA, SOB, pericardial effusion, who presents to the office for a follow up visit. His chief complaint is   - he notes taking Metamusal daily for his bowels which have been helping - he notes his balance is good - he notes coughing a bit more which he attributes to the fact he is still smoking - he notes smoking a pack a day  - he notes his energy levels are 6/10 - he notes sleeping well getting on average 6-7 hours - he notes last night getting 12 hours of sleep due to not sleeping the night prior - he notes losing a lot of weight with cutting out lunch - he notes he is planning to get a hearing aid  - he notes using his Trelegy inhaler - he notes occasionally taking a sleeping pill - he notes getting his flu shot   -he denies any headaches, nausea, emesis, fever, chills, sweats, chest pain, chest pressure, coughing, wheezing, palpitations, constipation, diarrhea, vertigo, dysphagia, heartburn, reflux, itchy eyes, itchy ears, leg swelling, leg pain, arthralgias, myalgias, hoarseness, or sour taste in the mouth.

## 2024-03-14 NOTE — PROCEDURE
[FreeTextEntry1] : CT Abdomen and Pelvis (1.15.2024) revealed multiple small bilateral pulmonary nodules measuring up to 6 mm in the right lung base. Some were present on the prior study however others are new including a 4 mm right middle lobe nodule. Subpleural reticulation is seen in both lung bases, progressive since 2019, possibly representing some interstitial disease/fibrosis  PFT reveals severe restrictive and obstructive dysfunction, with an FEV1 of 0.59 L, which is 28.6% of predicted, with a normal flow volume loop. PFTs were performed to evaluate for SOB and Asthma

## 2024-03-14 NOTE — PHYSICAL EXAM
[No Acute Distress] : no acute distress [Normal Oropharynx] : normal oropharynx [III] : Mallampati Class: III [No Neck Mass] : no neck mass [Normal Appearance] : normal appearance [Normal Rate/Rhythm] : normal rate/rhythm [No Resp Distress] : no resp distress [Normal S1, S2] : normal s1, s2 [Clear to Auscultation Bilaterally] : clear to auscultation bilaterally [Rhonchi] : rhonchi [Benign] : benign [No Abnormalities] : no abnormalities [No Clubbing] : no clubbing [Normal Gait] : normal gait [No Cyanosis] : no cyanosis [FROM] : FROM [No Focal Deficits] : no focal deficits [Normal Color/ Pigmentation] : normal color/ pigmentation [Normal Affect] : normal affect [Oriented x3] : oriented x3 [Murmur ___ / 6] : murmur [unfilled] / 6 [TextBox_2] : Overweight  [TextBox_54] : 2/6 systolic murmur [TextBox_68] : I:E 1:3; Mild rhonchi [TextBox_105] : trace edema

## 2024-03-14 NOTE — ADDENDUM
[FreeTextEntry1] : Documented by Sunil Pastrana acting as a scribe for Dr. Frederic Brand on 03/14/2024.   All medical record entries made by the Scribe were at my, Dr. Frederic Brand's, direction and personally dictated by me on 03/14/2024. I have reviewed the chart and agree that the record accurately reflects my personal performance of the history, physical exam, assessment and plan. I have also personally directed, reviewed, and agree with the discharge instructions.

## 2024-03-14 NOTE — ASSESSMENT
[FreeTextEntry1] : Mr. Murry is 78 y.o M who has a history of Afib, CAD, COPD, OSAS, obesity, active snoring and is s/p pericardiocentesis, and is dramatically improved. He is now compliant with his medications and smoking cessation. He is s/p CTS evaluation (Flores/Ruktin). He is still smoking. - currently stable  - poor compliance w/ nebulizer and meds; still smoking   His shortness of breath is multifactorial is due to:  -COPD -asthma -poor breathing mechanics -overweight  -CAD -?tracheomalacia  problem 1: asthma/COPD -  -continue Trelegy, 100 1 puff QD  -add Combivent 1 inhalation Q6H, PRN -continue Xopenex .63 nebulizer Q6H (gargle and spit after use) -add Daliresp 500 mg QAM  -Inhaler technique reviewed as well as oral hygiene techniques reviewed with patient. Avoidance of cold air, extremes of temperature, rescue inhaler should be used before exercise. Order of medication reviewed with patient. Recommended use of a cool mist humidifier in the bedroom.  Asthma is believed to be caused by inherited (genetic) and environmental factor, but its exact cause is unknown. Asthma may be triggered by allergens, lung infections, or irritants in the air. Asthma triggers are different for each person   problem 2: r/o TBM -complete dynamic chest CT to r/o TBM (noncompliant)   problem 3: allergies and sinuses  -continue Flonase 1 sniff each nostril BID  -continue to use Astelin nasal spray 1 sniff each nostril BID (0.15)  problem 4: current smoking (no interest in cessation) (discussed: 3/14/2024)  -re-stressed importance of smoking cessation with patient -he is being prescribed NicoDerm  -Discussed the risks/associations with continued smoking including COPD, emphysema, shortness of breath, renal cancer, bladder cancer, stroke risk, cardiac disease, etc. Smoking cessation was discussed at length and highly encouraged. Various options to aid cessation was discussed including use of Chantix, Nicotrol, nicotine products, laser therapy, hypnosis, Wellbutrin, etc.   problem 5: abnormal chest CT/ lung cancer screening -follow up chest CT 9/2020. (Due) - over due (scripted multiple times) - ?[facility] result CAT scans are the only radiological modality to identify abnormalities w/in the lungs with regards to nodules/masses/lymph nodes. Risks, benefits were reviewed in detail. The guidelines for abnormalities include follow up CT scans at various intervals which could range from 6 weeks to 1 year intervals. If there is a change for the worse then consideration for a biopsy will be considered if you are a candidate. Second opinion evaluation with a thoracic surgeon or an interventional radiologist could be offered.   problem 6: questionable sleep apnea (returned) -not interested in any sleep study -recommended Neuromag  -recommended to use Oxy-Aid by [facility]  Sleep apnea is associated with adverse clinical consequences which an affect most organ systems. Cardiovascular disease risk includes arrhythmias, atrial fibrillation, hypertension, coronary artery disease, and stroke. Metabolic disorders include diabetes type 2, non-alcoholic fatty liver disease. Mood disorder especially depression; and cognitive decline especially in the elderly. Associations with chronic reflux/Parsons's esophagus some but not all inclusive.  -Reasons include arousal consistent with hypopnea; respiratory events most prominent in REM sleep or supine position; therefore sleep staging and body position are important for accurate diagnosis and estimation of AHI.   problem 7: overweight -recommended to visit the [facility]   Weight loss, exercise, and diet control were discussed and are highly encouraged. Treatment options were given such as, aqua therapy, and contacting a nutritionist. Recommended to use the elliptical, stationary bike, less use of treadmill. Mindful eating was explained to the patient Obesity is associated with worsening asthma, shortness of breath, and potential for cardiac disease, diabetes, and other underlying medical conditions.   problem 8: cardiac -recommended to continua following with Dr. Silva al.; Sandra/Clovis (MARIA GUADALUPE 5/15) - ?TAVR +/- MV clip -s/p EPS/cardioversion  Problem 9: Health Maintenance/COVID19 Precautions: - S/p Covid 19 vaccine (Pfizer) x3 - Clean your hands often. Wash your hands often with soap and water for at least 20 seconds, especially after blowing your nose, coughing, or sneezing, or having been in a public place. - If soap and water are not available, use a hand  that contains at least 60% alcohol. - To the extent possible, avoid touching high-touch surfaces in public places - elevator buttons, door handles, handrails, handshaking with people, etc. Use a tissue or your sleeve to cover your hand or finger if you must touch something. - Wash your hands after touching surfaces in public places. Immune Support Recommendations: -OTC Vitamin C 500mg BID  -OTC Quercetin 250-500mg BID  -OTC Zinc 75-100mg per day  -OTC Melatonin 1or 2mg a night  -OTC Vitamin D 1-4000mg per day  -OTC Tonic Water 8oz per day   Problem 11: health maintenance -recommended RSV vaccine in the Fall for anyone over the age of 60 -s/p influenza vaccine - 2023 -recommended strep pneumonia vaccines: Prevnar-13 vaccine, followed by Pneumo vaccine 23 one year following (completed) -recommended early intervention for URIs -recommended regular osteoporosis evaluations -recommended early dermatological evaluations -recommended after the age of 50 to the age of 70, colonoscopy every 5 years   F/U in 3-4 months He is encouraged to call with any changes, concerns, or questions

## 2024-05-28 NOTE — PROGRESS NOTE ADULT - ASSESSMENT
73 M current smoker with a PMH of obesity, HTN, HLD, CAD, diastolic HF,  A fib on Eliquis, type 2 diabetes, COPD ( not on home O2), LIMA now presenting with SOB, b/l LE edema and abdominal distension, likely 2/2 acute decompensated diastolic heart failure     1. Acute decompensated diastolic HF/ A fib  - Agree with IV diuresis with Lasix   - Requires strict I/Os/ daily weights. Goal to keep at least 1-2 negative daily   - Suggest following electrolytes BID while getting IV diuresis. Keep  K+>4, Mag > 2  - Telemetry monitoring. Follow cardiac enzymes  - For MARIA GUADALUPE/EPS  - Cont to optimize rate control for A fib. Remains on AC with eliquis  - Cardiology follow up  2. COPD  -on Prednisone 30 mg daily and then taper over 1 week   - suggest Xopenex and Atrovent Q6H  -No clinical radiological evidence of infection. Hold off on antibiotics for now.   -- Chest PT/ acapella use to assist with mobilization of secretions - Incentive spirometry  - Supplemental O2 to keep sats > 90 %  3. Lung nodules:- Few unchanged 0.4 cm RUL nodules -outpt follow up with repeat imaging in 6 months   4. OS- Unwilling to use CPAP  5. Nicotine addiction/ Smoking cessation:  - Requires a nicotine patch - Smoking cessation counseling provided and different outpt options discussed at length. Time spent: 10 mins   ********************  4/9-better over all BMI: BMI (kg/m2): 38.3 (04-16-24 @ 19:07)  HbA1c: A1C with Estimated Average Glucose Result: 5.8 % (01-11-24 @ 08:00)    Glucose:   BP: 125/71 (05-27-24 @ 20:07) (125/71 - 133/78)Vital Signs Last 24 Hrs  T(C): 36.6 (05-28-24 @ 06:00), Max: 36.6 (05-28-24 @ 06:00)  T(F): 97.9 (05-28-24 @ 06:00), Max: 97.9 (05-28-24 @ 06:00)  HR: 103 (05-27-24 @ 20:07) (103 - 103)  BP: 125/71 (05-27-24 @ 20:07) (125/71 - 125/71)  BP(mean): --  RR: --  SpO2: 95% (05-28-24 @ 06:00) (95% - 95%)    Orthostatic VS  05-28-24 @ 06:00  Lying BP: 111/53 HR: 74  Sitting BP: 116/61 HR: 78  Standing BP: --/-- HR: --  Site: --  Mode: --  Orthostatic VS  05-27-24 @ 05:45  Lying BP: 136/69 HR: 81  Sitting BP: 129/61 HR: 85  Standing BP: --/-- HR: --  Site: --  Mode: --  Orthostatic VS  05-26-24 @ 18:55  Lying BP: --/-- HR: --  Sitting BP: 135/65 HR: 96  Standing BP: 115/74 HR: 98  Site: --  Mode: --    Lipid Panel: Date/Time: 04-05-24 @ 05:56  Cholesterol, Serum: 176  LDL Cholesterol Calculated: 97  HDL Cholesterol, Serum: 39  Total Cholesterol/HDL Ration Measurement: --  Triglycerides, Serum: 200

## 2024-08-16 ENCOUNTER — TRANSCRIPTION ENCOUNTER (OUTPATIENT)
Age: 79
End: 2024-08-16

## 2024-08-28 ENCOUNTER — RX RENEWAL (OUTPATIENT)
Age: 79
End: 2024-08-28

## 2024-09-17 ENCOUNTER — APPOINTMENT (OUTPATIENT)
Dept: PULMONOLOGY | Facility: CLINIC | Age: 79
End: 2024-09-17
Payer: MEDICARE

## 2024-09-17 VITALS
HEART RATE: 83 BPM | SYSTOLIC BLOOD PRESSURE: 122 MMHG | WEIGHT: 155 LBS | DIASTOLIC BLOOD PRESSURE: 68 MMHG | BODY MASS INDEX: 27.46 KG/M2 | OXYGEN SATURATION: 93 % | HEIGHT: 63 IN | TEMPERATURE: 98.2 F | RESPIRATION RATE: 17 BRPM

## 2024-09-17 DIAGNOSIS — F17.200 NICOTINE DEPENDENCE, UNSPECIFIED, UNCOMPLICATED: ICD-10-CM

## 2024-09-17 DIAGNOSIS — G47.33 OBSTRUCTIVE SLEEP APNEA (ADULT) (PEDIATRIC): ICD-10-CM

## 2024-09-17 DIAGNOSIS — R79.89 OTHER SPECIFIED ABNORMAL FINDINGS OF BLOOD CHEMISTRY: ICD-10-CM

## 2024-09-17 DIAGNOSIS — R06.02 SHORTNESS OF BREATH: ICD-10-CM

## 2024-09-17 DIAGNOSIS — J30.9 ALLERGIC RHINITIS, UNSPECIFIED: ICD-10-CM

## 2024-09-17 DIAGNOSIS — R93.89 ABNORMAL FINDINGS ON DIAGNOSTIC IMAGING OF OTHER SPECIFIED BODY STRUCTURES: ICD-10-CM

## 2024-09-17 PROCEDURE — 99214 OFFICE O/P EST MOD 30 MIN: CPT | Mod: 25

## 2024-09-17 PROCEDURE — 99406 BEHAV CHNG SMOKING 3-10 MIN: CPT | Mod: 25

## 2024-09-17 PROCEDURE — 94729 DIFFUSING CAPACITY: CPT

## 2024-09-17 PROCEDURE — 94727 GAS DIL/WSHOT DETER LNG VOL: CPT

## 2024-09-17 PROCEDURE — 94010 BREATHING CAPACITY TEST: CPT

## 2024-09-17 PROCEDURE — ZZZZZ: CPT

## 2024-09-17 NOTE — PHYSICAL EXAM
[No Acute Distress] : no acute distress [Normal Oropharynx] : normal oropharynx [III] : Mallampati Class: III [Normal Appearance] : normal appearance [No Neck Mass] : no neck mass [Normal Rate/Rhythm] : normal rate/rhythm [Murmur ___ / 6] : murmur [unfilled] / 6 [Normal S1, S2] : normal s1, s2 [No Resp Distress] : no resp distress [No Abnormalities] : no abnormalities [Benign] : benign [Normal Gait] : normal gait [No Clubbing] : no clubbing [No Cyanosis] : no cyanosis [FROM] : FROM [Normal Color/ Pigmentation] : normal color/ pigmentation [No Focal Deficits] : no focal deficits [Oriented x3] : oriented x3 [Normal Affect] : normal affect [TextBox_2] : Overweight  [TextBox_11] : dentures [TextBox_54] : 2/6 systolic murmur [TextBox_68] : I:E 1:3; Mild expiratory wheeze [TextBox_105] : trace edema

## 2024-09-17 NOTE — PROCEDURE
[FreeTextEntry1] : Full PFT reveals moderate restrictive and obstructive dysfunction; FEV1 was 2.15 L which is 37% of predicted; hyperinflation; moderately reduced diffusion at 8.35, which is 42% of predicted; abnormal inspiratory limb.  PFTs were performed to evaluate for SOB

## 2024-09-17 NOTE — HISTORY OF PRESENT ILLNESS
[FreeTextEntry1] : Mr. Murry is a 79 year old male with a history of abnormal CT, allergies, COPD, HTN, low Vit D, nicotine addiction, obesity, LIMA, SOB, pericardial effusion, who presents to the office for a follow up visit. His chief complaint is   -he notes waking up with stomach pains every morning -he notes bowels are regular -he notes vision is stable -he denies dysphonia -he notes energy levels are poor -he notes losing weight -he denies bowel movements while in bed -he notes some days he would like to dead -he notes his stomach pains have been ongoing for a month now -he notes pending appointment with Dr. Ward  -he denies any headaches, nausea, emesis, fever, chills, sweats, chest pain, chest pressure, coughing, wheezing, palpitations, diarrhea, constipation, dysphagia, vertigo, arthralgias, myalgias, leg swelling, itchy eyes, itchy ears, heartburn, reflux, or sour taste in the mouth.

## 2024-09-17 NOTE — ASSESSMENT
[FreeTextEntry1] : Mr. Murry is 79 y.o M who has a history of Afib, CAD, COPD, OSAS, obesity, active snoring and is s/p pericardiocentesis, and is dramatically improved. He is now compliant with his medications and smoking cessation. He is s/p CTS evaluation (Flores/Ruktin). He is still smoking. - currently stable  - poor compliance w/ nebulizer and meds; still smoking - #1 issue is bowel issues  His shortness of breath is multifactorial is due to:  -COPD -asthma -poor breathing mechanics -overweight  -CAD -?tracheomalacia  problem 1: asthma/COPD - stable -continue Trelegy, 100 1 puff QD  -add Combivent 1 inhalation Q6H, PRN -continue Xopenex .63 nebulizer Q6H (gargle and spit after use) -continue Daliresp 500 mg QAM  -Inhaler technique reviewed as well as oral hygiene techniques reviewed with patient. Avoidance of cold air, extremes of temperature, rescue inhaler should be used before exercise. Order of medication reviewed with patient. Recommended use of a cool mist humidifier in the bedroom.  Asthma is believed to be caused by inherited (genetic) and environmental factor, but its exact cause is unknown. Asthma may be triggered by allergens, lung infections, or irritants in the air. Asthma triggers are different for each person   problem 2: r/o TBM -complete dynamic chest CT to r/o TBM (noncompliant)   problem 3: allergies and sinuses  -continue Flonase 1 sniff each nostril BID  -continue to use Astelin nasal spray 1 sniff each nostril BID (0.15)  problem 4: current smoking (no interest in cessation) (discussed: 09/17/2024)  -re-stressed importance of smoking cessation with patient -he is being prescribed NicoDerm  -Discussed the risks/associations with continued smoking including COPD, emphysema, shortness of breath, renal cancer, bladder cancer, stroke risk, cardiac disease, etc. Smoking cessation was discussed at length and highly encouraged. Various options to aid cessation was discussed including use of Chantix, Nicotrol, nicotine products, laser therapy, hypnosis, Wellbutrin, etc.   problem 5: abnormal chest CT/ lung cancer screening -follow up chest CT 9/2020. (Due) - over due (scripted multiple times) - ?[facility] result CAT scans are the only radiological modality to identify abnormalities w/in the lungs with regards to nodules/masses/lymph nodes. Risks, benefits were reviewed in detail. The guidelines for abnormalities include follow up CT scans at various intervals which could range from 6 weeks to 1 year intervals. If there is a change for the worse then consideration for a biopsy will be considered if you are a candidate. Second opinion evaluation with a thoracic surgeon or an interventional radiologist could be offered.   problem 6: questionable sleep apnea (returned) -not interested in any sleep study -recommended Neuromag  -recommended to use Oxy-Aid by [facility]  Sleep apnea is associated with adverse clinical consequences which an affect most organ systems. Cardiovascular disease risk includes arrhythmias, atrial fibrillation, hypertension, coronary artery disease, and stroke. Metabolic disorders include diabetes type 2, non-alcoholic fatty liver disease. Mood disorder especially depression; and cognitive decline especially in the elderly. Associations with chronic reflux/Parsons's esophagus some but not all inclusive.  -Reasons include arousal consistent with hypopnea; respiratory events most prominent in REM sleep or supine position; therefore sleep staging and body position are important for accurate diagnosis and estimation of AHI.   problem 7: overweight -recommended to visit the [facility]   Weight loss, exercise, and diet control were discussed and are highly encouraged. Treatment options were given such as, aqua therapy, and contacting a nutritionist. Recommended to use the elliptical, stationary bike, less use of treadmill. Mindful eating was explained to the patient Obesity is associated with worsening asthma, shortness of breath, and potential for cardiac disease, diabetes, and other underlying medical conditions.   problem 8: cardiac -recommended to continua following with Dr. Silva al.; Sandra/[place] (MARIA GUADALUPE 5/15) - ?TAVR +/- MV clip -s/p EPS/cardioversion  Problem 9: Health Maintenance/COVID19 Precautions: - S/p Covid 19 vaccine (Pfizer) x3 - Clean your hands often. Wash your hands often with soap and water for at least 20 seconds, especially after blowing your nose, coughing, or sneezing, or having been in a public place. - If soap and water are not available, use a hand  that contains at least 60% alcohol. - To the extent possible, avoid touching high-touch surfaces in public places - elevator buttons, door handles, handrails, handshaking with people, etc. Use a tissue or your sleeve to cover your hand or finger if you must touch something. - Wash your hands after touching surfaces in public places. Immune Support Recommendations: -OTC Vitamin C 500mg BID  -OTC Quercetin 250-500mg BID  -OTC Zinc 75-100mg per day  -OTC Melatonin 1or 2mg a night  -OTC Vitamin D 1-4000mg per day  -OTC Tonic Water 8oz per day   Problem 11: health maintenance [facility]  -recommended RSV vaccine in the Fall for anyone over the age of 60 -s/p influenza vaccine - 2023 -recommended strep pneumonia vaccines: Prevnar-13 vaccine, followed by Pneumo vaccine 23 one year following (completed) -recommended early intervention for URIs -recommended regular osteoporosis evaluations -recommended early dermatological evaluations -recommended after the age of 50 to the age of 70, colonoscopy every 5 years   F/U in 3-4 months He is encouraged to call with any changes, concerns, or questions

## 2024-11-18 ENCOUNTER — RX RENEWAL (OUTPATIENT)
Age: 79
End: 2024-11-18

## 2025-01-01 ENCOUNTER — INPATIENT (INPATIENT)
Facility: HOSPITAL | Age: 80
LOS: 2 days | DRG: 884 | End: 2025-02-09
Attending: FAMILY MEDICINE | Admitting: STUDENT IN AN ORGANIZED HEALTH CARE EDUCATION/TRAINING PROGRAM
Payer: OTHER MISCELLANEOUS

## 2025-01-01 VITALS
DIASTOLIC BLOOD PRESSURE: 69 MMHG | OXYGEN SATURATION: 96 % | SYSTOLIC BLOOD PRESSURE: 131 MMHG | RESPIRATION RATE: 18 BRPM | HEART RATE: 110 BPM | HEIGHT: 63 IN | TEMPERATURE: 98 F | WEIGHT: 164.91 LBS

## 2025-01-01 VITALS — TEMPERATURE: 103 F

## 2025-01-01 DIAGNOSIS — K59.00 CONSTIPATION, UNSPECIFIED: ICD-10-CM

## 2025-01-01 DIAGNOSIS — J44.9 CHRONIC OBSTRUCTIVE PULMONARY DISEASE, UNSPECIFIED: ICD-10-CM

## 2025-01-01 DIAGNOSIS — Z51.5 ENCOUNTER FOR PALLIATIVE CARE: ICD-10-CM

## 2025-01-01 DIAGNOSIS — Z90.89 ACQUIRED ABSENCE OF OTHER ORGANS: Chronic | ICD-10-CM

## 2025-01-01 DIAGNOSIS — Z29.9 ENCOUNTER FOR PROPHYLACTIC MEASURES, UNSPECIFIED: ICD-10-CM

## 2025-01-01 DIAGNOSIS — R45.1 RESTLESSNESS AND AGITATION: ICD-10-CM

## 2025-01-01 DIAGNOSIS — Z71.89 OTHER SPECIFIED COUNSELING: ICD-10-CM

## 2025-01-01 DIAGNOSIS — Z87.442 PERSONAL HISTORY OF URINARY CALCULI: Chronic | ICD-10-CM

## 2025-01-01 DIAGNOSIS — C34.90 MALIGNANT NEOPLASM OF UNSPECIFIED PART OF UNSPECIFIED BRONCHUS OR LUNG: ICD-10-CM

## 2025-01-01 DIAGNOSIS — Z98.890 OTHER SPECIFIED POSTPROCEDURAL STATES: Chronic | ICD-10-CM

## 2025-01-01 DIAGNOSIS — R52 PAIN, UNSPECIFIED: ICD-10-CM

## 2025-01-01 LAB
ALBUMIN SERPL ELPH-MCNC: 3.5 G/DL — SIGNIFICANT CHANGE UP (ref 3.3–5)
ALP SERPL-CCNC: 132 U/L — HIGH (ref 40–120)
ALT FLD-CCNC: 13 U/L — SIGNIFICANT CHANGE UP (ref 10–45)
ANION GAP SERPL CALC-SCNC: 14 MMOL/L — SIGNIFICANT CHANGE UP (ref 5–17)
APPEARANCE UR: ABNORMAL
AST SERPL-CCNC: 15 U/L — SIGNIFICANT CHANGE UP (ref 10–40)
BACTERIA # UR AUTO: NEGATIVE /HPF — SIGNIFICANT CHANGE UP
BASOPHILS # BLD AUTO: 0.04 K/UL — SIGNIFICANT CHANGE UP (ref 0–0.2)
BASOPHILS NFR BLD AUTO: 0.2 % — SIGNIFICANT CHANGE UP (ref 0–2)
BILIRUB SERPL-MCNC: 0.5 MG/DL — SIGNIFICANT CHANGE UP (ref 0.2–1.2)
BILIRUB UR-MCNC: ABNORMAL
BUN SERPL-MCNC: 20 MG/DL — SIGNIFICANT CHANGE UP (ref 7–23)
CALCIUM SERPL-MCNC: 10 MG/DL — SIGNIFICANT CHANGE UP (ref 8.4–10.5)
CAST: 4 /LPF — SIGNIFICANT CHANGE UP (ref 0–4)
CHLORIDE SERPL-SCNC: 100 MMOL/L — SIGNIFICANT CHANGE UP (ref 96–108)
CO2 SERPL-SCNC: 28 MMOL/L — SIGNIFICANT CHANGE UP (ref 22–31)
COLOR SPEC: YELLOW — SIGNIFICANT CHANGE UP
CREAT SERPL-MCNC: 0.93 MG/DL — SIGNIFICANT CHANGE UP (ref 0.5–1.3)
DIFF PNL FLD: ABNORMAL
EGFR: 83 ML/MIN/1.73M2 — SIGNIFICANT CHANGE UP
EGFR: 83 ML/MIN/1.73M2 — SIGNIFICANT CHANGE UP
EOSINOPHIL # BLD AUTO: 0 K/UL — SIGNIFICANT CHANGE UP (ref 0–0.5)
EOSINOPHIL NFR BLD AUTO: 0 % — SIGNIFICANT CHANGE UP (ref 0–6)
GLUCOSE SERPL-MCNC: 166 MG/DL — HIGH (ref 70–99)
GLUCOSE UR QL: 500 MG/DL
HCT VFR BLD CALC: 38.1 % — LOW (ref 39–50)
HGB BLD-MCNC: 12.2 G/DL — LOW (ref 13–17)
IMM GRANULOCYTES NFR BLD AUTO: 1 % — HIGH (ref 0–0.9)
KETONES UR-MCNC: 40 MG/DL
LEUKOCYTE ESTERASE UR-ACNC: ABNORMAL
LYMPHOCYTES # BLD AUTO: 0.44 K/UL — LOW (ref 1–3.3)
LYMPHOCYTES # BLD AUTO: 2.3 % — LOW (ref 13–44)
MAGNESIUM SERPL-MCNC: 2 MG/DL — SIGNIFICANT CHANGE UP (ref 1.6–2.6)
MCHC RBC-ENTMCNC: 28 PG — SIGNIFICANT CHANGE UP (ref 27–34)
MCHC RBC-ENTMCNC: 32 G/DL — SIGNIFICANT CHANGE UP (ref 32–36)
MCV RBC AUTO: 87.4 FL — SIGNIFICANT CHANGE UP (ref 80–100)
MONOCYTES # BLD AUTO: 1.48 K/UL — HIGH (ref 0–0.9)
MONOCYTES NFR BLD AUTO: 7.7 % — SIGNIFICANT CHANGE UP (ref 2–14)
NEUTROPHILS # BLD AUTO: 17.1 K/UL — HIGH (ref 1.8–7.4)
NEUTROPHILS NFR BLD AUTO: 88.8 % — HIGH (ref 43–77)
NITRITE UR-MCNC: NEGATIVE — SIGNIFICANT CHANGE UP
NRBC # BLD: 0 /100 WBCS — SIGNIFICANT CHANGE UP (ref 0–0)
NRBC BLD-RTO: 0 /100 WBCS — SIGNIFICANT CHANGE UP (ref 0–0)
PH UR: 5.5 — SIGNIFICANT CHANGE UP (ref 5–8)
PLATELET # BLD AUTO: 276 K/UL — SIGNIFICANT CHANGE UP (ref 150–400)
POTASSIUM SERPL-MCNC: 3.8 MMOL/L — SIGNIFICANT CHANGE UP (ref 3.5–5.3)
POTASSIUM SERPL-SCNC: 3.8 MMOL/L — SIGNIFICANT CHANGE UP (ref 3.5–5.3)
PROT SERPL-MCNC: 6.9 G/DL — SIGNIFICANT CHANGE UP (ref 6–8.3)
PROT UR-MCNC: 100 MG/DL
RBC # BLD: 4.36 M/UL — SIGNIFICANT CHANGE UP (ref 4.2–5.8)
RBC # FLD: 14.6 % — HIGH (ref 10.3–14.5)
RBC CASTS # UR COMP ASSIST: 93 /HPF — HIGH (ref 0–4)
REVIEW: SIGNIFICANT CHANGE UP
SODIUM SERPL-SCNC: 142 MMOL/L — SIGNIFICANT CHANGE UP (ref 135–145)
SP GR SPEC: >1.03 — SIGNIFICANT CHANGE UP (ref 1–1.03)
SQUAMOUS # UR AUTO: 8 /HPF — HIGH (ref 0–5)
UROBILINOGEN FLD QL: 4 MG/DL (ref 0.2–1)
WBC # BLD: 19.26 K/UL — HIGH (ref 3.8–10.5)
WBC # FLD AUTO: 19.26 K/UL — HIGH (ref 3.8–10.5)
WBC UR QL: 31 /HPF — HIGH (ref 0–5)

## 2025-01-01 PROCEDURE — 94640 AIRWAY INHALATION TREATMENT: CPT

## 2025-01-01 PROCEDURE — 96374 THER/PROPH/DIAG INJ IV PUSH: CPT

## 2025-01-01 PROCEDURE — 83735 ASSAY OF MAGNESIUM: CPT

## 2025-01-01 PROCEDURE — 99223 1ST HOSP IP/OBS HIGH 75: CPT

## 2025-01-01 PROCEDURE — 80053 COMPREHEN METABOLIC PANEL: CPT

## 2025-01-01 PROCEDURE — 99285 EMERGENCY DEPT VISIT HI MDM: CPT

## 2025-01-01 PROCEDURE — 99233 SBSQ HOSP IP/OBS HIGH 50: CPT

## 2025-01-01 PROCEDURE — 81001 URINALYSIS AUTO W/SCOPE: CPT

## 2025-01-01 PROCEDURE — 99497 ADVNCD CARE PLAN 30 MIN: CPT | Mod: 25,GC,GW

## 2025-01-01 PROCEDURE — 85025 COMPLETE CBC W/AUTO DIFF WBC: CPT

## 2025-01-01 RX ORDER — SITAGLIPTIN AND METFORMIN HYDROCHLORIDE 1000; 50 MG/1; MG/1
1 TABLET, FILM COATED, EXTENDED RELEASE ORAL
Refills: 0 | DISCHARGE

## 2025-01-01 RX ORDER — PITAVASTATIN CALCIUM 4.18 MG/1
1 TABLET, FILM COATED ORAL
Refills: 0 | DISCHARGE

## 2025-01-01 RX ORDER — ACETAMINOPHEN 500 MG/5ML
650 LIQUID (ML) ORAL EVERY 6 HOURS
Refills: 0 | Status: DISCONTINUED | OUTPATIENT
Start: 2025-01-01 | End: 2025-01-01

## 2025-01-01 RX ORDER — METOPROLOL SUCCINATE 50 MG/1
1 TABLET, EXTENDED RELEASE ORAL
Refills: 0 | DISCHARGE

## 2025-01-01 RX ORDER — LORAZEPAM 4 MG/ML
0.5 VIAL (ML) INJECTION EVERY 6 HOURS
Refills: 0 | Status: DISCONTINUED | OUTPATIENT
Start: 2025-01-01 | End: 2025-01-01

## 2025-01-01 RX ORDER — ONDANSETRON HCL/PF 4 MG/2 ML
4 VIAL (ML) INJECTION EVERY 8 HOURS
Refills: 0 | Status: DISCONTINUED | OUTPATIENT
Start: 2025-01-01 | End: 2025-01-01

## 2025-01-01 RX ORDER — BISACODYL 5 MG
10 TABLET, DELAYED RELEASE (ENTERIC COATED) ORAL DAILY
Refills: 0 | Status: DISCONTINUED | OUTPATIENT
Start: 2025-01-01 | End: 2025-01-01

## 2025-01-01 RX ORDER — IPRATROPIUM BROMIDE AND ALBUTEROL SULFATE .5; 2.5 MG/3ML; MG/3ML
3 SOLUTION RESPIRATORY (INHALATION) ONCE
Refills: 0 | Status: DISCONTINUED | OUTPATIENT
Start: 2025-01-01 | End: 2025-01-01

## 2025-01-01 RX ORDER — EZETIMIBE 10 MG/1
1 TABLET ORAL
Refills: 0 | DISCHARGE

## 2025-01-01 RX ORDER — GLYCOPYRROLATE 0.2 MG/ML
0.4 INJECTION INTRAMUSCULAR; INTRAVENOUS EVERY 6 HOURS
Refills: 0 | Status: DISCONTINUED | OUTPATIENT
Start: 2025-01-01 | End: 2025-01-01

## 2025-01-01 RX ORDER — TIOTROPIUM BROMIDE INHALATION SPRAY 3.12 UG/1
2 SPRAY, METERED RESPIRATORY (INHALATION) DAILY
Refills: 0 | Status: DISCONTINUED | OUTPATIENT
Start: 2025-01-01 | End: 2025-01-01

## 2025-01-01 RX ORDER — LORAZEPAM 4 MG/ML
0.5 VIAL (ML) INJECTION ONCE
Refills: 0 | Status: DISCONTINUED | OUTPATIENT
Start: 2025-01-01 | End: 2025-01-01

## 2025-01-01 RX ORDER — FUROSEMIDE 10 MG/ML
1 INJECTION INTRAMUSCULAR; INTRAVENOUS
Refills: 0 | DISCHARGE

## 2025-01-01 RX ORDER — METHIMAZOLE 5 MG
1 TABLET ORAL
Refills: 0 | DISCHARGE

## 2025-01-01 RX ORDER — LORAZEPAM 4 MG/ML
0.5 VIAL (ML) INJECTION
Refills: 0 | Status: DISCONTINUED | OUTPATIENT
Start: 2025-01-01 | End: 2025-01-01

## 2025-01-01 RX ORDER — FLUTICASONE FUROATE, UMECLIDINIUM BROMIDE AND VILANTEROL TRIFENATATE 100; 62.5; 25 UG/1; UG/1; UG/1
1 POWDER RESPIRATORY (INHALATION)
Refills: 0 | DISCHARGE

## 2025-01-01 RX ORDER — ROFLUMILAST 250 UG/1
1 TABLET ORAL
Refills: 0 | DISCHARGE

## 2025-01-01 RX ORDER — DAPAGLIFLOZIN 5 MG/1
1 TABLET, FILM COATED ORAL
Refills: 0 | DISCHARGE

## 2025-01-01 RX ORDER — DILTIAZEM HYDROCHLORIDE 240 MG/1
1 TABLET, EXTENDED RELEASE ORAL
Refills: 0 | DISCHARGE

## 2025-01-01 RX ADMIN — Medication 1 DOSE(S): at 05:55

## 2025-01-01 RX ADMIN — Medication 0.5 MILLIGRAM(S): at 20:25

## 2025-01-01 RX ADMIN — Medication 10 MILLIGRAM(S): at 05:53

## 2025-01-01 RX ADMIN — Medication 2 MILLIGRAM(S): at 14:10

## 2025-01-01 RX ADMIN — Medication 2 MILLIGRAM(S): at 18:03

## 2025-01-01 RX ADMIN — Medication 2 MILLIGRAM(S): at 10:17

## 2025-01-01 RX ADMIN — Medication 4 MILLIGRAM(S): at 18:54

## 2025-01-01 RX ADMIN — Medication 2 MILLIGRAM(S): at 03:02

## 2025-01-01 RX ADMIN — Medication 0.5 MILLIGRAM(S): at 05:54

## 2025-01-01 RX ADMIN — Medication 2 MILLIGRAM(S): at 10:25

## 2025-01-01 RX ADMIN — Medication 0.5 MILLIGRAM(S): at 15:40

## 2025-01-01 RX ADMIN — Medication 0.5 MILLIGRAM(S): at 08:31

## 2025-01-01 RX ADMIN — Medication 650 MILLIGRAM(S): at 15:30

## 2025-01-01 RX ADMIN — Medication 2 MILLIGRAM(S): at 14:25

## 2025-01-01 RX ADMIN — Medication 650 MILLIGRAM(S): at 05:57

## 2025-01-01 RX ADMIN — Medication 2 MILLIGRAM(S): at 14:59

## 2025-01-01 RX ADMIN — Medication 2 MILLIGRAM(S): at 06:00

## 2025-01-01 RX ADMIN — Medication 0.5 MILLIGRAM(S): at 18:03

## 2025-01-01 RX ADMIN — Medication 2 MILLIGRAM(S): at 17:40

## 2025-01-01 RX ADMIN — Medication 0.5 MILLIGRAM(S): at 14:10

## 2025-01-01 RX ADMIN — GLYCOPYRROLATE 0.4 MILLIGRAM(S): 0.2 INJECTION INTRAMUSCULAR; INTRAVENOUS at 14:10

## 2025-01-01 RX ADMIN — Medication 4 MILLIGRAM(S): at 23:33

## 2025-01-01 RX ADMIN — Medication 2 MILLIGRAM(S): at 05:35

## 2025-01-01 RX ADMIN — Medication 0.5 MILLIGRAM(S): at 08:54

## 2025-01-01 RX ADMIN — Medication 2 MILLIGRAM(S): at 01:28

## 2025-01-01 RX ADMIN — Medication 2 MILLIGRAM(S): at 23:04

## 2025-01-01 RX ADMIN — Medication 0.5 MILLIGRAM(S): at 17:39

## 2025-01-01 RX ADMIN — Medication 2 MILLIGRAM(S): at 23:19

## 2025-01-01 RX ADMIN — Medication 0.5 MILLIGRAM(S): at 04:42

## 2025-01-01 RX ADMIN — Medication 2 MILLIGRAM(S): at 17:55

## 2025-01-01 RX ADMIN — Medication 2 MILLIGRAM(S): at 10:32

## 2025-01-01 RX ADMIN — Medication 2 MILLIGRAM(S): at 21:12

## 2025-01-01 RX ADMIN — Medication 2 MILLIGRAM(S): at 10:10

## 2025-01-01 RX ADMIN — Medication 0.5 MILLIGRAM(S): at 23:27

## 2025-01-01 RX ADMIN — Medication 2 MILLIGRAM(S): at 14:44

## 2025-01-01 RX ADMIN — Medication 2 MILLIGRAM(S): at 18:18

## 2025-01-01 RX ADMIN — Medication 0.5 MILLIGRAM(S): at 22:40

## 2025-01-01 RX ADMIN — Medication 2 MILLIGRAM(S): at 01:51

## 2025-01-01 RX ADMIN — Medication 0.5 MILLIGRAM(S): at 05:36

## 2025-01-01 RX ADMIN — Medication 0.5 MILLIGRAM(S): at 00:11

## 2025-01-01 RX ADMIN — Medication 650 MILLIGRAM(S): at 14:40

## 2025-01-01 RX ADMIN — Medication 2 MILLIGRAM(S): at 22:02

## 2025-01-01 RX ADMIN — Medication 2 MILLIGRAM(S): at 02:48

## 2025-01-10 ENCOUNTER — EMERGENCY (EMERGENCY)
Facility: HOSPITAL | Age: 80
LOS: 1 days | Discharge: HOME | End: 2025-01-10
Attending: STUDENT IN AN ORGANIZED HEALTH CARE EDUCATION/TRAINING PROGRAM
Payer: MEDICARE

## 2025-01-10 VITALS
OXYGEN SATURATION: 94 % | SYSTOLIC BLOOD PRESSURE: 106 MMHG | RESPIRATION RATE: 19 BRPM | HEART RATE: 88 BPM | DIASTOLIC BLOOD PRESSURE: 80 MMHG

## 2025-01-10 VITALS
WEIGHT: 166.89 LBS | HEIGHT: 63 IN | DIASTOLIC BLOOD PRESSURE: 92 MMHG | OXYGEN SATURATION: 91 % | SYSTOLIC BLOOD PRESSURE: 150 MMHG | RESPIRATION RATE: 20 BRPM | HEART RATE: 56 BPM

## 2025-01-10 DIAGNOSIS — Z98.890 OTHER SPECIFIED POSTPROCEDURAL STATES: Chronic | ICD-10-CM

## 2025-01-10 DIAGNOSIS — Z87.442 PERSONAL HISTORY OF URINARY CALCULI: Chronic | ICD-10-CM

## 2025-01-10 DIAGNOSIS — Z51.5 ENCOUNTER FOR PALLIATIVE CARE: ICD-10-CM

## 2025-01-10 DIAGNOSIS — Z90.89 ACQUIRED ABSENCE OF OTHER ORGANS: Chronic | ICD-10-CM

## 2025-01-10 DIAGNOSIS — G89.3 NEOPLASM RELATED PAIN (ACUTE) (CHRONIC): ICD-10-CM

## 2025-01-10 DIAGNOSIS — C34.90 MALIGNANT NEOPLASM OF UNSPECIFIED PART OF UNSPECIFIED BRONCHUS OR LUNG: ICD-10-CM

## 2025-01-10 LAB
ALBUMIN SERPL ELPH-MCNC: 3.3 G/DL — SIGNIFICANT CHANGE UP (ref 3.3–5)
ALP SERPL-CCNC: 104 U/L — SIGNIFICANT CHANGE UP (ref 40–120)
ALT FLD-CCNC: <5 U/L — LOW (ref 10–45)
ANION GAP SERPL CALC-SCNC: 17 MMOL/L — SIGNIFICANT CHANGE UP (ref 5–17)
APPEARANCE UR: CLEAR — SIGNIFICANT CHANGE UP
APTT BLD: 30.8 SEC — SIGNIFICANT CHANGE UP (ref 24.5–35.6)
AST SERPL-CCNC: <39 U/L — SIGNIFICANT CHANGE UP (ref 10–40)
BACTERIA # UR AUTO: NEGATIVE /HPF — SIGNIFICANT CHANGE UP
BASOPHILS # BLD AUTO: 0.06 K/UL — SIGNIFICANT CHANGE UP (ref 0–0.2)
BASOPHILS NFR BLD AUTO: 0.4 % — SIGNIFICANT CHANGE UP (ref 0–2)
BILIRUB SERPL-MCNC: 0.4 MG/DL — SIGNIFICANT CHANGE UP (ref 0.2–1.2)
BILIRUB UR-MCNC: NEGATIVE — SIGNIFICANT CHANGE UP
BUN SERPL-MCNC: 18 MG/DL — SIGNIFICANT CHANGE UP (ref 7–23)
CALCIUM SERPL-MCNC: 9.4 MG/DL — SIGNIFICANT CHANGE UP (ref 8.4–10.5)
CAST: 0 /LPF — SIGNIFICANT CHANGE UP (ref 0–4)
CHLORIDE SERPL-SCNC: 99 MMOL/L — SIGNIFICANT CHANGE UP (ref 96–108)
CO2 SERPL-SCNC: 20 MMOL/L — LOW (ref 22–31)
COLOR SPEC: YELLOW — SIGNIFICANT CHANGE UP
CREAT SERPL-MCNC: 0.8 MG/DL — SIGNIFICANT CHANGE UP (ref 0.5–1.3)
DIFF PNL FLD: ABNORMAL
EGFR: 89 ML/MIN/1.73M2 — SIGNIFICANT CHANGE UP
EOSINOPHIL # BLD AUTO: 0.06 K/UL — SIGNIFICANT CHANGE UP (ref 0–0.5)
EOSINOPHIL NFR BLD AUTO: 0.4 % — SIGNIFICANT CHANGE UP (ref 0–6)
FLUAV AG NPH QL: SIGNIFICANT CHANGE UP
FLUBV AG NPH QL: SIGNIFICANT CHANGE UP
GAS PNL BLDV: SIGNIFICANT CHANGE UP
GLUCOSE SERPL-MCNC: 140 MG/DL — HIGH (ref 70–99)
GLUCOSE UR QL: 500 MG/DL
HCT VFR BLD CALC: 41.8 % — SIGNIFICANT CHANGE UP (ref 39–50)
HGB BLD-MCNC: 13.6 G/DL — SIGNIFICANT CHANGE UP (ref 13–17)
IMM GRANULOCYTES NFR BLD AUTO: 1.2 % — HIGH (ref 0–0.9)
INR BLD: 1.14 RATIO — SIGNIFICANT CHANGE UP (ref 0.85–1.16)
KETONES UR-MCNC: ABNORMAL MG/DL
LEUKOCYTE ESTERASE UR-ACNC: ABNORMAL
LYMPHOCYTES # BLD AUTO: 0.83 K/UL — LOW (ref 1–3.3)
LYMPHOCYTES # BLD AUTO: 6 % — LOW (ref 13–44)
MCHC RBC-ENTMCNC: 28.8 PG — SIGNIFICANT CHANGE UP (ref 27–34)
MCHC RBC-ENTMCNC: 32.5 G/DL — SIGNIFICANT CHANGE UP (ref 32–36)
MCV RBC AUTO: 88.4 FL — SIGNIFICANT CHANGE UP (ref 80–100)
MONOCYTES # BLD AUTO: 1.35 K/UL — HIGH (ref 0–0.9)
MONOCYTES NFR BLD AUTO: 9.8 % — SIGNIFICANT CHANGE UP (ref 2–14)
NEUTROPHILS # BLD AUTO: 11.3 K/UL — HIGH (ref 1.8–7.4)
NEUTROPHILS NFR BLD AUTO: 82.2 % — HIGH (ref 43–77)
NITRITE UR-MCNC: NEGATIVE — SIGNIFICANT CHANGE UP
NRBC # BLD: 0 /100 WBCS — SIGNIFICANT CHANGE UP (ref 0–0)
PH UR: 7 — SIGNIFICANT CHANGE UP (ref 5–8)
PLATELET # BLD AUTO: 273 K/UL — SIGNIFICANT CHANGE UP (ref 150–400)
POTASSIUM SERPL-MCNC: 6.6 MMOL/L — CRITICAL HIGH (ref 3.5–5.3)
POTASSIUM SERPL-SCNC: 6.6 MMOL/L — CRITICAL HIGH (ref 3.5–5.3)
PROT SERPL-MCNC: 7.8 G/DL — SIGNIFICANT CHANGE UP (ref 6–8.3)
PROT UR-MCNC: 30 MG/DL
PROTHROM AB SERPL-ACNC: 13 SEC — SIGNIFICANT CHANGE UP (ref 9.9–13.4)
RBC # BLD: 4.73 M/UL — SIGNIFICANT CHANGE UP (ref 4.2–5.8)
RBC # FLD: 14.6 % — HIGH (ref 10.3–14.5)
RBC CASTS # UR COMP ASSIST: 4 /HPF — SIGNIFICANT CHANGE UP (ref 0–4)
RSV RNA NPH QL NAA+NON-PROBE: SIGNIFICANT CHANGE UP
SARS-COV-2 RNA SPEC QL NAA+PROBE: SIGNIFICANT CHANGE UP
SODIUM SERPL-SCNC: 136 MMOL/L — SIGNIFICANT CHANGE UP (ref 135–145)
SP GR SPEC: 1.02 — SIGNIFICANT CHANGE UP (ref 1–1.03)
SQUAMOUS # UR AUTO: 0 /HPF — SIGNIFICANT CHANGE UP (ref 0–5)
TROPONIN T, HIGH SENSITIVITY RESULT: 23 NG/L — SIGNIFICANT CHANGE UP (ref 0–51)
UROBILINOGEN FLD QL: 1 MG/DL — SIGNIFICANT CHANGE UP (ref 0.2–1)
WBC # BLD: 13.77 K/UL — HIGH (ref 3.8–10.5)
WBC # FLD AUTO: 13.77 K/UL — HIGH (ref 3.8–10.5)
WBC UR QL: 22 /HPF — HIGH (ref 0–5)

## 2025-01-10 PROCEDURE — 99285 EMERGENCY DEPT VISIT HI MDM: CPT | Mod: FS

## 2025-01-10 PROCEDURE — 70470 CT HEAD/BRAIN W/O & W/DYE: CPT | Mod: 26

## 2025-01-10 PROCEDURE — 71275 CT ANGIOGRAPHY CHEST: CPT | Mod: 26

## 2025-01-10 PROCEDURE — 74177 CT ABD & PELVIS W/CONTRAST: CPT | Mod: 26

## 2025-01-10 PROCEDURE — 99497 ADVNCD CARE PLAN 30 MIN: CPT

## 2025-01-10 PROCEDURE — 72129 CT CHEST SPINE W/DYE: CPT | Mod: 26

## 2025-01-10 PROCEDURE — 99285 EMERGENCY DEPT VISIT HI MDM: CPT

## 2025-01-10 PROCEDURE — 72126 CT NECK SPINE W/DYE: CPT | Mod: 26

## 2025-01-10 PROCEDURE — 72132 CT LUMBAR SPINE W/DYE: CPT | Mod: 26

## 2025-01-10 PROCEDURE — 99498 ADVNCD CARE PLAN ADDL 30 MIN: CPT

## 2025-01-10 RX ORDER — MORPHINE SULFATE 15 MG
1 TABLET, EXTENDED RELEASE ORAL
Qty: 28 | Refills: 0
Start: 2025-01-10 | End: 2025-01-16

## 2025-01-10 RX ORDER — IPRATROPIUM BROMIDE AND ALBUTEROL SULFATE .5; 2.5 MG/3ML; MG/3ML
3 SOLUTION RESPIRATORY (INHALATION)
Refills: 0 | Status: COMPLETED | OUTPATIENT
Start: 2025-01-10 | End: 2025-01-10

## 2025-01-10 RX ORDER — ALBUTEROL SULFATE 90 UG/1
2 INHALANT RESPIRATORY (INHALATION)
Qty: 1 | Refills: 0
Start: 2025-01-10 | End: 2025-01-12

## 2025-01-10 RX ORDER — PREDNISONE 5 MG
1 TABLET ORAL
Qty: 5 | Refills: 0
Start: 2025-01-10 | End: 2025-01-14

## 2025-01-10 RX ORDER — LIDOCAINE 50 MG/G
1 OINTMENT TOPICAL ONCE
Refills: 0 | Status: COMPLETED | OUTPATIENT
Start: 2025-01-10 | End: 2025-01-10

## 2025-01-10 RX ORDER — ACETAMINOPHEN 80 MG/.8ML
1000 SOLUTION/ DROPS ORAL ONCE
Refills: 0 | Status: COMPLETED | OUTPATIENT
Start: 2025-01-10 | End: 2025-01-10

## 2025-01-10 RX ORDER — MORPHINE SULFATE 15 MG
15 TABLET, EXTENDED RELEASE ORAL ONCE
Refills: 0 | Status: DISCONTINUED | OUTPATIENT
Start: 2025-01-10 | End: 2025-01-10

## 2025-01-10 RX ADMIN — IPRATROPIUM BROMIDE AND ALBUTEROL SULFATE 3 MILLILITER(S): .5; 2.5 SOLUTION RESPIRATORY (INHALATION) at 08:30

## 2025-01-10 RX ADMIN — Medication 15 MILLIGRAM(S): at 12:33

## 2025-01-10 RX ADMIN — IPRATROPIUM BROMIDE AND ALBUTEROL SULFATE 3 MILLILITER(S): .5; 2.5 SOLUTION RESPIRATORY (INHALATION) at 08:06

## 2025-01-10 RX ADMIN — ACETAMINOPHEN 400 MILLIGRAM(S): 80 SOLUTION/ DROPS ORAL at 08:06

## 2025-01-10 RX ADMIN — IPRATROPIUM BROMIDE AND ALBUTEROL SULFATE 3 MILLILITER(S): .5; 2.5 SOLUTION RESPIRATORY (INHALATION) at 09:16

## 2025-01-10 RX ADMIN — LIDOCAINE 1 PATCH: 50 OINTMENT TOPICAL at 08:06

## 2025-01-10 RX ADMIN — ACETAMINOPHEN 1000 MILLIGRAM(S): 80 SOLUTION/ DROPS ORAL at 08:30

## 2025-01-10 NOTE — ED PROVIDER NOTE - NSFOLLOWUPINSTRUCTIONS_ED_ALL_ED_FT
Please follow up with your primary care doctor in 1-3 days.   *Bring all printed lab/test results to your appointment(s).*    Continue all home medications as prescribed.    For pain:  Morphine 15mg every 6 hrs as needed for severe pain. DO NOT TAKE WITH ALCOHOL OR WHEN DRIVING.   Take acetaminophen 500-1000mg every 6 hrs as needed for pain. DO NOT EXCEED 4000mg DAILY.    Lidocaine 4% patch to back/chest wall twice a day - available over the counter (e.g. salonpas).    Take steroids as prescribed  Use Albuterol as needed for shortness of breath/wheezing    Return for worsening shortness of breath, worsening pain, dizziness, weakness, fevers, inability to hold down food/drinks, or any other concerns.

## 2025-01-10 NOTE — ED PROVIDER NOTE - CLINICAL SUMMARY MEDICAL DECISION MAKING FREE TEXT BOX
79-year-old male history A-fib on aspirin (previously on Eliquis) HTN diabetes aortic stenosis severe mitral regurg CHF COPD/50-pack-year smoker, new lung cancer history which he does not want treatment for/pending imaging BIBA for home for back pain x1 month with difficulty ambulating, neck pain, abdominal distension, decreased PO intake. on arrival hypoxic 88% on RA (spouse unsure baseline spo2 status with copd) normotensive afebrile. chronically ill appearing/unkempt, diffuse rhonchi/wheezing no respiratory distress, midline lower thoracolumbar ttp w/out deformity, abdomen distended, left sided tenderness. ddx metastatic dz (including but not limited to spine, brain, bones, abdomen) acute spinal compression/fracture from metastatic disease, PE, ACS, PNA, electrolyte derangement, jennifer, infectious (UTI, PNA, spinal epidural abscess). Labs with cbc, cmp, troponin, coags, CT head/cervical/chest/abd/pelvis, analgesics, most likely tba medicine if no actionable surgical pathology - Tk Tran PA-C 79-year-old male history A-fib on aspirin (previously on Eliquis) HTN diabetes aortic stenosis severe mitral regurg CHF COPD/50-pack-year smoker, new lung cancer history which he does not want treatment for/pending imaging BIBA for home for back pain x1 month with difficulty ambulating, neck pain, abdominal distension, decreased PO intake. on arrival hypoxic 88% on RA (spouse unsure baseline spo2 status with copd) normotensive afebrile. chronically ill appearing/unkempt, diffuse rhonchi/wheezing no respiratory distress, midline lower thoracolumbar ttp w/out deformity, abdomen distended, left sided tenderness. ddx metastatic dz (including but not limited to spine, brain, bones, abdomen) acute spinal compression/fracture from metastatic disease, PE, ACS, PNA, electrolyte derangement, jennifer, infectious (UTI, PNA, spinal epidural abscess). Labs with cbc, cmp, troponin, coags, CT head/cervical/chest/abd/pelvis, analgesics, most likely tba medicine if no actionable surgical pathology - Tk Tran PA-C    Attending Tay: See attending attestation.

## 2025-01-10 NOTE — CONSULT NOTE ADULT - SUBJECTIVE AND OBJECTIVE BOX
Date of Service:01-10-25 @ 13:49  HPI: 79-year-old male history A-fib on aspirin (previously on Eliquis) HTN diabetes aortic stenosis severe mitral regurg CHF COPD/50-pack-year smoker, new lung cancer history which he does not want treatment for/pending imaging BIBA for home for back pain x1 month with difficulty ambulating, neck pain, abdominal distension, decreased PO intake. on arrival hypoxic 88% on RA (spouse unsure baseline spo2 status with copd) normotensive afebrile.     Pt seen in ED w wife at bedside. Pt is a & o x 3, c/o diffuse pain (namely neck and back) which has been refractory to tylenol 1G at home. No dyspnea (pt refusing to wear O2, sat 92% on RA). He is tolerating po intake.   Pt and wife are well aware of his diagnosis of metastatic lung cancer which is progressing. Pt was diagnosed at [facility] and has not returned for follow up as he is not interested in pursuing treatment.  Wife reports at home pt with increasing care needs, she sees he is clinically deteriorating and supports his decisions to do things "his way". Of note: pt's son passed away 2 years ago, wife has noticed a significant decline in pt's status since the death of their son. Pt currently denies feeling depressed. He would like for his pain to be treated and go home.      Pt is ambulatory at home, able to bathe himself. Resides with wife.    Pt actively smokes 1PPD.    PERTINENT PM/SXH:   Hypertension    Kidney stones    Diabetes mellitus, type 2    COPD (chronic obstructive pulmonary disease)    Pulmonary nodules    HLD (hyperlipidemia)    Coronary artery disease    Aortic stenosis    LIMA (obstructive sleep apnea)    Hyperthyroidism    Goiter    Atrial fibrillation    Pericardial effusion    Mitral regurgitation    COPD (chronic obstructive pulmonary disease)    Nonrheumatic mitral valve insufficiency      History of kidney stones    History of tonsillectomy    S/P coronary angiogram      FAMILY HISTORY:    Family Hx substance abuse [ ]yes [ ]no  ITEMS NOT CHECKED ARE NOT PRESENT    SOCIAL HISTORY:   Significant other/partner[ x]  Children[x ]  Episcopalian/Spirituality:  Substance hx:  [ ]   Tobacco hx:  [x ]   Alcohol hx: [ ]   Home Opioid hx:  [x ] I-Stop Reference No: [id number]  Living Situation: [ x]Home  [ ]Long term care  [ ]Rehab [ ]Other    ADVANCE DIRECTIVES:    DNR/MOLST  [ ]  Living Will  [ ]   DECISION MAKER(s):  [ ] Health Care Proxy(s)  [x ] Surrogate(s)  [ ] Guardian           Name(s): Phone Number(s):    BASELINE (I)ADL(s) (prior to admission):  Charles: [ ]Total  [ x] Moderate [ ]Dependent    Allergies    penicillins (Anaphylaxis)    Intolerances    MEDICATIONS  (STANDING):    MEDICATIONS  (PRN):    PRESENT SYMPTOMS: [ ]Unable to self-report  [ ] CPOT [ ] PAINADs [ ] RDOS  Source if other than patient:  [ ]Family   [ ]Team     Pain: [x ]yes [ ]no  QOL impact -  impairs mobility  Location -   diffuse/neck/back                 Aggravating factors - movement  Quality -  Radiation -  Timing- constant   Severity (0-10 scale):  Minimal acceptable level (0-10 scale):     CPOT:    https://www.[facility].org/getattachment/[email or web address]-[email or web address]-7s4x-8358z8299p6z/Critical-Care-Pain-Observation-Tool-(CPOT)    PAINAD Score: See PAINAD tool and score below     Dyspnea:                           [ ]Mild [ ]Moderate [ ]Severe    RDOS: See RDOS tool and score below   0 to 2  minimal or no respiratory distress   3  mild distress  4 to 6 moderate distress  >7 severe distress      Anxiety:                             [ ]Mild [ ]Moderate [ ]Severe  Fatigue:                             [ ]Mild [ x]Moderate [ ]Severe  Nausea:                             [ ]Mild [ ]Moderate [ ]Severe  Loss of appetite:              [ ]Mild [ ]Moderate [ ]Severe  Constipation:                    [ ]Mild [ ]Moderate [ ]Severe    PCSSQ[Palliative Care Spiritual Screening Question]   Severity (0-10):  Score of 4 or > indicate consideration of Chaplaincy referral.  Chaplaincy Referral: [ ] yes [ ] refused [ ] following [ ] Deferred     Caregiver [place]? : [ ] yes [ ] no [ ] Deferred [ ] Declined             Social work referral [ ] Patient & Family Centered Care Referral [ ]     Anticipatory Grief present?:  [ ] yes [ ] no  [ ] Deferred                  Social work referral [ ] Chaplaincy Referral [ ]    		  Other Symptoms:  [ x]All other review of systems negative     Palliative Performance Status Version 2:   See PPSv2 tool and score below          PHYSICAL EXAM:  Vital Signs Last 24 Hrs  T(C): 37.3 (10 Bo 2025 11:10), Max: 37.3 (10 Bo 2025 11:10)  T(F): 99.1 (10 Bo 2025 11:10), Max: 99.1 (10 Bo 2025 11:10)  HR: 88 (10 Bo 2025 12:29) (56 - 93)  BP: 106/80 (10 Bo 2025 12:29) (106/80 - 150/92)  BP(mean): 102 (10 Bo 2025 07:56) (102 - 102)  RR: 19 (10 Bo 2025 12:29) (19 - 21)  SpO2: 94% (10 Bo 2025 12:29) (91% - 97%)    Parameters below as of 10 Bo 2025 12:29  Patient On (Oxygen Delivery Method): room air     I&O's Summary    GENERAL: [ ]Cachexia    [x ]Alert  [x ]Oriented x 3   [ ]Lethargic  [ ]Unarousable  [x ]Verbal  [ ]Non-Verbal  Behavioral:   [ ] Anxiety  [ ] Delirium [ ] Agitation [ ] Other  HEENT:  [x ]Normal   [ ]Dry mouth   [ ]ET Tube/Trach  [ ]Oral lesions  PULMONARY:   [x ]Clear [ ]Tachypnea  [ ]Audible excessive secretions   [ ]Rhonchi        [ ]Right [ ]Left [ ]Bilateral  [ ]Crackles        [ ]Right [ ]Left [ ]Bilateral  [ ]Wheezing     [ ]Right [ ]Left [ ]Bilateral  [ ]Diminished breath sounds [ ]right [ ]left [ ]bilateral  CARDIOVASCULAR:    [x ]Regular [ ]Irregular [ ]Tachy  [ ]Sami [ ]Murmur [ ]Other  GASTROINTESTINAL:  [x ]Soft  [ ]Distended   [ ]+BS  [ ]Non tender [ ]Tender  [ ]Other [ ]PEG [ ]OGT/ NGT  Last BM:  GENITOURINARY:  [ ]Normal [ ] Incontinent   [ ]Oliguria/Anuria   [ ]Flores  MUSCULOSKELETAL:   [ ]Normal   [x ]Weakness  [ ]Bed/Wheelchair bound [ ]Edema  NEUROLOGIC:   [x ]No focal deficits  [ ]Cognitive impairment  [ ]Dysphagia [ ]Dysarthria [ ]Paresis [ ]Other   SKIN:   [ ]Normal  [ ]Rash  [ ]Other  [ ]Pressure ulcer(s)       Present on admission [ ]y [ ]n    CRITICAL CARE:  [ ] Shock Present  [ ]Septic [ ]Cardiogenic [ ]Neurologic [ ]Hypovolemic  [ ]  Vasopressors [ ]  Inotropes   [ ]Respiratory failure present [ ]Mechanical ventilation [ ]Non-invasive ventilatory support [ ]High flow    [ ]Acute  [ ]Chronic [ ]Hypoxic  [ ]Hypercarbic [ ]Other  [ ]Other organ failure     LABS:                        13.6   13.77 )-----------( 273      ( 10 Bo 2025 08:19 )             41.8   01-10    136  |  99  |  18  ----------------------------<  140[H]  6.6[HH]   |  20[L]  |  0.80    Ca    9.4      10 Bo 2025 08:19    TPro  7.8  /  Alb  3.3  /  TBili  0.4  /  DBili  x   /  AST  <39  /  ALT  <5[L]  /  AlkPhos  104  01-10  PT/INR - ( 10 Bo 2025 08:19 )   PT: 13.0 sec;   INR: 1.14 ratio         PTT - ( 10 Bo 2025 08:19 )  PTT:30.8 sec    Urinalysis Basic - ( 10 Bo 2025 08:19 )    Color: x / Appearance: x / SG: x / pH: x  Gluc: 140 mg/dL / Ketone: x  / Bili: x / Urobili: x   Blood: x / Protein: x / Nitrite: x   Leuk Esterase: x / RBC: x / WBC x   Sq Epi: x / Non Sq Epi: x / Bacteria: x      RADIOLOGY & ADDITIONAL STUDIES:   < from: CT Thoracic Spine Reform w/ IV Cont (01.10.25 @ 11:13) >    IMPRESSION:      Degenerative changes of the cervical, thoracic, and lumbar spine as above.    Partial evaluation of a soft tissue lesion within the right lower lobe is   identified which measures at least 4.0 x 3.8 cm.   Scattered bibasilar   atelectatic changes are suggested. Please see report of CT chest for   dedicated evaluation of this lesion and please see report of dedicated CT   chest abdomen and pelvis for dedicated evaluation of the chest, abdomen,   and pelvis.    < end of copied text >  < from: CT Abdomen and Pelvis w/ IV Cont (01.10.25 @ 10:50) >  IMPRESSION:    No pulmonary embolism.    Interval increase of right lower lobe pulmonary mass compatible with lung   cancer.    New multiple metastatic lesions within the liver.    New 4.8 x 2.6 cm expansile metastatic lytic lesion arising from the   posterior left 3rd rib.    < end of copied text >  < from: CT Head w/wo IV Cont (01.10.25 @ 10:49) >  IMPRESSION:    1. Scattered hyperdense lesions in the brain parenchyma suggests treated   metastases or other    2. Ischemic white matter disease and atrophy not atypical for age    3. Intracranial atherosclerosis    4. No adverse interval change August 2024. No interval enhancing lesion   identified to suggest interval metastasis    < end of copied text >      PROTEIN CALORIE MALNUTRITION PRESENT: [ ]mild [ ]moderate [ ]severe [ ]underweight [ ]morbid obesity  https://www.andeal.org/vault/2440/web/files/ONC/Table_Clinical%20Characteristics%20to%20Document%20Malnutrition-White%20JV%20et%20al%202012.pdf    Height (cm): 160 (01-10-25 @ 07:13), 160 (08-15-24 @ 13:49)  Weight (kg): 75.7 (01-10-25 @ 07:13), 70.3 (08-15-24 @ 13:49)  BMI (kg/m2): 29.6 (01-10-25 @ 07:13), 27.5 (08-15-24 @ 13:49)    [ ]PPSV2 < or = to 30% [ ]significant weight loss  [ ]poor nutritional intake  [ ]anasarca[ ]Artificial Nutrition      Other REFERRALS:  [x ]Hospice  [ ]Child Life  [ ]Social Work  [ ]Case management [ ]Holistic Therapy     Goals of Care Document:

## 2025-01-10 NOTE — CONSULT NOTE ADULT - ASSESSMENT
80yo M w recently diagnosed metastatic lung ca, deferring further work up/treatment, electing comfort focused care with plan to initiate home hospice services w [facility].

## 2025-01-10 NOTE — ED ADULT NURSE NOTE - NSFALLRISKINTERV_ED_ALL_ED
Assistance OOB with selected safe patient handling equipment if applicable/Assistance with ambulation/Communicate fall risk and risk factors to all staff, patient, and family/Monitor gait and stability/Provide visual cue: yellow wristband, yellow gown, etc/Reinforce activity limits and safety measures with patient and family/Call bell, personal items and telephone in reach/Instruct patient to call for assistance before getting out of bed/chair/stretcher/Non-slip footwear applied when patient is off stretcher/[place] to call system/Physically safe environment - no spills, clutter or unnecessary equipment/Purposeful Proactive Rounding/Room/bathroom lighting operational, light cord in reach

## 2025-01-10 NOTE — ED PROVIDER NOTE - ATTENDING APP SHARED VISIT CONTRIBUTION OF CARE
Attending Tay: I performed a face to face evaluation of the patient and obtained a history as well as performed a physical exam. I have discussed their management with the HETAL. I have reviewed the HETAL note and agree with the documented findings and plan of care, except as noted. This was a shared visit with an HETAL. I reviewed and verified the documentation and independently performed my own history/exam/medical decision making. My medical decision making and observations are found below. Please refer to any progress notes for updates on clinical course. My notes supersedes the above HETAL note in case of discrepancy    MDM:  78 y/o M w/PMH of A-fib on aspirin (previously on Eliquis), HTN, DM, aortic stenosis, severe mitral regurg, CHF, COPD/50-pack-year smoker presents brought in by ambulance for back pain. Seen w/ wife. Pt reports about 1 month of lower back pain aching interfering with ability to walk around the house.  Had to use a walker last night but could barely get around.  Per EMS patient recently diagnosed with cancer of suspected lung, patient unclear of what testing has been done as of yet.  No analgesics taken for back pain this morning.  Pain radiates down the left leg occasionally.  Secondary complaint of chest pressure and shortness of breath felt this morning when waking up points to left breast says it resolved at this time, was resting when it occurred.  Denies cough or hemoptysis.  Denies urinary retention or incontinence, saddle anesthesia, extremity numbness tingling or weakness.  Denies fever or chills. Additional collateral from wife: Patient has been also reporting acute neck pain for about 2 weeks.  Since the diagnosis of lung cancer patient has not wanting to get his PET/CT scan because he has to abstain from smoking and caffeine for 24 hours, patient is declining care, had gone to an oncologist at [facility] but has not had the imaging yet.  Patient does not wish to be treated for the cancer.    Gen: NAD, AOx3, able to make needs known, non-toxic. chronically ill appearing  Head: NCAT  HEENT: EOMI, oral mucosa moist, normal conjunctiva  Lung: no respiratory distress, coarse breath sounds b/l, speaking in full sentences  CV: RRR, no murmurs  Abd: non distended, soft, LUQ tender, no guarding, no CVA tenderness  MSK: no visible deformities. midline lower thoracic tenderness. Lower lumbar paraspinal tenderness  Neuro: Appears non focal  Skin: Warm, well perfused  Psych: normal affect     Pt chronically ill appearing, here w/ multiple complaints. Will eval for occult spine fractures as cause of back pain given untreated malignancy. Will eval for PE given chest pain and cancer/smoking hx. Eval for ACS. Eval for CHF. W/ abdominal tenderness will eval for acute surgical abdominal pathology. Will provide analgesia. No fevers to suspect infectious process at this time. Plan for labs, imaging, EKG, meds. Will reassess the need for additional interventions as clinically warranted. Refer to any progress notes for updates on clinical course and as a continuation of this MDM.     I, Dr. Max Cross, independently interpreted the EKG which showed sinus rhythm w/ occasional PACs at a rate of 93.

## 2025-01-10 NOTE — ED PROVIDER NOTE - PATIENT PORTAL LINK FT
You can access the FollowMyHealth Patient Portal offered by [facility] by registering at the following website: [email or web address]. By joining [employer]’s FollowMyHealth portal, you will also be able to view your health information using other applications (apps) compatible with our system.

## 2025-01-10 NOTE — CHART NOTE - NSCHARTNOTEFT_GEN_A_CORE
ED SW-    LMSW consulted by ED Palliative MD for patient in ED requesting home hospice referral. Per chart, patient is an 80 y/o pmhx of HTN, DM, CHF, COPD, Afib, and recently diagnosed with metastatic lung CA. Per ED Palliative MD, patient declining further treatment and would like to return home with hospice. ED Palliative MD requested LMSW to send referral to [facility] for home hospice and requested to expedite.     LMSW send [facility] referral for home hospice services. LMSW received response via careport that referral received and will be reviewed by liarichard Unger [phone]. LMSW contacted liaison Ute to determine if referral can be expedited. Per Ute, [facility] staffing is low today with high volume of referrals. However, per Ute, all attempts will be made to review patient's referral asap, and patient can anticipate [facility] follow up within 1-2 days. LMSW made ED Palliative MD aware. Per ED Palliative MD, patient eager to return home, is independent with ADL's, and has support from spouse. Thus, per ED Palliative MD, patient will be discharged home with medications until hospice services start within 1-2 days, which patient and spouse are in agreement to.    LMSW met with patient and spouse/emergency contact Laura Hutchinsonjuvencio [phone] at bedside to introduce self and role. Patient appeared a&ox4. Patient and Spouse verbalized understanding to SW role. Patient and spouse confirmed agreement to home hospice via [facility]. LMSW educated patient and spouse to home hospice and answered questions regarding services. Patient and spouse verbalized they are comfortable returning home with hospice follow up in 1-2 days as patient is independent, and spouse is present to assist as needed. LMSW provided spouse with [facility] phone # if further questions arise. Patient and spouse greatly appreciative and denied further questions/concerns at this time. ED Palliative MD and ED Attending made aware of above. No further SW needs. SW to remain available.

## 2025-01-10 NOTE — ED PROVIDER NOTE - MUSCULOSKELETAL, MLM
Spine appears normal, + midline lower thoracolumbar tenderness; + L paraspinal ttp; range of motion is not limited, no joint tenderness/swelling

## 2025-01-10 NOTE — ED ADULT NURSE NOTE - OBJECTIVE STATEMENT
Pt presents to ED from home complaining of LL back pain and neck pain x1 month w/ episodes of urinary/fecal incontinence and difficulty ambulating that has gotten progressively worse since last month. Pt denies chest pain, chest palpitations, SOB, fever/chills, n/v/d, trauma to the back. PMH of HTN,, HLD, COPD, Nonrheumatic mitral valve insufficiency, Mitral valve regurgitations, A-Fib, Pericardial effusion, Hypothyroid, LIMA, DM2, Lung CA- in which pt refuses treatment at this time. Pt presents at baseline mental status, AAOx4. Bladder scan performed as ordered w/ 141 mL in bladder, and pt voided 135 CC of clear, yellow urine. Plan of care and monitoring discussed with pt. Bed locked and lowered for safety. Pt placed on 1L NC d/t  and repositioned to facilitate comfort.

## 2025-01-10 NOTE — CONSULT NOTE ADULT - TIME BILLING
Total time spent including the following  [x] Physical chart review and documentation   An extensive review of the physical chart, electronic health record, and documentation was conducted to obtain collateral information including but not limited to:   - Current inpatient records (ED, H&P, primary team, and consultants [   ])   - Inpatient values/results (CBC, CMP, Imaging)   - Outpatient records   - Prior inpatient records (if available)   - Social work assessments   - Current or proposed treatment plans   - Pharmacotherapy review (including I-STOP if applicable)  []review of medical literature related to pathogenesis, disease/illness progress, treatment and/or prognosis  [x]care coordination  [x]discussion with the primary team  []discussion with floor staff  []discussion with consultant(s)  [x]discussion with the patient, surrogate decision maker, or family  [x]Physical Exam and/or review of systems   [x]Formulation of assessment and plan   [x]Evaluating for response to treatment and side effects of opioids or benzodiazepines      The _35_____ minutes spent in ACP (       35          ) were independent to the time spent in time based billing

## 2025-01-10 NOTE — CONSULT NOTE ADULT - PROBLEM SELECTOR RECOMMENDATION 9
no further work up/disease directed therapies  Hospice eligible, life expectancy < 6 months.   cont best supportive care

## 2025-01-10 NOTE — CONSULT NOTE ADULT - PROBLEM SELECTOR RECOMMENDATION 2
start morphine IR 7.5-15mg q4h PRN mod-severe pain/dyspnea (pt to receive one week supply at dc)  daily bowel regimen w miralax    Pt and wife educated on safe opioid utilization at home, all questions answered in detail

## 2025-01-10 NOTE — CONSULT NOTE ADULT - CONVERSATION DETAILS
Clinical condition reviewed.   Palliative service introduced. Wife requested palliative involvement in pt's care.    Pt confirms his understanding of having metastatic disease and confirms his wishes to forgo any further work up or treatment. What is important to pt is remaining home where he is most comfortable, where he can continue smoking and having his coffee.     Introduced Hospice philosophy of care.  Pt and wife confirm wishes to return home w hospice services.   DNR/DNI, no further testing/blood draws or work up   Pt does not want O2 supplementation- he understands the risk of progressing to resp failure, cardiac arrest and death.  Explained symptom focused care, pt wants medication focused on maintaining his comfort at home.

## 2025-01-10 NOTE — ED PROVIDER NOTE - PROGRESS NOTE DETAILS
Attending Tay: family requesting palliative consult. spoke w/ Dr. Crespo who will come speak w/ pt and wife. CTs demonstrating soft tissue lesion within the right lower lobe, bibasilar atelectatic changes grossly unchanged from prior imaging.  Suspected metastatic intracranial lesions likely unchanged from previous imaging.  Degenerative changes of cervical, thoracic lumbar spine without evidence of metastatic disease however limited by CT.  Patient was evaluated by palliative care team Dr. Mckenzie, pt is declining further care, reports he is interested in home hospice with supportive care at home.  She is contacting social work to see if home hospice can be arranged within 1 week.  In the meantime would plan for ED team for discharge with a 1 week of pain medications. - Tk Tran PA-C CTs demonstrating soft tissue lesion within the right lower lobe, bibasilar atelectatic changes grossly unchanged from prior imaging.  Suspected metastatic intracranial lesions likely unchanged from previous imaging. Left rib lytic lesion. Degenerative changes of cervical, thoracic lumbar spine without evidence of metastatic disease however limited by CT.  Patient was evaluated by palliative care team Dr. Crespo, pt is declining further care, reports he is interested in home hospice with supportive care at home.  She is contacting social work to see if home hospice can be arranged within 1 week.  In the meantime would plan for ED team for discharge with a 1 week of pain medications. - kT Tran PA-C Social work sent [facility] referral for home hospice services.  Patient is eager to return home, is independent with ADLs, demonstrated ability to ambulate independently with ER staff. pts spouse/health care proxy reports she received a call from hospice, is in the process of arranging apptment. Will DC with 1 week of analgesics PRN for severe pain, steroids/albuterol for copd exacerbation. Will dc with follow up. Discussed plan and return precautions with patient who understands and agrees. All questions answered. - Tk Tran PA-C

## 2025-01-10 NOTE — ED ADULT NURSE NOTE - CHPI ED NUR SYMPTOMS NEG
no anorexia/no constipation/no difficulty bearing weight/no motor function loss/no numbness/no tingling

## 2025-01-10 NOTE — ED PROVIDER NOTE - OBJECTIVE STATEMENT
79-year-old male history A-fib on aspirin (previously on Eliquis) HTN diabetes aortic stenosis severe mitral regurg CHF COPD/50-pack-year smoker presents brought in by ambulance for back pain.  Patient reports about 1 month of lower back pain aching interfering with ability to walk around the house.  Had to use a walker last night but could barely get around.  Per EMS patient recently diagnosed with cancer of suspected lung, patient unclear of what testing has been done as of yet.  No analgesics taken for back pain this morning.  Pain radiates down the left leg occasionally.  Secondary complaint of chest pressure and shortness of breath felt this morning when waking up points to left breast says it resolved at this time, was resting when it occurred.  Denies cough or hemoptysis.  Denies urinary retention or incontinence, saddle anesthesia, extremity numbness tingling or weakness.  Denies fever or chills. 79-year-old male history A-fib on aspirin (previously on Eliquis) HTN diabetes aortic stenosis severe mitral regurg CHF COPD/50-pack-year smoker presents brought in by ambulance for back pain.  Patient reports about 1 month of lower back pain aching interfering with ability to walk around the house.  Had to use a walker last night but could barely get around.  Per EMS patient recently diagnosed with cancer of suspected lung, patient unclear of what testing has been done as of yet.  No analgesics taken for back pain this morning.  Pain radiates down the left leg occasionally.  Secondary complaint of chest pressure and shortness of breath felt this morning when waking up points to left breast says it resolved at this time, was resting when it occurred.  Denies cough or hemoptysis.  Denies urinary retention or incontinence, saddle anesthesia, extremity numbness tingling or weakness.  Denies fever or chills.  additional collateral from wife: Patient has been also reporting acute neck pain for about 2 weeks.  Since the diagnosis of lung cancer patient has not wanting to get his PET/CT scan because he has to abstain from smoking and caffeine for 24 hours, patient is declining care, had gone to an oncologist at [facility] but has not had the imaging yet.  Patient does not wish to be treated for the cancer.  Wife denies decreased energy level, abdominal distention, decreased p.o. intake but denies noticing focal weakness, headaches, confusion.

## 2025-01-10 NOTE — CONSULT NOTE ADULT - PROBLEM SELECTOR RECOMMENDATION 3
GOC as above  dc home from ED  HCN referral in place w expedited SOC  SW assistance appreciated   DNR/DNI, comfort measures only reflected on MOLST       All questions answered in detail  Support provided

## 2025-01-10 NOTE — ED PROVIDER NOTE - CARE PLAN
Principal Discharge DX:	Lung cancer metastatic to bone  Secondary Diagnosis:	Pain due to neoplasm  Secondary Diagnosis:	Palliative care encounter   1

## 2025-01-11 LAB
CULTURE RESULTS: SIGNIFICANT CHANGE UP
SPECIMEN SOURCE: SIGNIFICANT CHANGE UP

## 2025-01-13 PROCEDURE — 85025 COMPLETE CBC W/AUTO DIFF WBC: CPT

## 2025-01-13 PROCEDURE — 83605 ASSAY OF LACTIC ACID: CPT

## 2025-01-13 PROCEDURE — 82435 ASSAY OF BLOOD CHLORIDE: CPT

## 2025-01-13 PROCEDURE — 80053 COMPREHEN METABOLIC PANEL: CPT

## 2025-01-13 PROCEDURE — 85730 THROMBOPLASTIN TIME PARTIAL: CPT

## 2025-01-13 PROCEDURE — 74177 CT ABD & PELVIS W/CONTRAST: CPT | Mod: MC

## 2025-01-13 PROCEDURE — 70470 CT HEAD/BRAIN W/O & W/DYE: CPT | Mod: MC

## 2025-01-13 PROCEDURE — 94640 AIRWAY INHALATION TREATMENT: CPT

## 2025-01-13 PROCEDURE — 87637 SARSCOV2&INF A&B&RSV AMP PRB: CPT

## 2025-01-13 PROCEDURE — 71275 CT ANGIOGRAPHY CHEST: CPT | Mod: MC

## 2025-01-13 PROCEDURE — 84132 ASSAY OF SERUM POTASSIUM: CPT

## 2025-01-13 PROCEDURE — 72126 CT NECK SPINE W/DYE: CPT | Mod: MC

## 2025-01-13 PROCEDURE — 84145 PROCALCITONIN (PCT): CPT

## 2025-01-13 PROCEDURE — 81001 URINALYSIS AUTO W/SCOPE: CPT

## 2025-01-13 PROCEDURE — 87086 URINE CULTURE/COLONY COUNT: CPT

## 2025-01-13 PROCEDURE — 84295 ASSAY OF SERUM SODIUM: CPT

## 2025-01-13 PROCEDURE — 82330 ASSAY OF CALCIUM: CPT

## 2025-01-13 PROCEDURE — 83880 ASSAY OF NATRIURETIC PEPTIDE: CPT

## 2025-01-13 PROCEDURE — 85018 HEMOGLOBIN: CPT

## 2025-01-13 PROCEDURE — 85610 PROTHROMBIN TIME: CPT

## 2025-01-13 PROCEDURE — 93005 ELECTROCARDIOGRAM TRACING: CPT

## 2025-01-13 PROCEDURE — 99285 EMERGENCY DEPT VISIT HI MDM: CPT | Mod: 25

## 2025-01-13 PROCEDURE — 85014 HEMATOCRIT: CPT

## 2025-01-13 PROCEDURE — 96374 THER/PROPH/DIAG INJ IV PUSH: CPT | Mod: XU

## 2025-01-13 PROCEDURE — 82947 ASSAY GLUCOSE BLOOD QUANT: CPT

## 2025-01-13 PROCEDURE — 84484 ASSAY OF TROPONIN QUANT: CPT

## 2025-01-13 PROCEDURE — 82803 BLOOD GASES ANY COMBINATION: CPT

## 2025-03-19 ENCOUNTER — APPOINTMENT (OUTPATIENT)
Dept: PULMONOLOGY | Facility: CLINIC | Age: 80
End: 2025-03-19

## 2025-05-28 NOTE — PATIENT PROFILE ADULT - NSPROPTRIGHTBILLOFRIGHTS_GEN_A_NUR
Instructions from Today's Visit:    The blood flow to the artery to the colon is narrowed with cholesterol build up (atherosclerosis).   Start the aspirin 81mg daily.     I put in the vascular referral to .     Treating High Cholesterol - Start rosuvastatin 10mg once daily.   Statin medications lower cholesterol, stabilize plaque and reduce risk of heart attack and stroke.   Take your cholesterol medication once daily.   After starting this medication, it is important to recheck fasting cholesterol and liver tests in 8-12 weeks.   Reach out if you develop muscle aches or cramps while on your statin medicine.   Do not drink grapefruit juice or eat grapefruit while on this medication.     Other Heart Healthy Habits  Limit animal fats (meat and high fat dairy), fried foods and processed foods  Get regular exercise (walking, running, biking, swimming, weight lifting, etc)  Weight management or weight loss if indicated  Eat a diet rich in fruits, vegetables and whole grains   Limit alcohol  If you use tobacco or vape- stop smoking.                General Instructions After Your Visit  If you have been seen for a concern and are worsening or not improving as expected, please schedule a follow-up visit or reach out to a member of our care team or nurses if it is urgent.  We have Nurse advice available 24/7 by calling [phone].  For emergencies, please call [phone].    My Clinic Hours  I am in the office Mondays, Wednesdays, and Thursdays. Messages received outside of normal clinic hours and on my days out of the office will be reviewed by our [place] care team, but may not be addressed by me personally until I am back in the office. If you have concerns that cannot wait for my return please call the Nurse advise line [phone].    Test results  You may see your lab or test results before we can make recommendations. This is common, as sometimes we are awaiting on other labs to return or we are out of  office on a particular day. Please be patient, and if you don't see a response from me or one of my colleagues within 2-3 business days, and you have a specific concern, please send us a message.    Refills  If you have run out of refills, please schedule a visit. This is generally an indication that you are due for a follow-up visit. All prescriptions are only valid for 1 year from the date written and need a visit annually to renew. Most mental health and chronic diseases we are treating (diabetes, high blood pressure, etc) require some type of visit every 6 months. We are now offering video visits! However, if physical exams are needed or it is a complex concern, we may ask you to be seen in person.    Physicals & Preventative Visits   These appointment slots fill up fast. Please consider scheduling these 2-3 months in advance to allow for the appointment time that fits you best. If you have medications ordered or other issues addressed that are not preventive at these visits, please be aware there are extra costs associated with this.       patient